# Patient Record
Sex: FEMALE | Race: WHITE | Employment: FULL TIME | URBAN - METROPOLITAN AREA
[De-identification: names, ages, dates, MRNs, and addresses within clinical notes are randomized per-mention and may not be internally consistent; named-entity substitution may affect disease eponyms.]

---

## 2017-01-05 ENCOUNTER — HOSPITAL ENCOUNTER (OUTPATIENT)
Dept: PAIN MANAGEMENT | Age: 62
Discharge: HOME | End: 2017-01-05
Attending: PAIN MEDICINE | Admitting: PAIN MEDICINE

## 2017-01-05 VITALS
OXYGEN SATURATION: 98 % | DIASTOLIC BLOOD PRESSURE: 86 MMHG | TEMPERATURE: 97.5 F | RESPIRATION RATE: 16 BRPM | WEIGHT: 130 LBS | SYSTOLIC BLOOD PRESSURE: 130 MMHG | BODY MASS INDEX: 20.89 KG/M2 | HEIGHT: 66 IN | HEART RATE: 70 BPM

## 2017-01-05 ASSESSMENT — PAIN - FUNCTIONAL ASSESSMENT: PAIN_FUNCTIONAL_ASSESSMENT: 0-10

## 2017-01-05 ASSESSMENT — ACTIVITIES OF DAILY LIVING (ADL): EFFECT OF PAIN ON DAILY ACTIVITIES: WALKING AND STANDING

## 2017-01-05 ASSESSMENT — PAIN DESCRIPTION - DESCRIPTORS: DESCRIPTORS: ACHING

## 2017-02-16 ENCOUNTER — HOSPITAL ENCOUNTER (OUTPATIENT)
Dept: PAIN MANAGEMENT | Age: 62
Discharge: HOME | End: 2017-02-16
Attending: PAIN MEDICINE | Admitting: PAIN MEDICINE

## 2017-02-16 VITALS
HEART RATE: 71 BPM | SYSTOLIC BLOOD PRESSURE: 139 MMHG | RESPIRATION RATE: 16 BRPM | DIASTOLIC BLOOD PRESSURE: 93 MMHG | OXYGEN SATURATION: 100 % | HEIGHT: 66 IN | TEMPERATURE: 98.4 F | WEIGHT: 130 LBS | BODY MASS INDEX: 20.89 KG/M2

## 2017-02-16 ASSESSMENT — PAIN DESCRIPTION - DESCRIPTORS: DESCRIPTORS: BURNING;SHARP;STABBING

## 2017-02-16 ASSESSMENT — PAIN - FUNCTIONAL ASSESSMENT: PAIN_FUNCTIONAL_ASSESSMENT: 0-10

## 2017-02-16 ASSESSMENT — ACTIVITIES OF DAILY LIVING (ADL): EFFECT OF PAIN ON DAILY ACTIVITIES: EVERYTHING

## 2017-03-23 ENCOUNTER — HOSPITAL ENCOUNTER (OUTPATIENT)
Dept: PAIN MANAGEMENT | Age: 62
Discharge: HOME | End: 2017-03-23
Attending: PAIN MEDICINE | Admitting: PAIN MEDICINE

## 2017-03-23 VITALS
TEMPERATURE: 98 F | BODY MASS INDEX: 20.89 KG/M2 | RESPIRATION RATE: 16 BRPM | WEIGHT: 130 LBS | HEART RATE: 77 BPM | DIASTOLIC BLOOD PRESSURE: 70 MMHG | HEIGHT: 66 IN | OXYGEN SATURATION: 98 % | SYSTOLIC BLOOD PRESSURE: 120 MMHG

## 2017-03-23 PROCEDURE — 64636 DESTROY L/S FACET JNT ADDL: CPT | Performed by: PAIN MEDICINE

## 2017-03-23 PROCEDURE — 77003 FLUOROGUIDE FOR SPINE INJECT: CPT | Performed by: PAIN MEDICINE

## 2017-03-23 PROCEDURE — 64635 DESTROY LUMB/SAC FACET JNT: CPT | Performed by: PAIN MEDICINE

## 2017-03-23 RX ORDER — SODIUM CHLORIDE, SODIUM LACTATE, POTASSIUM CHLORIDE, CALCIUM CHLORIDE 600; 310; 30; 20 MG/100ML; MG/100ML; MG/100ML; MG/100ML
INJECTION, SOLUTION INTRAVENOUS CONTINUOUS
Status: DISCONTINUED | OUTPATIENT
Start: 2017-03-23 | End: 2017-03-24 | Stop reason: HOSPADM

## 2017-03-23 RX ADMIN — SODIUM CHLORIDE, SODIUM LACTATE, POTASSIUM CHLORIDE, CALCIUM CHLORIDE: 600; 310; 30; 20 INJECTION, SOLUTION INTRAVENOUS at 11:15

## 2017-03-23 ASSESSMENT — PAIN - FUNCTIONAL ASSESSMENT: PAIN_FUNCTIONAL_ASSESSMENT: 0-10

## 2017-03-23 ASSESSMENT — ACTIVITIES OF DAILY LIVING (ADL): EFFECT OF PAIN ON DAILY ACTIVITIES: WALKING

## 2017-03-23 ASSESSMENT — PAIN DESCRIPTION - DESCRIPTORS: DESCRIPTORS: SHARP

## 2017-05-25 ENCOUNTER — HOSPITAL ENCOUNTER (OUTPATIENT)
Dept: PAIN MANAGEMENT | Age: 62
Discharge: HOME | End: 2017-05-25
Attending: PAIN MEDICINE | Admitting: PAIN MEDICINE

## 2017-05-25 VITALS
BODY MASS INDEX: 16.07 KG/M2 | RESPIRATION RATE: 16 BRPM | HEART RATE: 84 BPM | HEIGHT: 66 IN | OXYGEN SATURATION: 98 % | DIASTOLIC BLOOD PRESSURE: 82 MMHG | WEIGHT: 100 LBS | SYSTOLIC BLOOD PRESSURE: 138 MMHG | TEMPERATURE: 98 F

## 2017-05-25 PROCEDURE — 64483 NJX AA&/STRD TFRM EPI L/S 1: CPT | Performed by: PAIN MEDICINE

## 2017-05-25 PROCEDURE — 64484 NJX AA&/STRD TFRM EPI L/S EA: CPT | Performed by: PAIN MEDICINE

## 2017-05-25 ASSESSMENT — PAIN - FUNCTIONAL ASSESSMENT
PAIN_FUNCTIONAL_ASSESSMENT: 0-10
PAIN_FUNCTIONAL_ASSESSMENT: 0-10

## 2017-05-25 ASSESSMENT — ACTIVITIES OF DAILY LIVING (ADL): EFFECT OF PAIN ON DAILY ACTIVITIES: WALKING

## 2017-05-25 ASSESSMENT — PAIN DESCRIPTION - DESCRIPTORS: DESCRIPTORS: ACHING;BURNING

## 2017-07-12 ENCOUNTER — HOSPITAL ENCOUNTER (OUTPATIENT)
Dept: ENDOSCOPY | Age: 62
Discharge: HOME | End: 2017-07-12
Attending: INTERNAL MEDICINE | Admitting: INTERNAL MEDICINE

## 2017-07-12 VITALS
TEMPERATURE: 98.5 F | DIASTOLIC BLOOD PRESSURE: 74 MMHG | RESPIRATION RATE: 20 BRPM | BODY MASS INDEX: 20.89 KG/M2 | HEIGHT: 66 IN | HEART RATE: 80 BPM | SYSTOLIC BLOOD PRESSURE: 116 MMHG | OXYGEN SATURATION: 100 % | WEIGHT: 130 LBS

## 2019-05-25 ENCOUNTER — APPOINTMENT (OUTPATIENT)
Dept: GENERAL RADIOLOGY | Age: 64
End: 2019-05-25
Payer: COMMERCIAL

## 2019-05-25 ENCOUNTER — APPOINTMENT (OUTPATIENT)
Dept: CT IMAGING | Age: 64
End: 2019-05-25
Payer: COMMERCIAL

## 2019-05-25 ENCOUNTER — HOSPITAL ENCOUNTER (EMERGENCY)
Age: 64
Discharge: HOME | End: 2019-05-25
Attending: EMERGENCY MEDICINE
Payer: COMMERCIAL

## 2019-05-25 VITALS
TEMPERATURE: 99.9 F | OXYGEN SATURATION: 97 % | SYSTOLIC BLOOD PRESSURE: 91 MMHG | HEART RATE: 95 BPM | BODY MASS INDEX: 20.98 KG/M2 | DIASTOLIC BLOOD PRESSURE: 67 MMHG | WEIGHT: 130 LBS | RESPIRATION RATE: 18 BRPM

## 2019-05-25 DIAGNOSIS — S40.011A CONTUSION OF RIGHT SHOULDER, INITIAL ENCOUNTER: ICD-10-CM

## 2019-05-25 DIAGNOSIS — S30.0XXA CONTUSION OF COCCYX, INITIAL ENCOUNTER: ICD-10-CM

## 2019-05-25 DIAGNOSIS — S09.90XA INJURY OF HEAD, INITIAL ENCOUNTER: Primary | ICD-10-CM

## 2019-05-25 PROCEDURE — 70450 CT HEAD/BRAIN W/O DYE: CPT

## 2019-05-25 PROCEDURE — 73030 X-RAY EXAM OF SHOULDER: CPT

## 2019-05-25 PROCEDURE — 6370000000 HC RX 637 (ALT 250 FOR IP): Performed by: EMERGENCY MEDICINE

## 2019-05-25 PROCEDURE — 72100 X-RAY EXAM L-S SPINE 2/3 VWS: CPT

## 2019-05-25 PROCEDURE — 99284 EMERGENCY DEPT VISIT MOD MDM: CPT

## 2019-05-25 RX ORDER — PROCHLORPERAZINE MALEATE 10 MG
10 TABLET ORAL EVERY 6 HOURS PRN
Qty: 21 TABLET | Refills: 0 | Status: SHIPPED | OUTPATIENT
Start: 2019-05-25 | End: 2019-12-09 | Stop reason: ALTCHOICE

## 2019-05-25 RX ORDER — HYDROCODONE BITARTRATE AND ACETAMINOPHEN 5; 325 MG/1; MG/1
1 TABLET ORAL ONCE
Status: COMPLETED | OUTPATIENT
Start: 2019-05-25 | End: 2019-05-25

## 2019-05-25 RX ADMIN — HYDROCODONE BITARTRATE AND ACETAMINOPHEN 1 TABLET: 5; 325 TABLET ORAL at 02:42

## 2019-05-25 ASSESSMENT — ENCOUNTER SYMPTOMS
ABDOMINAL PAIN: 0
BACK PAIN: 1
SHORTNESS OF BREATH: 0

## 2019-05-25 ASSESSMENT — PAIN SCALES - GENERAL: PAINLEVEL_OUTOF10: 9

## 2019-05-25 ASSESSMENT — PAIN DESCRIPTION - PAIN TYPE: TYPE: ACUTE PAIN

## 2019-05-25 ASSESSMENT — PAIN DESCRIPTION - LOCATION: LOCATION: HEAD

## 2019-05-25 NOTE — ED NOTES
Pt discharged to home, alert and oriented. Denies any questions about discharge instructions. Will follow up as directed. encouraged to return for any worsening symptoms.         Zaira Jewell RN  05/25/19 [id number]

## 2019-05-25 NOTE — ED PROVIDER NOTES
[facility]          ATTENDING PHYSICIAN NOTE       Date of evaluation: 5/25/2019    Chief Complaint     Fall (fell two days ago, head pain, tailbone pain; evaluated and imaged at [facility])      History of Present Illness     Nisha Kurtz is a 59 y.o. female who presents with right shoulder pain and headache in buttock pain after a mechanical fall a few days ago. She states that she was briefly knocked out for a period of a few seconds but otherwise has good recall of events. The headache is dull achy no other aggravating or relieving factors not associate with any focal neurologic complaints nausea or photophobia. She denies any neck pain. The right shoulder pain is dull achy minimally worse with movement certainly by rest with no other aggravating leading factors. Denies any focal neurologic complaints. Review of Systems     Review of Systems   Constitutional: Negative for chills and fever. Respiratory: Negative for shortness of breath. Cardiovascular: Negative for chest pain and palpitations. Gastrointestinal: Negative for abdominal pain. Musculoskeletal: Positive for back pain. Neurological: Positive for headaches. Negative for weakness and numbness. All other systems reviewed and are negative. Past Medical, Surgical, Family, and Social History     She has a past medical history of Chronic back pain, Depression, and Fracture. She has a past surgical history that includes Neck surgery; Tubal ligation; and Cholecystectomy. Her family history is not on file. She reports that she has quit smoking. She does not have any smokeless tobacco history on file. She reports that she drinks alcohol. She reports that she does not use drugs. Medications     Previous Medications    ACYCLOVIR (ZOVIRAX) 200 MG CAPSULE    Take 200 mg by mouth every 4 hours (while awake).       DULOXETINE HCL (CYMBALTA PO)    Take by mouth    GABAPENTIN PO    Take by mouth IBUPROFEN (IBU) 600 MG TABLET    Take 1 tablet by mouth every 8 hours as needed for Pain    MULTIVITAMIN (THERAGRAN) PER TABLET    Take 1 tablet by mouth daily. OXYCODONE-ACETAMINOPHEN (PERCOCET) 5-325 MG PER TABLET    Take 1 tablet by mouth 2 times daily  Fill on or After 03/22/17. OXYCODONE-ACETAMINOPHEN (PERCOCET) 5-325 MG PER TABLET    Take 1 tablet by mouth 2 times daily  [facility] RX [id number]. Allergies     She is allergic to latex; vicodin [hydrocodone-acetaminophen]; and zocor [simvastatin]. Physical Exam     INITIAL VITALS: BP: 121/67, Temp: 99.9 °F (37.7 °C), Pulse: 91, Resp: 20, SpO2: 100 %    Physical Exam   Constitutional: She is oriented to person, place, and time. She appears well-developed and well-nourished. HENT:   Head: Normocephalic and atraumatic. Right Ear: External ear normal.   Left Ear: External ear normal.   Eyes: Pupils are equal, round, and reactive to light. EOM are normal.   Neck: Normal range of motion and full passive range of motion without pain. Neck supple. No spinous process tenderness and no muscular tenderness present. No neck rigidity. Normal range of motion present. Cardiovascular: Normal rate, regular rhythm, normal heart sounds and intact distal pulses. Exam reveals no gallop and no friction rub. No murmur heard. Pulmonary/Chest: Effort normal and breath sounds normal.   Abdominal: Soft. Musculoskeletal: Normal range of motion. Back:    Neurological: She is alert and oriented to person, place, and time. Skin: Skin is warm and dry. Capillary refill takes less than 2 seconds. Psychiatric: She has a normal mood and affect. Her behavior is normal.       Diagnostic Results     EKG       RADIOLOGY:  XR SHOULDER RIGHT (MIN 2 VIEWS)   Final Result     No acute findings. XR LUMBAR SPINE (2-3 VIEWS)   Final Result       Mild levoscoliosis. No acute fracture.   Multilevel moderate degenerative    disc disease with disc height loss and facet arthrosis. Grade 1    anterolisthesis of L3 on L4, L4 on L5, and L5 on S1.      CT HEAD WO CONTRAST   Final Result     No acute hemorrhage, hydrocephalus, or mass effect. LABS:   No results found for this visit on 05/25/19. ED BEDSIDE ULTRASOUND:      RECENT VITALS:  BP: 121/67,Temp: 99.9 °F (37.7 °C), Pulse: 91, Resp: 20, SpO2: 100 %     Procedures     The above history and physical exam are performed. I reviewed her past medical past social and family history. She was given appropriate analgesia in the emergency department. ED Course     Nursing Notes, Past Medical Hx, Past Surgical Hx, Social Hx,Allergies, and Family Hx were reviewed. The patient was given the following medications:  Orders Placed This Encounter   Medications    HYDROcodone-acetaminophen (NORCO) 5-325 MG per tablet 1 tablet       CONSULTS:  None    MEDICAL DECISIONMAKING / ASSESSMENT / Sheila Ave is a 59 y.o. female with history of mechanical fall with minimal head trauma probably a grade 1 concussion. Her films were essentially unremarkable slightly she is stable for discharge and to be safely follow-up on outpatient basis. She is given appropriate analgesia in the emergency department. .      Clinical Impression     1. Injury of head, initial encounter    2. Contusion of right shoulder, initial encounter    3. Contusion of coccyx, initial encounter        Disposition     PATIENT REFERRED TO:  No follow-up provider specified.     DISCHARGE MEDICATIONS:  New Prescriptions    No medications on file       DISPOSITION Decision To Discharge 05/25/2019 03:49:26 AM        Isis Barcenas MD  05/25/19 1170

## 2019-05-25 NOTE — LETTER
[facility] Emergency Department  [address] [place] [address]  Phone: [phone]  Fax: [phone]         May 25, 2019     Patient: Ally Guerrero   YOB: 1955   Date of Visit: 5/25/2019       To Whom It May Concern:    Fan Nieto was seen and treated in our emergency department on 5/25/2019. The following work duties are recommended.     She may return to work on 5/27/19          Sincerely,        Esa Lepe RN        Signature:__________________________________

## 2019-05-26 ENCOUNTER — ANESTHESIA EVENT (OUTPATIENT)
Dept: OPERATING ROOM | Age: 64
DRG: 463 | End: 2019-05-26
Payer: COMMERCIAL

## 2019-05-26 ENCOUNTER — APPOINTMENT (OUTPATIENT)
Dept: CT IMAGING | Age: 64
DRG: 463 | End: 2019-05-26
Payer: COMMERCIAL

## 2019-05-26 ENCOUNTER — APPOINTMENT (OUTPATIENT)
Dept: GENERAL RADIOLOGY | Age: 64
DRG: 463 | End: 2019-05-26
Payer: COMMERCIAL

## 2019-05-26 ENCOUNTER — ANESTHESIA (OUTPATIENT)
Dept: OPERATING ROOM | Age: 64
DRG: 463 | End: 2019-05-26
Payer: COMMERCIAL

## 2019-05-26 ENCOUNTER — HOSPITAL ENCOUNTER (INPATIENT)
Age: 64
LOS: 16 days | Discharge: NURSING, REHAB OR LONG-TERM CARE | DRG: 463 | End: 2019-06-11
Attending: EMERGENCY MEDICINE | Admitting: SURGERY
Payer: COMMERCIAL

## 2019-05-26 VITALS — DIASTOLIC BLOOD PRESSURE: 38 MMHG | SYSTOLIC BLOOD PRESSURE: 68 MMHG | TEMPERATURE: 101.3 F | OXYGEN SATURATION: 96 %

## 2019-05-26 DIAGNOSIS — L03.315 CELLULITIS OF PERINEUM: ICD-10-CM

## 2019-05-26 DIAGNOSIS — L03.317 CELLULITIS OF GLUTEAL REGION: Primary | ICD-10-CM

## 2019-05-26 LAB
ANION GAP SERPL CALCULATED.3IONS-SCNC: 12 MMOL/L (ref 3–16)
ANION GAP SERPL CALCULATED.3IONS-SCNC: 16 MMOL/L (ref 3–16)
ATYPICAL LYMPHOCYTE RELATIVE PERCENT: 1 % (ref 0–6)
BANDED NEUTROPHILS RELATIVE PERCENT: 20 % (ref 0–7)
BANDED NEUTROPHILS RELATIVE PERCENT: 6 % (ref 0–7)
BASE EXCESS ARTERIAL: -6.7 MMOL/L (ref -3–3)
BASE EXCESS VENOUS: -2 (ref -3–3)
BASE EXCESS VENOUS: -3 (ref -3–3)
BASOPHILS ABSOLUTE: 0 K/UL (ref 0–0.2)
BASOPHILS ABSOLUTE: 0 K/UL (ref 0–0.2)
BASOPHILS RELATIVE PERCENT: 0 %
BASOPHILS RELATIVE PERCENT: 0 %
BUN BLDV-MCNC: 8 MG/DL (ref 7–20)
BUN BLDV-MCNC: 9 MG/DL (ref 7–20)
C-REACTIVE PROTEIN: 505.4 MG/L (ref 0–5.1)
CALCIUM SERPL-MCNC: 7.9 MG/DL (ref 8.3–10.6)
CALCIUM SERPL-MCNC: 9.4 MG/DL (ref 8.3–10.6)
CARBOXYHEMOGLOBIN ARTERIAL: 1.1 % (ref 0–1.5)
CHLORIDE BLD-SCNC: 101 MMOL/L (ref 99–110)
CHLORIDE BLD-SCNC: 106 MMOL/L (ref 99–110)
CO2: 19 MMOL/L (ref 21–32)
CO2: 21 MMOL/L (ref 21–32)
CREAT SERPL-MCNC: 0.7 MG/DL (ref 0.6–1.2)
CREAT SERPL-MCNC: 0.7 MG/DL (ref 0.6–1.2)
EOSINOPHILS ABSOLUTE: 0 K/UL (ref 0–0.6)
EOSINOPHILS ABSOLUTE: 0.1 K/UL (ref 0–0.6)
EOSINOPHILS RELATIVE PERCENT: 0 %
EOSINOPHILS RELATIVE PERCENT: 1 %
GFR AFRICAN AMERICAN: >60
GFR AFRICAN AMERICAN: >60
GFR NON-AFRICAN AMERICAN: >60
GFR NON-AFRICAN AMERICAN: >60
GLUCOSE BLD-MCNC: 117 MG/DL (ref 70–99)
GLUCOSE BLD-MCNC: 126 MG/DL (ref 70–99)
HCO3 ARTERIAL: 19 MMOL/L (ref 21–29)
HCO3 VENOUS: 22.1 MMOL/L (ref 23–29)
HCO3 VENOUS: 22.2 MMOL/L (ref 23–29)
HCT VFR BLD CALC: 28.7 % (ref 36–48)
HCT VFR BLD CALC: 34.3 % (ref 36–48)
HEMOGLOBIN, ART, EXTENDED: 8.8 G/DL
HEMOGLOBIN: 11.2 G/DL (ref 12–16)
HEMOGLOBIN: 9.4 G/DL (ref 12–16)
LACTATE: 1.32 MMOL/L (ref 0.4–2)
LACTATE: 1.99 MMOL/L (ref 0.4–2)
LACTIC ACID: 1.8 MMOL/L (ref 0.4–2)
LACTIC ACID: 2.7 MMOL/L (ref 0.4–2)
LYMPHOCYTES ABSOLUTE: 0.8 K/UL (ref 1–5.1)
LYMPHOCYTES ABSOLUTE: 1 K/UL (ref 1–5.1)
LYMPHOCYTES RELATIVE PERCENT: 5 %
LYMPHOCYTES RELATIVE PERCENT: 7 %
MAGNESIUM: 1.2 MG/DL (ref 1.8–2.4)
MCH RBC QN AUTO: 30.1 PG (ref 26–34)
MCH RBC QN AUTO: 30.3 PG (ref 26–34)
MCHC RBC AUTO-ENTMCNC: 32.6 G/DL (ref 31–36)
MCHC RBC AUTO-ENTMCNC: 32.7 G/DL (ref 31–36)
MCV RBC AUTO: 92.3 FL (ref 80–100)
MCV RBC AUTO: 92.7 FL (ref 80–100)
METAMYELOCYTES RELATIVE PERCENT: 3 %
METHEMOGLOBIN ARTERIAL: 0.9 % (ref 0–1.4)
MONOCYTES ABSOLUTE: 0.8 K/UL (ref 0–1.3)
MONOCYTES ABSOLUTE: 1.1 K/UL (ref 0–1.3)
MONOCYTES RELATIVE PERCENT: 6 %
MONOCYTES RELATIVE PERCENT: 8 %
MYELOCYTE PERCENT: 1 %
MYELOCYTE PERCENT: 1 %
NEUTROPHILS ABSOLUTE: 11 K/UL (ref 1.7–7.7)
NEUTROPHILS ABSOLUTE: 11.6 K/UL (ref 1.7–7.7)
NEUTROPHILS RELATIVE PERCENT: 61 %
NEUTROPHILS RELATIVE PERCENT: 80 %
O2 SAT, ARTERIAL: 99 % (ref 93–100)
O2 SAT, VEN: 49 %
O2 SAT, VEN: 50 %
PCO2 ARTERIAL: 43.5 MMHG (ref 35–45)
PCO2, VEN: 31.3 MM HG (ref 40–50)
PCO2, VEN: 36.9 MM HG (ref 40–50)
PDW BLD-RTO: 13.1 % (ref 12.4–15.4)
PDW BLD-RTO: 13.3 % (ref 12.4–15.4)
PERFORMED ON: ABNORMAL
PERFORMED ON: ABNORMAL
PH ARTERIAL: 7.27 (ref 7.35–7.45)
PH VENOUS: 7.38 (ref 7.35–7.45)
PH VENOUS: 7.46 (ref 7.35–7.45)
PLATELET # BLD: 243 K/UL (ref 135–450)
PLATELET # BLD: 291 K/UL (ref 135–450)
PMV BLD AUTO: 8.1 FL (ref 5–10.5)
PMV BLD AUTO: 8.3 FL (ref 5–10.5)
PO2 ARTERIAL: 190 MMHG (ref 75–108)
PO2, VEN: 25 MM HG
PO2, VEN: 27 MM HG
POC SAMPLE TYPE: ABNORMAL
POC SAMPLE TYPE: ABNORMAL
POTASSIUM REFLEX MAGNESIUM: 4 MMOL/L (ref 3.5–5.1)
POTASSIUM REFLEX MAGNESIUM: 4.1 MMOL/L (ref 3.5–5.1)
RBC # BLD: 3.1 M/UL (ref 4–5.2)
RBC # BLD: 3.71 M/UL (ref 4–5.2)
RBC # BLD: NORMAL 10*6/UL
RBC # BLD: NORMAL 10*6/UL
REASON FOR REJECTION: NORMAL
REJECTED TEST: NORMAL
SODIUM BLD-SCNC: 137 MMOL/L (ref 136–145)
SODIUM BLD-SCNC: 138 MMOL/L (ref 136–145)
TCO2 ARTERIAL: 21 MMOL/L
TCO2 CALC VENOUS: 23 MMOL/L
TCO2 CALC VENOUS: 23 MMOL/L
WBC # BLD: 12.7 K/UL (ref 4–11)
WBC # BLD: 13.7 K/UL (ref 4–11)

## 2019-05-26 PROCEDURE — 6360000002 HC RX W HCPCS: Performed by: SURGERY

## 2019-05-26 PROCEDURE — 3700000000 HC ANESTHESIA ATTENDED CARE: Performed by: SURGERY

## 2019-05-26 PROCEDURE — 37799 UNLISTED PX VASCULAR SURGERY: CPT

## 2019-05-26 PROCEDURE — 87116 MYCOBACTERIA CULTURE: CPT

## 2019-05-26 PROCEDURE — 87040 BLOOD CULTURE FOR BACTERIA: CPT

## 2019-05-26 PROCEDURE — 3700000001 HC ADD 15 MINUTES (ANESTHESIA): Performed by: SURGERY

## 2019-05-26 PROCEDURE — 7100000001 HC PACU RECOVERY - ADDTL 15 MIN: Performed by: SURGERY

## 2019-05-26 PROCEDURE — 96375 TX/PRO/DX INJ NEW DRUG ADDON: CPT

## 2019-05-26 PROCEDURE — 96376 TX/PRO/DX INJ SAME DRUG ADON: CPT

## 2019-05-26 PROCEDURE — 86850 RBC ANTIBODY SCREEN: CPT

## 2019-05-26 PROCEDURE — 86140 C-REACTIVE PROTEIN: CPT

## 2019-05-26 PROCEDURE — 2500000003 HC RX 250 WO HCPCS: Performed by: ANESTHESIOLOGY

## 2019-05-26 PROCEDURE — 0JBB0ZZ EXCISION OF PERINEUM SUBCUTANEOUS TISSUE AND FASCIA, OPEN APPROACH: ICD-10-PCS | Performed by: STUDENT IN AN ORGANIZED HEALTH CARE EDUCATION/TRAINING PROGRAM

## 2019-05-26 PROCEDURE — 86900 BLOOD TYPING SEROLOGIC ABO: CPT

## 2019-05-26 PROCEDURE — 2709999900 HC NON-CHARGEABLE SUPPLY: Performed by: SURGERY

## 2019-05-26 PROCEDURE — 71045 X-RAY EXAM CHEST 1 VIEW: CPT

## 2019-05-26 PROCEDURE — 2580000003 HC RX 258: Performed by: STUDENT IN AN ORGANIZED HEALTH CARE EDUCATION/TRAINING PROGRAM

## 2019-05-26 PROCEDURE — 86901 BLOOD TYPING SEROLOGIC RH(D): CPT

## 2019-05-26 PROCEDURE — 36415 COLL VENOUS BLD VENIPUNCTURE: CPT

## 2019-05-26 PROCEDURE — 2500000003 HC RX 250 WO HCPCS: Performed by: INTERNAL MEDICINE

## 2019-05-26 PROCEDURE — 2580000003 HC RX 258: Performed by: INTERNAL MEDICINE

## 2019-05-26 PROCEDURE — 6360000002 HC RX W HCPCS: Performed by: STUDENT IN AN ORGANIZED HEALTH CARE EDUCATION/TRAINING PROGRAM

## 2019-05-26 PROCEDURE — 87206 SMEAR FLUORESCENT/ACID STAI: CPT

## 2019-05-26 PROCEDURE — 96361 HYDRATE IV INFUSION ADD-ON: CPT

## 2019-05-26 PROCEDURE — 80048 BASIC METABOLIC PNL TOTAL CA: CPT

## 2019-05-26 PROCEDURE — 87077 CULTURE AEROBIC IDENTIFY: CPT

## 2019-05-26 PROCEDURE — 87102 FUNGUS ISOLATION CULTURE: CPT

## 2019-05-26 PROCEDURE — 86403 PARTICLE AGGLUT ANTBDY SCRN: CPT

## 2019-05-26 PROCEDURE — 6360000002 HC RX W HCPCS: Performed by: INTERNAL MEDICINE

## 2019-05-26 PROCEDURE — 3600000013 HC SURGERY LEVEL 3 ADDTL 15MIN: Performed by: SURGERY

## 2019-05-26 PROCEDURE — 6370000000 HC RX 637 (ALT 250 FOR IP): Performed by: ANESTHESIOLOGY

## 2019-05-26 PROCEDURE — 83735 ASSAY OF MAGNESIUM: CPT

## 2019-05-26 PROCEDURE — 11004 DBRDMT SKIN XTRNL GENT&PER: CPT | Performed by: SURGERY

## 2019-05-26 PROCEDURE — 7100000000 HC PACU RECOVERY - FIRST 15 MIN: Performed by: SURGERY

## 2019-05-26 PROCEDURE — 70450 CT HEAD/BRAIN W/O DYE: CPT

## 2019-05-26 PROCEDURE — 87070 CULTURE OTHR SPECIMN AEROBIC: CPT

## 2019-05-26 PROCEDURE — 87015 SPECIMEN INFECT AGNT CONCNTJ: CPT

## 2019-05-26 PROCEDURE — 99253 IP/OBS CNSLTJ NEW/EST LOW 45: CPT | Performed by: SURGERY

## 2019-05-26 PROCEDURE — 72170 X-RAY EXAM OF PELVIS: CPT

## 2019-05-26 PROCEDURE — 83036 HEMOGLOBIN GLYCOSYLATED A1C: CPT

## 2019-05-26 PROCEDURE — 99285 EMERGENCY DEPT VISIT HI MDM: CPT

## 2019-05-26 PROCEDURE — 82803 BLOOD GASES ANY COMBINATION: CPT

## 2019-05-26 PROCEDURE — 83605 ASSAY OF LACTIC ACID: CPT

## 2019-05-26 PROCEDURE — 2000000000 HC ICU R&B

## 2019-05-26 PROCEDURE — 96365 THER/PROPH/DIAG IV INF INIT: CPT

## 2019-05-26 PROCEDURE — 72193 CT PELVIS W/DYE: CPT

## 2019-05-26 PROCEDURE — 85025 COMPLETE CBC W/AUTO DIFF WBC: CPT

## 2019-05-26 PROCEDURE — 6360000004 HC RX CONTRAST MEDICATION: Performed by: EMERGENCY MEDICINE

## 2019-05-26 PROCEDURE — 3600000003 HC SURGERY LEVEL 3 BASE: Performed by: SURGERY

## 2019-05-26 PROCEDURE — 87205 SMEAR GRAM STAIN: CPT

## 2019-05-26 PROCEDURE — 2580000003 HC RX 258: Performed by: SURGERY

## 2019-05-26 PROCEDURE — 6360000002 HC RX W HCPCS: Performed by: ANESTHESIOLOGY

## 2019-05-26 PROCEDURE — 87186 SC STD MICRODIL/AGAR DIL: CPT

## 2019-05-26 RX ORDER — DICLOFENAC SODIUM 75 MG/1
75 TABLET, DELAYED RELEASE ORAL 2 TIMES DAILY
Status: ON HOLD | COMMUNITY
End: 2019-12-10 | Stop reason: HOSPADM

## 2019-05-26 RX ORDER — LACTOBACILLUS RHAMNOSUS GG 10B CELL
1 CAPSULE ORAL 2 TIMES DAILY
Status: DISCONTINUED | OUTPATIENT
Start: 2019-05-26 | End: 2019-05-26

## 2019-05-26 RX ORDER — DEXAMETHASONE SODIUM PHOSPHATE 4 MG/ML
INJECTION, SOLUTION INTRA-ARTICULAR; INTRALESIONAL; INTRAMUSCULAR; INTRAVENOUS; SOFT TISSUE PRN
Status: DISCONTINUED | OUTPATIENT
Start: 2019-05-26 | End: 2019-05-26 | Stop reason: SDUPTHER

## 2019-05-26 RX ORDER — OXYCODONE HYDROCHLORIDE 5 MG/1
5 TABLET ORAL EVERY 8 HOURS PRN
Status: DISCONTINUED | OUTPATIENT
Start: 2019-05-26 | End: 2019-05-26

## 2019-05-26 RX ORDER — MINERAL OIL AND WHITE PETROLATUM 150; 830 MG/G; MG/G
OINTMENT OPHTHALMIC PRN
Status: DISCONTINUED | OUTPATIENT
Start: 2019-05-26 | End: 2019-05-26 | Stop reason: SDUPTHER

## 2019-05-26 RX ORDER — OXYCODONE HYDROCHLORIDE 5 MG/1
5 TABLET ORAL EVERY 8 HOURS PRN
Status: ON HOLD | COMMUNITY
End: 2019-06-11 | Stop reason: SDUPTHER

## 2019-05-26 RX ORDER — HYDRALAZINE HYDROCHLORIDE 20 MG/ML
5 INJECTION INTRAMUSCULAR; INTRAVENOUS EVERY 10 MIN PRN
Status: DISCONTINUED | OUTPATIENT
Start: 2019-05-26 | End: 2019-05-26 | Stop reason: HOSPADM

## 2019-05-26 RX ORDER — PROCHLORPERAZINE MALEATE 10 MG
10 TABLET ORAL EVERY 6 HOURS PRN
Status: DISCONTINUED | OUTPATIENT
Start: 2019-05-26 | End: 2019-05-26

## 2019-05-26 RX ORDER — ACYCLOVIR 200 MG/1
200 CAPSULE ORAL
Status: DISCONTINUED | OUTPATIENT
Start: 2019-05-26 | End: 2019-05-26

## 2019-05-26 RX ORDER — PROPOFOL 10 MG/ML
INJECTION, EMULSION INTRAVENOUS PRN
Status: DISCONTINUED | OUTPATIENT
Start: 2019-05-26 | End: 2019-05-26 | Stop reason: SDUPTHER

## 2019-05-26 RX ORDER — 0.9 % SODIUM CHLORIDE 0.9 %
1000 INTRAVENOUS SOLUTION INTRAVENOUS ONCE
Status: COMPLETED | OUTPATIENT
Start: 2019-05-26 | End: 2019-05-26

## 2019-05-26 RX ORDER — MIDAZOLAM HYDROCHLORIDE 1 MG/ML
INJECTION INTRAMUSCULAR; INTRAVENOUS PRN
Status: DISCONTINUED | OUTPATIENT
Start: 2019-05-26 | End: 2019-05-26 | Stop reason: SDUPTHER

## 2019-05-26 RX ORDER — GABAPENTIN 600 MG/1
600 TABLET ORAL 3 TIMES DAILY
COMMUNITY
End: 2021-07-02 | Stop reason: ALTCHOICE

## 2019-05-26 RX ORDER — HYDROMORPHONE HCL 110MG/55ML
PATIENT CONTROLLED ANALGESIA SYRINGE INTRAVENOUS PRN
Status: DISCONTINUED | OUTPATIENT
Start: 2019-05-26 | End: 2019-05-26 | Stop reason: SDUPTHER

## 2019-05-26 RX ORDER — ROCURONIUM BROMIDE 10 MG/ML
INJECTION, SOLUTION INTRAVENOUS PRN
Status: DISCONTINUED | OUTPATIENT
Start: 2019-05-26 | End: 2019-05-26 | Stop reason: SDUPTHER

## 2019-05-26 RX ORDER — MEPERIDINE HYDROCHLORIDE 25 MG/ML
12.5 INJECTION INTRAMUSCULAR; INTRAVENOUS; SUBCUTANEOUS EVERY 5 MIN PRN
Status: DISCONTINUED | OUTPATIENT
Start: 2019-05-26 | End: 2019-05-26 | Stop reason: HOSPADM

## 2019-05-26 RX ORDER — SODIUM CHLORIDE 0.9 % (FLUSH) 0.9 %
10 SYRINGE (ML) INJECTION PRN
Status: DISCONTINUED | OUTPATIENT
Start: 2019-05-26 | End: 2019-06-11 | Stop reason: HOSPADM

## 2019-05-26 RX ORDER — FENTANYL CITRATE 50 UG/ML
25 INJECTION, SOLUTION INTRAMUSCULAR; INTRAVENOUS EVERY 5 MIN PRN
Status: DISCONTINUED | OUTPATIENT
Start: 2019-05-26 | End: 2019-05-26 | Stop reason: HOSPADM

## 2019-05-26 RX ORDER — MAGNESIUM HYDROXIDE 1200 MG/15ML
LIQUID ORAL CONTINUOUS PRN
Status: COMPLETED | OUTPATIENT
Start: 2019-05-26 | End: 2019-05-26

## 2019-05-26 RX ORDER — 0.9 % SODIUM CHLORIDE 0.9 %
500 INTRAVENOUS SOLUTION INTRAVENOUS
Status: DISCONTINUED | OUTPATIENT
Start: 2019-05-26 | End: 2019-05-26 | Stop reason: HOSPADM

## 2019-05-26 RX ORDER — LIDOCAINE HYDROCHLORIDE 20 MG/ML
INJECTION, SOLUTION INTRAVENOUS PRN
Status: DISCONTINUED | OUTPATIENT
Start: 2019-05-26 | End: 2019-05-26 | Stop reason: SDUPTHER

## 2019-05-26 RX ORDER — FENTANYL CITRATE 50 UG/ML
INJECTION, SOLUTION INTRAMUSCULAR; INTRAVENOUS PRN
Status: DISCONTINUED | OUTPATIENT
Start: 2019-05-26 | End: 2019-05-26 | Stop reason: SDUPTHER

## 2019-05-26 RX ORDER — MORPHINE SULFATE 2 MG/ML
4 INJECTION, SOLUTION INTRAMUSCULAR; INTRAVENOUS EVERY 4 HOURS PRN
Status: DISCONTINUED | OUTPATIENT
Start: 2019-05-26 | End: 2019-05-26

## 2019-05-26 RX ORDER — CLINDAMYCIN PHOSPHATE 600 MG/50ML
600 INJECTION INTRAVENOUS EVERY 8 HOURS
Status: DISCONTINUED | OUTPATIENT
Start: 2019-05-26 | End: 2019-05-27

## 2019-05-26 RX ORDER — ONDANSETRON 2 MG/ML
4 INJECTION INTRAMUSCULAR; INTRAVENOUS EVERY 6 HOURS PRN
Status: DISCONTINUED | OUTPATIENT
Start: 2019-05-26 | End: 2019-06-11 | Stop reason: HOSPADM

## 2019-05-26 RX ORDER — LABETALOL 20 MG/4 ML (5 MG/ML) INTRAVENOUS SYRINGE
5 EVERY 10 MIN PRN
Status: DISCONTINUED | OUTPATIENT
Start: 2019-05-26 | End: 2019-05-26 | Stop reason: HOSPADM

## 2019-05-26 RX ORDER — IBUPROFEN 200 MG
400 TABLET ORAL EVERY 8 HOURS PRN
Status: DISCONTINUED | OUTPATIENT
Start: 2019-05-26 | End: 2019-05-26

## 2019-05-26 RX ORDER — VENLAFAXINE 100 MG/1
100 TABLET ORAL 2 TIMES DAILY
COMMUNITY

## 2019-05-26 RX ORDER — OXYCODONE HYDROCHLORIDE AND ACETAMINOPHEN 5; 325 MG/1; MG/1
1 TABLET ORAL ONCE
Status: DISCONTINUED | OUTPATIENT
Start: 2019-05-26 | End: 2019-05-26

## 2019-05-26 RX ORDER — SUCCINYLCHOLINE/SOD CL,ISO/PF 200MG/10ML
SYRINGE (ML) INTRAVENOUS PRN
Status: DISCONTINUED | OUTPATIENT
Start: 2019-05-26 | End: 2019-05-26 | Stop reason: SDUPTHER

## 2019-05-26 RX ORDER — ACETAMINOPHEN 325 MG/1
650 TABLET ORAL EVERY 4 HOURS PRN
Status: DISCONTINUED | OUTPATIENT
Start: 2019-05-26 | End: 2019-06-07

## 2019-05-26 RX ORDER — METOCLOPRAMIDE HYDROCHLORIDE 5 MG/ML
INJECTION INTRAMUSCULAR; INTRAVENOUS PRN
Status: DISCONTINUED | OUTPATIENT
Start: 2019-05-26 | End: 2019-05-26 | Stop reason: SDUPTHER

## 2019-05-26 RX ORDER — DIPHENHYDRAMINE HYDROCHLORIDE 50 MG/ML
12.5 INJECTION INTRAMUSCULAR; INTRAVENOUS
Status: DISCONTINUED | OUTPATIENT
Start: 2019-05-26 | End: 2019-05-26 | Stop reason: HOSPADM

## 2019-05-26 RX ORDER — GABAPENTIN 600 MG/1
600 TABLET ORAL 3 TIMES DAILY
Status: DISCONTINUED | OUTPATIENT
Start: 2019-05-26 | End: 2019-05-26

## 2019-05-26 RX ORDER — MORPHINE SULFATE 4 MG/ML
4 INJECTION, SOLUTION INTRAMUSCULAR; INTRAVENOUS ONCE
Status: COMPLETED | OUTPATIENT
Start: 2019-05-26 | End: 2019-05-26

## 2019-05-26 RX ORDER — ONDANSETRON 2 MG/ML
4 INJECTION INTRAMUSCULAR; INTRAVENOUS EVERY 30 MIN PRN
Status: DISCONTINUED | OUTPATIENT
Start: 2019-05-26 | End: 2019-05-26 | Stop reason: HOSPADM

## 2019-05-26 RX ORDER — SODIUM CHLORIDE, SODIUM LACTATE, POTASSIUM CHLORIDE, CALCIUM CHLORIDE 600; 310; 30; 20 MG/100ML; MG/100ML; MG/100ML; MG/100ML
INJECTION, SOLUTION INTRAVENOUS CONTINUOUS
Status: DISCONTINUED | OUTPATIENT
Start: 2019-05-26 | End: 2019-05-29

## 2019-05-26 RX ORDER — SODIUM CHLORIDE 0.9 % (FLUSH) 0.9 %
10 SYRINGE (ML) INJECTION EVERY 12 HOURS SCHEDULED
Status: DISCONTINUED | OUTPATIENT
Start: 2019-05-26 | End: 2019-06-11 | Stop reason: HOSPADM

## 2019-05-26 RX ORDER — SODIUM CHLORIDE 9 MG/ML
INJECTION, SOLUTION INTRAVENOUS CONTINUOUS
Status: DISCONTINUED | OUTPATIENT
Start: 2019-05-26 | End: 2019-05-26

## 2019-05-26 RX ORDER — PROMETHAZINE HYDROCHLORIDE 25 MG/ML
6.25 INJECTION, SOLUTION INTRAMUSCULAR; INTRAVENOUS
Status: DISCONTINUED | OUTPATIENT
Start: 2019-05-26 | End: 2019-05-26 | Stop reason: HOSPADM

## 2019-05-26 RX ORDER — M-VIT,TX,IRON,MINS/CALC/FOLIC 27MG-0.4MG
1 TABLET ORAL DAILY
Status: DISCONTINUED | OUTPATIENT
Start: 2019-05-26 | End: 2019-05-26

## 2019-05-26 RX ADMIN — PHENYLEPHRINE HYDROCHLORIDE 160 MCG: 10 INJECTION, SOLUTION INTRAMUSCULAR; INTRAVENOUS; SUBCUTANEOUS at 21:26

## 2019-05-26 RX ADMIN — PHENYLEPHRINE HYDROCHLORIDE 160 MCG: 10 INJECTION, SOLUTION INTRAMUSCULAR; INTRAVENOUS; SUBCUTANEOUS at 21:35

## 2019-05-26 RX ADMIN — PHENYLEPHRINE HYDROCHLORIDE 160 MCG: 10 INJECTION, SOLUTION INTRAMUSCULAR; INTRAVENOUS; SUBCUTANEOUS at 21:20

## 2019-05-26 RX ADMIN — CLINDAMYCIN PHOSPHATE 600 MG: 600 INJECTION, SOLUTION INTRAVENOUS at 19:03

## 2019-05-26 RX ADMIN — PHENYLEPHRINE HYDROCHLORIDE 160 MCG: 10 INJECTION, SOLUTION INTRAMUSCULAR; INTRAVENOUS; SUBCUTANEOUS at 20:54

## 2019-05-26 RX ADMIN — PHENYLEPHRINE HYDROCHLORIDE 320 MCG: 10 INJECTION, SOLUTION INTRAMUSCULAR; INTRAVENOUS; SUBCUTANEOUS at 21:42

## 2019-05-26 RX ADMIN — MORPHINE SULFATE 4 MG: 4 INJECTION INTRAVENOUS at 15:34

## 2019-05-26 RX ADMIN — FENTANYL CITRATE 100 MCG: 50 INJECTION INTRAMUSCULAR; INTRAVENOUS at 20:26

## 2019-05-26 RX ADMIN — SODIUM CHLORIDE 1000 ML: 9 INJECTION, SOLUTION INTRAVENOUS at 18:12

## 2019-05-26 RX ADMIN — Medication 120 MG: at 20:26

## 2019-05-26 RX ADMIN — LIDOCAINE HYDROCHLORIDE 100 MG: 20 INJECTION, SOLUTION INTRAVENOUS at 20:26

## 2019-05-26 RX ADMIN — Medication 10 ML: at 23:41

## 2019-05-26 RX ADMIN — MORPHINE SULFATE 4 MG: 4 INJECTION INTRAVENOUS at 13:18

## 2019-05-26 RX ADMIN — PHENYLEPHRINE HYDROCHLORIDE 320 MCG: 10 INJECTION, SOLUTION INTRAMUSCULAR; INTRAVENOUS; SUBCUTANEOUS at 21:48

## 2019-05-26 RX ADMIN — PROPOFOL 150 MG: 10 INJECTION, EMULSION INTRAVENOUS at 20:26

## 2019-05-26 RX ADMIN — MIDAZOLAM HYDROCHLORIDE 2 MG: 2 INJECTION, SOLUTION INTRAMUSCULAR; INTRAVENOUS at 20:25

## 2019-05-26 RX ADMIN — PHENYLEPHRINE HYDROCHLORIDE 80 MCG: 10 INJECTION, SOLUTION INTRAMUSCULAR; INTRAVENOUS; SUBCUTANEOUS at 22:00

## 2019-05-26 RX ADMIN — VANCOMYCIN HYDROCHLORIDE 1250 MG: 10 INJECTION, POWDER, LYOPHILIZED, FOR SOLUTION INTRAVENOUS at 14:54

## 2019-05-26 RX ADMIN — DEXAMETHASONE SODIUM PHOSPHATE 8 MG: 4 INJECTION, SOLUTION INTRAMUSCULAR; INTRAVENOUS at 21:03

## 2019-05-26 RX ADMIN — SUGAMMADEX 2 MG: 100 INJECTION, SOLUTION INTRAVENOUS at 21:39

## 2019-05-26 RX ADMIN — PHENYLEPHRINE HYDROCHLORIDE 160 MCG: 10 INJECTION, SOLUTION INTRAMUSCULAR; INTRAVENOUS; SUBCUTANEOUS at 21:07

## 2019-05-26 RX ADMIN — SODIUM CHLORIDE 1000 ML: 9 INJECTION, SOLUTION INTRAVENOUS at 13:18

## 2019-05-26 RX ADMIN — PIPERACILLIN SODIUM,TAZOBACTAM SODIUM 3.38 G: 3; .375 INJECTION, POWDER, FOR SOLUTION INTRAVENOUS at 20:37

## 2019-05-26 RX ADMIN — SODIUM CHLORIDE, POTASSIUM CHLORIDE, SODIUM LACTATE AND CALCIUM CHLORIDE: 600; 310; 30; 20 INJECTION, SOLUTION INTRAVENOUS at 23:25

## 2019-05-26 RX ADMIN — PHENYLEPHRINE HYDROCHLORIDE 160 MCG: 10 INJECTION, SOLUTION INTRAMUSCULAR; INTRAVENOUS; SUBCUTANEOUS at 20:40

## 2019-05-26 RX ADMIN — HYDROMORPHONE HYDROCHLORIDE 0.4 MG: 2 INJECTION, SOLUTION INTRAMUSCULAR; INTRAVENOUS; SUBCUTANEOUS at 20:45

## 2019-05-26 RX ADMIN — SODIUM CHLORIDE: 9 INJECTION, SOLUTION INTRAVENOUS at 19:42

## 2019-05-26 RX ADMIN — ROCURONIUM BROMIDE 50 MG: 10 INJECTION, SOLUTION INTRAVENOUS at 20:31

## 2019-05-26 RX ADMIN — WHITE PETROLATUM MINERAL OIL 1 APPLICATOR: 150; 830 OINTMENT OPHTHALMIC at 20:30

## 2019-05-26 RX ADMIN — PHENYLEPHRINE HYDROCHLORIDE 160 MCG: 10 INJECTION, SOLUTION INTRAMUSCULAR; INTRAVENOUS; SUBCUTANEOUS at 21:30

## 2019-05-26 RX ADMIN — METOCLOPRAMIDE 10 MG: 5 INJECTION, SOLUTION INTRAMUSCULAR; INTRAVENOUS at 21:03

## 2019-05-26 RX ADMIN — IOPAMIDOL 80 ML: 755 INJECTION, SOLUTION INTRAVENOUS at 14:24

## 2019-05-26 ASSESSMENT — PULMONARY FUNCTION TESTS
PIF_VALUE: 5
PIF_VALUE: 25
PIF_VALUE: 22
PIF_VALUE: 5
PIF_VALUE: 22
PIF_VALUE: 22
PIF_VALUE: 5
PIF_VALUE: 4
PIF_VALUE: 22
PIF_VALUE: 21
PIF_VALUE: 21
PIF_VALUE: 22
PIF_VALUE: 21
PIF_VALUE: 4
PIF_VALUE: 1
PIF_VALUE: 22
PIF_VALUE: 5
PIF_VALUE: 21
PIF_VALUE: 5
PIF_VALUE: 22
PIF_VALUE: 18
PIF_VALUE: 4
PIF_VALUE: 21
PIF_VALUE: 4
PIF_VALUE: 22
PIF_VALUE: 1
PIF_VALUE: 1
PIF_VALUE: 22
PIF_VALUE: 22
PIF_VALUE: 23
PIF_VALUE: 21
PIF_VALUE: 21
PIF_VALUE: 23
PIF_VALUE: 22
PIF_VALUE: 6
PIF_VALUE: 1
PIF_VALUE: 22
PIF_VALUE: 21
PIF_VALUE: 17
PIF_VALUE: 23
PIF_VALUE: 17
PIF_VALUE: 21
PIF_VALUE: 5
PIF_VALUE: 22
PIF_VALUE: 22
PIF_VALUE: 0
PIF_VALUE: 29
PIF_VALUE: 23
PIF_VALUE: 22
PIF_VALUE: 16
PIF_VALUE: 22
PIF_VALUE: 22
PIF_VALUE: 4
PIF_VALUE: 2
PIF_VALUE: 21
PIF_VALUE: 1
PIF_VALUE: 22
PIF_VALUE: 4
PIF_VALUE: 22
PIF_VALUE: 5
PIF_VALUE: 4
PIF_VALUE: 22
PIF_VALUE: 18
PIF_VALUE: 23
PIF_VALUE: 22
PIF_VALUE: 21
PIF_VALUE: 22
PIF_VALUE: 19
PIF_VALUE: 22
PIF_VALUE: 22
PIF_VALUE: 4
PIF_VALUE: 6
PIF_VALUE: 22
PIF_VALUE: 22
PIF_VALUE: 0
PIF_VALUE: 22
PIF_VALUE: 2
PIF_VALUE: 22
PIF_VALUE: 22
PIF_VALUE: 21
PIF_VALUE: 29
PIF_VALUE: 21
PIF_VALUE: 22
PIF_VALUE: 21
PIF_VALUE: 22
PIF_VALUE: 22
PIF_VALUE: 4
PIF_VALUE: 5
PIF_VALUE: 21
PIF_VALUE: 5
PIF_VALUE: 22
PIF_VALUE: 21
PIF_VALUE: 5
PIF_VALUE: 22
PIF_VALUE: 5

## 2019-05-26 ASSESSMENT — ENCOUNTER SYMPTOMS
VOICE CHANGE: 0
NAUSEA: 0
CONSTIPATION: 0
TROUBLE SWALLOWING: 0
PHOTOPHOBIA: 0
SHORTNESS OF BREATH: 0
ABDOMINAL PAIN: 0
BLOOD IN STOOL: 0
VOMITING: 0
DIARRHEA: 0
COUGH: 0
STRIDOR: 0

## 2019-05-26 ASSESSMENT — PAIN DESCRIPTION - LOCATION: LOCATION: HEAD;BUTTOCKS

## 2019-05-26 ASSESSMENT — PAIN SCALES - GENERAL
PAINLEVEL_OUTOF10: 8
PAINLEVEL_OUTOF10: 0
PAINLEVEL_OUTOF10: 0
PAINLEVEL_OUTOF10: 8

## 2019-05-26 ASSESSMENT — PAIN DESCRIPTION - PAIN TYPE
TYPE: ACUTE PAIN
TYPE: ACUTE PAIN

## 2019-05-26 ASSESSMENT — PAIN DESCRIPTION - FREQUENCY: FREQUENCY: CONTINUOUS

## 2019-05-26 ASSESSMENT — PAIN DESCRIPTION - DESCRIPTORS
DESCRIPTORS: CONSTANT;CRAMPING
DESCRIPTORS: ACHING
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
## 2019-05-26 NOTE — ED NOTES
**med rec was completed after orders placed**  Home Med List is complete. Source of medications in list is patient interview at bedside.     Patient states she last took her meds yesterday.     Please note:  1. Removed from pt's med list:    Duloxetine   Ibuprofen 600 mg   2. Added to pt's med list:    Diclofenac 75 mg     Venlafaxine 100 mg    Oxycodone 5 mg SILVA SarahD.  Candidate 2020  5/26/2019 5:01 PM

## 2019-05-26 NOTE — ED PROVIDER NOTES
ED Attending Attestation Note     Date of evaluation: 5/26/2019    Chief Complaint     Tailbone Pain (Pt fell four days ago and still having pain in her tailbone and head.) and Headache        Medical Decision Making      This patient was seen by the resident. I have seen and examined the patient, agree with the workup, evaluation, management and diagnosis. The care plan has been discussed. My assessment reveals  64-year-old female presents for evaluation of tailbone pain after falling. Patient with multiple ER visits over the past few days. Examination concerning for cellulitis to gluteus and mons pubis area with no crepitus. Patient afebrile, tachycardic, though hemodynamically stable with normal mentation. Non-ill-appearing acutely. Low suspicion for necrotizing fasciitis however will obtain CT imaging to further classify. Will initiate fluids and antibiotics. Reevaluation 1526  No evidence of Girish's gangrene on imaging consistent with exam likely early cellulitis with no drainable fluid collection  As such we'll defer surgical consult at this point  Will treat medically with antibiotics and plan admission for further management  Patient has remained hemodynamically stable    Diagnostic Results       RADIOLOGY:  CT PELVIS W CONTRAST Additional Contrast? None   Final Result   1. Stranding in the fat involving the right perineum and right gluteal region. Given the patient's history this likely reflects a cellulitis. This does extend superiorly into the pelvis involving the right pelvic sidewall and  region, presumably    reflecting cellulitis/phlegmon. There is no evidence of a drainable fluid collection at this time. XR PELVIS (1-2 VIEWS)   Final Result      No acute findings. Degenerative changes in the lower lumbar spine.           LABS:   Labs Reviewed   CBC WITH AUTO DIFFERENTIAL - Abnormal; Notable for the following components:       Result Value    WBC 12.7 (*) RBC 3.71 (*)     Hemoglobin 11.2 (*)     Hematocrit 34.3 (*)     Neutrophils # 11.0 (*)     Lymphocytes # 0.8 (*)     Myelocytes Relative 1 (*)     All other components within normal limits    Narrative:     Performed at: [facility]  [address],  [place], [facility] [address]   Phone [phone]   BASIC METABOLIC PANEL W/ REFLEX TO MG FOR LOW K - Abnormal; Notable for the following components:    Glucose 117 (*)     All other components within normal limits    Narrative:     Performed at: [facility]  [address],  [place], [facility] [address]   Phone [phone]   POCT VENOUS - Abnormal; Notable for the following components:    pCO2, Alcon 36.9 (*)     HCO3, Venous 22.1 (*)     All other components within normal limits    Narrative:     Performed at: [facility]  [address],  [place], [facility] [address]   Phone [phone]   POCT VENOUS - Abnormal; Notable for the following components:    pH, Alcon 7.458 (*)     pCO2, Alcon 31.3 (*)     HCO3, Venous 22.2 (*)     All other components within normal limits    Narrative:     Performed at: [facility]  [address],  [place], [facility] [address]   Phone [phone]   LACTIC ACID, PLASMA    Narrative:     Performed at: [facility]  [address], [facility] [address]   Phone [phone]   BLOOD GAS, VENOUS       RECENT VITALS:    BP: 124/78, Temp: 98 °F (36.7 °C), Pulse: 117, Resp: 22       Disposition     CLINICAL IMPRESSION:  1. Cellulitis of gluteal region        DISPOSITION:     DISPOSITION Decision To Admit 05/26/2019 03:22:20 PM        (Please note that portions of this note were completed with a voice recognition program.  Efforts were made to edit the dictations but occasionally words are mis-transcribed. )             Enedina Felty, MD  05/26/19 [address] [address] [facility]

## 2019-05-26 NOTE — CONSULTS
Clinical Pharmacy Consult Note    Subjective/Objective:  59 YOF presenting with headache and pelvic pain. Pharmacy is consulted to dose Vancomycin for one-time ED dose per Dr. Hilary Carvalho. No results for input(s): BUN, CREATININE in the last 72 hours. CrCl cannot be calculated (Patient's most recent lab result is older than the maximum 10 days allowed. ). VITALS:  /78   Pulse 117   Temp 98 °F (36.7 °C) (Oral)   Resp 22   LMP 07/12/2007   SpO2 97%     Assessment/Plan:  · Will order Vancomycin 1.25g x1 for ED dose. · Please note, consult order is for ED dose only -- if patient admitted and continued on vancomycin, please consult pharmacy for continued dosing. Thank you for the consult!    Tierra Castellanos, PharmD, MPH  PGY-1 Pharmacy Resident  Office: [phone]  Wireless: [phone]  5/26/2019 1:03 PM

## 2019-05-26 NOTE — ED PROVIDER NOTES
[address] [facility] RESIDENT NOTE       Date of evaluation: 5/26/2019    Chief Complaint     Tailbone Pain (Pt fell four days ago and still having pain in her tailbone and head.) and Headache      History of Present Illness     Nisha Kurtz is a 59 y.o. female with history of depression, cervical and lumbar degenerative disc disease who presents headache and pelvic pain. Patient reports a mechanical fall 4 days ago when she was out in the garden reaching for stones and fell on her back towards her right side. She is subsequently been having a throbbing headache as well as as tailbone pain. She reports she was seen in the ED yesterday, and was unsure of what the results of her tests were. She reports she takes oxycodone and gabapentin at baseline for chronic cervical and lumbar back pain, however she has not taken this morning because she was looking to fill her Compazine and did not know if it in. With the other medications. She also reports difficulty with ambulation due to pain in her \"private region. \"  Reports that she has significant tenderness along her right buttock that extends to her private region. She denies any fevers or chills. Denies IV drug use. Review of Systems     Review of Systems   Constitutional: Negative for chills and fever. HENT: Negative for drooling, trouble swallowing and voice change. Eyes: Negative for photophobia and visual disturbance. Respiratory: Negative for cough, shortness of breath and stridor. Cardiovascular: Negative for chest pain and leg swelling. Gastrointestinal: Negative for abdominal pain, blood in stool, constipation, diarrhea, nausea and vomiting. Genitourinary: Positive for pelvic pain. Negative for dysuria and hematuria. Musculoskeletal: Negative for neck pain. Skin: Negative for rash. Neurological: Negative for weakness and headaches. Psychiatric/Behavioral: Negative for confusion. exhibits no discharge. Left eye exhibits no discharge. Neck: Normal range of motion. Neck supple. Cardiovascular: Normal rate and regular rhythm. Exam reveals no gallop and no friction rub. No murmur heard. Pulmonary/Chest: Effort normal. No stridor. No respiratory distress. She has no wheezes. She has no rales. Abdominal: Soft. She exhibits no distension. There is no tenderness. There is no rebound and no guarding. Genitourinary:   Genitourinary Comments: Rectal exam did not demonstrate any induration or fluctuance in the rectal canal.   Musculoskeletal: Normal range of motion. She exhibits no edema or deformity. Tenderness on palpation of the pubic symphysis. No appreciable pelvic laxity. Neurological: She is alert and oriented to person, place, and time. No cranial nerve deficit. Skin: Skin is warm. Rash noted. There is erythema. Large area of erythema and induration extending from right buttock to anterior labia majora. There is a small skin tear around her perineum without any purulent discharge or extension into deeper tissues. Psychiatric: She has a normal mood and affect. DiagnosticResults       RADIOLOGY:  CT PELVIS W CONTRAST Additional Contrast? None   Final Result   1. Stranding in the fat involving the right perineum and right gluteal region. Given the patient's history this likely reflects a cellulitis. This does extend superiorly into the pelvis involving the right pelvic sidewall and  region, presumably    reflecting cellulitis/phlegmon. There is no evidence of a drainable fluid collection at this time. XR PELVIS (1-2 VIEWS)   Final Result      No acute findings. Degenerative changes in the lower lumbar spine.       CT HEAD WO CONTRAST    (Results Pending)       LABS:   Results for orders placed or performed during the hospital encounter of 05/26/19   Lactic Acid, Plasma   Result Value Ref Range    Lactic Acid 1.8 0.4 - 2.0 mmol/L   CBC Auto Differential Result Value Ref Range    WBC 12.7 (H) 4.0 - 11.0 K/uL    RBC 3.71 (L) 4.00 - 5.20 M/uL    Hemoglobin 11.2 (L) 12.0 - 16.0 g/dL    Hematocrit 34.3 (L) 36.0 - 48.0 %    MCV 92.3 80.0 - 100.0 fL    MCH 30.1 26.0 - 34.0 pg    MCHC 32.6 31.0 - 36.0 g/dL    RDW 13.1 12.4 - 15.4 %    Platelets 510 206 - 379 K/uL    MPV 8.3 5.0 - 10.5 fL    Neutrophils % 80.0 %    Lymphocytes % 5.0 %    Monocytes % 6.0 %    Eosinophils % 1.0 %    Basophils % 0.0 %    Neutrophils # 11.0 (H) 1.7 - 7.7 K/uL    Lymphocytes # 0.8 (L) 1.0 - 5.1 K/uL    Monocytes # 0.8 0.0 - 1.3 K/uL    Eosinophils # 0.1 0.0 - 0.6 K/uL    Basophils # 0.0 0.0 - 0.2 K/uL    Bands Relative 6 0 - 7 %    Atypical Lymphocytes Relative 1 0 - 6 %    Myelocytes Relative 1 (A) %    RBC Morphology Normal    Basic Metabolic Panel w/ Reflex to MG   Result Value Ref Range    Sodium 138 136 - 145 mmol/L    Potassium reflex Magnesium 4.0 3.5 - 5.1 mmol/L    Chloride 101 99 - 110 mmol/L    CO2 21 21 - 32 mmol/L    Anion Gap 16 3 - 16    Glucose 117 (H) 70 - 99 mg/dL    BUN 9 7 - 20 mg/dL    CREATININE 0.7 0.6 - 1.2 mg/dL    GFR Non-African American >60 >60    GFR African American >60 >60    Calcium 9.4 8.3 - 10.6 mg/dL   POCT Venous   Result Value Ref Range    pH, Anahi 7.385 7.350 - 7.450    pCO2, Anahi 36.9 (L) 40.0 - 50.0 mm Hg    pO2, Anahi 27 Not Established mm Hg    HCO3, Venous 22.1 (L) 23.0 - 29.0 mmol/L    Base Excess, Anahi -3 -3 - 3    O2 Sat, Anahi 49 Not Established %    TC02 (Calc), Anahi 23 Not Established mmol/L    Lactate 1.99 0.40 - 2.00 mmol/L    Sample Type ANAHI     Performed on SEE BELOW    POCT Venous   Result Value Ref Range    pH, Anahi 7.458 (H) 7.350 - 7.450    pCO2, Anahi 31.3 (L) 40.0 - 50.0 mm Hg    pO2, Anahi 25 Not Established mm Hg    HCO3, Venous 22.2 (L) 23.0 - 29.0 mmol/L    Base Excess, Anahi -2 -3 - 3    O2 Sat, Anahi 50 Not Established %    TC02 (Calc), Anahi 23 Not Established mmol/L    Lactate 1.32 0.40 - 2.00 mmol/L    Sample Type ANAHI     Performed on SEE BELOW ED BEDSIDE ULTRASOUND:  Diffuse cobblestoning of infected area without fluid collection    RECENT VITALS:  BP: 123/76, Temp: 101.4 °F (38.6 °C), Pulse: 108,Resp: 22, SpO2: 95 %     Procedures     n/a    ED Course     Nursing Notes, Past Medical Hx, Past Surgical Hx, Social Hx, Allergies, and Family Hx were reviewed.     The patient was given the followingmedications:  Orders Placed This Encounter   Medications    0.9 % sodium chloride IV bolus 1,000 mL    morphine injection 4 mg    vancomycin (VANCOCIN) 1,250 mg in dextrose 5 % 250 mL IVPB    iopamidol (ISOVUE-370) 76 % injection 80 mL    morphine injection 4 mg    piperacillin-tazobactam (ZOSYN) 3.375 g in dextrose 5 % 100 mL IVPB extended infusion (mini-bag)    clindamycin (CLEOCIN) 600 mg in dextrose 5 % 50 mL IVPB    lactobacillus (CULTURELLE) capsule 1 capsule    0.9 % sodium chloride bolus    0.9 % sodium chloride infusion    insulin lispro (HUMALOG) injection pen 0-6 Units    morphine (PF) injection 4 mg    sodium chloride flush 0.9 % injection 10 mL    sodium chloride flush 0.9 % injection 10 mL    magnesium hydroxide (MILK OF MAGNESIA) 400 MG/5ML suspension 30 mL    ondansetron (ZOFRAN) injection 4 mg    enoxaparin (LOVENOX) injection 40 mg    acetaminophen (TYLENOL) tablet 650 mg    acyclovir (ZOVIRAX) capsule 200 mg    ibuprofen (ADVIL;MOTRIN) tablet 400 mg    gabapentin (NEURONTIN) tablet 600 mg    multivitamin (THERAGRAN) per tablet 1 tablet    oxyCODONE (ROXICODONE) immediate release tablet 5 mg    prochlorperazine (COMPAZINE) tablet 10 mg    venlafaxine (EFFEXOR) tablet 100 mg       CONSULTS:  PHARMACY TO DOSE VANCOMYCIN  IP CONSULT TO HOSPITALIST  IP CONSULT TO PHARMACY  IP CONSULT TO INFECTIOUS DISEASES  IP CONSULT TO GENERAL SURGERY  IP CONSULT TO [address] [place] [address] / [facility] / Gabriel Silvino is a 59 y.o. female with history of depression, cervical and lumbar degenerative disc disease who presents headache and pelvic pain. Labs notable for tachycardia. Physical exam as above. Suspect tachycardia is in part due to pain as well as anxiety, patient otherwise looks well and lower suspicion for sepsis. She is provided with 1 L fluid bolus and analgesia, along with empirically provided with vancomycin given the extent of cellulitis. A bedside ultrasound was performed that demonstrated diffuse cobblestoning without any focal fluid consolidation to indicate abscess. CBC with leukocytosis of 12.7, BMP within normal limits. VBG without acidosis, lactic acid 1.8. CT pelvis:  Stranding in the fat involving the right perineum and right gluteal region. Given the patient's history this likely reflects a cellulitis. This does extend superiorly into the pelvis involving the right pelvic sidewall and  region, presumably reflecting cellulitis/phlegmon. There is no evidence of a drainable fluid collection at this time. Patient will be admitted to hospitalist team for further management of cellulitis and pain. Low suspicion for Girish's gangrene as no risk factors and no evidence of deeper soft tissue infection on CT scan. In terms of patient's injuries, they have been previously imaged without acute findings, and given no repeat injury with intact neurologic exam, no further imaging warranted at this time. This patient was also evaluated by the attending physician. All care plans werediscussed and agreed upon. Clinical Impression     1.  Cellulitis of gluteal region        Disposition     PATIENT REFERRED TO:  [id number]  [address] [facility] [address]  [phone]            DISCHARGE MEDICATIONS:  Current Discharge Medication List          DISPOSITION Admitted 05/26/2019 04:18:24 Pipe Osborne MD  Resident  05/26/19 [id number]       Nicole Linton MD  Resident  05/26/19 6205

## 2019-05-26 NOTE — ED TRIAGE NOTES
Pt reports falling four days ago. Pt report still having pain in tailbone and head. Pt never got her compazine filled because she could not find a ride. Pt sees a pain management doctor but she wasn't sure if she could take her percocet and gabapentin with her compazine. Pt has not taken her percocet or gabapentin today.

## 2019-05-26 NOTE — CONSULTS
every 4 hours (while awake).  prochlorperazine (COMPAZINE) 10 MG tablet Take 1 tablet by mouth every 6 hours as needed (HA) 21 tablet 0    oxyCODONE-acetaminophen (PERCOCET) 5-325 MG per tablet Take 1 tablet by mouth 2 times daily  Oh CTP RX 08935. 60 tablet 0    oxyCODONE-acetaminophen (PERCOCET) 5-325 MG per tablet Take 1 tablet by mouth 2 times daily  Fill on or After 03/22/17. 60 tablet 0       Current Meds    piperacillin-tazobactam (ZOSYN) 3.375 g in dextrose 5 % 100 mL IVPB extended infusion (mini-bag) Q8H   clindamycin (CLEOCIN) 600 mg in dextrose 5 % 50 mL IVPB Q8H   lactobacillus (CULTURELLE) capsule 1 capsule BID   0.9 % sodium chloride bolus Once   0.9 % sodium chloride infusion Continuous   insulin lispro (HUMALOG) injection pen 0-6 Units TID WC   morphine (PF) injection 4 mg Q4H PRN   sodium chloride flush 0.9 % injection 10 mL 2 times per day   sodium chloride flush 0.9 % injection 10 mL PRN   magnesium hydroxide (MILK OF MAGNESIA) 400 MG/5ML suspension 30 mL Daily PRN   ondansetron (ZOFRAN) injection 4 mg Q6H PRN   enoxaparin (LOVENOX) injection 40 mg Daily   acetaminophen (TYLENOL) tablet 650 mg Q4H PRN   acyclovir (ZOVIRAX) capsule 200 mg Q4H While awake   ibuprofen (ADVIL;MOTRIN) tablet 400 mg Q8H PRN   gabapentin (NEURONTIN) tablet 600 mg TID   multivitamin (THERAGRAN) per tablet 1 tablet Daily   oxyCODONE (ROXICODONE) immediate release tablet 5 mg Q8H PRN   prochlorperazine (COMPAZINE) tablet 10 mg Q6H PRN   venlafaxine (EFFEXOR) tablet 100 mg TID       Family History:   History reviewed. No pertinent family history. Social History:   TOBACCO:   reports that she has quit smoking. She has never used smokeless tobacco. quit 25 years ago  ETOH:   reports that she drinks alcohol. DRUGS:   reports that she does not use drugs. ROS: A 14 point review of systems was conducted, significant findings as noted in HPI. All other systems negative.     Physical exam:    Vitals:    05/26/19 1600 05/26/19 1630 05/26/19 1717 05/26/19 1726   BP: 103/62  123/76    Pulse: 109 100 108    Resp: 26 24 22    Temp:   101.4 °F (38.6 °C)    TempSrc:   Oral    SpO2: 93% (!) 65% 95%    Weight:    141 lb 12.1 oz (64.3 kg)   Height:    5' 6\" (1.676 m)       General appearance: alert, in acute distress, grooming appropriate  Eyes: PERRLA, no scleral icterus  Neck: trachea midline, no JVD, no lymphadenopathy  Chest/Lungs: CTAB, no crackles/rales, wheezes/rhonchi, normal effort  Cardiovascular: Tachycardic, RR, no murmurs/gallops/rubs  Abdomen: soft, tender to palpate in lower abdomen, non-distended, +BS, no guarding/rigidity. Surgical scar noted. : Warm, erythematous R labia extending to right gluteal (marked) with induration. Woody edema, no fluctuance noted. Tender to palpation. Bullae and skin tear noted. Metal ring in clitoris in place. Skin: warm and dry  Extremities: no edema, no cyanosis  Neuro: A&Ox3, no focal deficits, sensation intact    Labs:    CBC: Recent Labs     05/26/19  1249   WBC 12.7*   HGB 11.2*   HCT 34.3*   MCV 92.3        BMP: Recent Labs     05/26/19  1249      K 4.0      CO2 21   BUN 9   CREATININE 0.7     PT/INR: No results for input(s): PROTIME, INR in the last 72 hours. APTT: No results for input(s): APTT in the last 72 hours. Liver Profile:  Lab Results   Component Value Date    AST 40 06/04/2013    ALT 54 06/04/2013    BILITOT 0.4 06/04/2013    ALKPHOS 108 06/04/2013     Lab Results   Component Value Date    CHOL 206 06/04/2013    HDL 61 06/04/2013    TRIG 101 06/04/2013     UA: No results found for: NITRITE, COLORU, PHUR, LABCAST, WBCUA, RBCUA, MUCUS, TRICHOMONAS, YEAST, BACTERIA, CLARITYU, SPECGRAV, LEUKOCYTESUR, UROBILINOGEN, BILIRUBINUR, BLOODU, GLUCOSEU, AMORPHOUS    Imaging:   CT PELVIS W CONTRAST Additional Contrast? None   Final Result   1. Stranding in the fat involving the right perineum and right gluteal region.  Given the patient's history this likely reflects a cellulitis. This does extend superiorly into the pelvis involving the right pelvic sidewall and  region, presumably    reflecting cellulitis/phlegmon. There is no evidence of a drainable fluid collection at this time. XR PELVIS (1-2 VIEWS)   Final Result      No acute findings. Degenerative changes in the lower lumbar spine. CT HEAD WO CONTRAST    (Results Pending)          Assessment/Plan: This is a 59 y.o. female with acute R labial pain erythema and edema extending to R glueal. Pt is febrile and tachycardic, otherwise hemodynamically stable. Labs shows leukocytosis, elevated CRP, elevated lactic acid. CT of pelvis shows extensive fat stranding, however no gas or fluid collection. CT scan is consistent with cellulitis, however, her physical exam finding is concerning for Girish's gangrene. Patient will be taken to the OR emergently for incision and debridement of necrotizing tissue.  - Diet/IVF: NPO,  ml/hr  - EKG  - PT/INR  - Type and screen  - Antibiotic: clinda and zosyn  - Pregnancy test not indicated  - Consent obtained and will be placed on chart  - Anesthesia to see patient    Discussed with staff    Binta Valladares MD  PGY-1 General Surgery  05/26/19 7:31 PM  [phone]    I have seen, examined, and reviewed the patients chart. I agree with the residents assessment and have made appropriate changes. Patient with findings suggestive of a necrotizing process. We will proceed with surgery. The risks, benefits, and extensive debridement that may be required were discussed with the patient and she wishes to proceed.      Ritchie Sever

## 2019-05-27 PROBLEM — M79.89 NECROTIZING SOFT TISSUE INFECTION: Status: ACTIVE | Noted: 2019-05-27

## 2019-05-27 LAB
ABO/RH: NORMAL
ALBUMIN SERPL-MCNC: 2.8 G/DL (ref 3.4–5)
ANION GAP SERPL CALCULATED.3IONS-SCNC: 12 MMOL/L (ref 3–16)
ANTIBODY SCREEN: NORMAL
BASE EXCESS ARTERIAL: -4.6 MMOL/L (ref -3–3)
BASOPHILS ABSOLUTE: 0 K/UL (ref 0–0.2)
BASOPHILS RELATIVE PERCENT: 0.3 %
BUN BLDV-MCNC: 9 MG/DL (ref 7–20)
CALCIUM SERPL-MCNC: 8.2 MG/DL (ref 8.3–10.6)
CARBOXYHEMOGLOBIN ARTERIAL: 1.3 % (ref 0–1.5)
CHLORIDE BLD-SCNC: 107 MMOL/L (ref 99–110)
CO2: 19 MMOL/L (ref 21–32)
CREAT SERPL-MCNC: 0.7 MG/DL (ref 0.6–1.2)
EOSINOPHILS ABSOLUTE: 0 K/UL (ref 0–0.6)
EOSINOPHILS RELATIVE PERCENT: 0 %
ESTIMATED AVERAGE GLUCOSE: 125.5 MG/DL
GFR AFRICAN AMERICAN: >60
GFR NON-AFRICAN AMERICAN: >60
GLUCOSE BLD-MCNC: 171 MG/DL (ref 70–99)
GLUCOSE BLD-MCNC: 200 MG/DL (ref 70–99)
GLUCOSE BLD-MCNC: 205 MG/DL (ref 70–99)
GLUCOSE BLD-MCNC: 251 MG/DL (ref 70–99)
HBA1C MFR BLD: 6 %
HCO3 ARTERIAL: 21 MMOL/L (ref 21–29)
HCT VFR BLD CALC: 25.7 % (ref 36–48)
HEMOGLOBIN, ART, EXTENDED: 8.5 G/DL
HEMOGLOBIN: 8.4 G/DL (ref 12–16)
INR BLD: 1.3 (ref 0.86–1.14)
LACTIC ACID: 1.1 MMOL/L (ref 0.4–2)
LYMPHOCYTES ABSOLUTE: 0.7 K/UL (ref 1–5.1)
LYMPHOCYTES RELATIVE PERCENT: 4.4 %
MAGNESIUM: 3 MG/DL (ref 1.8–2.4)
MCH RBC QN AUTO: 30.4 PG (ref 26–34)
MCHC RBC AUTO-ENTMCNC: 32.8 G/DL (ref 31–36)
MCV RBC AUTO: 92.5 FL (ref 80–100)
METHEMOGLOBIN ARTERIAL: 1.1 % (ref 0–1.4)
MONOCYTES ABSOLUTE: 0.9 K/UL (ref 0–1.3)
MONOCYTES RELATIVE PERCENT: 5.7 %
NEUTROPHILS ABSOLUTE: 14 K/UL (ref 1.7–7.7)
NEUTROPHILS RELATIVE PERCENT: 89.6 %
O2 SAT, ARTERIAL: 99 % (ref 93–100)
PCO2 ARTERIAL: 41 MMHG (ref 35–45)
PDW BLD-RTO: 13.4 % (ref 12.4–15.4)
PERFORMED ON: ABNORMAL
PH ARTERIAL: 7.32 (ref 7.35–7.45)
PHOSPHORUS: 4.1 MG/DL (ref 2.5–4.9)
PLATELET # BLD: 247 K/UL (ref 135–450)
PMV BLD AUTO: 7.8 FL (ref 5–10.5)
PO2 ARTERIAL: 125 MMHG (ref 75–108)
POTASSIUM SERPL-SCNC: 4.3 MMOL/L (ref 3.5–5.1)
PROTHROMBIN TIME: 14.8 SEC (ref 9.8–13)
RBC # BLD: 2.77 M/UL (ref 4–5.2)
SODIUM BLD-SCNC: 138 MMOL/L (ref 136–145)
TCO2 ARTERIAL: 22 MMOL/L
WBC # BLD: 15.6 K/UL (ref 4–11)

## 2019-05-27 PROCEDURE — 94761 N-INVAS EAR/PLS OXIMETRY MLT: CPT

## 2019-05-27 PROCEDURE — 82803 BLOOD GASES ANY COMBINATION: CPT

## 2019-05-27 PROCEDURE — 6360000002 HC RX W HCPCS: Performed by: STUDENT IN AN ORGANIZED HEALTH CARE EDUCATION/TRAINING PROGRAM

## 2019-05-27 PROCEDURE — 2500000003 HC RX 250 WO HCPCS: Performed by: SURGERY

## 2019-05-27 PROCEDURE — 2580000003 HC RX 258: Performed by: STUDENT IN AN ORGANIZED HEALTH CARE EDUCATION/TRAINING PROGRAM

## 2019-05-27 PROCEDURE — 85025 COMPLETE CBC W/AUTO DIFF WBC: CPT

## 2019-05-27 PROCEDURE — 2500000003 HC RX 250 WO HCPCS: Performed by: INTERNAL MEDICINE

## 2019-05-27 PROCEDURE — 6360000002 HC RX W HCPCS: Performed by: INTERNAL MEDICINE

## 2019-05-27 PROCEDURE — 83735 ASSAY OF MAGNESIUM: CPT

## 2019-05-27 PROCEDURE — 85610 PROTHROMBIN TIME: CPT

## 2019-05-27 PROCEDURE — 99024 POSTOP FOLLOW-UP VISIT: CPT | Performed by: SURGERY

## 2019-05-27 PROCEDURE — 2580000003 HC RX 258: Performed by: INTERNAL MEDICINE

## 2019-05-27 PROCEDURE — 80069 RENAL FUNCTION PANEL: CPT

## 2019-05-27 PROCEDURE — 99255 IP/OBS CONSLTJ NEW/EST HI 80: CPT | Performed by: INTERNAL MEDICINE

## 2019-05-27 PROCEDURE — 6370000000 HC RX 637 (ALT 250 FOR IP): Performed by: STUDENT IN AN ORGANIZED HEALTH CARE EDUCATION/TRAINING PROGRAM

## 2019-05-27 PROCEDURE — 2000000000 HC ICU R&B

## 2019-05-27 PROCEDURE — 36415 COLL VENOUS BLD VENIPUNCTURE: CPT

## 2019-05-27 PROCEDURE — 2500000003 HC RX 250 WO HCPCS: Performed by: STUDENT IN AN ORGANIZED HEALTH CARE EDUCATION/TRAINING PROGRAM

## 2019-05-27 PROCEDURE — 37799 UNLISTED PX VASCULAR SURGERY: CPT

## 2019-05-27 RX ORDER — CLINDAMYCIN PHOSPHATE 900 MG/50ML
900 INJECTION INTRAVENOUS EVERY 8 HOURS
Status: DISCONTINUED | OUTPATIENT
Start: 2019-05-27 | End: 2019-05-27

## 2019-05-27 RX ORDER — CLINDAMYCIN PHOSPHATE 600 MG/50ML
600 INJECTION INTRAVENOUS EVERY 8 HOURS
Status: DISCONTINUED | OUTPATIENT
Start: 2019-05-27 | End: 2019-05-28

## 2019-05-27 RX ORDER — SODIUM CHLORIDE 9 MG/ML
INJECTION, SOLUTION INTRAVENOUS
Status: DISPENSED
Start: 2019-05-27 | End: 2019-05-27

## 2019-05-27 RX ORDER — MAGNESIUM SULFATE IN WATER 40 MG/ML
4 INJECTION, SOLUTION INTRAVENOUS ONCE
Status: COMPLETED | OUTPATIENT
Start: 2019-05-27 | End: 2019-05-27

## 2019-05-27 RX ADMIN — VENLAFAXINE 100 MG: 25 TABLET ORAL at 20:15

## 2019-05-27 RX ADMIN — CLINDAMYCIN PHOSPHATE 600 MG: 600 INJECTION, SOLUTION INTRAVENOUS at 02:47

## 2019-05-27 RX ADMIN — PIPERACILLIN SODIUM,TAZOBACTAM SODIUM 3.38 G: 3; .375 INJECTION, POWDER, FOR SOLUTION INTRAVENOUS at 18:10

## 2019-05-27 RX ADMIN — CLINDAMYCIN PHOSPHATE 600 MG: 600 INJECTION, SOLUTION INTRAVENOUS at 10:01

## 2019-05-27 RX ADMIN — HYDROMORPHONE HYDROCHLORIDE 0.5 MG: 1 INJECTION, SOLUTION INTRAMUSCULAR; INTRAVENOUS; SUBCUTANEOUS at 15:24

## 2019-05-27 RX ADMIN — FOLIC ACID: 5 INJECTION, SOLUTION INTRAMUSCULAR; INTRAVENOUS; SUBCUTANEOUS at 10:56

## 2019-05-27 RX ADMIN — CLINDAMYCIN PHOSPHATE 600 MG: 600 INJECTION, SOLUTION INTRAVENOUS at 18:10

## 2019-05-27 RX ADMIN — HYDROMORPHONE HYDROCHLORIDE 0.5 MG: 1 INJECTION, SOLUTION INTRAMUSCULAR; INTRAVENOUS; SUBCUTANEOUS at 20:15

## 2019-05-27 RX ADMIN — HYDROMORPHONE HYDROCHLORIDE 0.5 MG: 1 INJECTION, SOLUTION INTRAMUSCULAR; INTRAVENOUS; SUBCUTANEOUS at 09:45

## 2019-05-27 RX ADMIN — Medication 10 ML: at 20:15

## 2019-05-27 RX ADMIN — HYDROMORPHONE HYDROCHLORIDE 0.5 MG: 1 INJECTION, SOLUTION INTRAMUSCULAR; INTRAVENOUS; SUBCUTANEOUS at 12:17

## 2019-05-27 RX ADMIN — MAGNESIUM SULFATE HEPTAHYDRATE 4 G: 40 INJECTION, SOLUTION INTRAVENOUS at 00:55

## 2019-05-27 RX ADMIN — VANCOMYCIN HYDROCHLORIDE 1000 MG: 10 INJECTION, POWDER, LYOPHILIZED, FOR SOLUTION INTRAVENOUS at 14:26

## 2019-05-27 RX ADMIN — ENOXAPARIN SODIUM 40 MG: 40 INJECTION SUBCUTANEOUS at 08:24

## 2019-05-27 RX ADMIN — VENLAFAXINE 100 MG: 25 TABLET ORAL at 10:01

## 2019-05-27 RX ADMIN — PIPERACILLIN SODIUM,TAZOBACTAM SODIUM 3.38 G: 3; .375 INJECTION, POWDER, FOR SOLUTION INTRAVENOUS at 03:00

## 2019-05-27 RX ADMIN — HYDROMORPHONE HYDROCHLORIDE 1 MG: 1 INJECTION, SOLUTION INTRAMUSCULAR; INTRAVENOUS; SUBCUTANEOUS at 05:57

## 2019-05-27 RX ADMIN — SODIUM CHLORIDE, POTASSIUM CHLORIDE, SODIUM LACTATE AND CALCIUM CHLORIDE: 600; 310; 30; 20 INJECTION, SOLUTION INTRAVENOUS at 17:07

## 2019-05-27 RX ADMIN — SODIUM CHLORIDE, POTASSIUM CHLORIDE, SODIUM LACTATE AND CALCIUM CHLORIDE: 600; 310; 30; 20 INJECTION, SOLUTION INTRAVENOUS at 08:31

## 2019-05-27 RX ADMIN — Medication 10 ML: at 08:24

## 2019-05-27 RX ADMIN — HYDROMORPHONE HYDROCHLORIDE 1 MG: 1 INJECTION, SOLUTION INTRAMUSCULAR; INTRAVENOUS; SUBCUTANEOUS at 17:04

## 2019-05-27 RX ADMIN — VANCOMYCIN HYDROCHLORIDE 1000 MG: 10 INJECTION, POWDER, LYOPHILIZED, FOR SOLUTION INTRAVENOUS at 02:53

## 2019-05-27 RX ADMIN — PIPERACILLIN SODIUM,TAZOBACTAM SODIUM 3.38 G: 3; .375 INJECTION, POWDER, FOR SOLUTION INTRAVENOUS at 10:01

## 2019-05-27 ASSESSMENT — PAIN DESCRIPTION - PROGRESSION
CLINICAL_PROGRESSION: NOT CHANGED
CLINICAL_PROGRESSION: NOT CHANGED

## 2019-05-27 ASSESSMENT — PAIN SCALES - GENERAL
PAINLEVEL_OUTOF10: 5
PAINLEVEL_OUTOF10: 0
PAINLEVEL_OUTOF10: 7
PAINLEVEL_OUTOF10: 8
PAINLEVEL_OUTOF10: 0
PAINLEVEL_OUTOF10: 7
PAINLEVEL_OUTOF10: 8
PAINLEVEL_OUTOF10: 6
PAINLEVEL_OUTOF10: 5
PAINLEVEL_OUTOF10: 0
PAINLEVEL_OUTOF10: 0
PAINLEVEL_OUTOF10: 8
PAINLEVEL_OUTOF10: 4

## 2019-05-27 ASSESSMENT — PAIN DESCRIPTION - PAIN TYPE
TYPE: SURGICAL PAIN
TYPE: SURGICAL PAIN

## 2019-05-27 ASSESSMENT — PAIN DESCRIPTION - ONSET: ONSET: ON-GOING

## 2019-05-27 ASSESSMENT — PAIN DESCRIPTION - LOCATION
LOCATION: PERINEUM
LOCATION: PERINEUM

## 2019-05-27 ASSESSMENT — PAIN DESCRIPTION - FREQUENCY: FREQUENCY: INTERMITTENT

## 2019-05-27 ASSESSMENT — PAIN DESCRIPTION - DESCRIPTORS: DESCRIPTORS: ACHING

## 2019-05-27 NOTE — PROGRESS NOTES
Pharmacy Consult Note    Admit date:  5/26/19    Interval update: I&D yesterday, plan to go back to OR tomorrow for EUA    Subjective/Objective:  60 yo female admitted with 3 day history right labial / gluteal pain and edema. 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 Cellulitis confirmed by CT, but necrotizing tissue noted by physician. Nausea/ vomiting upon admission with fever 103 and chills. Pt started on vancomycin in ED, then clindamycin/ Zosyn on admission. Taken to OR for emergency I&D. Pharmacy is consulted to dose Vancomycin per Dr. Yoseph Milton    Pertinent Patient Data:  Height= 66 inches      Weight = 64.3 kg    Pertinent Medications:  Vancomycin -- day  #2   1250 mg x 1 (5/26)   1000 mg IV q12h (5/27-current)  Zosyn 3.375 g IV q8h -- day #2  Clindamycin 600 mg IV q8h  - day #2    PERTINENT LABS:  Recent Labs     05/26/19  2300 05/27/19  0627    138   K 4.1 4.3    107   CO2 19* 19*   PHOS  --  4.1   BUN 8 9   CREATININE 0.7 0.7       Estimated Creatinine Clearance: 78 mL/min (based on SCr of 0.7 mg/dL). Recent Labs     05/26/19  2300 05/27/19  0627   WBC 13.7* 15.6*   HGB 9.4* 8.4*   HCT 28.7* 25.7*   MCV 92.7 92.5    247       Micro:  Date Site Micro Susceptibility   5/26 Tissue 1+ GPC  3+ GPC    5/26 Blood #1 pending    5/26 Blood #2 pending    5/26 Tissue (Fungus) Sent      Assessment/Plan:  1. Necrotizing infection of perineal area :  vancomycin day #2  Vancomycin  · Renal function stable, will continue 1000 mg IV q12h. · Ordered trough tomorrow prior to 0300 dose. Goal trough = 15-20 mcg/mL. · Renal function will be monitored closely and dosing will be adjusted as appropriate.     Thank you for the consult  Harjeet Clarke, PharmD  [phone]  5/27/2019 12:02 PM

## 2019-05-27 NOTE — PROGRESS NOTES
Surgery at bedside for wound dressing change. One time dose 1mg Dilaudid given as ordered. Dressing change completed, pt tolerated well.

## 2019-05-27 NOTE — PROGRESS NOTES
Prophylaxis:sc lovenox   Diet: DIET CLEAR LIQUID;  Diet NPO, After Midnight  Dietary Nutrition Supplements: Protein Modular, Clear Liquid Oral Supplement  Code Status: Full Code      Dispo - once acute medical processes have resolved    Vish Collazo MD

## 2019-05-27 NOTE — PROGRESS NOTES
Surgery Daily Progress Note    CC: necrotizing fasciitis of the perineum    Subjective :  No acute events overnight, does have pain in her perineum. Performing IS at bedside    denies nausea, vomiting, urinary complaints such as burning, chills, chest pain, SOB    ROS: A 10 point review of systems was obtained and pertinent positives and negatives were mentioned. Otherwise, no significant history other than as noted in the subjective portion of the note. Objective    Infusions:   sodium chloride      norepinephrine Stopped (05/27/19 0024)    lactated ringers 125 mL/hr at 05/26/19 2325        I/O:I/O last 3 completed shifts:  In: -   Out: 300 [Urine:100; Blood:200]           Wt Readings from Last 1 Encounters:   05/27/19 146 lb 2.6 oz (66.3 kg)                 LABS:    Recent Labs     05/26/19  2300 05/27/19  0627   WBC 13.7* 15.6*   HGB 9.4* 8.4*   HCT 28.7* 25.7*   MCV 92.7 92.5    247        Recent Labs     05/26/19  1249 05/26/19  2300    137   K 4.0 4.1    106   CO2 21 19*   BUN 9 8   CREATININE 0.7 0.7      No results for input(s): AST, ALT, ALB, BILIDIR, BILITOT, ALKPHOS in the last 72 hours. No results for input(s): LIPASE, AMYLASE in the last 72 hours. No results for input(s): PROT, INR, APTT in the last 72 hours. No results for input(s): CKTOTAL, CKMB, CKMBINDEX, TROPONINI in the last 72 hours.       Exam:  Vitals:    05/27/19 0130 05/27/19 0200 05/27/19 0230 05/27/19 0400   BP: 103/76 (!) 91/56 (!) 91/56    Pulse: 80 83 81    Resp: 16 16 17    Temp:    97.6 °F (36.4 °C)   TempSrc:    Oral   SpO2: 100% 100% 99%    Weight:       Height:           General appearance: alert, lying comfortably in bed  Neuro: Alert & Orientated x3,   Lungs: no labored breathing, no respiratory distress, no accessory muscle use, on 2L NC  Cardiovascular: RRR, S1, S2 normal, no m/g/r  Abdomen: soft, NT, ND, +BS  Ext: no edema  Perineum: wound in the right labia roughly 18 x 5 x 15 deep - no other

## 2019-05-27 NOTE — PROGRESS NOTES
ABG, renal, mag, CBC, lactic acid, coags drawn at this time via arterial line. Patient remains on a 100% non re-breather.

## 2019-05-27 NOTE — CONSULTS
Pharmacy Consult Note    Admit date:  5/26/19    Subjective/Objective:  58 yo female admitted with 3 day history right labial / gluteal pain and edema. IPt has 12-year clitoral piercing, and fell while dog-walking 3 days ago. Cellulitis confirmed by CT, but necrotizing tissue noted by physician. Nausea/ vomiting today, fever 103, and chills. Pt started on vancomycin in ED, then clindamycin/ Zosyn on admission. Taken to OR for emergency I&D. Pharmacy is consulted to dose Vancomycin per Dr. Cody Mooney    Pertinent Patient Data:  Height= 66 inches      Weight = 64.3 kg    Pertinent Medications:  Vancomycin 1250 mg x 1 then 1000 mg IV every 12 hours-- day  1  Zosyn 3.375 g IV extended infusion, every 8 hours  - day 1  Clindamycin 600 mg IV every 8 hours  - day 1        PERTINENT LABS:  Recent Labs     05/26/19  1249      K 4.0      CO2 21   BUN 9   CREATININE 0.7       Estimated Creatinine Clearance: 78 mL/min (based on SCr of 0.7 mg/dL). Recent Labs     05/26/19  1249   WBC 12.7*   HGB 11.2*   HCT 34.3*   MCV 92.3          Micro:  Date Site Micro Susceptibility   5/26 tissue pending    5/26 Blood #1 pending    5/26 Blood #2 pending        Assessment/Plan:  1. Necrotizing infection of  :  vancomycin day #1  Vancomycin  Dose of 1250 mg received in ED @ 1500  Estimated vancomycin elimination half-life=   Will order 1000 mg IV every 12 hours, next dose at 0300  Renal function will be monitored closely and dosing will be adjusted as appropriate. Please call pharmacy [phone] with any questions     Thank you for the consult  Buck Blank. [place] ARENAS   5/26/2019 9:28 PM

## 2019-05-27 NOTE — PROGRESS NOTES
Nutrition Assessment    Type and Reason for Visit: Initial, Positive Nutrition Screen    Nutrition Recommendations:   · Monitor ability to advance diet as tolerate to encourage intake   · Trial ONS: Ensure Clear and Protein modular     Nutrition Assessment: Pt is at nutritional compromise r/t inadequate oral intake since admission 2/2 NPO and clear liquid diet order. Pt has increased nutritional needs 2/2 wound. Pt w/ admitted w/ necrotizing fasciitis of the perineum s/p I&D 5/26 w/ plans to return to OR tomorrow (5/27). Diet advanced to clear liquids this morning. Will add Ensure Clear ONS and Protein Modular as trial. Monitor ability to advance diet as tolerated and adequacy of PO intake. Malnutrition Assessment:  · Malnutrition Status: At risk for malnutrition  · Context: Acute illness or injury  · Findings of the 6 clinical characteristics of malnutrition (Minimum of 2 out of 6 clinical characteristics is required to make the diagnosis of moderate or severe Protein Calorie Malnutrition based on AND/ASPEN Guidelines):  1. Energy Intake-Less than or equal to 50% of estimated energy requirement, (x2 days)    2. Weight Loss-No significant weight loss,    3. Fat Loss-Unable to assess(not appropriate),    4. Muscle Loss-Unable to assess,    5. Fluid Accumulation-No significant fluid accumulation,    6.   Strength-Not measured    Nutrition Risk Level: High    Nutrient Needs:  · Estimated Daily Total Kcal: 6722-3961 kcal (25-30)  · Estimated Daily Protein (g): 85-99 grams(1.3-1.5)  · Estimated Daily Total Fluid (ml/day): 9842-7901 mL    Nutrition Diagnosis:   · Problem: Inadequate oral intake  · Etiology: related to Acute injury/trauma     Signs and symptoms:  as evidenced by NPO status due to medical condition    Objective Information:  · Nutrition-Focused Physical Findings: No BM documented since admission; no edema  · Wound Type: Surgical Wound  · Current Nutrition Therapies:  · Oral Diet Orders: Clear Liquid   · Oral Nutrition Supplement (ONS) Orders: None  · Anthropometric Measures:  · Ht: 5' 6\" (167.6 cm)   · Current Body Wt: 146 lb 2.6 oz (66.3 kg)  · Admission Body Wt: 141 lb 12.1 oz (64.3 kg)  · Usual Body Wt: 135 lb (61.2 kg)(-140 lb)  · Ideal Body Wt: 130 lb (59 kg)   · BMI Classification: BMI 18.5 - 24.9 Normal Weight    Nutrition Interventions:   Start oral diet, Start ONS  Continued Inpatient Monitoring    Nutrition Evaluation:   · Evaluation: Goals set   · Goals: pt will tolerate diet and consume >75% of meals and ONS offered    · Monitoring: Nutrition Progression, Diet Tolerance, Wound Healing, Monitor Bowel Function      Electronically signed by Kaci Leggett RD, LD on 5/27/19 at 11:26 AM    Contact Number:   Pager: [phone]  Office: [phone]

## 2019-05-27 NOTE — PROGRESS NOTES
Patient admitted to [id number] [phone] [id number] and successfully recovered by PACU nurses without difficulty. Admission assessment performed. Patient lethargic after procedure, but answering questions and following commands. Oriented to self and is aware that she is at the hospital. Unable to tell this RN what brought her to the hospital, and says that it is \"2013\". Stridorous while on %. Left lower lobe with crackles, otherwise clear. Perineal area packed with gauze that is saturated. Informed surgical residents who note that it's chlorhexidine. Patient denies pain at this time. BP soft, but MAP > 65 per a-line (correlates well with cuff pressure). Will use ordered levo if needed. ST on monitor. CHG bath given. Patient updated on POC. Questions answered at this time. Will continue to monitor.
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
## 2019-05-27 NOTE — PROGRESS NOTES
Patient admitted to PACU (while recovering in the ICU 4522) post I and D of perineal area per Dr Cony Roblero. Attached to ICU monitoring system and report received from Dr. Caryle Neighbours. Patient was unstable at times during surgical procedure with low blood pressure requiring myrna synephrine. Patient arrived in PACU not wakeful from her anesthesia and with no initial evidence of pain.

## 2019-05-27 NOTE — PROGRESS NOTES
Department of Surgery:  Post-op Note    CC: R perineum nec fasc    Procedure(s) Performed: Incision and drainage perineal area with debridement of nec fasc    Subjective:   Pain is controlled, denies nausea or vomiting. Block catheter in place. Did not require additional pressors    Objective:  Anesthesia type: General      I/O    Intra op    Post op     Fluids  1000 mL 500 mL      mL 0 mL     Urine 100 mL 300 mL     Exam:  Vitals:    05/27/19 0600 05/27/19 0630 05/27/19 0700 05/27/19 0715   BP: 99/72 91/67 97/69 103/69   Pulse: 84 80 87 87   Resp: 12 13 21 18   Temp:       TempSrc:       SpO2: 92% 100% 98% 94%   Weight:       Height:           General appearance: alert, no acute distress, grooming appropriate  Chest/Lungs: CTAB, no crackles/rales, wheezes/rhonchi, normal effort  Cardiovascular: RRR, no murmurs/gallops/rubs  Abdomen: soft, appropriately tender, non-distended  : wound cavity packed with betadaine soaked gauzed. No bleeding noted. Erythema marked, has not spread pass the outlined marked  Extremities: no edema, no cyanosis  Neuro: A&Ox3, no focal deficits, sensation intact    Assessment and Plan  This is a 59y.o. year old female s/p Incision and drainage perineal area with debridement of nec fasc POD #0    Pain management: dilaudid for dressing change  Cardiovascular: stable, continue to monitor.  Levo availabe if patient become hypotensive  Respiratory: extubated, encourage hourly incentive spirometry and deep breathing  FEN:  Fluids: , Diet: NPO  : Urine output is adequate  Wound: local care, dressing change in the AM  Ambulation: OOB to chair, encourage ambulation  Prophylaxis: luis Miller MD  PGY-1 General Surgery  05/27/19  7:34 AM  [phone]

## 2019-05-27 NOTE — PLAN OF CARE
Problem: Nutrition  Goal: Optimal nutrition therapy  Outcome: Ongoing   Nutrition Problem: Inadequate oral intake  Intervention: Food and/or Nutrient Delivery: Start oral diet, Start ONS  Nutritional Goals: pt will tolerate diet and consume >75% of meals and ONS offered

## 2019-05-27 NOTE — ANESTHESIA PROCEDURE NOTES
Central Venous Line:    A central venous line was placed using surface landmarks, in the OR for the following indication(s): central venous access and CVP monitoring. Sterility preparation included the following: hand hygiene performed prior to procedure, maximum sterile barriers used and sterile technique used to drape from head to toe. The patient was placed in supine position. The right internal jugular vein was prepped. The site was prepped with Chloraprep. A 7 Fr (size), 16 (length), triple lumen was placed. During the procedure, the following specific steps were taken: target vein identified, needle advanced into vein and blood aspirated and guidewire advanced into vein. Intravenous verification was obtained by ultrasound and venous blood return. Post insertion care included: all ports aspirated, all ports flushed easily, guidewire removed intact, Biopatch applied, line sutured in place and dressing applied. During the procedure the patient experienced: patient tolerated procedure well with no complications.       Anesthesia type: general..No  Staffing  Anesthesiologist: Tony Marc MD  Performed: Anesthesiologist   Preanesthetic Checklist  Completed: patient identified, site marked, surgical consent, pre-op evaluation, timeout performed, IV checked, risks and benefits discussed, monitors and equipment checked, anesthesia consent given, oxygen available and patient being monitored

## 2019-05-27 NOTE — ANESTHESIA POSTPROCEDURE EVALUATION
Department of Anesthesiology  Postprocedure Note    Patient: Shad Malave  MRN: [id number]  YOB: 1955  Date of evaluation: 5/26/2019  Time:  10:21 PM     Procedure Summary     Date:  05/26/19 Room / Location:  [facility] OR    Anesthesia Start:  2016 Anesthesia Stop:      Procedure:  Monna Navarro AND DRAINAGE PERINEAL AREA WITH DEBRIDEMENT OF NECROTIZING FASCIITIS (N/A ) Diagnosis:  (NECROTIZING FASCIITIS)    Surgeon:  Ashlie Montalvo MD Responsible Provider:  Lara Del Rosario MD    Anesthesia Type:  general ASA Status:  3 - Emergent          Anesthesia Type: No value filed. Anum Phase I:      Anum Phase II:      Last vitals: Reviewed and per EMR flowsheets. Anesthesia Post Evaluation    Patient location during evaluation: ICU  Patient participation: complete - patient participated  Level of consciousness: lethargic (resting comfortably)  Pain scale: please refer to nursing notes. Airway patency: patent  Nausea & Vomiting: no nausea and no vomiting  Complications: no  Cardiovascular status: hypotensive and vasoactive/inotropes  Respiratory status: spontaneous ventilation and face mask  Hydration status: hypovolemic  Comments: No phone calls received from PACU RN regarding patient.

## 2019-05-27 NOTE — ANESTHESIA PRE PROCEDURE
Department of Anesthesiology  Preprocedure Note       Name:  Shad Rubin   Age:  59 y.o.  :  1955                                          MRN:  [id number]         Date:  2019      Surgeon: Chao Shi):  Fabian North MD    Procedure: Rj Hammer AND DRAINAGE PERINEAL AREA WITH DEBRIDEMENT (N/A )    Medications prior to admission:   Prior to Admission medications    Medication Sig Start Date End Date Taking? Authorizing Provider   diclofenac (VOLTAREN) 75 MG EC tablet Take 75 mg by mouth 2 times daily   Yes Historical Provider, MD   gabapentin (NEURONTIN) 600 MG tablet Take 600 mg by mouth 3 times daily. Yes Historical Provider, MD   venlafaxine (EFFEXOR) 100 MG tablet Take 100 mg by mouth 2 times daily    Yes Historical Provider, MD   oxyCODONE (ROXICODONE) 5 MG immediate release tablet Take 5 mg by mouth every 8 hours as needed for Pain. Yes Historical Provider, MD   multivitamin [facility]) per tablet Take 1 tablet by mouth daily. Yes Historical Provider, MD   acyclovir (ZOVIRAX) 200 MG capsule Take 200 mg by mouth every 4 hours (while awake).      Yes Historical Provider, MD   prochlorperazine (COMPAZINE) 10 MG tablet Take 1 tablet by mouth every 6 hours as needed (HA) 19   Ángel Peña MD   oxyCODONE-acetaminophen (PERCOCET) 5-325 MG per tablet Take 1 tablet by mouth 2 times daily  [facility] RX 25519. 17  Shawna Ganser, APRN - [place]   oxyCODONE-acetaminophen (PERCOCET) 5-325 MG per tablet Take 1 tablet by mouth 2 times daily  Fill on or After 17. 3/20/17 4/19/17  Amanda Mohan MD       Current medications:    Current Facility-Administered Medications   Medication Dose Route Frequency Provider Last Rate Last Dose    piperacillin-tazobactam (ZOSYN) 3.375 g in dextrose 5 % 100 mL IVPB extended infusion (mini-bag)  3.375 g Intravenous Q8H Rafa Trujillo MD   3.375 g at 19    clindamycin (CLEOCIN) 600 mg in dextrose 5 % 50 mL IVPB  600 mg Intravenous Q8H Bob Dash MD   Stopped at 05/1955    lactobacillus (CULTURELLE) capsule 1 capsule  1 capsule Oral BID Bob Dash MD        0.9 % sodium chloride infusion   Intravenous Continuous Bob MD Hardy 100 mL/hr at 05/26/19 1942      insulin lispro (HUMALOG) injection pen 0-6 Units  0-6 Units Subcutaneous TID  Bob Dash MD        morphine (PF) injection 4 mg  4 mg Intravenous Q4H PRN Bob Dash MD        sodium chloride flush 0.9 % injection 10 mL  10 mL Intravenous 2 times per day Bob Dash MD        sodium chloride flush 0.9 % injection 10 mL  10 mL Intravenous PRN Bob Dash MD        magnesium hydroxide (MILK OF MAGNESIA) 400 MG/5ML suspension 30 mL  30 mL Oral Daily PRN Bob Dash MD        ondansetron [facility]) injection 4 mg  4 mg Intravenous Q6H PRN Bob Dash MD        enoxaparin (LOVENOX) injection 40 mg  40 mg Subcutaneous Daily Bob Dash MD        acetaminophen (TYLENOL) tablet 650 mg  650 mg Oral Q4H PRN Bob Dash MD        acyclovir (ZOVIRAX) capsule 200 mg  200 mg Oral Q4H While awake Bob Dash MD        gabapentin (NEURONTIN) tablet 600 mg  600 mg Oral TID Bob Dash MD        therapeutic multivitamin-minerals 1 tablet  1 tablet Oral Daily Bob Dash MD        oxyCODONE (ROXICODONE) immediate release tablet 5 mg  5 mg Oral Q8H PRN Bob Dash MD        prochlorperazine (COMPAZINE) tablet 10 mg  10 mg Oral Q6H PRN Bob Dash MD        venlafaxine [facility]) tablet 100 mg  100 mg Oral BID  Bob Dash MD        ibuprofen (ADVIL;MOTRIN) tablet 400 mg  400 mg Oral Q8H PRN Sander Avila MD        [START ON 5/27/2019] vancomycin (VANCOCIN) 1,000 mg in dextrose 5 % 250 mL IVPB  1,000 mg Intravenous Q12H Bob Dash MD        fentaNYL (SUBLIMAZE) injection 25 mcg  25 mcg Intravenous Q5 Min PRN Roslyn Murillo MD        HYDROmorphone (DILAUDID) injection 0.5 mg  0.5 mg Intravenous Q5 Min PRN Charles Johnson MD  HYDROmorphone (DILAUDID) injection 0.25 mg  0.25 mg Intravenous Q5 Min PRN Roslyn Murillo MD        HYDROmorphone (DILAUDID) injection 0.5 mg  0.5 mg Intravenous Q5 Min PRN Teodoro Vivar MD        oxyCODONE-acetaminophen (PERCOCET) 5-325 MG per tablet 1 tablet  1 tablet Oral Once Teodoro Vivar MD        diphenhydrAMINE (BENADRYL) injection 12.5 mg  12.5 mg Intravenous Once PRN Teodoro Vivar MD        0.9 % sodium chloride bolus  500 mL Intravenous Once PRN Teodoro Vivar MD        ondansetron (ZOFRAN) injection 4 mg  4 mg Intravenous Q30 Min PRN Roslyn Murillo MD        promethazine (PHENERGAN) injection 6.25 mg  6.25 mg Intravenous Once PRN Roslyn Murillo MD        labetalol (NORMODYNE;TRANDATE) injection 5 mg  5 mg Intravenous Q10 Min PRN Roslyn Murillo MD        hydrALAZINE (APRESOLINE) injection 5 mg  5 mg Intravenous Q10 Min PRN Roslyn Murillo MD        meperidine (DEMEROL) injection 12.5 mg  12.5 mg Intravenous Q5 Min PRN Teodoro Vivar MD         Facility-Administered Medications Ordered in Other Encounters   Medication Dose Route Frequency Provider Last Rate Last Dose    propofol injection    PRN Teodoro Vivar MD   150 mg at 05/26/19 2026    midazolam (VERSED) injection    PRN Teodoro Vivar MD   2 mg at 05/26/19 2025    succinylcholine (ANECTINE) injection    PRN Teodoro Vivar MD   120 mg at 05/26/19 2026    fentaNYL (SUBLIMAZE) injection    PRN Roslyn Murillo MD   100 mcg at 05/26/19 2026    lidocaine (cardiac) (XYLOCAINE) injection    PRN Roslyn Murillo MD   100 mg at 05/26/19 2026    rocuronium (ZEMURON) injection    PRBARB Vivar MD   50 mg at 05/26/19 2031    phenylephrine (TITUS-SYNEPHRINE) injection    HODAN Vivar MD   160 mcg at 05/26/19 2040    lubrifresh P.M. (artificial tears) ophthalmic ointment    PRN Teodoro Vivar MD   1 applicator at 90/71/47 1893    HYDROmorphone (DILAUDID) injection    PRN Teodoro Vivar MD   0.4 mg at 05/26/19 8355 Encounters:   05/26/19 141 lb 12.1 oz (64.3 kg)   05/25/19 130 lb (59 kg)   04/01/18 130 lb (59 kg)     Body mass index is 22.88 kg/m². CBC:   Lab Results   Component Value Date    WBC 12.7 05/26/2019    RBC 3.71 05/26/2019    HGB 11.2 05/26/2019    HCT 34.3 05/26/2019    MCV 92.3 05/26/2019    RDW 13.1 05/26/2019     05/26/2019       CMP:   Lab Results   Component Value Date     05/26/2019    K 4.0 05/26/2019     05/26/2019    CO2 21 05/26/2019    BUN 9 05/26/2019    CREATININE 0.7 05/26/2019    GFRAA >60 05/26/2019    AGRATIO 1.5 06/04/2013    LABGLOM >60 05/26/2019    GLUCOSE 117 05/26/2019    PROT 6.3 06/04/2013    CALCIUM 9.4 05/26/2019    BILITOT 0.4 06/04/2013    ALKPHOS 108 06/04/2013    AST 40 06/04/2013    ALT 54 06/04/2013       POC Tests: No results for input(s): POCGLU, POCNA, POCK, POCCL, POCBUN, POCHEMO, POCHCT in the last 72 hours. Coags:   Lab Results   Component Value Date    PROTIME 12.7 06/04/2013    INR 1.0 06/04/2013       HCG (If Applicable): No results found for: PREGTESTUR, PREGSERUM, HCG, HCGQUANT     ABGs: No results found for: PHART, PO2ART, AZI2SBP, OCW7DJR, BEART, X2OVEANA     Type & Screen (If Applicable):  No results found for: [facility]    Anesthesia Evaluation  Patient summary reviewed no history of anesthetic complications:   Airway: Mallampati: I  TM distance: >3 FB   Neck ROM: full  Mouth opening: < 3 FB Dental:          Pulmonary:                              Cardiovascular:  Exercise tolerance: good (>4 METS),   (+) hyperlipidemia                 PE comment: tachycardic   Neuro/Psych:   (+) depression/anxiety              ROS comment: Chronic Back Pain GI/Hepatic/Renal:            ROS comment: Nec Fasc of perineal area.    Endo/Other:    (+) blood dyscrasia: anemia:., .                  ROS comment: EtOH use- 3-4 beers each weekend day Abdominal:           Vascular:                                        Anesthesia Plan      general     ASA 3 - emergent       Induction: intravenous and rapid sequence. MIPS: Postoperative opioids intended and Prophylactic antiemetics administered. Anesthetic plan and risks discussed with patient.                       Tony Marc MD   5/26/2019

## 2019-05-27 NOTE — PROGRESS NOTES
4 Eyes Admission Assessment     I agree as the admission nurse that 2 RN's have performed a thorough Head to Toe Skin Assessment on the patient. ALL assessment sites listed below have been assessed on admission. Areas assessed by both nurses:   [x]   Head, Face, and Ears   [x]   Shoulders, Back, and Chest  [x]   Arms, Elbows, and Hands   [x]   Coccyx, Sacrum, and Ischum  [x]   Legs, Feet, and Heels        Does the Patient have Skin Breakdown?   Yes a wound was noted on the Admission Assessment and an LDA was Initiated documentation include the Tran-wound, Wound Assessment, Measurements, Dressing Treatment, Drainage, and Color\",         Pavel Prevention initiated:  Yes   Wound Care Orders initiated:  Yes      WOC nurse consulted for Pressure Injury (Stage 3,4, Unstageable, DTI, NWPT, and Complex wounds):  No      Nurse 1 eSignature: Electronically signed by Hudson Ricardo RN on 5/27/19 at 12:31 AM    **SHARE this note so that the co-signing nurse is able to place an eSignature**    Nurse 2 eSignature: Electronically signed by hTong Diaz RN on 5/27/19 at 12:40 AM

## 2019-05-27 NOTE — BRIEF OP NOTE
Brief Postoperative Note  ______________________________________________________________    Patient: Simone Krabbe  YOB: 1955  MRN: [id number]  Date of Procedure: 5/26/2019    Pre-Op Diagnosis: NECROTIZING FASCIITIS    Post-Op Diagnosis: Same       Procedure(s):  IINCISION AND DRAINAGE PERINEAL AREA WITH DEBRIDEMENT OF NECROTIZING FASCIITIS    Anesthesia: Anesthesia type not filed in the log.     Surgeon(s):  Monica Ssoa MD    Assistant: Francesco Rai    Estimated Blood Loss (mL): less than 50     Complications: None    Specimens:   ID Type Source Tests Collected by Time Destination   1 : 1. FLUID CULTURE SWAB PERINEUM Tissue Perineum FUNGUS CULTURE, TISSUE CULTURE, ACID FAST CULTURE WITH SMEAR Monica Sosa MD 5/26/2019 2049    2 : 2. TISSUE CULTURE PERINEUM Tissue Perineum FUNGUS CULTURE, TISSUE CULTURE, ACID FAST CULTURE WITH SMEAR Monica Sosa MD 5/26/2019 2051            Drains:   Urethral Catheter Non-latex 16 fr (Active)       Findings: Necrotic tissue from right labia to the ischiorectal fossa     Francesco Rai MD  Date: 5/26/2019  Time: 10:20 PM

## 2019-05-28 ENCOUNTER — APPOINTMENT (OUTPATIENT)
Dept: GENERAL RADIOLOGY | Age: 64
DRG: 463 | End: 2019-05-28
Payer: COMMERCIAL

## 2019-05-28 ENCOUNTER — ANESTHESIA EVENT (OUTPATIENT)
Dept: OPERATING ROOM | Age: 64
DRG: 463 | End: 2019-05-28
Payer: COMMERCIAL

## 2019-05-28 ENCOUNTER — ANESTHESIA (OUTPATIENT)
Dept: OPERATING ROOM | Age: 64
DRG: 463 | End: 2019-05-28
Payer: COMMERCIAL

## 2019-05-28 VITALS — DIASTOLIC BLOOD PRESSURE: 66 MMHG | OXYGEN SATURATION: 92 % | TEMPERATURE: 98 F | SYSTOLIC BLOOD PRESSURE: 107 MMHG

## 2019-05-28 LAB
ALBUMIN SERPL-MCNC: 2.6 G/DL (ref 3.4–5)
ANION GAP SERPL CALCULATED.3IONS-SCNC: 11 MMOL/L (ref 3–16)
BASE EXCESS ARTERIAL: -1.1 MMOL/L (ref -3–3)
BASOPHILS ABSOLUTE: 0 K/UL (ref 0–0.2)
BASOPHILS RELATIVE PERCENT: 0.2 %
BUN BLDV-MCNC: 15 MG/DL (ref 7–20)
CALCIUM SERPL-MCNC: 8.6 MG/DL (ref 8.3–10.6)
CARBOXYHEMOGLOBIN ARTERIAL: 0.9 % (ref 0–1.5)
CHLORIDE BLD-SCNC: 107 MMOL/L (ref 99–110)
CO2: 23 MMOL/L (ref 21–32)
CREAT SERPL-MCNC: 0.6 MG/DL (ref 0.6–1.2)
EOSINOPHILS ABSOLUTE: 0 K/UL (ref 0–0.6)
EOSINOPHILS RELATIVE PERCENT: 0.1 %
GFR AFRICAN AMERICAN: >60
GFR NON-AFRICAN AMERICAN: >60
GLUCOSE BLD-MCNC: 113 MG/DL (ref 70–99)
GLUCOSE BLD-MCNC: 124 MG/DL (ref 70–99)
GLUCOSE BLD-MCNC: 129 MG/DL (ref 70–99)
GLUCOSE BLD-MCNC: 136 MG/DL (ref 70–99)
HCO3 ARTERIAL: 23 MMOL/L (ref 21–29)
HCT VFR BLD CALC: 21.8 % (ref 36–48)
HEMOGLOBIN, ART, EXTENDED: 14.1 G/DL
HEMOGLOBIN: 7.2 G/DL (ref 12–16)
LYMPHOCYTES ABSOLUTE: 1.4 K/UL (ref 1–5.1)
LYMPHOCYTES RELATIVE PERCENT: 8.3 %
MAGNESIUM: 2.3 MG/DL (ref 1.8–2.4)
MCH RBC QN AUTO: 30.4 PG (ref 26–34)
MCHC RBC AUTO-ENTMCNC: 33.1 G/DL (ref 31–36)
MCV RBC AUTO: 91.8 FL (ref 80–100)
METHEMOGLOBIN ARTERIAL: 0.5 % (ref 0–1.4)
MONOCYTES ABSOLUTE: 1.2 K/UL (ref 0–1.3)
MONOCYTES RELATIVE PERCENT: 7.2 %
NEUTROPHILS ABSOLUTE: 13.8 K/UL (ref 1.7–7.7)
NEUTROPHILS RELATIVE PERCENT: 84.2 %
O2 SAT, ARTERIAL: 93 % (ref 93–100)
PCO2 ARTERIAL: 37.3 MMHG (ref 35–45)
PDW BLD-RTO: 13.6 % (ref 12.4–15.4)
PERFORMED ON: ABNORMAL
PH ARTERIAL: 7.4 (ref 7.35–7.45)
PHOSPHORUS: 3.8 MG/DL (ref 2.5–4.9)
PLATELET # BLD: 255 K/UL (ref 135–450)
PMV BLD AUTO: 7.8 FL (ref 5–10.5)
PO2 ARTERIAL: 66.7 MMHG (ref 75–108)
POTASSIUM SERPL-SCNC: 3.5 MMOL/L (ref 3.5–5.1)
RBC # BLD: 2.38 M/UL (ref 4–5.2)
SLIDE REVIEW: ABNORMAL
SODIUM BLD-SCNC: 141 MMOL/L (ref 136–145)
TCO2 ARTERIAL: 24 MMOL/L
VANCOMYCIN TROUGH: 10.9 UG/ML (ref 10–20)
WBC # BLD: 16.4 K/UL (ref 4–11)

## 2019-05-28 PROCEDURE — 3600000013 HC SURGERY LEVEL 3 ADDTL 15MIN: Performed by: SURGERY

## 2019-05-28 PROCEDURE — 2580000003 HC RX 258: Performed by: STUDENT IN AN ORGANIZED HEALTH CARE EDUCATION/TRAINING PROGRAM

## 2019-05-28 PROCEDURE — 6360000002 HC RX W HCPCS: Performed by: SURGERY

## 2019-05-28 PROCEDURE — 6360000002 HC RX W HCPCS: Performed by: STUDENT IN AN ORGANIZED HEALTH CARE EDUCATION/TRAINING PROGRAM

## 2019-05-28 PROCEDURE — 2500000003 HC RX 250 WO HCPCS: Performed by: STUDENT IN AN ORGANIZED HEALTH CARE EDUCATION/TRAINING PROGRAM

## 2019-05-28 PROCEDURE — 80069 RENAL FUNCTION PANEL: CPT

## 2019-05-28 PROCEDURE — 3700000001 HC ADD 15 MINUTES (ANESTHESIA): Performed by: SURGERY

## 2019-05-28 PROCEDURE — 2709999900 HC NON-CHARGEABLE SUPPLY: Performed by: SURGERY

## 2019-05-28 PROCEDURE — 36415 COLL VENOUS BLD VENIPUNCTURE: CPT

## 2019-05-28 PROCEDURE — 6360000002 HC RX W HCPCS: Performed by: INTERNAL MEDICINE

## 2019-05-28 PROCEDURE — 3600000003 HC SURGERY LEVEL 3 BASE: Performed by: SURGERY

## 2019-05-28 PROCEDURE — 3700000000 HC ANESTHESIA ATTENDED CARE: Performed by: SURGERY

## 2019-05-28 PROCEDURE — 6360000002 HC RX W HCPCS: Performed by: NURSE ANESTHETIST, CERTIFIED REGISTERED

## 2019-05-28 PROCEDURE — 2000000000 HC ICU R&B

## 2019-05-28 PROCEDURE — 82803 BLOOD GASES ANY COMBINATION: CPT

## 2019-05-28 PROCEDURE — 83735 ASSAY OF MAGNESIUM: CPT

## 2019-05-28 PROCEDURE — 6370000000 HC RX 637 (ALT 250 FOR IP): Performed by: STUDENT IN AN ORGANIZED HEALTH CARE EDUCATION/TRAINING PROGRAM

## 2019-05-28 PROCEDURE — 36592 COLLECT BLOOD FROM PICC: CPT

## 2019-05-28 PROCEDURE — 99232 SBSQ HOSP IP/OBS MODERATE 35: CPT | Performed by: INTERNAL MEDICINE

## 2019-05-28 PROCEDURE — 71045 X-RAY EXAM CHEST 1 VIEW: CPT

## 2019-05-28 PROCEDURE — 80202 ASSAY OF VANCOMYCIN: CPT

## 2019-05-28 PROCEDURE — 2580000003 HC RX 258: Performed by: SURGERY

## 2019-05-28 PROCEDURE — 2580000003 HC RX 258: Performed by: INTERNAL MEDICINE

## 2019-05-28 PROCEDURE — 85025 COMPLETE CBC W/AUTO DIFF WBC: CPT

## 2019-05-28 PROCEDURE — 11004 DBRDMT SKIN XTRNL GENT&PER: CPT | Performed by: SURGERY

## 2019-05-28 PROCEDURE — 2500000003 HC RX 250 WO HCPCS: Performed by: SURGERY

## 2019-05-28 RX ORDER — LABETALOL 20 MG/4 ML (5 MG/ML) INTRAVENOUS SYRINGE
5 EVERY 10 MIN PRN
Status: DISCONTINUED | OUTPATIENT
Start: 2019-05-28 | End: 2019-05-28

## 2019-05-28 RX ORDER — FENTANYL CITRATE 50 UG/ML
25 INJECTION, SOLUTION INTRAMUSCULAR; INTRAVENOUS EVERY 5 MIN PRN
Status: DISCONTINUED | OUTPATIENT
Start: 2019-05-28 | End: 2019-05-28

## 2019-05-28 RX ORDER — PROMETHAZINE HYDROCHLORIDE 25 MG/ML
6.25 INJECTION, SOLUTION INTRAMUSCULAR; INTRAVENOUS
Status: DISCONTINUED | OUTPATIENT
Start: 2019-05-28 | End: 2019-05-28

## 2019-05-28 RX ORDER — OXYCODONE HYDROCHLORIDE AND ACETAMINOPHEN 5; 325 MG/1; MG/1
1 TABLET ORAL
Status: DISCONTINUED | OUTPATIENT
Start: 2019-05-28 | End: 2019-05-28

## 2019-05-28 RX ORDER — PROPOFOL 10 MG/ML
INJECTION, EMULSION INTRAVENOUS PRN
Status: DISCONTINUED | OUTPATIENT
Start: 2019-05-28 | End: 2019-05-28 | Stop reason: SDUPTHER

## 2019-05-28 RX ORDER — ONDANSETRON 2 MG/ML
4 INJECTION INTRAMUSCULAR; INTRAVENOUS
Status: DISCONTINUED | OUTPATIENT
Start: 2019-05-28 | End: 2019-05-28

## 2019-05-28 RX ORDER — MIDAZOLAM HYDROCHLORIDE 1 MG/ML
INJECTION INTRAMUSCULAR; INTRAVENOUS PRN
Status: DISCONTINUED | OUTPATIENT
Start: 2019-05-28 | End: 2019-05-28 | Stop reason: SDUPTHER

## 2019-05-28 RX ORDER — ONDANSETRON 2 MG/ML
INJECTION INTRAMUSCULAR; INTRAVENOUS PRN
Status: DISCONTINUED | OUTPATIENT
Start: 2019-05-28 | End: 2019-05-28 | Stop reason: SDUPTHER

## 2019-05-28 RX ORDER — DEXAMETHASONE SODIUM PHOSPHATE 4 MG/ML
INJECTION, SOLUTION INTRA-ARTICULAR; INTRALESIONAL; INTRAMUSCULAR; INTRAVENOUS; SOFT TISSUE PRN
Status: DISCONTINUED | OUTPATIENT
Start: 2019-05-28 | End: 2019-05-28 | Stop reason: SDUPTHER

## 2019-05-28 RX ORDER — HYDROMORPHONE HCL 110MG/55ML
PATIENT CONTROLLED ANALGESIA SYRINGE INTRAVENOUS PRN
Status: DISCONTINUED | OUTPATIENT
Start: 2019-05-28 | End: 2019-05-28 | Stop reason: SDUPTHER

## 2019-05-28 RX ORDER — HYDRALAZINE HYDROCHLORIDE 20 MG/ML
5 INJECTION INTRAMUSCULAR; INTRAVENOUS EVERY 10 MIN PRN
Status: DISCONTINUED | OUTPATIENT
Start: 2019-05-28 | End: 2019-05-28

## 2019-05-28 RX ORDER — FENTANYL CITRATE 50 UG/ML
50 INJECTION, SOLUTION INTRAMUSCULAR; INTRAVENOUS EVERY 5 MIN PRN
Status: DISCONTINUED | OUTPATIENT
Start: 2019-05-28 | End: 2019-05-28

## 2019-05-28 RX ADMIN — Medication 10 ML: at 08:21

## 2019-05-28 RX ADMIN — FOLIC ACID: 5 INJECTION, SOLUTION INTRAMUSCULAR; INTRAVENOUS; SUBCUTANEOUS at 08:08

## 2019-05-28 RX ADMIN — ONDANSETRON 4 MG: 2 INJECTION INTRAMUSCULAR; INTRAVENOUS at 15:23

## 2019-05-28 RX ADMIN — PROPOFOL 30 MG: 10 INJECTION, EMULSION INTRAVENOUS at 15:25

## 2019-05-28 RX ADMIN — PROPOFOL 200 MG: 10 INJECTION, EMULSION INTRAVENOUS at 15:03

## 2019-05-28 RX ADMIN — HYDROMORPHONE HYDROCHLORIDE 0.5 MG: 1 INJECTION, SOLUTION INTRAMUSCULAR; INTRAVENOUS; SUBCUTANEOUS at 21:06

## 2019-05-28 RX ADMIN — Medication 10 ML: at 21:00

## 2019-05-28 RX ADMIN — PIPERACILLIN SODIUM,TAZOBACTAM SODIUM 3.38 G: 3; .375 INJECTION, POWDER, FOR SOLUTION INTRAVENOUS at 03:19

## 2019-05-28 RX ADMIN — VENLAFAXINE 100 MG: 25 TABLET ORAL at 21:06

## 2019-05-28 RX ADMIN — PIPERACILLIN SODIUM,TAZOBACTAM SODIUM 3.38 G: 3; .375 INJECTION, POWDER, FOR SOLUTION INTRAVENOUS at 10:02

## 2019-05-28 RX ADMIN — ENOXAPARIN SODIUM 40 MG: 40 INJECTION SUBCUTANEOUS at 08:08

## 2019-05-28 RX ADMIN — SODIUM CHLORIDE, POTASSIUM CHLORIDE, SODIUM LACTATE AND CALCIUM CHLORIDE: 600; 310; 30; 20 INJECTION, SOLUTION INTRAVENOUS at 16:54

## 2019-05-28 RX ADMIN — HYDROMORPHONE HYDROCHLORIDE 1 MG: 2 INJECTION, SOLUTION INTRAMUSCULAR; INTRAVENOUS; SUBCUTANEOUS at 15:26

## 2019-05-28 RX ADMIN — SODIUM CHLORIDE, POTASSIUM CHLORIDE, SODIUM LACTATE AND CALCIUM CHLORIDE: 600; 310; 30; 20 INJECTION, SOLUTION INTRAVENOUS at 11:47

## 2019-05-28 RX ADMIN — PIPERACILLIN SODIUM,TAZOBACTAM SODIUM 3.38 G: 3; .375 INJECTION, POWDER, FOR SOLUTION INTRAVENOUS at 19:19

## 2019-05-28 RX ADMIN — HYDROMORPHONE HYDROCHLORIDE 0.5 MG: 1 INJECTION, SOLUTION INTRAMUSCULAR; INTRAVENOUS; SUBCUTANEOUS at 12:08

## 2019-05-28 RX ADMIN — MIDAZOLAM HYDROCHLORIDE 2 MG: 2 INJECTION, SOLUTION INTRAMUSCULAR; INTRAVENOUS at 15:03

## 2019-05-28 RX ADMIN — HYDROMORPHONE HYDROCHLORIDE 1 MG: 1 INJECTION, SOLUTION INTRAMUSCULAR; INTRAVENOUS; SUBCUTANEOUS at 06:14

## 2019-05-28 RX ADMIN — CLINDAMYCIN PHOSPHATE 600 MG: 600 INJECTION, SOLUTION INTRAVENOUS at 10:02

## 2019-05-28 RX ADMIN — VENLAFAXINE 100 MG: 25 TABLET ORAL at 08:08

## 2019-05-28 RX ADMIN — CLINDAMYCIN PHOSPHATE 600 MG: 600 INJECTION, SOLUTION INTRAVENOUS at 03:20

## 2019-05-28 RX ADMIN — VANCOMYCIN HYDROCHLORIDE 1250 MG: 10 INJECTION, POWDER, LYOPHILIZED, FOR SOLUTION INTRAVENOUS at 11:47

## 2019-05-28 RX ADMIN — VANCOMYCIN HYDROCHLORIDE 1000 MG: 10 INJECTION, POWDER, LYOPHILIZED, FOR SOLUTION INTRAVENOUS at 03:19

## 2019-05-28 RX ADMIN — DEXAMETHASONE SODIUM PHOSPHATE 4 MG: 4 INJECTION, SOLUTION INTRAMUSCULAR; INTRAVENOUS at 15:23

## 2019-05-28 ASSESSMENT — PULMONARY FUNCTION TESTS
PIF_VALUE: 4
PIF_VALUE: 13
PIF_VALUE: 1
PIF_VALUE: 4
PIF_VALUE: 11
PIF_VALUE: 15
PIF_VALUE: 5
PIF_VALUE: 12
PIF_VALUE: 14
PIF_VALUE: 12
PIF_VALUE: 4
PIF_VALUE: 4
PIF_VALUE: 12
PIF_VALUE: 12
PIF_VALUE: 1
PIF_VALUE: 1
PIF_VALUE: 12
PIF_VALUE: 18
PIF_VALUE: 4
PIF_VALUE: 5
PIF_VALUE: 3
PIF_VALUE: 1
PIF_VALUE: 6
PIF_VALUE: 13
PIF_VALUE: 4
PIF_VALUE: 3
PIF_VALUE: 4
PIF_VALUE: 5
PIF_VALUE: 27
PIF_VALUE: 8
PIF_VALUE: 13
PIF_VALUE: 15
PIF_VALUE: 5
PIF_VALUE: 13
PIF_VALUE: 4
PIF_VALUE: 5
PIF_VALUE: 0
PIF_VALUE: 0
PIF_VALUE: 3
PIF_VALUE: 5
PIF_VALUE: 12
PIF_VALUE: 4
PIF_VALUE: 1
PIF_VALUE: 4
PIF_VALUE: 7
PIF_VALUE: 4
PIF_VALUE: 5
PIF_VALUE: 26
PIF_VALUE: 1
PIF_VALUE: 10
PIF_VALUE: 4
PIF_VALUE: 3
PIF_VALUE: 6
PIF_VALUE: 5
PIF_VALUE: 3

## 2019-05-28 ASSESSMENT — PAIN SCALES - GENERAL
PAINLEVEL_OUTOF10: 4
PAINLEVEL_OUTOF10: 0
PAINLEVEL_OUTOF10: 8
PAINLEVEL_OUTOF10: 9
PAINLEVEL_OUTOF10: 7
PAINLEVEL_OUTOF10: 0

## 2019-05-28 ASSESSMENT — PAIN DESCRIPTION - PAIN TYPE
TYPE: SURGICAL PAIN
TYPE: SURGICAL PAIN

## 2019-05-28 ASSESSMENT — PAIN DESCRIPTION - ONSET
ONSET: ON-GOING
ONSET: ON-GOING

## 2019-05-28 ASSESSMENT — PAIN DESCRIPTION - FREQUENCY
FREQUENCY: CONTINUOUS
FREQUENCY: CONTINUOUS

## 2019-05-28 ASSESSMENT — PAIN DESCRIPTION - DESCRIPTORS
DESCRIPTORS: ACHING
DESCRIPTORS: ACHING

## 2019-05-28 ASSESSMENT — PAIN DESCRIPTION - PROGRESSION
CLINICAL_PROGRESSION: GRADUALLY WORSENING
CLINICAL_PROGRESSION: GRADUALLY WORSENING

## 2019-05-28 ASSESSMENT — PAIN DESCRIPTION - LOCATION
LOCATION: PERINEUM
LOCATION: PERINEUM

## 2019-05-28 ASSESSMENT — PAIN - FUNCTIONAL ASSESSMENT: PAIN_FUNCTIONAL_ASSESSMENT: PREVENTS OR INTERFERES SOME ACTIVE ACTIVITIES AND ADLS

## 2019-05-28 ASSESSMENT — PAIN DESCRIPTION - ORIENTATION: ORIENTATION: OTHER (COMMENT)

## 2019-05-28 NOTE — PROGRESS NOTES
Pt in bed, alert and oriented x 4. Incont of stool, area cleansed, portion of dressing with stool was trimmed off, and abd pad replaced. Block care completed. Pt encouraged to remove jewelry (rings and toe rings), she insists on keeping it in place. Pt explained that if extremities swell, it might require rings to be cut off, she verbalizes understanding, and continues to refuse to take it off at this time. Instructed to call for assistance with ambulation, call light in reach. Bed in lowest locked position, side rails up x 3, bed alarm on.

## 2019-05-28 NOTE — BRIEF OP NOTE
Brief Postoperative Note  ______________________________________________________________    Patient: Isaías Lemos  YOB: 1955  MRN: [id number]  Date of Procedure: 5/28/2019    Pre-Op Diagnosis: NECROTIZING FASCIITIS OF GLUTEAL REGION    Post-Op Diagnosis: Same       Procedure(s):  EXAMINATION UNDER ANESTHESIA WITH DEBRIDEMENT    Anesthesia: General    Surgeon(s):  Jeff Brand MD    Assistant: Dr. Crystal Cuenca  PGY-III    Estimated Blood Loss (mL): less than 50     Complications: None    Specimens:   * No specimens in log *    Implants:  * No implants in log *      Drains:   Urethral Catheter Non-latex 16 fr (Active)   Catheter Indications Perioperative use in selected surgeries including but not limited to urologic, pelvic or need for intraoperative monitoring of urinary output due to prolonged surgery, large volume infusion or need for diuretic therapy in surgery 5/28/2019 11:51 AM   Site Assessment No urethral drainage 5/28/2019 11:51 AM   Urine Color Yellow 5/28/2019 11:51 AM   Urine Appearance Clear 5/28/2019 11:51 AM   Output (mL) 75 mL 5/28/2019  2:45 PM       Findings: see op note    Gorge Cm MD  Date: 5/28/2019  Time: 3:56 PM

## 2019-05-28 NOTE — PLAN OF CARE
Problem: Pain:  Goal: Pain level will decrease  Description  Pain level will decrease  5/27/2019 1448 by Emelina Day RN  Outcome: Ongoing  Pt resting comfortably at this time, will cont to monitor for pain.     Problem: Falls - Risk of:  Goal: Will remain free from falls  Description  Will remain free from falls  5/27/2019 1448 by Emelina Day RN  Outcome: Ongoing  Pt is fall risk. Instructed to call for assistance with ambulation, call light in reach. Bed in lowest locked position, side rails up x 3, bed alarm on. Will cont to monitor for safety.     Problem: Risk for Impaired Skin Integrity  Goal: Tissue integrity - skin and mucous membranes  Description  Structural intactness and normal physiological function of skin and  mucous membranes. 5/27/2019 1448 by Emelina Day RN  Outcome: Ongoing  Pt encouraged to turn and reposition frequently in bed, assistance given as needed. Surgical site to perineum, dressing in place, covered by absorbant brief. Sacral heart to coccyx for prevention.      Problem: Nutrition  Goal: Optimal nutrition therapy  5/27/2019 1448 by Emelina Day RN  Outcome: Ongoing  Pt NPO in anticipation of surgery today.

## 2019-05-28 NOTE — ANESTHESIA POSTPROCEDURE EVALUATION
Department of Anesthesiology  Postprocedure Note    Patient: Jhonny Monroy  MRN: [id number]  YOB: 1955  Date of evaluation: 5/28/2019  Time:  5:29 PM     Procedure Summary     Date:  05/28/19 Room / Location:  [facility] OR [address] [place] [address] OR    Anesthesia Start:  6962 Anesthesia Stop:  3235    Procedure:  EXAMINATION UNDER ANESTHESIA WITH DEBRIDEMENT (N/A ) Diagnosis:  (NECROTIZING FASCIITIS OF GLUTEAL REGION)    Surgeon:  Prema Proctor MD Responsible Provider:  Jelena Kearns DO    Anesthesia Type:  general ASA Status:  3 - Emergent          Anesthesia Type: general    Anum Phase I: Anum Score: 8    Anum Phase II:      Last vitals: Reviewed and per EMR flowsheets.        Anesthesia Post Evaluation    Patient location during evaluation: PACU  Patient participation: complete - patient participated  Level of consciousness: awake and alert  Airway patency: patent  Nausea & Vomiting: no nausea and no vomiting  Cardiovascular status: blood pressure returned to baseline  Respiratory status: acceptable  Hydration status: euvolemic

## 2019-05-28 NOTE — PROGRESS NOTES
Hospitalist Progress Note      PCP: Se[facility]    Date of Admission: 5/26/2019    Chief Complaint: Necrotizing fasciitis of the perineum    Hospital Course:  Patient feels well  Semination under anesthesia today  No chest pain shortness of breath  No abdominal pain nausea vomiting  No headache        Medications:  Reviewed      Exam:    /78   Pulse 73   Temp 98.5 °F (36.9 °C) (Oral)   Resp 15   Ht 5' 6\" (1.676 m)   Wt 149 lb 0.5 oz (67.6 kg)   LMP 07/12/2007   SpO2 96%   BMI 24.05 kg/m²     General appearance: No apparent distress, appears stated age and cooperative. HEENT: Pupils equal, round, and reactive to light. Conjunctivae/corneas clear. Neck: Supple, with full range of motion. No jugular venous distention. Trachea midline. Respiratory:  Normal respiratory effort. Clear to auscultation, bilaterally without RALES/WHEEZES/Rhonchi. Cardiovascular: Regular rate and rhythm with normal S1/S2 without MURMURS, rubs or gallops. Abdomen: Soft, non-tender, non-distended with normal bowel sounds. Musculoskeletal: No clubbing, cyanosis or EDEMA bilaterally. Full range of motion without deformity. Skin: Skin color, texture, turgor normal.  No rashes or lesions. Perineal wound dressed by general surgery  Neurologic:  Neurovascularly intact without any focal sensory/motor deficits.  Cranial nerves: II-XII intact, grossly non-focal.  Psychiatric: Alert and oriented, thought content appropriate, normal insight  Overall no change in physical exam from 5/27        Labs:   Recent Labs     05/26/19  2300 05/27/19  0627 05/28/19  0354   WBC 13.7* 15.6* 16.4*   HGB 9.4* 8.4* 7.2*   HCT 28.7* 25.7* 21.8*    247 255     Recent Labs     05/26/19  2300 05/27/19  0627 05/28/19  0302    138 141   K 4.1 4.3 3.5    107 107   CO2 19* 19* 23   BUN 8 9 15   CREATININE 0.7 0.7 0.6   CALCIUM 7.9* 8.2* 8.6   PHOS  --  4.1 3.8     No results for input(s): AST, ALT, BILIDIR, BILITOT, ALKPHOS in the last 72 hours. Recent Labs     05/27/19  0627   INR 1.30*     No results for input(s): Gordy Daly in the last 72 hours. Urinalysis:    No results found for: Noretta Flash, BACTERIA, RBCUA, BLOODU, SPECGRAV, [place] 994    Radiology:  XR CHEST PORTABLE   Final Result      Patchy consolidation in the right lower lung-atelectasis versus pneumonia. Low lung volumes. Stable cardiomediastinal silhouette. Right jugular central venous catheter. XR CHEST PORTABLE   Final Result   Impression:       1. Newly placed right IJ central venous catheter with tip overlying the cavoatrial junction. No pneumothorax. 2. Low lung volumes with ill-defined bilateral lung opacities. This could represent vascular crowding related to low lung volumes, subsegmental atelectasis, pulmonary edema, or pneumonia. CT HEAD WO CONTRAST   Final Result   Impression: No acute intracranial abnormality. No significant change since yesterday's study. CT PELVIS W CONTRAST Additional Contrast? None   Final Result   1. Stranding in the fat involving the right perineum and right gluteal region. Given the patient's history this likely reflects a cellulitis. This does extend superiorly into the pelvis involving the right pelvic sidewall and  region, presumably    reflecting cellulitis/phlegmon. There is no evidence of a drainable fluid collection at this time. XR PELVIS (1-2 VIEWS)   Final Result      No acute findings. Degenerative changes in the lower lumbar spine.           Assessment/Plan:    Active Hospital Problems    Diagnosis Date Noted    Necrotizing soft tissue infection [M79.89] 05/27/2019    Cellulitis of perineum [G62.543] 05/26/2019    Cellulitis of gluteal region [L03.317]        Acute Medical Issues Being Addressed:    61-year-old woman admitted to the hospital with necrotizing fasciitis of the cranial region    Sepsis sec to Necrotizing fasciitis of the perineal region MRSA positive  Status post incision and drainage by general surgery  On vancomycin and Zosyn and clindamycin, and infectious disease clindamycin discontinued  Hemodynamically stable  Daily dressings per surgery  Continue IV fluids  Seen by infectious disease  Is growing MRSA  Plan for examination under anesthesia today       DVT Prophylaxis:sc lovenox   Diet: Diet NPO, After Midnight  Dietary Nutrition Supplements: Protein Modular, Clear Liquid Oral Supplement  Code Status: Full Code      Dispo - once acute medical processes have resolved    Caprice Yost MD

## 2019-05-28 NOTE — PROGRESS NOTES
Pt incontinent of small amount of formed stool. ABD pad changed and wound cleansed on the surface. Pt scheduled to go to OR at 2pm for wound care and dressing change. Attempted to removed rings with lotion, several were removed and placed in pt belonging bag in closet, but unable to remove all. Will tape prior to OR. Pt aware that if rings start to cut into skin or impede circulation that we will have to cut them off. Pt verbalized understanding.

## 2019-05-28 NOTE — PROGRESS NOTES
Surgery Daily Progress Note    CC: necrotizing fasciitis of the perineum    Subjective :  No acute events overnight, tolerated dressing change in the PM, tolerated her liquid diet and has been NPO after midnight    denies nausea, vomiting, urinary complaints such as burning, chills, chest pain, SOB    ROS: A 10 point review of systems was obtained and pertinent positives and negatives were mentioned. Otherwise, no significant history other than as noted in the subjective portion of the note. Objective    Infusions:   norepinephrine Stopped (05/27/19 0024)    lactated ringers 125 mL/hr at 05/27/19 1707        I/O:I/O last 3 completed shifts: In: 5077.6 [P.O.:1140; I.V.:3790.5; IV Piggyback:147.1]  Out: 2460 [Urine:2460]           Wt Readings from Last 1 Encounters:   05/27/19 149 lb 0.5 oz (67.6 kg)                 LABS:    Recent Labs     05/27/19 0627 05/28/19  0354   WBC 15.6* 16.4*   HGB 8.4* 7.2*   HCT 25.7* 21.8*   MCV 92.5 91.8    255        Recent Labs     05/27/19  0627 05/28/19  0302    141   K 4.3 3.5    107   CO2 19* 23   PHOS 4.1 3.8   BUN 9 15   CREATININE 0.7 0.6      No results for input(s): AST, ALT, ALB, BILIDIR, BILITOT, ALKPHOS in the last 72 hours. No results for input(s): LIPASE, AMYLASE in the last 72 hours. Recent Labs     05/27/19 0627   INR 1.30*      No results for input(s): CKTOTAL, CKMB, CKMBINDEX, TROPONINI in the last 72 hours.       Exam:  Vitals:    05/28/19 0200 05/28/19 0300 05/28/19 0400 05/28/19 0500   BP: 99/69 98/66 90/76 103/67   Pulse: 77 75 78 83   Resp: 17 12 16 17   Temp:  98.8 °F (37.1 °C)     TempSrc:  Oral     SpO2:  96%     Weight:       Height:           General appearance: alert, lying comfortably in bed  Neuro: Alert & Orientated x3,   Lungs: no labored breathing, no respiratory distress, no accessory muscle use, on 2L NC  Cardiovascular: RRR, S1, S2 normal, no m/g/r  Abdomen: soft, NT, ND, +BS  Ext: no edema  : valverde in place  Perineum: wound in the right labia roughly 18 x 5 x 15 deep - no other necrotic tissue present that is visible, penrose drain in place, no foul smelling odor, minimal erythema, healthy adipose visualized, new betadine soaked krelex changed      ASSESSMENT/PLAN:   This is a 59 y.o. female s/p incision and drainage of perineal area with debridement of necrotizing fasciitis that demonstrated necrotic tissue from right labia to the ischiorectal fossa POD #2 (5.26.2019)    - cont. Vanc/Zosyn/Clinda until cultures result  - awaiting blood cultures  - ID consulted  - NPO today for OR in the afternoon for EUA and possible debridement  - cont. BID Dressing changes with Krelex soaked betadine gauze  - encourage IS   - cont.  IVF at this time with strict I/O's with nicolle tobar  - Follow up morning labs      Vishnu Elizondo DO  05/28/19  6:38 AM  [phone]

## 2019-05-28 NOTE — PROGRESS NOTES
ID Follow-up NOTE    CC:   Necrotizing soft tissue infection  Antibiotics: Vancomycin, Clindamycin, Zosyn    Admit Date: 2019  Hospital Day: 3    Subjective:     Patient c/o pain at surg site, worse with dressing changes      Objective:     Afebrile last 24 hr    Patient Vitals for the past 8 hrs:   BP Temp Temp src Pulse Resp SpO2   19 1000 114/78 -- -- 73 15 96 %   19 0900 107/72 -- -- 75 15 93 %   19 0800 107/74 98.5 °F (36.9 °C) Oral 90 17 94 %   19 0700 104/72 -- -- 86 14 92 %   19 0600 105/65 -- -- 78 15 --   19 0500 103/67 -- -- 83 17 --   19 0400 90/76 -- -- 78 16 --   19 0300 98/66 98.8 °F (37.1 °C) Oral 75 12 96 %     I/O last 3 completed shifts: In: 4507 [P.O.:1080; I.V.:2823]  Out: 1920 [Urine:1920]  I/O this shift:  In: -   Out: 350 [Urine:350]    EXAM:  GENERAL: No apparent distress.     HEENT: Membranes moist, no oral lesion  NECK:  Supple  LUNGS: Clear b/l, no rales, no dullness  CARDIAC: RRR, no murmur appreciated  ABD:  + BS, soft / NT  EXT:  No rash, no edema, no lesions  NEURO: No focal neurologic findings  PSYCH: Orientation, sensorium, mood normal  LINES:  R IJ line in place, R radial a-line     Data Review:  Lab Results   Component Value Date    WBC 16.4 (H) 2019    HGB 7.2 (L) 2019    HCT 21.8 (L) 2019    MCV 91.8 2019     2019     Lab Results   Component Value Date    CREATININE 0.6 2019    BUN 15 2019     2019    K 3.5 2019     2019    CO2 23 2019       Hepatic Function Panel:   Lab Results   Component Value Date    ALKPHOS 108 2013    ALT 54 2013    AST 40 2013    PROT 6.3 2013    BILITOT 0.4 2013    LABALBU 2.6 2019         Micro:   Tissue #1: GS 1+WBC, 3+GPC; cult light MRSA   Tissue #2: GS no WBC, 1+GPC; cult - light MRSA, no anaerobes isolated to date   BC x 2 - no growth to date     Imagin/26 Pelvic CT:  Stranding in the fat involving the right perineum and right gluteal region. Given the patient's history this likely reflects a cellulitis. This does extend superiorly into the pelvis involving the right pelvic sidewall and  region, presumably    reflecting cellulitis/phlegmon. There is no evidence of a drainable fluid collection at this time. Scheduled Meds:   vancomycin  1,250 mg Intravenous Q12H    clindamycin (CLEOCIN) IV  600 mg Intravenous Q8H    venlafaxine  100 mg Oral BID    folic acid, thiamine, multi-vitamin with vitamin K infusion   Intravenous Daily    piperacillin-tazobactam  3.375 g Intravenous Q8H    sodium chloride flush  10 mL Intravenous 2 times per day    enoxaparin  40 mg Subcutaneous Daily       Continuous Infusions:   norepinephrine Stopped (05/27/19 0024)    lactated ringers 125 mL/hr at 05/27/19 1707       PRN Meds:  HYDROmorphone **OR** HYDROmorphone, sodium chloride flush, ondansetron, acetaminophen      Assessment:     58 yo woman with hx chronic back pain, depression, recurrent HSV, neck surg     Pt fell on 5/23 (gardening), seen at [facility] ED, discharged  Presents to [facility] ED 5/25 with HA, R shoulder pain. T 99.9. Discharged     Returned to [facility] ED 5/26 with HA and pain in 'tailbone'. T 101.4. WBC 12.7  Had erythema on R buttock and perineum (from labia extending posteriorly). Pelvic CT with 'standing', no gas. Started on vancomycin and clindamycin and zosyn added  Seen by Gen Surg. Taken to OR, had necrotic tissue debrided.     IMP/  Hx chronic back pain, depression, recurrent HSV, neck surg  Fall     R buttock / perineum infection -  + necrotizing ST infection, cult + MRSA  Fever - resolved  Leukocytosis     Plan:     Cont vanco + zosyn for now   Will d/c clindamycin  Will f/u on cult - await MRSA sensitivities, updated anaerobic cult  Wound care and return to OR today per Surg     Discussed with pt  Serina Valle
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
## 2019-05-28 NOTE — PROGRESS NOTES
Pt to OR for procedure. Escorted to OR by this RN. Rings taped in place prior to transfer. Friend at bedside to Surgery Family Waiting Area.

## 2019-05-28 NOTE — CARE COORDINATION
Case Management Admission Assessment     2019  [facility])  Clinical Case Management Department    Patient: Shad Malave  MRN: [id number] / : 1955  ACCT: [de-identified]        Admission Documentation  Attending Provider: Moni Winter MD  Admit date/time: 2019 12:16 PM  Status: Inpatient [101]  Diagnosis: Cellulitis of perineum     Readmission within last 30 days:  no     The patient is a 59 y.o. female with medical history of chronic lower back pain and depression, who presents to [facility] with perineal pain. She states she fell few days ago and started to have pain initially in her tail bone and later on pain spread to involve right labia and buttock and lower abdominal wall. Rates pain 8/10, sharp, morphine helped in ER. Patient also reports chills at home, but did not check her temperature. Trauma work up was negative in ER on . Patient reports her headache persisted till today. She does not feel in general.     ER course: CT pelvic was negative for fracture, positive for fat stranding in right perineum and gluteal region. Admitted to ICU with diagnosis of Cellulitis of perineum. Met with patient at bedside. She works full time at [employer]. She has 5 dogs at home that she has no one to care for. Her daughter travels a lot for work. She has never required hhc, IV antibiotics at home, or a SNF stay. Currently on IV Clindamycin, IV Zosyn, and IV Vancomycin. CM will follow case and assist with discharge planning as needed.     PTA Living Situation  Discharge Planning  Living Arrangements: Family Members - daughter  Support Systems: Friends/Neighbors, Family Members  Potential Assistance Needed: N/A  Type of Home Care Services: None  Patient expects to be discharged to[de-identified] Home with daughter and dogs    Service Assessment       Values / Beliefs  Do you have any ethnic, cultural, sacramental, or spiritual Yazidi needs you would like us to be aware of while you are in the hospital?: No    Advance Directives (For Healthcare)  Pre-existing DNR Comfort Care/DNR Arrest/DNI Order: No  Healthcare Directive: No, patient does not have an advance directive for healthcare treatment  Information on Healthcare Directives Requested: No  Patient Requests Assistance: No              Durable Medical Equipment    None    Home Health/Skilled Nursing   Home care at home? No       Therapy Consults  PT evaluation needed?: No  OT Evalulation Needed?: No  SLP evaluation needed?: No      Pharmacy: Em at [facility] Medications:  No  Does patient want to participate in local refill/meds to beds program?: Yes    Discharge Plan   Patient expects to be discharged to[de-identified] Home with daughter and dogs         Barriers to discharge: OR today for EUA    Role of Case Management discussed with patient/family/designee.      Perfecto De La O RN, BSN  [facility] [employer]  Case Management Department  Ph:  [phone]

## 2019-05-28 NOTE — PROGRESS NOTES
Department of Surgery:  Post-op Note      Procedure(s) Performed: EUA with debridement of gluteal region    Subjective:   Patient's pain is controlled, denies nausea or vomiting. Has not had any liquids since surgery, but is hungry. Has not been OOB. Valverde in place. Denies flatus or BM at this time. Objective:  Anesthesia type: General      I/O    Intra op    Post op     Fluids  400 IVF 125ml/hr     EBL 25 0     Urine 200 350     Exam:/74   Pulse 71   Temp 98.1 °F (36.7 °C) (Oral)   Resp 10   Ht 5' 6\" (1.676 m)   Wt 149 lb 0.5 oz (67.6 kg)   LMP 07/12/2007   SpO2 96%   BMI 24.05 kg/m²   Post-op vital signs:  Stable   Exam:General appearance: alert, appears stated age and cooperative  Lungs: normal effort, no accessory muscle use, on 4L NC  Heart: regular rate and rhythm, normotensive  Abdomen: soft, non tender, non distended. Right labial wound - area of debridement with wet to dry dressing in place, distal aspect with some serosang. staining, penrose drain in place.    : valverde in place - clear yellow urine  Extremities: no edema or cyanosis    Assessment and Plan  Pt is a 59y.o. year old female with necrotizing right labial infection s/p incision and drainage of perineal area with debridement POD #2 (5.26.2019) taken back today for EXAMINATION UNDER ANESTHESIA WITH DEBRIDEMENT POD #0    Pain management: Dilaudid pain panel PRN  Cardiovasc: hemodynamically stable, will continue to monitor  Respiratory:  IS ordered to bedside, encourage hourly IS and deep breathing, wean oxygen as tolerated  FeNa: Fluids  LR at 125 ml/hr, Diet: Clear liquid diet  :  Urine output is adequate, valverde in place  Ambulation: OOB to chair, encourage ambulation  Prophylaxis: SCDs, lovenox  Abx: ID on board, continue vancomycin and Zosyn, clinda discontinued today  Dressing to be changed in the am.      Kevin Scales MD  General Surgery PGY 1  5/28/2019 7:29 PM  Pager [phone]

## 2019-05-28 NOTE — OP NOTE
fat were noted to be grey, boggy and necrosed. The incision was extended superiorly, posteriorly, laterally, and deep  All of the visible necrosed tissues were excised. Tissue tunneling was noted just posterior to the distal rectum. A small counter incision was made in the left perineal area and a penrose was left in place. The wound was irrigated with copious amounts of sterile saline using Pulsavac irrigation system. Hemostasis was ensured with electrocautery. The final measurements of the wound were 18 cm long, 5 cm wide and 15 cm deep (goint into the ischiorectal fossa). The wound was packed with betadine soaked Kirlex. Gauze fluffs and mesh panties were used as dressing. Laps and instruments counts were correct x 2. Patient tolerated procedure well. There were no immediate complications. Dr. Digna Vargas was present, scrubbed, and directed the course of the entire operation from beginning to end.      Karen Avila MD PGY 4  General Surgery Resident

## 2019-05-28 NOTE — ANESTHESIA PRE PROCEDURE
Department of Anesthesiology  Preprocedure Note       Name:  Simone Krabbe   Age:  59 y.o.  :  1955                                          MRN:  [id number]         Date:  2019      Surgeon: Kacy Fritz):  Monica Sosa MD    Procedure: EXAMINATION UNDER ANESTHESIA, POSSIBLE DEBRIDEMENT (N/A )    Medications prior to admission:   Prior to Admission medications    Medication Sig Start Date End Date Taking? Authorizing Provider   diclofenac (VOLTAREN) 75 MG EC tablet Take 75 mg by mouth 2 times daily    Historical Provider, MD   gabapentin (NEURONTIN) 600 MG tablet Take 600 mg by mouth 3 times daily. Historical Provider, MD   venlafaxine (EFFEXOR) 100 MG tablet Take 100 mg by mouth 2 times daily     Historical Provider, MD   oxyCODONE (ROXICODONE) 5 MG immediate release tablet Take 5 mg by mouth every 8 hours as needed for Pain. Historical Provider, MD   prochlorperazine (COMPAZINE) 10 MG tablet Take 1 tablet by mouth every 6 hours as needed (HA) 19   Sunny Lim MD   oxyCODONE-acetaminophen (PERCOCET) 5-325 MG per tablet Take 1 tablet by mouth 2 times daily  Oh CTP RX 78088. 17  NATHEN Pack - ADAL[facility]ERTO   oxyCODONE-acetaminophen (PERCOCET) 5-325 MG per tablet Take 1 tablet by mouth 2 times daily  Fill on or After 17. 3/20/17 4/19/17  Bonita Gold MD   multivitamin [facility]) per tablet Take 1 tablet by mouth daily. Historical Provider, MD   acyclovir (ZOVIRAX) 200 MG capsule Take 200 mg by mouth every 4 hours (while awake). Historical Provider, MD       Current medications:    No current facility-administered medications for this visit. No current outpatient medications on file.      Facility-Administered Medications Ordered in Other Visits   Medication Dose Route Frequency Provider Last Rate Last Dose    vancomycin (VANCOCIN) 1,250 mg in dextrose 5 % 250 mL IVPB  1,250 mg Intravenous Q12H Jose Rios .7 mL/hr at 19 1147 1,250 mg at 05/28/19 1147    HYDROmorphone (DILAUDID) injection 0.25 mg  0.25 mg Intravenous Q3H PRN Tara Prather MD        Or    HYDROmorphone (DILAUDID) injection 0.5 mg  0.5 mg Intravenous Q3H PRN Tara Prather MD   0.5 mg at 05/27/19 2015    venlafaxine (EFFEXOR) tablet 100 mg  100 mg Oral BID Divya Alicia MD   100 mg at 05/28/19 0808    sodium chloride 0.9 % 50 mL with folic acid 1 mg, adult multi-vitamin with vitamin k 10 mL, thiamine 100 mg   Intravenous Daily Jewel Rojo  mL/hr at 05/28/19 0808      piperacillin-tazobactam (ZOSYN) 3.375 g in dextrose 5 % 100 mL IVPB extended infusion (mini-bag)  3.375 g Intravenous Q8H Tara Prather MD 25 mL/hr at 05/28/19 1002 3.375 g at 05/28/19 1002    sodium chloride flush 0.9 % injection 10 mL  10 mL Intravenous 2 times per day Tara Prather MD   10 mL at 05/28/19 0821    sodium chloride flush 0.9 % injection 10 mL  10 mL Intravenous PRN Tara Prather MD        [facility]dansetron [facility] injection 4 mg  4 mg Intravenous Q6H PRN Tara Prather MD        enoxaparin (LOVENOX) injection 40 mg  40 mg Subcutaneous Daily Tara Prather MD   40 mg at 05/28/19 5835    acetaminophen (TYLENOL) tablet 650 mg  650 mg Oral Q4H PRN Tara Prather MD        lactated ringers infusion   Intravenous Continuous Tara Prather  mL/hr at 05/28/19 1147         Allergies:     Allergies   Allergen Reactions    Latex Rash    Vicodin [Hydrocodone-Acetaminophen] Nausea Only    Zocor [Simvastatin] Other (See Comments)     STATINS - LEG CRAMPS         Problem List:    Patient Active Problem List   Diagnosis Code    Hyperlipidemia E78.5    Depression F32.9    Displacement of lumbar intervertebral disc without myelopathy M51.26    Degeneration of lumbar or lumbosacral intervertebral disc M51.37    Lumbar radiculopathy M54.16    Cervical disc displacement M50.20    Cervical stenosis of spinal canal M48.02    Lumbar degenerative disc disease

## 2019-05-29 ENCOUNTER — ANESTHESIA EVENT (OUTPATIENT)
Dept: OPERATING ROOM | Age: 64
DRG: 463 | End: 2019-05-29
Payer: COMMERCIAL

## 2019-05-29 LAB
ALBUMIN SERPL-MCNC: 2.5 G/DL (ref 3.4–5)
ANION GAP SERPL CALCULATED.3IONS-SCNC: 9 MMOL/L (ref 3–16)
BANDED NEUTROPHILS RELATIVE PERCENT: 2 % (ref 0–7)
BASOPHILS ABSOLUTE: 0 K/UL (ref 0–0.2)
BASOPHILS RELATIVE PERCENT: 0 %
BUN BLDV-MCNC: 16 MG/DL (ref 7–20)
C DIFFICILE TOXIN, EIA: NORMAL
CALCIUM SERPL-MCNC: 8.7 MG/DL (ref 8.3–10.6)
CHLORIDE BLD-SCNC: 105 MMOL/L (ref 99–110)
CO2: 26 MMOL/L (ref 21–32)
CREAT SERPL-MCNC: 0.6 MG/DL (ref 0.6–1.2)
EOSINOPHILS ABSOLUTE: 0 K/UL (ref 0–0.6)
EOSINOPHILS RELATIVE PERCENT: 0 %
GFR AFRICAN AMERICAN: >60
GFR NON-AFRICAN AMERICAN: >60
GLUCOSE BLD-MCNC: 102 MG/DL (ref 70–99)
GLUCOSE BLD-MCNC: 106 MG/DL (ref 70–99)
GLUCOSE BLD-MCNC: 129 MG/DL (ref 70–99)
GLUCOSE BLD-MCNC: 93 MG/DL (ref 70–99)
HCT VFR BLD CALC: 21 % (ref 36–48)
HEMOGLOBIN: 7.1 G/DL (ref 12–16)
LYMPHOCYTES ABSOLUTE: 1.6 K/UL (ref 1–5.1)
LYMPHOCYTES RELATIVE PERCENT: 11 %
MAGNESIUM: 1.8 MG/DL (ref 1.8–2.4)
MCH RBC QN AUTO: 30.4 PG (ref 26–34)
MCHC RBC AUTO-ENTMCNC: 33.6 G/DL (ref 31–36)
MCV RBC AUTO: 90.5 FL (ref 80–100)
MONOCYTES ABSOLUTE: 0.3 K/UL (ref 0–1.3)
MONOCYTES RELATIVE PERCENT: 2 %
NEUTROPHILS ABSOLUTE: 12.8 K/UL (ref 1.7–7.7)
NEUTROPHILS RELATIVE PERCENT: 85 %
PDW BLD-RTO: 13.5 % (ref 12.4–15.4)
PERFORMED ON: ABNORMAL
PERFORMED ON: ABNORMAL
PERFORMED ON: NORMAL
PHOSPHORUS: 4.8 MG/DL (ref 2.5–4.9)
PLATELET # BLD: 297 K/UL (ref 135–450)
PMV BLD AUTO: 7.4 FL (ref 5–10.5)
POTASSIUM SERPL-SCNC: 4 MMOL/L (ref 3.5–5.1)
RBC # BLD: 2.32 M/UL (ref 4–5.2)
RBC # BLD: NORMAL 10*6/UL
SODIUM BLD-SCNC: 140 MMOL/L (ref 136–145)
VANCOMYCIN TROUGH: 18.1 UG/ML (ref 10–20)
WBC # BLD: 14.7 K/UL (ref 4–11)

## 2019-05-29 PROCEDURE — 6360000002 HC RX W HCPCS: Performed by: STUDENT IN AN ORGANIZED HEALTH CARE EDUCATION/TRAINING PROGRAM

## 2019-05-29 PROCEDURE — 99232 SBSQ HOSP IP/OBS MODERATE 35: CPT | Performed by: INTERNAL MEDICINE

## 2019-05-29 PROCEDURE — 2500000003 HC RX 250 WO HCPCS: Performed by: STUDENT IN AN ORGANIZED HEALTH CARE EDUCATION/TRAINING PROGRAM

## 2019-05-29 PROCEDURE — 83735 ASSAY OF MAGNESIUM: CPT

## 2019-05-29 PROCEDURE — 2580000003 HC RX 258: Performed by: STUDENT IN AN ORGANIZED HEALTH CARE EDUCATION/TRAINING PROGRAM

## 2019-05-29 PROCEDURE — 85025 COMPLETE CBC W/AUTO DIFF WBC: CPT

## 2019-05-29 PROCEDURE — 87449 NOS EACH ORGANISM AG IA: CPT

## 2019-05-29 PROCEDURE — 2000000000 HC ICU R&B

## 2019-05-29 PROCEDURE — 87324 CLOSTRIDIUM AG IA: CPT

## 2019-05-29 PROCEDURE — 80069 RENAL FUNCTION PANEL: CPT

## 2019-05-29 PROCEDURE — 2500000003 HC RX 250 WO HCPCS: Performed by: INTERNAL MEDICINE

## 2019-05-29 PROCEDURE — 6370000000 HC RX 637 (ALT 250 FOR IP): Performed by: STUDENT IN AN ORGANIZED HEALTH CARE EDUCATION/TRAINING PROGRAM

## 2019-05-29 PROCEDURE — 80202 ASSAY OF VANCOMYCIN: CPT

## 2019-05-29 RX ORDER — CLINDAMYCIN PHOSPHATE 600 MG/50ML
600 INJECTION INTRAVENOUS EVERY 8 HOURS
Status: DISCONTINUED | OUTPATIENT
Start: 2019-05-29 | End: 2019-06-11 | Stop reason: HOSPADM

## 2019-05-29 RX ORDER — SODIUM CHLORIDE, SODIUM LACTATE, POTASSIUM CHLORIDE, CALCIUM CHLORIDE 600; 310; 30; 20 MG/100ML; MG/100ML; MG/100ML; MG/100ML
INJECTION, SOLUTION INTRAVENOUS CONTINUOUS
Status: DISCONTINUED | OUTPATIENT
Start: 2019-05-30 | End: 2019-05-30

## 2019-05-29 RX ADMIN — HYDROMORPHONE HYDROCHLORIDE 0.5 MG: 1 INJECTION, SOLUTION INTRAMUSCULAR; INTRAVENOUS; SUBCUTANEOUS at 10:32

## 2019-05-29 RX ADMIN — HYDROMORPHONE HYDROCHLORIDE 0.5 MG: 1 INJECTION, SOLUTION INTRAMUSCULAR; INTRAVENOUS; SUBCUTANEOUS at 04:36

## 2019-05-29 RX ADMIN — SODIUM CHLORIDE, POTASSIUM CHLORIDE, SODIUM LACTATE AND CALCIUM CHLORIDE: 600; 310; 30; 20 INJECTION, SOLUTION INTRAVENOUS at 00:49

## 2019-05-29 RX ADMIN — HYDROMORPHONE HYDROCHLORIDE 0.5 MG: 1 INJECTION, SOLUTION INTRAMUSCULAR; INTRAVENOUS; SUBCUTANEOUS at 14:28

## 2019-05-29 RX ADMIN — VANCOMYCIN HYDROCHLORIDE 1250 MG: 10 INJECTION, POWDER, LYOPHILIZED, FOR SOLUTION INTRAVENOUS at 00:05

## 2019-05-29 RX ADMIN — HYDROMORPHONE HYDROCHLORIDE 0.5 MG: 1 INJECTION, SOLUTION INTRAMUSCULAR; INTRAVENOUS; SUBCUTANEOUS at 20:06

## 2019-05-29 RX ADMIN — HYDROMORPHONE HYDROCHLORIDE 0.5 MG: 1 INJECTION, SOLUTION INTRAMUSCULAR; INTRAVENOUS; SUBCUTANEOUS at 06:08

## 2019-05-29 RX ADMIN — HYDROMORPHONE HYDROCHLORIDE 0.5 MG: 1 INJECTION, SOLUTION INTRAMUSCULAR; INTRAVENOUS; SUBCUTANEOUS at 23:18

## 2019-05-29 RX ADMIN — Medication 10 ML: at 20:07

## 2019-05-29 RX ADMIN — PIPERACILLIN SODIUM,TAZOBACTAM SODIUM 3.38 G: 3; .375 INJECTION, POWDER, FOR SOLUTION INTRAVENOUS at 11:05

## 2019-05-29 RX ADMIN — FOLIC ACID: 5 INJECTION, SOLUTION INTRAMUSCULAR; INTRAVENOUS; SUBCUTANEOUS at 08:18

## 2019-05-29 RX ADMIN — VANCOMYCIN HYDROCHLORIDE 1250 MG: 10 INJECTION, POWDER, LYOPHILIZED, FOR SOLUTION INTRAVENOUS at 12:14

## 2019-05-29 RX ADMIN — HYDROMORPHONE HYDROCHLORIDE 0.5 MG: 1 INJECTION, SOLUTION INTRAMUSCULAR; INTRAVENOUS; SUBCUTANEOUS at 00:03

## 2019-05-29 RX ADMIN — VENLAFAXINE 100 MG: 25 TABLET ORAL at 20:07

## 2019-05-29 RX ADMIN — PIPERACILLIN SODIUM,TAZOBACTAM SODIUM 3.38 G: 3; .375 INJECTION, POWDER, FOR SOLUTION INTRAVENOUS at 04:34

## 2019-05-29 RX ADMIN — VENLAFAXINE 100 MG: 25 TABLET ORAL at 08:10

## 2019-05-29 RX ADMIN — ONDANSETRON 4 MG: 2 INJECTION INTRAMUSCULAR; INTRAVENOUS at 20:06

## 2019-05-29 RX ADMIN — Medication 10 ML: at 08:09

## 2019-05-29 RX ADMIN — ENOXAPARIN SODIUM 40 MG: 40 INJECTION SUBCUTANEOUS at 08:09

## 2019-05-29 RX ADMIN — CLINDAMYCIN PHOSPHATE 600 MG: 600 INJECTION, SOLUTION INTRAVENOUS at 20:07

## 2019-05-29 RX ADMIN — SODIUM CHLORIDE, POTASSIUM CHLORIDE, SODIUM LACTATE AND CALCIUM CHLORIDE: 600; 310; 30; 20 INJECTION, SOLUTION INTRAVENOUS at 10:32

## 2019-05-29 ASSESSMENT — PAIN DESCRIPTION - LOCATION: LOCATION: PERINEUM

## 2019-05-29 ASSESSMENT — PAIN SCALES - GENERAL
PAINLEVEL_OUTOF10: 0
PAINLEVEL_OUTOF10: 10
PAINLEVEL_OUTOF10: 0
PAINLEVEL_OUTOF10: 8
PAINLEVEL_OUTOF10: 0
PAINLEVEL_OUTOF10: 3
PAINLEVEL_OUTOF10: 0
PAINLEVEL_OUTOF10: 0
PAINLEVEL_OUTOF10: 7
PAINLEVEL_OUTOF10: 10
PAINLEVEL_OUTOF10: 7
PAINLEVEL_OUTOF10: 0
PAINLEVEL_OUTOF10: 9
PAINLEVEL_OUTOF10: 10
PAINLEVEL_OUTOF10: 0

## 2019-05-29 ASSESSMENT — PAIN DESCRIPTION - ONSET: ONSET: ON-GOING

## 2019-05-29 ASSESSMENT — PAIN DESCRIPTION - FREQUENCY: FREQUENCY: CONTINUOUS

## 2019-05-29 ASSESSMENT — PAIN DESCRIPTION - DESCRIPTORS: DESCRIPTORS: ACHING;CONSTANT;DISCOMFORT

## 2019-05-29 ASSESSMENT — PAIN DESCRIPTION - PAIN TYPE: TYPE: SURGICAL PAIN

## 2019-05-29 NOTE — PROGRESS NOTES
PRE-OP NOTE  Department of Surgery  Resident Note     Procedure: laparoscopic diverting colostomy, wound exam under anesthesia, poss. debridement    Consent: Informed consent signed    Labs      Recent Labs     05/28/19  0354 05/29/19  0405   WBC 16.4* 14.7*   HGB 7.2* 7.1*   HCT 21.8* 21.0*   MCV 91.8 90.5    297        Recent Labs     05/28/19  0302 05/29/19  0405    140   K 3.5 4.0    105   CO2 23 26   PHOS 3.8 4.8   BUN 15 16   CREATININE 0.6 0.6      No results for input(s): AST, ALT, ALB, BILIDIR, BILITOT, ALKPHOS in the last 72 hours. No results for input(s): LIPASE, AMYLASE in the last 72 hours. Recent Labs     05/27/19  0627   INR 1.30*      No results for input(s): CKTOTAL, CKMB, CKMBINDEX, TROPONINI in the last 72 hours.     CXR:   EKG: Reviewed, no acute changes    Orders:   · Diet: NPO after midnight,  IVF @ 100  · On Vanc/Zosyn  · Labs to be drawn: Type and Screen, Renal panel, CBC, INR    Adam Connell D.O.  5/29/2019  3:58 PM  [phone]

## 2019-05-29 NOTE — CARE COORDINATION
Case Management Daily Note:    Current Plan of Care: Continues IVABX x3. Dressing and wound care,       PT AM-PAC: N/A    OT AM-PAC: N/A    DME needs: N/A      Discharge Plan: From home. Unknown at this time. Tentative Discharge Date: TBD    Current Barriers to Discharge: Medical treatment/clearance     Resources/Information given: N/A       Case Management Notes: SW met with patient at bedside. SW talked about discharge plan. Patient unsure of next steps pending medical plan. Patient provided SW with [facility] paperwork. SW reached out to [email or web address] [facility] for assistance to complete. SW to follow. Paperwork to be provided to MIGUEL Starks with [facility] for assistance.          George Oliver, MSW, LSW     [facility]   [phone]

## 2019-05-29 NOTE — PROGRESS NOTES
ID Follow-up NOTE    CC:   Necrotizing soft tissue infection  Antibiotics: Vancomycin, Zosyn    Admit Date: 5/26/2019  Hospital Day: 4    Subjective:     Patient c/o pain at surg site, worse with dressing changes      Objective:     Afebrile last 24 hr    Patient Vitals for the past 8 hrs:   BP Temp Temp src Pulse Resp SpO2   05/29/19 1900 139/88 -- -- 85 23 --   05/29/19 1800 121/72 -- -- 75 15 --   05/29/19 1700 -- -- -- 80 18 --   05/29/19 1600 116/77 98.2 °F (36.8 °C) Oral 82 21 96 %   05/29/19 1500 -- -- -- 74 16 --   05/29/19 1400 110/66 -- -- 84 20 94 %   05/29/19 1300 116/76 -- -- 78 16 92 %   05/29/19 1200 113/81 98.6 °F (37 °C) Oral 91 17 92 %     I/O last 3 completed shifts: In: 4478 [P.O.:760; I.V.:3718]  Out: 2640 [Urine:2615; Blood:25]  No intake/output data recorded. EXAM:  GENERAL: No apparent distress.     HEENT: Membranes moist, no oral lesion  NECK:  Supple  LUNGS: Clear b/l, no rales, no dullness  CARDIAC: RRR, no murmur appreciated  ABD:  + BS, soft / NT  EXT:  No rash, no edema, no lesions  NEURO: No focal neurologic findings  PSYCH: Orientation, sensorium, mood normal  LINES:  R IJ line in place    Data Review:  Lab Results   Component Value Date    WBC 14.7 (H) 05/29/2019    HGB 7.1 (L) 05/29/2019    HCT 21.0 (L) 05/29/2019    MCV 90.5 05/29/2019     05/29/2019     Lab Results   Component Value Date    CREATININE 0.6 05/29/2019    BUN 16 05/29/2019     05/29/2019    K 4.0 05/29/2019     05/29/2019    CO2 26 05/29/2019       Hepatic Function Panel:   Lab Results   Component Value Date    ALKPHOS 108 06/04/2013    ALT 54 06/04/2013    AST 40 06/04/2013    PROT 6.3 06/04/2013    BILITOT 0.4 06/04/2013    LABALBU 2.5 05/29/2019         Micro:  5/26 Tissue #1: GS 1+WBC, 3+GPC; cult light MRSA  5/26 Tissue #2: GS no WBC, 1+GPC; cult - light MRSA, no anaerobes isolated to date  Staph aureus mrsa   Antibiotic Interpretation MARGIE Status    ceFAZolin Resistant >16 mcg/mL clindamycin Sensitive <=0.5 mcg/mL     erythromycin Resistant >4 mcg/mL     oxacillin Resistant >2 mcg/mL     tetracycline Sensitive <=0.5 mcg/mL     trimethoprim-sulfamethoxazole Sensitive <=0.5/9.5 mcg/mL     vancomycin Sensitive 1 mcg/mL        BC x 2 - no growth to date     Imagin/26 Pelvic CT:  Stranding in the fat involving the right perineum and right gluteal region. Given the patient's history this likely reflects a cellulitis. This does extend superiorly into the pelvis involving the right pelvic sidewall and  region, presumably    reflecting cellulitis/phlegmon. There is no evidence of a drainable fluid collection at this time. Scheduled Meds:   vancomycin  1,250 mg Intravenous Q12H    venlafaxine  100 mg Oral BID    piperacillin-tazobactam  3.375 g Intravenous Q8H    sodium chloride flush  10 mL Intravenous 2 times per day    enoxaparin  40 mg Subcutaneous Daily       Continuous Infusions:   [START ON 2019] lactated ringers         PRN Meds:  HYDROmorphone **OR** HYDROmorphone, sodium chloride flush, ondansetron, acetaminophen      Assessment:     58 yo woman with hx chronic back pain, depression, recurrent HSV, neck surg     Pt fell on  (gardening), seen at [facility] ED, discharged  Presents to [facility] ED  with HA, R shoulder pain. T 99.9. Discharged     Returned to [facility] ED  with HA and pain in 'tailbone'. T 101.4. WBC 12.7. BC sent  Had erythema on R buttock and perineum (from labia extending posteriorly). Pelvic CT with 'standing', no gas. Started on vancomycin and clindamycin and zosyn added  Seen by Gen Surg. Taken to OR, had necrotic tissue debrided.     Return to OR  - wound 18 x 5 x 15 cm    IMP/  Hx chronic back pain, depression, recurrent HSV, neck surg  Fall     R buttock / perineum infection -  + necrotizing ST infection, cult + MRSA  Fever - resolved  Leukocytosis     Plan:     Change to clindamycin alone - MRSA, anaerobic coverage  Diverting colostomy 5/30 per surg  Wound care per Surg     Discussed with pt  Ron Ferrell

## 2019-05-29 NOTE — PROGRESS NOTES
Patient up to chair with x2 assist. Patient slightly dizzy and wobbly upon first standing up but regained balance quickly and stated she feels fine. Vital signs stable. Chair alarm on, will continue to monitor.

## 2019-05-29 NOTE — PROGRESS NOTES
AM assessment complete. Patient alert and oriented x4 but seems to have poor attention/concentration. Patient denies any pain at this time. Patient took medications with water without issue. Given washcloth to wash off face. Bed alarm on, call light within reach, brake set, bed in lowest position. Will continue to monitor.

## 2019-05-29 NOTE — PLAN OF CARE
Problem: Pain:  Goal: Pain level will decrease  Description  Pain level will decrease  Outcome: Ongoing   Pain/discomfort being managed with PRN analgesics per MD orders. Pt able to express presence and absence of pain and rate pain appropriately using numerical scale. Problem: Falls - Risk of:  Goal: Will remain free from falls  Description  Patient has been free from falls and injuries this shift. Woodard fall risk assessed each shift. Bed locked in lowest position, alarm engaged. Fall bracelet in place, fall sign present outside door, fall socks present on patient. Patients call light within reach. Patient educated on use of call light. Room free of clutter. Patient has made no attempts to get out of bed. Will continue to round and assess needs. Outcome: Ongoing     Problem: Risk for Impaired Skin Integrity  Goal: Tissue integrity - skin and mucous membranes  Description  Patient's skin assessed q4h hours and prn. Patient turns self prn. Skin is kept clean and dry. Preventative dressings have been applied. Sacral heart placed and assessed to be CDI. Pavel scale is assessed and documented each shift. Pt family is educated on importance of frequent repositioning and assessment. Patient has no skin integrity issues besides the surgical site being seen and cared for by the surgical team. Patient's skin integrity maintained throughout shift. Outcome: Ongoing     Problem: Nutrition  Goal: Optimal nutrition therapy  Outcome: Ongoing  Pt able to verbalize understanding of and adherence to nutritional guidelines/dietary allowances or restrictions.

## 2019-05-29 NOTE — PROGRESS NOTES
Spoke with MD Connell, okay to place flexiseal as patient continues to have frequent, large amounts of liquid stool that saturates her dressing and wounds. . Inserted without issue. Patient cleaned up and new dressing placed. Will continue to monitor.

## 2019-05-29 NOTE — PROGRESS NOTES
Surgery Daily Progress Note    CC: necrotizing fasciitis of the perineum    Subjective :  Having some BM's overnight, dressing changed, no acute issues, tolerated her procedure yesterday. Tolerating clears    denies nausea, vomiting, urinary complaints such as burning, chills, chest pain, SOB    ROS: A 10 point review of systems was obtained and pertinent positives and negatives were mentioned. Otherwise, no significant history other than as noted in the subjective portion of the note. Objective    Infusions:   lactated ringers 125 mL/hr at 05/29/19 0049        I/O:I/O last 3 completed shifts: In: 2303 [I.V.:2298]  Out: 2690 [Urine:2665; Blood:25]           Wt Readings from Last 1 Encounters:   05/27/19 149 lb 0.5 oz (67.6 kg)                 LABS:    Recent Labs     05/28/19  0354 05/29/19  0405   WBC 16.4* 14.7*   HGB 7.2* 7.1*   HCT 21.8* 21.0*   MCV 91.8 90.5    297        Recent Labs     05/28/19  0302 05/29/19  0405    140   K 3.5 4.0    105   CO2 23 26   PHOS 3.8 4.8   BUN 15 16   CREATININE 0.6 0.6      No results for input(s): AST, ALT, ALB, BILIDIR, BILITOT, ALKPHOS in the last 72 hours. No results for input(s): LIPASE, AMYLASE in the last 72 hours. Recent Labs     05/27/19  0627   INR 1.30*      No results for input(s): CKTOTAL, CKMB, CKMBINDEX, TROPONINI in the last 72 hours.       Exam:  Vitals:    05/29/19 0100 05/29/19 0200 05/29/19 0300 05/29/19 0400   BP: 119/82 115/84 108/75 116/78   Pulse: 77 73 72 71   Resp: 25 26 19 18   Temp:    99 °F (37.2 °C)   TempSrc:    Oral   SpO2: 95% 96% 96% 92%   Weight:       Height:           General appearance: alert, lying comfortably in bed  Neuro: Alert & Orientated x3,   Lungs: no labored breathing, no respiratory distress, no accessory muscle use, on 3L NC  Cardiovascular: RRR, S1, S2 normal, no m/g/r  Abdomen: soft, NT, ND, +BS  Ext: no edema  : valverde in place  Perineum: wound in the right labia roughly 18 x 5 x 15 deep - no other necrotic tissue present that is visible, penrose drain in place, no foul smelling odor, minimal erythema around the gluteal region, healthy adipose visualized, new saline soaked gauze packing changed      ASSESSMENT/PLAN:   This is a 59 y.o. female s/p incision and drainage of perineal area with debridement of necrotizing fasciitis that demonstrated necrotic tissue from right labia to the ischiorectal fossa POD #3 (5.26.2019) and take back for EUA and debridement on 5.28.19 for a 2nd look. - cont. Vanc/Zosyn for MRSA growing - follow up on sensititivies - appreciate ID recommendations  - clinda discontinued per ID  - awaiting blood cultures  - okay for CLD and encourage nutrition shakes  - cont. BID Dressing changes with Krelex soaked saline gauze  - encourage IS   - decrease fluids to 50 given excellent urine output and tolerating clears  - laparoscopic diverting colostomy on thursday    Adam Montanez 68, DO  05/29/19  6:03 AM  [phone]    I have seen, examined, and reviewed the patients chart. I agree with the residents assessment and have made appropriate changes. Patient looks good. WBC decreasing. Plan to OR for lap diverting colostomy tomorrow. Nees to be marked.      Dominga Hirsch

## 2019-05-29 NOTE — PROGRESS NOTES
ALKPHOS in the last 72 hours. Recent Labs     05/27/19  0627   INR 1.30*     No results for input(s): Chuckie Aver in the last 72 hours. Urinalysis:    No results found for: Bates Maribel, BACTERIA, RBCUA, BLOODU, SPECGRAV, [place] [place] [address]    Radiology:  XR CHEST PORTABLE   Final Result      Patchy consolidation in the right lower lung-atelectasis versus pneumonia. Low lung volumes. Stable cardiomediastinal silhouette. Right jugular central venous catheter. XR CHEST PORTABLE   Final Result   Impression:       1. Newly placed right IJ central venous catheter with tip overlying the cavoatrial junction. No pneumothorax. 2. Low lung volumes with ill-defined bilateral lung opacities. This could represent vascular crowding related to low lung volumes, subsegmental atelectasis, pulmonary edema, or pneumonia. CT HEAD WO CONTRAST   Final Result   Impression: No acute intracranial abnormality. No significant change since yesterday's study. CT PELVIS W CONTRAST Additional Contrast? None   Final Result   1. Stranding in the fat involving the right perineum and right gluteal region. Given the patient's history this likely reflects a cellulitis. This does extend superiorly into the pelvis involving the right pelvic sidewall and  region, presumably    reflecting cellulitis/phlegmon. There is no evidence of a drainable fluid collection at this time. XR PELVIS (1-2 VIEWS)   Final Result      No acute findings. Degenerative changes in the lower lumbar spine.           Assessment/Plan:    Active Hospital Problems    Diagnosis Date Noted    Necrotizing soft tissue infection [M79.89] 05/27/2019    Cellulitis of perineum [N17.856] 05/26/2019    Cellulitis of gluteal region [L03.317]        Acute Medical Issues Being Addressed:    59-year-old woman admitted to the hospital with necrotizing fasciitis of the cranial region    Sepsis sec to Necrotizing fasciitis of the perineal region

## 2019-05-29 NOTE — PROGRESS NOTES
diclofenac (VOLTAREN) 75 MG EC tablet Take 75 mg by mouth 2 times daily   Yes Historical Provider, MD   gabapentin (NEURONTIN) 600 MG tablet Take 600 mg by mouth 3 times daily. Yes Historical Provider, MD   venlafaxine (EFFEXOR) 100 MG tablet Take 100 mg by mouth 2 times daily    Yes Historical Provider, MD   oxyCODONE (ROXICODONE) 5 MG immediate release tablet Take 5 mg by mouth every 8 hours as needed for Pain. Yes Historical Provider, MD   multivitamin [facility]) per tablet Take 1 tablet by mouth daily. Yes Historical Provider, MD   acyclovir (ZOVIRAX) 200 MG capsule Take 200 mg by mouth every 4 hours (while awake).      Yes Historical Provider, MD   prochlorperazine (COMPAZINE) 10 MG tablet Take 1 tablet by mouth every 6 hours as needed (HA) 5/25/19   Marco Antonio Carlson MD   oxyCODONE-acetaminophen (PERCOCET) 5-325 MG per tablet Take 1 tablet by mouth 2 times daily  Oh CTP RX 12742. 4/24/17 5/24/17  NATHEN Smyth - CNP   oxyCODONE-acetaminophen (PERCOCET) 5-325 MG per tablet Take 1 tablet by mouth 2 times daily  Fill on or After 03/22/17. 3/20/17 4/19/17  Darcy Olivares MD       CURRENT INPATIENT MEDICATIONS:  Scheduled Meds:   vancomycin  1,250 mg Intravenous Q12H    venlafaxine  100 mg Oral BID    folic acid, thiamine, multi-vitamin with vitamin K infusion   Intravenous Daily    piperacillin-tazobactam  3.375 g Intravenous Q8H    sodium chloride flush  10 mL Intravenous 2 times per day    enoxaparin  40 mg Subcutaneous Daily     Continuous Infusions:   lactated ringers 50 mL/hr at 05/29/19 0617     PRN Meds:HYDROmorphone **OR** HYDROmorphone, sodium chloride flush, ondansetron, acetaminophen    PERTINENT LABS:  CBC   Recent Labs     05/27/19 0627 05/28/19  0354 05/29/19  0405   WBC 15.6* 16.4* 14.7*   HGB 8.4* 7.2* 7.1*   HCT 25.7* 21.8* 21.0*   MCV 92.5 91.8 90.5    255 297     Renal   Recent Labs     05/27/19 0627 05/28/19  0302 05/29/19  0405    141 140   K 4.3 3.5 4.0  107 105   CO2 19* 23 26   PHOS 4.1 3.8 4.8   BUN 9 15 16   CREATININE 0.7 0.6 0.6     Hepatic No results for input(s): AST, ALT, ALB, BILIDIR, BILITOT, ALKPHOS in the last 72 hours. INR/ANTI-Xa  Lab Results   Component Value Date    INR 1.30 (H) 05/27/2019     No results found for: ANTIXA    CALCULATED  Serum creatinine: 0.6 mg/dL 05/29/19 0405  Estimated creatinine clearance: 91 mL/min     DIET  DIET CLEAR LIQUID;  Dietary Nutrition Supplements: Protein Modular, Clear Liquid Oral Supplement, Standard High Calorie Oral Supplement     GLUCOSES/INSULIN REQUIREMENTS LAST 24 HOURS  124 - 129 - 113 - 129. None    CULTURES/ANTIGENS  Date Culture/Site Gram Stain Prelim/Final ID Sensitivities   5/26 Blood x2      5/26 Perineum fluid MRSA     5/26 Tissue  (perineum) MRSA     5/26 Fungus      5/26 Acid fast               ANTIBIOTICS  Antibiotic Date Started Date Stop Day(s) Therapy   Vancomycin pharmacy to dose 5/26  3   Zosyn 3.375g IV Q8H 5/26  3   Clindamycin 600 mg IV Q8H 5/26 5/27 2     VANCOMYCIN LEVELS  Date Time Type of Level Result   5/28 03:02 Trough 10.9 (drawn on 1g IV Q12H)   5/29 23:30 Trough pending       ASSESSMENT AND PLAN: 59 YOF admitted to the ICU with necrotizing fasciitis of the perineal area , who was started on vancomycin. Pharmacy is consulted to dose and manage vancomycin. Her other medication-related problems include ICU standard care (MRSA decolonization) and core measures (vaccinations and prophylaxis). (1) Necrotizing fasciitis of perineal area s/p I&D 5/26: all cultures currently growing MRSA   Vancomycin - day #4  · Continue 1.25g IV Q12H  · Trough ordered for 5/29 @ 23:30.  Target trough ~15 mcg/mL  · Renal function and clinical status will be monitored closely and dosing adjusted as appropriate    Zosyn - day #4  · 3.375g IV Q8H appropriate for empiric coverage pending cx    (2) Prophylaxis  · DVT:  Lovenox  · SUP:  Not indicated    (3) Vaccinations  · Pneumonia: Not

## 2019-05-30 ENCOUNTER — ANESTHESIA (OUTPATIENT)
Dept: OPERATING ROOM | Age: 64
DRG: 463 | End: 2019-05-30
Payer: COMMERCIAL

## 2019-05-30 VITALS — TEMPERATURE: 99.9 F | DIASTOLIC BLOOD PRESSURE: 50 MMHG | OXYGEN SATURATION: 90 % | SYSTOLIC BLOOD PRESSURE: 80 MMHG

## 2019-05-30 LAB
ALBUMIN SERPL-MCNC: 2.5 G/DL (ref 3.4–5)
ALBUMIN SERPL-MCNC: 2.5 G/DL (ref 3.4–5)
ALBUMIN SERPL-MCNC: 2.6 G/DL (ref 3.4–5)
ANION GAP SERPL CALCULATED.3IONS-SCNC: 8 MMOL/L (ref 3–16)
BANDED NEUTROPHILS RELATIVE PERCENT: 1 % (ref 0–7)
BASOPHILS ABSOLUTE: 0 K/UL (ref 0–0.2)
BASOPHILS RELATIVE PERCENT: 0 %
BUN BLDV-MCNC: 10 MG/DL (ref 7–20)
BUN BLDV-MCNC: 7 MG/DL (ref 7–20)
BUN BLDV-MCNC: 9 MG/DL (ref 7–20)
CALCIUM SERPL-MCNC: 7.6 MG/DL (ref 8.3–10.6)
CALCIUM SERPL-MCNC: 8.1 MG/DL (ref 8.3–10.6)
CALCIUM SERPL-MCNC: 8.1 MG/DL (ref 8.3–10.6)
CHLORIDE BLD-SCNC: 98 MMOL/L (ref 99–110)
CO2: 28 MMOL/L (ref 21–32)
CO2: 28 MMOL/L (ref 21–32)
CO2: 29 MMOL/L (ref 21–32)
CREAT SERPL-MCNC: <0.5 MG/DL (ref 0.6–1.2)
EOSINOPHILS ABSOLUTE: 0.3 K/UL (ref 0–0.6)
EOSINOPHILS RELATIVE PERCENT: 2 %
GFR AFRICAN AMERICAN: >60
GFR NON-AFRICAN AMERICAN: >60
GLUCOSE BLD-MCNC: 107 MG/DL (ref 70–99)
GLUCOSE BLD-MCNC: 112 MG/DL (ref 70–99)
GLUCOSE BLD-MCNC: 134 MG/DL (ref 70–99)
GLUCOSE BLD-MCNC: 143 MG/DL (ref 70–99)
GLUCOSE BLD-MCNC: 191 MG/DL (ref 70–99)
HCT VFR BLD CALC: 23 % (ref 36–48)
HEMOGLOBIN: 7.8 G/DL (ref 12–16)
LYMPHOCYTES ABSOLUTE: 1.6 K/UL (ref 1–5.1)
LYMPHOCYTES RELATIVE PERCENT: 12 %
MAGNESIUM: 1.3 MG/DL (ref 1.8–2.4)
MAGNESIUM: 1.3 MG/DL (ref 1.8–2.4)
MCH RBC QN AUTO: 30.7 PG (ref 26–34)
MCHC RBC AUTO-ENTMCNC: 34.1 G/DL (ref 31–36)
MCV RBC AUTO: 89.9 FL (ref 80–100)
MONOCYTES ABSOLUTE: 1.1 K/UL (ref 0–1.3)
MONOCYTES RELATIVE PERCENT: 8 %
NEUTROPHILS ABSOLUTE: 10.5 K/UL (ref 1.7–7.7)
NEUTROPHILS RELATIVE PERCENT: 77 %
PDW BLD-RTO: 13.6 % (ref 12.4–15.4)
PERFORMED ON: ABNORMAL
PERFORMED ON: ABNORMAL
PHOSPHORUS: 2.8 MG/DL (ref 2.5–4.9)
PHOSPHORUS: 3.2 MG/DL (ref 2.5–4.9)
PHOSPHORUS: 3.4 MG/DL (ref 2.5–4.9)
PLATELET # BLD: 345 K/UL (ref 135–450)
PMV BLD AUTO: 7.4 FL (ref 5–10.5)
POTASSIUM SERPL-SCNC: 2.7 MMOL/L (ref 3.5–5.1)
POTASSIUM SERPL-SCNC: 2.9 MMOL/L (ref 3.5–5.1)
POTASSIUM SERPL-SCNC: 3.9 MMOL/L (ref 3.5–5.1)
RBC # BLD: 2.55 M/UL (ref 4–5.2)
SODIUM BLD-SCNC: 134 MMOL/L (ref 136–145)
SODIUM BLD-SCNC: 134 MMOL/L (ref 136–145)
SODIUM BLD-SCNC: 135 MMOL/L (ref 136–145)
WBC # BLD: 13.5 K/UL (ref 4–11)

## 2019-05-30 PROCEDURE — 2500000003 HC RX 250 WO HCPCS: Performed by: STUDENT IN AN ORGANIZED HEALTH CARE EDUCATION/TRAINING PROGRAM

## 2019-05-30 PROCEDURE — 2500000003 HC RX 250 WO HCPCS: Performed by: INTERNAL MEDICINE

## 2019-05-30 PROCEDURE — 6360000002 HC RX W HCPCS: Performed by: STUDENT IN AN ORGANIZED HEALTH CARE EDUCATION/TRAINING PROGRAM

## 2019-05-30 PROCEDURE — 3700000001 HC ADD 15 MINUTES (ANESTHESIA): Performed by: SURGERY

## 2019-05-30 PROCEDURE — 6360000002 HC RX W HCPCS: Performed by: NURSE ANESTHETIST, CERTIFIED REGISTERED

## 2019-05-30 PROCEDURE — 99232 SBSQ HOSP IP/OBS MODERATE 35: CPT | Performed by: INTERNAL MEDICINE

## 2019-05-30 PROCEDURE — 99253 IP/OBS CNSLTJ NEW/EST LOW 45: CPT | Performed by: SURGERY

## 2019-05-30 PROCEDURE — 7100000011 HC PHASE II RECOVERY - ADDTL 15 MIN

## 2019-05-30 PROCEDURE — 3600000004 HC SURGERY LEVEL 4 BASE: Performed by: SURGERY

## 2019-05-30 PROCEDURE — 2580000003 HC RX 258: Performed by: NURSE ANESTHETIST, CERTIFIED REGISTERED

## 2019-05-30 PROCEDURE — 2580000003 HC RX 258: Performed by: STUDENT IN AN ORGANIZED HEALTH CARE EDUCATION/TRAINING PROGRAM

## 2019-05-30 PROCEDURE — 3700000000 HC ANESTHESIA ATTENDED CARE: Performed by: SURGERY

## 2019-05-30 PROCEDURE — 80069 RENAL FUNCTION PANEL: CPT

## 2019-05-30 PROCEDURE — 44188 LAP COLOSTOMY: CPT | Performed by: SURGERY

## 2019-05-30 PROCEDURE — 6370000000 HC RX 637 (ALT 250 FOR IP): Performed by: STUDENT IN AN ORGANIZED HEALTH CARE EDUCATION/TRAINING PROGRAM

## 2019-05-30 PROCEDURE — 36592 COLLECT BLOOD FROM PICC: CPT

## 2019-05-30 PROCEDURE — 7100000010 HC PHASE II RECOVERY - FIRST 15 MIN

## 2019-05-30 PROCEDURE — 2500000003 HC RX 250 WO HCPCS: Performed by: NURSE ANESTHETIST, CERTIFIED REGISTERED

## 2019-05-30 PROCEDURE — 3600000014 HC SURGERY LEVEL 4 ADDTL 15MIN: Performed by: SURGERY

## 2019-05-30 PROCEDURE — 83735 ASSAY OF MAGNESIUM: CPT

## 2019-05-30 PROCEDURE — 2000000000 HC ICU R&B

## 2019-05-30 PROCEDURE — 85025 COMPLETE CBC W/AUTO DIFF WBC: CPT

## 2019-05-30 PROCEDURE — 0D1N4Z4 BYPASS SIGMOID COLON TO CUTANEOUS, PERCUTANEOUS ENDOSCOPIC APPROACH: ICD-10-PCS | Performed by: SURGERY

## 2019-05-30 PROCEDURE — 2709999900 HC NON-CHARGEABLE SUPPLY: Performed by: SURGERY

## 2019-05-30 PROCEDURE — 2500000003 HC RX 250 WO HCPCS: Performed by: SURGERY

## 2019-05-30 PROCEDURE — 2580000003 HC RX 258: Performed by: SURGERY

## 2019-05-30 PROCEDURE — 2720000010 HC SURG SUPPLY STERILE: Performed by: SURGERY

## 2019-05-30 RX ORDER — SUCCINYLCHOLINE CHLORIDE 20 MG/ML
INJECTION INTRAMUSCULAR; INTRAVENOUS PRN
Status: DISCONTINUED | OUTPATIENT
Start: 2019-05-30 | End: 2019-05-30 | Stop reason: SDUPTHER

## 2019-05-30 RX ORDER — ROCURONIUM BROMIDE 10 MG/ML
INJECTION, SOLUTION INTRAVENOUS PRN
Status: DISCONTINUED | OUTPATIENT
Start: 2019-05-30 | End: 2019-05-30 | Stop reason: SDUPTHER

## 2019-05-30 RX ORDER — DEXAMETHASONE SODIUM PHOSPHATE 4 MG/ML
INJECTION, SOLUTION INTRA-ARTICULAR; INTRALESIONAL; INTRAMUSCULAR; INTRAVENOUS; SOFT TISSUE PRN
Status: DISCONTINUED | OUTPATIENT
Start: 2019-05-30 | End: 2019-05-30 | Stop reason: SDUPTHER

## 2019-05-30 RX ORDER — MAGNESIUM HYDROXIDE 1200 MG/15ML
LIQUID ORAL CONTINUOUS PRN
Status: COMPLETED | OUTPATIENT
Start: 2019-05-30 | End: 2019-05-30

## 2019-05-30 RX ORDER — LIDOCAINE HYDROCHLORIDE 20 MG/ML
INJECTION, SOLUTION INTRAVENOUS PRN
Status: DISCONTINUED | OUTPATIENT
Start: 2019-05-30 | End: 2019-05-30 | Stop reason: SDUPTHER

## 2019-05-30 RX ORDER — MIDAZOLAM HYDROCHLORIDE 1 MG/ML
INJECTION INTRAMUSCULAR; INTRAVENOUS PRN
Status: DISCONTINUED | OUTPATIENT
Start: 2019-05-30 | End: 2019-05-30 | Stop reason: SDUPTHER

## 2019-05-30 RX ORDER — MAGNESIUM SULFATE IN WATER 40 MG/ML
4 INJECTION, SOLUTION INTRAVENOUS ONCE
Status: COMPLETED | OUTPATIENT
Start: 2019-05-30 | End: 2019-05-30

## 2019-05-30 RX ORDER — DEXTROSE, SODIUM CHLORIDE, AND POTASSIUM CHLORIDE 5; .45; .15 G/100ML; G/100ML; G/100ML
INJECTION INTRAVENOUS CONTINUOUS
Status: DISCONTINUED | OUTPATIENT
Start: 2019-05-30 | End: 2019-06-01

## 2019-05-30 RX ORDER — POTASSIUM CHLORIDE 29.8 MG/ML
20 INJECTION INTRAVENOUS
Status: COMPLETED | OUTPATIENT
Start: 2019-05-30 | End: 2019-05-30

## 2019-05-30 RX ORDER — OXYCODONE HYDROCHLORIDE AND ACETAMINOPHEN 5; 325 MG/1; MG/1
2 TABLET ORAL EVERY 4 HOURS PRN
Status: DISCONTINUED | OUTPATIENT
Start: 2019-05-30 | End: 2019-06-11 | Stop reason: HOSPADM

## 2019-05-30 RX ORDER — SODIUM CHLORIDE 9 MG/ML
INJECTION, SOLUTION INTRAVENOUS CONTINUOUS PRN
Status: DISCONTINUED | OUTPATIENT
Start: 2019-05-30 | End: 2019-05-30 | Stop reason: SDUPTHER

## 2019-05-30 RX ORDER — BUPIVACAINE HYDROCHLORIDE 5 MG/ML
INJECTION, SOLUTION EPIDURAL; INTRACAUDAL PRN
Status: DISCONTINUED | OUTPATIENT
Start: 2019-05-30 | End: 2019-05-30 | Stop reason: ALTCHOICE

## 2019-05-30 RX ORDER — OXYCODONE HYDROCHLORIDE AND ACETAMINOPHEN 5; 325 MG/1; MG/1
1 TABLET ORAL EVERY 4 HOURS PRN
Status: DISCONTINUED | OUTPATIENT
Start: 2019-05-30 | End: 2019-06-11 | Stop reason: HOSPADM

## 2019-05-30 RX ORDER — ONDANSETRON 2 MG/ML
INJECTION INTRAMUSCULAR; INTRAVENOUS PRN
Status: DISCONTINUED | OUTPATIENT
Start: 2019-05-30 | End: 2019-05-30 | Stop reason: SDUPTHER

## 2019-05-30 RX ORDER — FENTANYL CITRATE 50 UG/ML
INJECTION, SOLUTION INTRAMUSCULAR; INTRAVENOUS PRN
Status: DISCONTINUED | OUTPATIENT
Start: 2019-05-30 | End: 2019-05-30 | Stop reason: SDUPTHER

## 2019-05-30 RX ORDER — SODIUM CHLORIDE, SODIUM LACTATE, POTASSIUM CHLORIDE, AND CALCIUM CHLORIDE .6; .31; .03; .02 G/100ML; G/100ML; G/100ML; G/100ML
IRRIGANT IRRIGATION PRN
Status: DISCONTINUED | OUTPATIENT
Start: 2019-05-30 | End: 2019-05-30 | Stop reason: ALTCHOICE

## 2019-05-30 RX ORDER — PROPOFOL 10 MG/ML
INJECTION, EMULSION INTRAVENOUS PRN
Status: DISCONTINUED | OUTPATIENT
Start: 2019-05-30 | End: 2019-05-30 | Stop reason: SDUPTHER

## 2019-05-30 RX ADMIN — ONDANSETRON 4 MG: 2 INJECTION INTRAMUSCULAR; INTRAVENOUS at 18:00

## 2019-05-30 RX ADMIN — DEXAMETHASONE SODIUM PHOSPHATE 4 MG: 4 INJECTION, SOLUTION INTRAMUSCULAR; INTRAVENOUS at 18:00

## 2019-05-30 RX ADMIN — POTASSIUM CHLORIDE 20 MEQ: 400 INJECTION, SOLUTION INTRAVENOUS at 11:00

## 2019-05-30 RX ADMIN — Medication 10 ML: at 08:20

## 2019-05-30 RX ADMIN — POTASSIUM CHLORIDE 20 MEQ: 400 INJECTION, SOLUTION INTRAVENOUS at 09:24

## 2019-05-30 RX ADMIN — DEXTROSE MONOHYDRATE, SODIUM CHLORIDE, AND POTASSIUM CHLORIDE: 50; 4.5; 1.49 INJECTION, SOLUTION INTRAVENOUS at 08:03

## 2019-05-30 RX ADMIN — PHENYLEPHRINE HYDROCHLORIDE 80 MCG: 10 INJECTION, SOLUTION INTRAMUSCULAR; INTRAVENOUS; SUBCUTANEOUS at 17:18

## 2019-05-30 RX ADMIN — ENOXAPARIN SODIUM 40 MG: 40 INJECTION SUBCUTANEOUS at 08:15

## 2019-05-30 RX ADMIN — PROPOFOL 20 MG: 10 INJECTION, EMULSION INTRAVENOUS at 16:36

## 2019-05-30 RX ADMIN — VENLAFAXINE 100 MG: 25 TABLET ORAL at 20:50

## 2019-05-30 RX ADMIN — SUCCINYLCHOLINE CHLORIDE 100 MG: 20 INJECTION, SOLUTION INTRAMUSCULAR; INTRAVENOUS; PARENTERAL at 16:46

## 2019-05-30 RX ADMIN — LIDOCAINE HYDROCHLORIDE 100 MG: 20 INJECTION, SOLUTION INTRAVENOUS at 16:46

## 2019-05-30 RX ADMIN — HYDROMORPHONE HYDROCHLORIDE 0.5 MG: 1 INJECTION, SOLUTION INTRAMUSCULAR; INTRAVENOUS; SUBCUTANEOUS at 15:23

## 2019-05-30 RX ADMIN — ROCURONIUM BROMIDE 50 MG: 10 INJECTION, SOLUTION INTRAVENOUS at 16:56

## 2019-05-30 RX ADMIN — HYDROMORPHONE HYDROCHLORIDE 0.5 MG: 1 INJECTION, SOLUTION INTRAMUSCULAR; INTRAVENOUS; SUBCUTANEOUS at 06:59

## 2019-05-30 RX ADMIN — CLINDAMYCIN PHOSPHATE 600 MG: 600 INJECTION, SOLUTION INTRAVENOUS at 20:37

## 2019-05-30 RX ADMIN — FENTANYL CITRATE 100 MCG: 50 INJECTION INTRAMUSCULAR; INTRAVENOUS at 16:33

## 2019-05-30 RX ADMIN — POTASSIUM CHLORIDE 20 MEQ: 400 INJECTION, SOLUTION INTRAVENOUS at 08:09

## 2019-05-30 RX ADMIN — MAGNESIUM SULFATE HEPTAHYDRATE 4 G: 40 INJECTION, SOLUTION INTRAVENOUS at 08:09

## 2019-05-30 RX ADMIN — PHENYLEPHRINE HYDROCHLORIDE 80 MCG: 10 INJECTION, SOLUTION INTRAMUSCULAR; INTRAVENOUS; SUBCUTANEOUS at 17:58

## 2019-05-30 RX ADMIN — HYDROMORPHONE HYDROCHLORIDE 0.5 MG: 1 INJECTION, SOLUTION INTRAMUSCULAR; INTRAVENOUS; SUBCUTANEOUS at 19:29

## 2019-05-30 RX ADMIN — FENTANYL CITRATE 50 MCG: 50 INJECTION INTRAMUSCULAR; INTRAVENOUS at 18:24

## 2019-05-30 RX ADMIN — VENLAFAXINE 100 MG: 25 TABLET ORAL at 08:15

## 2019-05-30 RX ADMIN — PHENYLEPHRINE HYDROCHLORIDE 80 MCG: 10 INJECTION, SOLUTION INTRAMUSCULAR; INTRAVENOUS; SUBCUTANEOUS at 17:00

## 2019-05-30 RX ADMIN — CLINDAMYCIN PHOSPHATE 600 MG: 600 INJECTION, SOLUTION INTRAVENOUS at 04:30

## 2019-05-30 RX ADMIN — PHENYLEPHRINE HYDROCHLORIDE 80 MCG: 10 INJECTION, SOLUTION INTRAMUSCULAR; INTRAVENOUS; SUBCUTANEOUS at 17:04

## 2019-05-30 RX ADMIN — Medication 10 ML: at 20:51

## 2019-05-30 RX ADMIN — CLINDAMYCIN PHOSPHATE 600 MG: 600 INJECTION, SOLUTION INTRAVENOUS at 11:05

## 2019-05-30 RX ADMIN — HYDROMORPHONE HYDROCHLORIDE 0.5 MG: 1 INJECTION, SOLUTION INTRAMUSCULAR; INTRAVENOUS; SUBCUTANEOUS at 03:53

## 2019-05-30 RX ADMIN — MIDAZOLAM HYDROCHLORIDE 2 MG: 2 INJECTION, SOLUTION INTRAMUSCULAR; INTRAVENOUS at 16:34

## 2019-05-30 RX ADMIN — HYDROMORPHONE HYDROCHLORIDE 0.25 MG: 1 INJECTION, SOLUTION INTRAMUSCULAR; INTRAVENOUS; SUBCUTANEOUS at 21:43

## 2019-05-30 RX ADMIN — PROPOFOL 120 MG: 10 INJECTION, EMULSION INTRAVENOUS at 16:46

## 2019-05-30 RX ADMIN — HYDROMORPHONE HYDROCHLORIDE 0.5 MG: 1 INJECTION, SOLUTION INTRAMUSCULAR; INTRAVENOUS; SUBCUTANEOUS at 11:00

## 2019-05-30 RX ADMIN — SODIUM CHLORIDE: 900 INJECTION, SOLUTION INTRAVENOUS at 16:35

## 2019-05-30 ASSESSMENT — PULMONARY FUNCTION TESTS
PIF_VALUE: 9
PIF_VALUE: 6
PIF_VALUE: 9
PIF_VALUE: 9
PIF_VALUE: 6
PIF_VALUE: 0
PIF_VALUE: 10
PIF_VALUE: 18
PIF_VALUE: 8
PIF_VALUE: 32
PIF_VALUE: 33
PIF_VALUE: 33
PIF_VALUE: 22
PIF_VALUE: 0
PIF_VALUE: 9
PIF_VALUE: 33
PIF_VALUE: 32
PIF_VALUE: 6
PIF_VALUE: 9
PIF_VALUE: 32
PIF_VALUE: 10
PIF_VALUE: 22
PIF_VALUE: 21
PIF_VALUE: 9
PIF_VALUE: 31
PIF_VALUE: 33
PIF_VALUE: 10
PIF_VALUE: 6
PIF_VALUE: 33
PIF_VALUE: 9
PIF_VALUE: 32
PIF_VALUE: 20
PIF_VALUE: 26
PIF_VALUE: 6
PIF_VALUE: 32
PIF_VALUE: 0
PIF_VALUE: 19
PIF_VALUE: 18
PIF_VALUE: 9
PIF_VALUE: 9
PIF_VALUE: 31
PIF_VALUE: 9
PIF_VALUE: 34
PIF_VALUE: 0
PIF_VALUE: 9
PIF_VALUE: 3
PIF_VALUE: 10
PIF_VALUE: 31
PIF_VALUE: 0
PIF_VALUE: 33
PIF_VALUE: 9
PIF_VALUE: 32
PIF_VALUE: 31
PIF_VALUE: 9
PIF_VALUE: 9
PIF_VALUE: 21
PIF_VALUE: 21
PIF_VALUE: 31
PIF_VALUE: 6
PIF_VALUE: 10
PIF_VALUE: 0
PIF_VALUE: 9
PIF_VALUE: 9
PIF_VALUE: 33
PIF_VALUE: 22
PIF_VALUE: 19
PIF_VALUE: 9
PIF_VALUE: 21
PIF_VALUE: 6
PIF_VALUE: 33
PIF_VALUE: 22
PIF_VALUE: 3
PIF_VALUE: 19
PIF_VALUE: 21
PIF_VALUE: 28
PIF_VALUE: 1
PIF_VALUE: 33
PIF_VALUE: 9
PIF_VALUE: 21
PIF_VALUE: 34
PIF_VALUE: 32
PIF_VALUE: 32
PIF_VALUE: 18
PIF_VALUE: 19
PIF_VALUE: 21
PIF_VALUE: 22
PIF_VALUE: 6
PIF_VALUE: 8
PIF_VALUE: 22
PIF_VALUE: 19
PIF_VALUE: 21
PIF_VALUE: 9
PIF_VALUE: 21
PIF_VALUE: 9
PIF_VALUE: 2
PIF_VALUE: 9
PIF_VALUE: 10
PIF_VALUE: 9
PIF_VALUE: 31
PIF_VALUE: 9
PIF_VALUE: 33
PIF_VALUE: 6
PIF_VALUE: 32
PIF_VALUE: 19
PIF_VALUE: 32
PIF_VALUE: 10
PIF_VALUE: 9
PIF_VALUE: 21
PIF_VALUE: 31
PIF_VALUE: 19
PIF_VALUE: 9
PIF_VALUE: 31
PIF_VALUE: 0
PIF_VALUE: 33
PIF_VALUE: 6
PIF_VALUE: 21
PIF_VALUE: 33
PIF_VALUE: 22
PIF_VALUE: 0
PIF_VALUE: 21
PIF_VALUE: 19
PIF_VALUE: 10
PIF_VALUE: 18
PIF_VALUE: 32
PIF_VALUE: 34
PIF_VALUE: 18
PIF_VALUE: 32
PIF_VALUE: 33
PIF_VALUE: 22
PIF_VALUE: 34
PIF_VALUE: 32
PIF_VALUE: 32

## 2019-05-30 ASSESSMENT — PAIN SCALES - GENERAL
PAINLEVEL_OUTOF10: 0
PAINLEVEL_OUTOF10: 0
PAINLEVEL_OUTOF10: 5
PAINLEVEL_OUTOF10: 7
PAINLEVEL_OUTOF10: 8
PAINLEVEL_OUTOF10: 0
PAINLEVEL_OUTOF10: 8
PAINLEVEL_OUTOF10: 9
PAINLEVEL_OUTOF10: 10
PAINLEVEL_OUTOF10: 7
PAINLEVEL_OUTOF10: 5
PAINLEVEL_OUTOF10: 0

## 2019-05-30 ASSESSMENT — PAIN DESCRIPTION - PAIN TYPE
TYPE: SURGICAL PAIN
TYPE: SURGICAL PAIN
TYPE: ACUTE PAIN

## 2019-05-30 ASSESSMENT — PAIN DESCRIPTION - DESCRIPTORS: DESCRIPTORS: ACHING;CONSTANT

## 2019-05-30 ASSESSMENT — PAIN DESCRIPTION - LOCATION
LOCATION: LABIA;PERINEUM
LOCATION: PERINEUM;LABIA
LOCATION: HEAD

## 2019-05-30 NOTE — PROGRESS NOTES
ID Follow-up NOTE    CC:   Necrotizing soft tissue infection  Antibiotics: Clindamycin    Admit Date: 5/26/2019  Hospital Day: 5    Subjective:     Patient c/o pain at surg site, worse with dressing changes      Objective:     Afebrile last 24 hr    Patient Vitals for the past 8 hrs:   BP Temp Temp src Pulse Resp SpO2   05/30/19 0800 111/67 99 °F (37.2 °C) Oral 88 22 --   05/30/19 0700 (!) 125/100 -- -- 98 23 --   05/30/19 0600 111/62 99.4 °F (37.4 °C) Oral 85 21 95 %   05/30/19 0500 117/67 -- -- 90 23 92 %   05/30/19 0451 -- 100.4 °F (38 °C) Oral -- -- --   05/30/19 0400 125/71 -- -- 97 18 95 %   05/30/19 0300 123/70 -- -- 88 24 93 %   05/30/19 0200 107/61 -- -- 87 22 98 %     I/O last 3 completed shifts: In: 3035 [P.O.:1110; I.V.:1925]  Out: 2965 [Urine:2480; Stool:125]  I/O this shift:  In: 120 [P.O.:120]  Out: -     EXAM:  GENERAL: No apparent distress.     HEENT: Membranes moist, no oral lesion  NECK:  Supple  LUNGS: Clear b/l, no rales, no dullness  CARDIAC: RRR, no murmur appreciated  ABD:  + BS, soft / NT  EXT:  No rash, no edema, no lesions  NEURO: No focal neurologic findings  PSYCH: Orientation, sensorium, mood normal  LINES:  R IJ line in place    Data Review:  Lab Results   Component Value Date    WBC 13.5 (H) 05/30/2019    HGB 7.8 (L) 05/30/2019    HCT 23.0 (L) 05/30/2019    MCV 89.9 05/30/2019     05/30/2019     Lab Results   Component Value Date    CREATININE <0.5 (L) 05/30/2019    BUN 9 05/30/2019     (L) 05/30/2019    K 2.7 (LL) 05/30/2019    CL 98 (L) 05/30/2019    CO2 29 05/30/2019       Hepatic Function Panel:   Lab Results   Component Value Date    ALKPHOS 108 06/04/2013    ALT 54 06/04/2013    AST 40 06/04/2013    PROT 6.3 06/04/2013    BILITOT 0.4 06/04/2013    LABALBU 2.5 05/30/2019       Micro:  5/29 C diff neg    5/26 Tissue #1: GS 1+WBC, 3+GPC; cult light MRSA  5/26 Tissue #2: GS no WBC, 1+GPC; cult - light MRSA, no anaerobes isolated to date  Staph aureus mrsa   Antibiotic necrotizing ST infection, cult + MRSA  Fever - resolved  Leukocytosis     Plan:     Cont clindamycin alone - MRSA, anaerobic coverage  Diverting colostomy today 5/30 per surg  Wound care per Surg     Discussed with pt  Gallo Mike
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
## 2019-05-30 NOTE — PLAN OF CARE
Problem: Diarrhea:  Goal: Bowel elimination is within specified parameters  Description  Bowel elimination is within specified parameters  Outcome: Ongoing  Note:   Patient with rectal tube (flexiseal) in place - loose brown stool draining. Patient c/o nausea, denies abdominal cramping at this time - afebrile. Will continue to monitor. Problem: Nausea/Vomiting:  Goal: Absence of nausea/vomiting  Description  Absence of nausea/vomiting  Outcome: Ongoing  Note:   PRN Zofran 4 mg administered for pt c/o nausea. Will continue to monitor.
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
## 2019-05-30 NOTE — OP NOTE
DATE OF PROCEDURE: 5/30/2019     PREOPERATIVE DIAGNOSES: Necrotizing fascitis     POSTOPERATIVE DIAGNOSES: Same     PROCEDURE: LAPAROSCOPIC DIVERTING COLOSTOMY, WOUND EXAMINATION UNDER ANESTHESIA    SURGEON: Mariana Thomas MD     ASSISTANT: Harjit Cabrales MD     ANESTHESIA: General     COMPLICATIONS: None.      SPECIMEN: None     FINDINGS: Wound in the right labia roughly 18 x 5 x 15 deep - no other necrotic tissue present that is visible, penrose drain in place, no foul smelling odor,     INDICATIONS: The patient is a 59-year-old female who presented with necrotizing fascitis. To ensure proper wound healing, it was decided to create a diverting colostomy. The risks of surgical intervention were discussed with the patient and she elected to undergo surgical intervention.     PROCEDURE IN DETAIL:     The patient was brought to the operating theater placed supine position on the operating table. General endotracheal intubation was performed by Anesthesiology. The patient was placed in the lithotomy position. Her abdomen was then prepped and draped in the usual sterile fashion using chlorhexidine prep solution. Her perineum was prepped with betadine. A timeout was performed confirming the patient's identity as well as the operative site. All safety points were followed. SCDs were functioning. A curved incision about the umbilicus was made, using an 11 blade scalpel. This was carried down through subcutaneous tissue to expose the anterior fascia. The fascia was grasped and raised into the wound, using 2 towel clips. An incision in the fascia was then made using an 11 blade scalpel. Blunt dissection then used to gain entrance into the abdominal cavity. A 5-mm trochar was then introduced. Abdomen was insufflated. Following adequate insufflation, 2 additional 5-mm trocars were then placed. The patient was then placed in slight head down and left side up positioning.   The descending colon was then followed down to the sigmoid colon. She was noted to have a very redundant sigmoid colon which came up quite nicely to the abdominal wall. The colon was circumferentially dissected around using Harmonic device to ensure that  we were completely underneath it and the mesentery was taken down for several centimeters. The ostomy aperture was then created at the previously marked area from the ostomy nurses. The skin was then excised and dissected through the subcutaneous tissue down to the fascia. A 10-mm Nile port was inserted through ostomy site. An [place] blue load 60 mm stapler was then used to transect the colon at the proposed area. The staple line was intact and only took one staple load. The mesentery was further mobilized to have good reach through the abdominal wall and we felt confident now at this point to create a laparoscopic diverting end colostomy given no evidence of lesion in the distal colon. Clover clamp was placed on colon.       The 10-mm port was removed and the colon was then brought through the abdominal wall. We then brought our attention to the maturation of the colostomy. The staple line was then completely excised and 3-0 Vicryl sutures were then used to mature the colostomy in a slightly brooking fashion. This was performed using several 3-0 Vicryl interrupted sutures. We inspected one more time laparoscopically ensuring that the correct end had been brought up and that there was no twisting of the mesentary and there were no other abnormalities and good hemostasis. The colostomy appliance was then applied.     The abdomen was then completely desufflated. The ports were removed and skin was closed using 4-0 Monocryl and skin glue. Attention was now turned to the perineum. The wound was evaluated and it was determined that the wound looked clean and no further debridement needed. The wound was packed with saline soaked gauze, fluffs, and abd pad.     The patient tolerated the procedure well. She was extubated and brought to the IC in stable condition. All counts were correct x2 at the end of the procedure. Dr. Henrique Al was present and scrubbed for the entire case.        Michael Alves MD PGY-2  05/30/19  10:53 PM  [phone]

## 2019-05-30 NOTE — PROGRESS NOTES
Occupational and Physical Therapy  Discharge        Orders received, chart review complete. Per chart, plan is for OR today for diverting colostomy. Spoke with RN who confirms this. Will discharge from acute OT/PT at this time and await new orders post-operatively. RN aware.          Breezy Wallis, OTR/L, [address], [place] [id number]

## 2019-05-30 NOTE — PROGRESS NOTES
clear yellow urine   Perineum: wound in the right labia roughly 18 x 5 x 15 deep - penrose in place, no erythema or necrosis. No purulent drainage. Wet to dry dressing in place. ASSESSMENT/PLAN:   This is a 59 y.o. female s/p incision and drainage of perineal area with debridement of necrotizing fasciitis that demonstrated necrotic tissue from right labia to the ischiorectal fossa POD #4 (5/26) and take back for 2nd look, EUA and debridement on (5/28) POD#2.    - cont. Vanc/Zosyn for MRSA, ID on board  - awaiting blood cultures  - NPO for diverting colostomy today, ice chips for comfort   - cont.  BID Dressing changes with Krelex soaked saline gauze  - encourage IS, deep breathing  - encourage OOB, ambulation  - plan for OR later today    Mustapha Machado MD   Gen Surg PGY 1  5/30/2019  7:04 AM  [phone]

## 2019-05-30 NOTE — PROGRESS NOTES
Critical K reported at 2.9 - surgery paged, order received to redraw stat renal/mg per Hudson Ardon MD - see results below. Results for Cruz Martin (MRN [id number]) as of 5/30/2019 05:32   Ref.  Range 5/30/2019 04:34   Sodium Latest Ref Range: 136 - 145 mmol/L 134 (L)   Potassium Latest Ref Range: 3.5 - 5.1 mmol/L 2.9 (LL)   Chloride Latest Ref Range: 99 - 110 mmol/L 98 (L)   CO2 Latest Ref Range: 21 - 32 mmol/L 28   BUN Latest Ref Range: 7 - 20 mg/dL 10   Creatinine Latest Ref Range: 0.6 - 1.2 mg/dL <0.5 (L)   Anion Gap Latest Ref Range: 3 - 16  8   GFR Non- Latest Ref Range: >60  >60   GFR  Latest Ref Range: >60  >60   Magnesium Latest Ref Range: 1.80 - 2.40 mg/dL 1.30 (L)   Glucose Latest Ref Range: 70 - 99 mg/dL 107 (H)   Calcium Latest Ref Range: 8.3 - 10.6 mg/dL 8.1 (L)   Phosphorus Latest Ref Range: 2.5 - 4.9 mg/dL 3.2   Albumin Latest Ref Range: 3.4 - 5.0 g/dL 2.6 (L)

## 2019-05-30 NOTE — FLOWSHEET NOTE
Patient returned to ICU 4522 from OR at 1900 on 8L/NRB, now sating 92% on 10L/HFNC - see VS below. Patient a/o x4 and moaning, reports \"stomach\" pain - CPOT 5. New colostomy C/D/I, stoma pink/moist - no stool assessed at this time. IVF infusing. Cheryn Severance remains in place. Will continue to monitor.         05/30/19 1912   Vitals   Temp 99.1 °F (37.3 °C)   Temp Source Oral   Pulse 94   Heart Rate Source Monitor   Resp 24   /69   MAP (mmHg) 82   BP Location Right upper arm   BP Upper/Lower Upper   BP Method Automatic   Patient Position Semi fowlers   Level of Consciousness 1   MEWS Score 3   Patient Currently in Pain Yes   Cardiac Rhythm NSR   Oxygen Therapy   SpO2 92 %   Pulse Oximeter Device Mode Continuous   Pulse Oximeter Device Location Finger   O2 Device High flow nasal cannula   Skin Protection for O2 Device Yes   O2 Flow Rate (L/min) 10 L/min   Pain Assessment   Pain Assessment CPOT   Pain Level 5  (When asked what hurts, pt reports \"Stomach\")   CPOT (Critical Patient)   CPOT (Critical Patient) Facial Expression 2   CPOT (Critical Patient) Body Movement 1   CPOT (Critical Patient) Muscle Tension 1   CPOT (Critical Patient) Vocalization (extubated) 1   Score CPOT (Extubated) 5   CPOT (Critial Patient) Vent Status Extubated

## 2019-05-30 NOTE — PROGRESS NOTES
Patient with flexiseal placed 5/29 at 1100 per chart - draining loose brown stool. Patient with new onset of nausea upon initial assessment this shift at 2000, denies abdominal cramping, afenrile. Due to inserted rectal tube, unable to chart 3 separate loose stools; however, patient reports \"I have been stooling all day. \" Criteria for c-diff collection met. Patient will be placed in contact plus precautions and specimen will be sent to lab per protocol.

## 2019-05-30 NOTE — PROGRESS NOTES
AM assessment complete. Patient alert and oriented x4. Complaining of a headache, offered tylenol but patient refused. Patient denies any shortness of breath. Medications given with sips of water without issue. Bed alarm on, call light within reach, brake set, bed in lowest position. Will continue to monitor.

## 2019-05-30 NOTE — PROGRESS NOTES
Hospitalist Progress Note      PCP: Howie Ayoub    Date of Admission: 5/26/2019    Chief Complaint: Necrotizing fasciitis of the perineum    Hospital Course:  Patient feels well  For diverticular colostomy today  No chest pain shortness of breath  No abdominal pain nausea vomiting          Medications:  Reviewed      Exam:    /77   Pulse 88   Temp 99.5 °F (37.5 °C) (Oral)   Resp 21   Ht 5' 6\" (1.676 m)   Wt 150 lb 9.2 oz (68.3 kg)   LMP 07/12/2007   SpO2 95%   BMI 24.30 kg/m²     General appearance: No apparent distress, appears stated age and cooperative. HEENT: Pupils equal, round, and reactive to light. Conjunctivae/corneas clear. Neck: Supple, with full range of motion. No jugular venous distention. Trachea midline. Respiratory:  Normal respiratory effort. Clear to auscultation, bilaterally without RALES/WHEEZES/Rhonchi. Cardiovascular: Regular rate and rhythm with normal S1/S2 without MURMURS, rubs or gallops. Abdomen: Soft, non-tender, non-distended with normal bowel sounds. Musculoskeletal: No clubbing, cyanosis or EDEMA bilaterally. Full range of motion without deformity. Skin: Skin color, texture, turgor normal.  No rashes or lesions. Perineal wound dressed by general surgery  Neurologic:  Neurovascularly intact without any focal sensory/motor deficits.  Cranial nerves: II-XII intact, grossly non-focal.  Psychiatric: Alert and oriented, thought content appropriate, normal insight  Overall no change in physical exam from 5/29        Labs:   Recent Labs     05/28/19  0354 05/29/19  0405 05/30/19  0435   WBC 16.4* 14.7* 13.5*   HGB 7.2* 7.1* 7.8*   HCT 21.8* 21.0* 23.0*    297 345     Recent Labs     05/29/19  0405 05/30/19  0434 05/30/19  0541    134* 135*   K 4.0 2.9* 2.7*    98* 98*   CO2 26 28 29   BUN 16 10 9   CREATININE 0.6 <0.5* <0.5*   CALCIUM 8.7 8.1* 8.1*   PHOS 4.8 3.2 3.4     No results for input(s): AST, ALT, BILIDIR, BILITOT, ALKPHOS in the last 72 hours. No results for input(s): INR in the last 72 hours. No results for input(s): Madalynn Chris in the last 72 hours. Urinalysis:    No results found for: Vish Knock, BACTERIA, RBCUA, BLOODU, SPECGRAV, [place] [address]    Radiology:  XR CHEST PORTABLE   Final Result      Patchy consolidation in the right lower lung-atelectasis versus pneumonia. Low lung volumes. Stable cardiomediastinal silhouette. Right jugular central venous catheter. XR CHEST PORTABLE   Final Result   Impression:       1. Newly placed right IJ central venous catheter with tip overlying the cavoatrial junction. No pneumothorax. 2. Low lung volumes with ill-defined bilateral lung opacities. This could represent vascular crowding related to low lung volumes, subsegmental atelectasis, pulmonary edema, or pneumonia. CT HEAD WO CONTRAST   Final Result   Impression: No acute intracranial abnormality. No significant change since yesterday's study. CT PELVIS W CONTRAST Additional Contrast? None   Final Result   1. Stranding in the fat involving the right perineum and right gluteal region. Given the patient's history this likely reflects a cellulitis. This does extend superiorly into the pelvis involving the right pelvic sidewall and  region, presumably    reflecting cellulitis/phlegmon. There is no evidence of a drainable fluid collection at this time. XR PELVIS (1-2 VIEWS)   Final Result      No acute findings. Degenerative changes in the lower lumbar spine.           Assessment/Plan:    Active Hospital Problems    Diagnosis Date Noted    Necrotizing soft tissue infection [M79.89] 05/27/2019    Cellulitis of perineum [B53.083] 05/26/2019    Cellulitis of gluteal region [L03.317]        Acute Medical Issues Being Addressed:    78-year-old woman admitted to the hospital with necrotizing fasciitis of the cranial region    Sepsis sec to Necrotizing fasciitis of the perineal region MRSA positive  Status post incision and drainage by general surgery  On vancomycin and Zosyn and clindamycin patient now only continued on clindamycin to give MRSA coverage and anaerobic coverage  Hemodynamically stable  Daily dressings per surgery  Continue IV fluids  Seen by infectious disease  Is growing MRSA  Examination under anesthesia done   Plan for diverticular colostomytoday    I discussed with the general surgery team - Dr Addis Velazquez. To take over as primary.   If any medical issues do arise these feel free to let me know-         DVT Prophylaxis:sc lovenox   Diet: Dietary Nutrition Supplements: Protein Modular, Clear Liquid Oral Supplement, Standard High Calorie Oral Supplement  Diet NPO, After Midnight Exceptions are: Ice Chips  Code Status: Full Code      Dispo - once acute medical processes have resolved    Curtis Colvin MD

## 2019-05-30 NOTE — CARE COORDINATION
Case Management Daily Note:    Current Plan of Care:  IV Clindamycin, IVF, IV Magnesium, IV Potassium. Scheduled for diverting colostomy today      Therapy evaluations will be completed when medically appropriate    Discharge Plan:  SNF    Tentative Discharge Date:  Over weekend    Current Barriers to Discharge:  placement    Resources/Information given:   SNF List      Case Management Notes:     Met with patient and discussed discharging to a SNF for care - she is agreeable. Does not want to go to [facility] where she works. CM called insurance company to verify benefits:    Deductible of $2700 has been met  Out of Pocket of $4200 - $6098 has been met  SNF benefits are paid at 80% and require a pre-auth -   ([employer] [phone])  No network of facilities - Admitting SNF must get pre-auth    Printed list of local SNF's to give to patient but she has gone to OR at this time. CM will follow up tomorrow morning.       Patricia Avilez RN, BSN  [facility]  Case Management Department  Ph: [phone]

## 2019-05-31 LAB
ALBUMIN SERPL-MCNC: 2.6 G/DL (ref 3.4–5)
ANAEROBIC CULTURE: ABNORMAL
ANAEROBIC CULTURE: ABNORMAL
ANAEROBIC CULTURE: NORMAL
ANAEROBIC CULTURE: NORMAL
ANION GAP SERPL CALCULATED.3IONS-SCNC: 8 MMOL/L (ref 3–16)
ANION GAP SERPL CALCULATED.3IONS-SCNC: 9 MMOL/L (ref 3–16)
BANDED NEUTROPHILS RELATIVE PERCENT: 4 % (ref 0–7)
BASOPHILS ABSOLUTE: 0 K/UL (ref 0–0.2)
BASOPHILS RELATIVE PERCENT: 0 %
BLOOD CULTURE, ROUTINE: NORMAL
BUN BLDV-MCNC: 7 MG/DL (ref 7–20)
BUN BLDV-MCNC: 7 MG/DL (ref 7–20)
C-REACTIVE PROTEIN: 186.3 MG/L (ref 0–5.1)
CALCIUM SERPL-MCNC: 8 MG/DL (ref 8.3–10.6)
CALCIUM SERPL-MCNC: 8.1 MG/DL (ref 8.3–10.6)
CHLORIDE BLD-SCNC: 101 MMOL/L (ref 99–110)
CHLORIDE BLD-SCNC: 102 MMOL/L (ref 99–110)
CO2: 28 MMOL/L (ref 21–32)
CO2: 28 MMOL/L (ref 21–32)
CREAT SERPL-MCNC: <0.5 MG/DL (ref 0.6–1.2)
CREAT SERPL-MCNC: <0.5 MG/DL (ref 0.6–1.2)
CULTURE, BLOOD 2: NORMAL
EOSINOPHILS ABSOLUTE: 0 K/UL (ref 0–0.6)
EOSINOPHILS RELATIVE PERCENT: 0 %
GFR AFRICAN AMERICAN: >60
GFR AFRICAN AMERICAN: >60
GFR NON-AFRICAN AMERICAN: >60
GFR NON-AFRICAN AMERICAN: >60
GLUCOSE BLD-MCNC: 125 MG/DL (ref 70–99)
GLUCOSE BLD-MCNC: 134 MG/DL (ref 70–99)
GLUCOSE BLD-MCNC: 146 MG/DL (ref 70–99)
GLUCOSE BLD-MCNC: 147 MG/DL (ref 70–99)
GRAM STAIN RESULT: ABNORMAL
GRAM STAIN RESULT: ABNORMAL
HCT VFR BLD CALC: 23.3 % (ref 36–48)
HEMOGLOBIN: 7.9 G/DL (ref 12–16)
LYMPHOCYTES ABSOLUTE: 0.9 K/UL (ref 1–5.1)
LYMPHOCYTES RELATIVE PERCENT: 5 %
MAGNESIUM: 2.1 MG/DL (ref 1.8–2.4)
MCH RBC QN AUTO: 30.7 PG (ref 26–34)
MCHC RBC AUTO-ENTMCNC: 33.7 G/DL (ref 31–36)
MCV RBC AUTO: 91 FL (ref 80–100)
METAMYELOCYTES RELATIVE PERCENT: 5 %
MONOCYTES ABSOLUTE: 0.7 K/UL (ref 0–1.3)
MONOCYTES RELATIVE PERCENT: 4 %
NEUTROPHILS ABSOLUTE: 16 K/UL (ref 1.7–7.7)
NEUTROPHILS RELATIVE PERCENT: 82 %
ORGANISM: ABNORMAL
ORGANISM: ABNORMAL
PDW BLD-RTO: 13.3 % (ref 12.4–15.4)
PERFORMED ON: ABNORMAL
PERFORMED ON: ABNORMAL
PHOSPHORUS: 3.2 MG/DL (ref 2.5–4.9)
PLATELET # BLD: 380 K/UL (ref 135–450)
PMV BLD AUTO: 7.2 FL (ref 5–10.5)
POTASSIUM SERPL-SCNC: 4.5 MMOL/L (ref 3.5–5.1)
POTASSIUM SERPL-SCNC: 4.5 MMOL/L (ref 3.5–5.1)
PREALBUMIN: 9.6 MG/DL (ref 20–40)
RBC # BLD: 2.56 M/UL (ref 4–5.2)
RBC # BLD: NORMAL 10*6/UL
SODIUM BLD-SCNC: 137 MMOL/L (ref 136–145)
SODIUM BLD-SCNC: 139 MMOL/L (ref 136–145)
TRANSFERRIN: 118 MG/DL (ref 200–360)
WBC # BLD: 17.6 K/UL (ref 4–11)
WOUND/ABSCESS: ABNORMAL
WOUND/ABSCESS: ABNORMAL
WOUND/ABSCESS: NORMAL
WOUND/ABSCESS: NORMAL

## 2019-05-31 PROCEDURE — 2700000000 HC OXYGEN THERAPY PER DAY

## 2019-05-31 PROCEDURE — 2580000003 HC RX 258: Performed by: STUDENT IN AN ORGANIZED HEALTH CARE EDUCATION/TRAINING PROGRAM

## 2019-05-31 PROCEDURE — 99232 SBSQ HOSP IP/OBS MODERATE 35: CPT | Performed by: INTERNAL MEDICINE

## 2019-05-31 PROCEDURE — 2500000003 HC RX 250 WO HCPCS: Performed by: STUDENT IN AN ORGANIZED HEALTH CARE EDUCATION/TRAINING PROGRAM

## 2019-05-31 PROCEDURE — 6360000002 HC RX W HCPCS: Performed by: STUDENT IN AN ORGANIZED HEALTH CARE EDUCATION/TRAINING PROGRAM

## 2019-05-31 PROCEDURE — 6370000000 HC RX 637 (ALT 250 FOR IP): Performed by: STUDENT IN AN ORGANIZED HEALTH CARE EDUCATION/TRAINING PROGRAM

## 2019-05-31 PROCEDURE — 85025 COMPLETE CBC W/AUTO DIFF WBC: CPT

## 2019-05-31 PROCEDURE — 97116 GAIT TRAINING THERAPY: CPT

## 2019-05-31 PROCEDURE — 97161 PT EVAL LOW COMPLEX 20 MIN: CPT

## 2019-05-31 PROCEDURE — 97530 THERAPEUTIC ACTIVITIES: CPT

## 2019-05-31 PROCEDURE — 86140 C-REACTIVE PROTEIN: CPT

## 2019-05-31 PROCEDURE — 97165 OT EVAL LOW COMPLEX 30 MIN: CPT

## 2019-05-31 PROCEDURE — 83735 ASSAY OF MAGNESIUM: CPT

## 2019-05-31 PROCEDURE — 36415 COLL VENOUS BLD VENIPUNCTURE: CPT

## 2019-05-31 PROCEDURE — 2000000000 HC ICU R&B

## 2019-05-31 PROCEDURE — 36592 COLLECT BLOOD FROM PICC: CPT

## 2019-05-31 PROCEDURE — 94761 N-INVAS EAR/PLS OXIMETRY MLT: CPT

## 2019-05-31 PROCEDURE — 80069 RENAL FUNCTION PANEL: CPT

## 2019-05-31 PROCEDURE — 84134 ASSAY OF PREALBUMIN: CPT

## 2019-05-31 PROCEDURE — 84466 ASSAY OF TRANSFERRIN: CPT

## 2019-05-31 RX ORDER — DIPHENHYDRAMINE HCL 25 MG
25 TABLET ORAL EVERY 6 HOURS PRN
Status: DISCONTINUED | OUTPATIENT
Start: 2019-05-31 | End: 2019-06-11 | Stop reason: HOSPADM

## 2019-05-31 RX ADMIN — HYDROMORPHONE HYDROCHLORIDE 1 MG: 1 INJECTION, SOLUTION INTRAMUSCULAR; INTRAVENOUS; SUBCUTANEOUS at 04:33

## 2019-05-31 RX ADMIN — HYDROMORPHONE HYDROCHLORIDE 0.5 MG: 1 INJECTION, SOLUTION INTRAMUSCULAR; INTRAVENOUS; SUBCUTANEOUS at 17:25

## 2019-05-31 RX ADMIN — HYDROMORPHONE HYDROCHLORIDE 1 MG: 1 INJECTION, SOLUTION INTRAMUSCULAR; INTRAVENOUS; SUBCUTANEOUS at 20:29

## 2019-05-31 RX ADMIN — ONDANSETRON 4 MG: 2 INJECTION INTRAMUSCULAR; INTRAVENOUS at 21:25

## 2019-05-31 RX ADMIN — OXYCODONE HYDROCHLORIDE AND ACETAMINOPHEN 2 TABLET: 5; 325 TABLET ORAL at 00:39

## 2019-05-31 RX ADMIN — CLINDAMYCIN PHOSPHATE 600 MG: 600 INJECTION, SOLUTION INTRAVENOUS at 04:22

## 2019-05-31 RX ADMIN — OXYCODONE HYDROCHLORIDE AND ACETAMINOPHEN 2 TABLET: 5; 325 TABLET ORAL at 08:07

## 2019-05-31 RX ADMIN — Medication 10 ML: at 21:25

## 2019-05-31 RX ADMIN — ENOXAPARIN SODIUM 40 MG: 40 INJECTION SUBCUTANEOUS at 08:07

## 2019-05-31 RX ADMIN — HYDROMORPHONE HYDROCHLORIDE 1 MG: 1 INJECTION, SOLUTION INTRAMUSCULAR; INTRAVENOUS; SUBCUTANEOUS at 11:43

## 2019-05-31 RX ADMIN — DEXTROSE MONOHYDRATE, SODIUM CHLORIDE, AND POTASSIUM CHLORIDE: 50; 4.5; 1.49 INJECTION, SOLUTION INTRAVENOUS at 07:07

## 2019-05-31 RX ADMIN — HYDROMORPHONE HYDROCHLORIDE 1 MG: 1 INJECTION, SOLUTION INTRAMUSCULAR; INTRAVENOUS; SUBCUTANEOUS at 00:39

## 2019-05-31 RX ADMIN — CLINDAMYCIN PHOSPHATE 600 MG: 600 INJECTION, SOLUTION INTRAVENOUS at 20:29

## 2019-05-31 RX ADMIN — VENLAFAXINE 100 MG: 25 TABLET ORAL at 21:24

## 2019-05-31 RX ADMIN — CLINDAMYCIN PHOSPHATE 600 MG: 600 INJECTION, SOLUTION INTRAVENOUS at 11:36

## 2019-05-31 RX ADMIN — Medication 10 ML: at 08:08

## 2019-05-31 RX ADMIN — VENLAFAXINE 100 MG: 25 TABLET ORAL at 08:07

## 2019-05-31 RX ADMIN — HYDROMORPHONE HYDROCHLORIDE 0.25 MG: 1 INJECTION, SOLUTION INTRAMUSCULAR; INTRAVENOUS; SUBCUTANEOUS at 07:36

## 2019-05-31 RX ADMIN — OXYCODONE HYDROCHLORIDE AND ACETAMINOPHEN 2 TABLET: 5; 325 TABLET ORAL at 15:48

## 2019-05-31 RX ADMIN — OXYCODONE HYDROCHLORIDE AND ACETAMINOPHEN 2 TABLET: 5; 325 TABLET ORAL at 21:24

## 2019-05-31 ASSESSMENT — PAIN DESCRIPTION - PROGRESSION
CLINICAL_PROGRESSION: NOT CHANGED
CLINICAL_PROGRESSION: NOT CHANGED
CLINICAL_PROGRESSION: GRADUALLY WORSENING
CLINICAL_PROGRESSION: NOT CHANGED
CLINICAL_PROGRESSION: NOT CHANGED

## 2019-05-31 ASSESSMENT — PAIN SCALES - GENERAL
PAINLEVEL_OUTOF10: 10
PAINLEVEL_OUTOF10: 0
PAINLEVEL_OUTOF10: 8
PAINLEVEL_OUTOF10: 0
PAINLEVEL_OUTOF10: 10
PAINLEVEL_OUTOF10: 0
PAINLEVEL_OUTOF10: 10
PAINLEVEL_OUTOF10: 10
PAINLEVEL_OUTOF10: 8
PAINLEVEL_OUTOF10: 8
PAINLEVEL_OUTOF10: 0
PAINLEVEL_OUTOF10: 8
PAINLEVEL_OUTOF10: 8
PAINLEVEL_OUTOF10: 10
PAINLEVEL_OUTOF10: 5

## 2019-05-31 ASSESSMENT — PAIN DESCRIPTION - PAIN TYPE
TYPE: ACUTE PAIN;SURGICAL PAIN
TYPE: SURGICAL PAIN
TYPE: ACUTE PAIN;SURGICAL PAIN
TYPE: SURGICAL PAIN

## 2019-05-31 ASSESSMENT — PAIN DESCRIPTION - ONSET
ONSET: ON-GOING

## 2019-05-31 ASSESSMENT — PAIN DESCRIPTION - DESCRIPTORS
DESCRIPTORS: ACHING;CONSTANT;SHARP
DESCRIPTORS: SHARP
DESCRIPTORS: ACHING;BURNING;SORE;TENDER
DESCRIPTORS: ACHING;BURNING;SORE;TENDER

## 2019-05-31 ASSESSMENT — PAIN DESCRIPTION - FREQUENCY
FREQUENCY: CONTINUOUS

## 2019-05-31 ASSESSMENT — PAIN - FUNCTIONAL ASSESSMENT
PAIN_FUNCTIONAL_ASSESSMENT: PREVENTS OR INTERFERES SOME ACTIVE ACTIVITIES AND ADLS

## 2019-05-31 ASSESSMENT — PAIN DESCRIPTION - ORIENTATION: ORIENTATION: MID

## 2019-05-31 ASSESSMENT — PAIN DESCRIPTION - LOCATION
LOCATION: ABDOMEN;PERINEUM
LOCATION: ABDOMEN;PERINEUM
LOCATION: ABDOMEN
LOCATION: ABDOMEN

## 2019-05-31 NOTE — PLAN OF CARE
Problem: Nutrition  Goal: Optimal nutrition therapy  Outcome: Ongoing   Nutrition Problem: Increased nutrient needs  Intervention: Food and/or Nutrient Delivery: Continue current diet, Continue current ONS  Nutritional Goals: pt will tolerate diet and consume >75% of meals and ONS offered

## 2019-05-31 NOTE — PROGRESS NOTES
ID Follow-up NOTE    CC:   Necrotizing soft tissue infection  Antibiotics: Clindamycin    Admit Date: 5/26/2019  Hospital Day: 6    Subjective:     POD#1 colostomy    Patient c/o pain at surg site, worse with dressing changes      Objective:     Afebrile last 24 hr    Patient Vitals for the past 8 hrs:   BP Temp Temp src Pulse Resp SpO2   05/31/19 1324 -- -- -- -- 12 96 %   05/31/19 1200 (!) 127/98 98.5 °F (36.9 °C) Oral 81 13 --   05/31/19 1000 116/67 -- -- 83 11 96 %   05/31/19 0900 124/72 -- -- 80 13 97 %   05/31/19 0800 -- -- -- 82 -- --   05/31/19 0600 (!) 92/51 -- -- 74 14 95 %     I/O last 3 completed shifts: In: 4011.9 [P.O.:240; I.V.:3771.9]  Out: 3150 [Urine:3050; Stool:100]  I/O this shift:  In: 240 [P.O.:240]  Out: 185 [Urine:185]    EXAM:  GENERAL: No apparent distress.     HEENT: Membranes moist, no oral lesion  NECK:  Supple  LUNGS: Clear b/l, no rales, no dullness  CARDIAC: RRR, no murmur appreciated  ABD:  + BS, soft / NT; colostomy in place with liquid output  EXT:  No rash, no edema, no lesions  NEURO: No focal neurologic findings  PSYCH: Orientation, sensorium, mood normal  LINES:  R IJ line in place    Data Review:  Lab Results   Component Value Date    WBC 17.6 (H) 05/31/2019    HGB 7.9 (L) 05/31/2019    HCT 23.3 (L) 05/31/2019    MCV 91.0 05/31/2019     05/31/2019     Lab Results   Component Value Date    CREATININE <0.5 (L) 05/31/2019    CREATININE <0.5 (L) 05/31/2019    BUN 7 05/31/2019    BUN 7 05/31/2019     05/31/2019     05/31/2019    K 4.5 05/31/2019    K 4.5 05/31/2019     05/31/2019     05/31/2019    CO2 28 05/31/2019    CO2 28 05/31/2019       Hepatic Function Panel:   Lab Results   Component Value Date    ALKPHOS 108 06/04/2013    ALT 54 06/04/2013    AST 40 06/04/2013    PROT 6.3 06/04/2013    BILITOT 0.4 06/04/2013    LABALBU 2.6 05/31/2019       Micro:  5/29 C diff neg    5/26 Tissue #1: GS 1+WBC, 3+GPC; cult light MRSA  5/26 Tissue #2: GS no WBC, recurrent HSV, neck surg  Fall     R buttock / perineum infection -  + necrotizing ST infection, cult + MRSA  Fever - resolved  Leukocytosis     Plan:     Cont clindamycin alone - MRSA, anaerobic coverage  Wound care per Surg     Discussed with pt  Anamika Velarde

## 2019-05-31 NOTE — PROGRESS NOTES
AM assessment complete. Patient alert and oriented x4. Complaining of abdominal pain at this time. Medication given. Patient denies any shortness of breath. Patient swallowed medication with water without issue. Bed alarm on, call light within reach, brake set, bed in lowest position. Will continue to monitor.

## 2019-05-31 NOTE — PROGRESS NOTES
Surgery Daily Progress Note    CC: necrotizing fasciitis of the perineum    Subjective :  OR yesterday for diverting colostomy, no acute events overnight aside pain, having some stool output     denies nausea, vomiting, urinary complaints such as burning, chills, chest pain, SOB    ROS: A 10 point review of systems was obtained and pertinent positives and negatives were mentioned. Otherwise, no significant history other than as noted in the subjective portion of the note. Objective    Infusions:   dextrose 5% and 0.45% NaCl with KCl 20 mEq 75 mL/hr at 05/31/19 0707        I/O:I/O last 3 completed shifts: In: 4011.9 [P.O.:240; I.V.:3771.9]  Out: 3150 [Urine:3050; Stool:100]           Wt Readings from Last 1 Encounters:   05/31/19 145 lb 4.5 oz (65.9 kg)                 LABS:    Recent Labs     05/30/19  0435 05/31/19  0421   WBC 13.5* 17.6*   HGB 7.8* 7.9*   HCT 23.0* 23.3*   MCV 89.9 91.0    380        Recent Labs     05/30/19  2032 05/31/19  0421   * 137  139   K 3.9 4.5  4.5   CL 98* 101  102   CO2 28 28  28   PHOS 2.8 3.2   BUN 7 7  7   CREATININE <0.5* <0.5*  <0.5*      No results for input(s): AST, ALT, ALB, BILIDIR, BILITOT, ALKPHOS in the last 72 hours. No results for input(s): LIPASE, AMYLASE in the last 72 hours. No results for input(s): PROT, INR, APTT in the last 72 hours. No results for input(s): CKTOTAL, CKMB, CKMBINDEX, TROPONINI in the last 72 hours.       Exam:  Vitals:    05/31/19 0300 05/31/19 0400 05/31/19 0500 05/31/19 0600   BP: 109/67 104/75 (!) 105/52 (!) 92/51   Pulse: 74 71 82 74   Resp: 9 10 12 14   Temp:  98.6 °F (37 °C)     TempSrc:  Oral     SpO2: 94% 94% 96% 95%   Weight:   145 lb 4.5 oz (65.9 kg)    Height:           General appearance: alert, lying comfortably in bed  Neuro: Alert & Orientated x3,   Lungs: no labored breathing, no respiratory distress, no accessory muscle use, on 2L NC  Cardiovascular: RRR, S1, S2 normal, no m/g/r  Abdomen: soft, diverting colostomy in place, liquid stool in bag, BS+  Ext: no edema  : valverde in place  Perineum: wound in the right labia roughly 18 x 5 x 15 deep - no other necrotic tissue present that is visible, penrose drain in place, no foul smelling odor, minimal erythema around the gluteal region, healthy adipose visualized, new dakins soaked gauze packing changed. ASSESSMENT/PLAN:   This is a 59 y.o. female s/p incision and drainage of perineal area with debridement of necrotizing fasciitis that demonstrated necrotic tissue from right labia to the ischiorectal fossa POD #3 (5.26.2019) and take back for EUA and debridement on 5.28.19 for a 2nd look. And s.p lap .diverting colostomy (5.30.2019)    - cont. Clinda for MRSA and anaerobic coverage - appreciate ID recommedations  - cont. General diet with nutrition supplements - nutrition labs ordered  - cont.  BID Dressing changes with Dakins soaked gauze BID  - encourage IS   - decrease fluids to 50 given excellent urine output  - plastic surgery consulted for reconstruction    Adam Connell DO  05/31/19  7:18 AM  [phone]
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
## 2019-05-31 NOTE — ANESTHESIA POSTPROCEDURE EVALUATION
Department of Anesthesiology  Postprocedure Note    Patient: Robbie Madsen  MRN: [id number]  YOB: 1955  Date of evaluation: 5/30/2019  Time:  10:42 PM     Procedure Summary     Date:  05/30/19 Room / Location:  [place] [address] / [facility] OR    Anesthesia Start:  1635 Anesthesia Stop:  1900    Procedure:  LAPAROSCOPIC DIVERTING COLOSTOMY, WOUND EXAMINATION UNDER ANESTHESIA (N/A ) Diagnosis:  (NECROTIZING FASCIITIS)    Surgeon:  Rohan Garnica MD Responsible Provider:  Nichelle Colon MD    Anesthesia Type:  general ASA Status:  3          Anesthesia Type: general    Anum Phase I: Anum Score: 8    Anum Phase II:      Last vitals: Reviewed and per EMR flowsheets. Anesthesia Post Evaluation    Patient location during evaluation: PACU  Patient participation: complete - patient participated  Level of consciousness: awake and alert  Pain scale: please refer to nursing notes. Airway patency: patent  Nausea & Vomiting: no nausea and no vomiting  Complications: no  Cardiovascular status: hemodynamically stable  Respiratory status: spontaneous ventilation  Hydration status: stable  Comments: No phone calls received from PACU RN regarding patient.

## 2019-05-31 NOTE — PROGRESS NOTES
General Surgery           Post-Op Note  Name: Everett Pires  Procedure: LAPAROSCOPIC DIVERTING COLOSTOMY, WOUND EXAMINATION UNDER ANESTHESIA    Subjective:  Pt reports doing well. She is having some pain in her abdomen. Denies nausea or vomiting. Afebrile, hemodynamically stable. Block in place with clear yellow urine. ROS: A 10 point review of systems was conducted, significant findings as noted in HPI. All other systems negative. Objective:  Vitals:    05/30/19 1500 05/30/19 1600 05/30/19 1900 05/30/19 1912   BP: 123/77 117/62 114/70 113/69   Pulse: 91 96 92 94   Resp: 21 14 24 24   Temp:  99.4 °F (37.4 °C)  99.1 °F (37.3 °C)   TempSrc:  Oral  Oral   SpO2:   91% 92%   Weight:       Height:             Intake/Output Summary (Last 24 hours) at 5/30/2019 2152  Last data filed at 5/30/2019 1959  Gross per 24 hour   Intake 3557.87 ml   Output 3625 ml   Net -67.13 ml       IntraOp Crystalloid 1000    EBL <5    U/O 360   PostOp U/O  760    Drains NA            Continuous Infusions:   dextrose 5% and 0.45% NaCl with KCl 20 mEq 100 mL/hr at 05/30/19 0803       Active Problems:    Cellulitis of perineum    Cellulitis of gluteal region    Necrotizing soft tissue infection  Resolved Problems:    * No resolved hospital problems.  *        Physical Exam:      Vitals:    05/30/19 1912   BP: 113/69   Pulse: 94   Resp: 24   Temp: 99.1 °F (37.3 °C)   SpO2: 92%      General appearance: awake, alert, resting in bed   Neuro: A&Ox3, no focal deficits   Eyes: EOMI   Chest/Lungs: CTAB, no crackles/rales, wheezes/rhonchi, 2L NC   Cardiovascular: RRR, no murmurs/gallops/rubs   Abdomen: soft, non-tender, non-distended, port sites are well approximated, ostomy is pink and viable with sweat in bag   : Block in place with clear  Yellow urine, rectal tube in place with liquid stool   Extremities: no edema , no cyanosis, DP/PT pulses 2+ bilaterally    Assessment and Plan:  Everett Pires is a 59 y.o. female patient s/p

## 2019-05-31 NOTE — PROGRESS NOTES
[facility] PLASTICS    Patient seen and examined this AM.  No dressing change performed as it was performed prior to arrival thi AM by the surgical team.    An extensive discussion was performed with the patient outlining the steps required for reconstruction of her wound. We discussed the importance of local wound care / debridement(s) / antibiotics / and stressed the importance of nutrition. Will plan for OR on Monday for EUA / debridement / potential vac application. Once cultures return, will plan for reconstruction with gracilis flap +/- tissue transfer for the anterior labia. Nutrition labs pending. Will continue to closely follow.     Erlinda Gramajo MD  [address] [place] [address] Reconstructive Surgery  [phone]  05/31/19

## 2019-05-31 NOTE — PROGRESS NOTES
(Minimum of 2 out of 6 clinical characteristics is required to make the diagnosis of moderate or severe Protein Calorie Malnutrition based on AND/ASPEN Guidelines):  1. Energy Intake-Less than or equal to 50% of estimated energy requirement, Greater than or equal to 5 days    2. Weight Loss-No significant weight loss,    3. Fat Loss-Unable to assess(not appropriate),    4. Muscle Loss-Unable to assess,    5. Fluid Accumulation-No significant fluid accumulation,    6.   Strength-Not measured    Nutrition Risk Level: High    Nutrient Needs:  · Estimated Daily Total Kcal: 5846-5211 kcal   · Estimated Daily Protein (g):  grams   · Estimated Daily Total Fluid (ml/day): 9431-8091 mL    Nutrition Diagnosis:   · Problem: Increased nutrient needs  · Etiology: related to Increased demand for energy/nutrients     Signs and symptoms:  as evidenced by Presence of wounds    Objective Information:  · Nutrition-Focused Physical Findings: colostomy; no edema  · Wound Type: Surgical Wound  · Current Nutrition Therapies:  · Oral Diet Orders: General(diet advanced today)   · Oral Diet intake: 26-50%  · Oral Nutrition Supplement (ONS) Orders: Standard High Calorie Oral Supplement, Clear Liquid Oral Supplement, Protein Modular  · ONS intake: %(Ensure )  · Anthropometric Measures:  · Ht: 5' 6\" (167.6 cm)   · Current Body Wt: 145 lb 4.5 oz (65.9 kg)  · Admission Body Wt: 141 lb 12.1 oz (64.3 kg)  · Usual Body Wt: 135 lb (61.2 kg)(-140 lb)  · Ideal Body Wt: 130 lb (59 kg)   · BMI Classification: BMI 18.5 - 24.9 Normal Weight    Nutrition Interventions:   Continue current diet, Continue current ONS  Education Completed(high protein)    Nutrition Evaluation:   · Evaluation: Progressing toward goals   · Goals: pt will tolerate diet and consume >75% of meals and ONS offered    · Monitoring: Meal Intake, Supplement Intake, Diet Tolerance, Monitor Bowel Function, Wound Healing      Electronically signed by Silvano Nicolas RD, LD on 5/31/19 at 2:45 PM    Contact Number:   Pager: [phone]  Office: [phone]

## 2019-06-01 LAB
ALBUMIN SERPL-MCNC: 2.5 G/DL (ref 3.4–5)
ANION GAP SERPL CALCULATED.3IONS-SCNC: 7 MMOL/L (ref 3–16)
BANDED NEUTROPHILS RELATIVE PERCENT: 7 % (ref 0–7)
BASOPHILS ABSOLUTE: 0 K/UL (ref 0–0.2)
BASOPHILS RELATIVE PERCENT: 0 %
BUN BLDV-MCNC: 12 MG/DL (ref 7–20)
CALCIUM SERPL-MCNC: 8.2 MG/DL (ref 8.3–10.6)
CHLORIDE BLD-SCNC: 104 MMOL/L (ref 99–110)
CO2: 28 MMOL/L (ref 21–32)
CREAT SERPL-MCNC: 0.5 MG/DL (ref 0.6–1.2)
EOSINOPHILS ABSOLUTE: 0.4 K/UL (ref 0–0.6)
EOSINOPHILS RELATIVE PERCENT: 3 %
GFR AFRICAN AMERICAN: >60
GFR NON-AFRICAN AMERICAN: >60
GLUCOSE BLD-MCNC: 102 MG/DL (ref 70–99)
GLUCOSE BLD-MCNC: 107 MG/DL (ref 70–99)
GLUCOSE BLD-MCNC: 123 MG/DL (ref 70–99)
GLUCOSE BLD-MCNC: 154 MG/DL (ref 70–99)
HCT VFR BLD CALC: 21.5 % (ref 36–48)
HEMOGLOBIN: 7.2 G/DL (ref 12–16)
LYMPHOCYTES ABSOLUTE: 1.9 K/UL (ref 1–5.1)
LYMPHOCYTES RELATIVE PERCENT: 14 %
MAGNESIUM: 1.5 MG/DL (ref 1.8–2.4)
MCH RBC QN AUTO: 30.5 PG (ref 26–34)
MCHC RBC AUTO-ENTMCNC: 33.5 G/DL (ref 31–36)
MCV RBC AUTO: 90.9 FL (ref 80–100)
MONOCYTES ABSOLUTE: 0.1 K/UL (ref 0–1.3)
MONOCYTES RELATIVE PERCENT: 1 %
NEUTROPHILS ABSOLUTE: 11.1 K/UL (ref 1.7–7.7)
NEUTROPHILS RELATIVE PERCENT: 75 %
PDW BLD-RTO: 13.4 % (ref 12.4–15.4)
PERFORMED ON: ABNORMAL
PHOSPHORUS: 3 MG/DL (ref 2.5–4.9)
PLATELET # BLD: 370 K/UL (ref 135–450)
PMV BLD AUTO: 7 FL (ref 5–10.5)
POTASSIUM SERPL-SCNC: 4 MMOL/L (ref 3.5–5.1)
RBC # BLD: 2.36 M/UL (ref 4–5.2)
SODIUM BLD-SCNC: 139 MMOL/L (ref 136–145)
WBC # BLD: 13.5 K/UL (ref 4–11)

## 2019-06-01 PROCEDURE — 83735 ASSAY OF MAGNESIUM: CPT

## 2019-06-01 PROCEDURE — 6360000002 HC RX W HCPCS: Performed by: STUDENT IN AN ORGANIZED HEALTH CARE EDUCATION/TRAINING PROGRAM

## 2019-06-01 PROCEDURE — 6370000000 HC RX 637 (ALT 250 FOR IP): Performed by: STUDENT IN AN ORGANIZED HEALTH CARE EDUCATION/TRAINING PROGRAM

## 2019-06-01 PROCEDURE — 80069 RENAL FUNCTION PANEL: CPT

## 2019-06-01 PROCEDURE — 2000000000 HC ICU R&B

## 2019-06-01 PROCEDURE — 2500000003 HC RX 250 WO HCPCS: Performed by: STUDENT IN AN ORGANIZED HEALTH CARE EDUCATION/TRAINING PROGRAM

## 2019-06-01 PROCEDURE — 6370000000 HC RX 637 (ALT 250 FOR IP): Performed by: INTERNAL MEDICINE

## 2019-06-01 PROCEDURE — 94761 N-INVAS EAR/PLS OXIMETRY MLT: CPT

## 2019-06-01 PROCEDURE — 94150 VITAL CAPACITY TEST: CPT

## 2019-06-01 PROCEDURE — 2580000003 HC RX 258: Performed by: STUDENT IN AN ORGANIZED HEALTH CARE EDUCATION/TRAINING PROGRAM

## 2019-06-01 PROCEDURE — 85025 COMPLETE CBC W/AUTO DIFF WBC: CPT

## 2019-06-01 PROCEDURE — 36415 COLL VENOUS BLD VENIPUNCTURE: CPT

## 2019-06-01 PROCEDURE — 36592 COLLECT BLOOD FROM PICC: CPT

## 2019-06-01 RX ORDER — MAGNESIUM SULFATE IN WATER 40 MG/ML
4 INJECTION, SOLUTION INTRAVENOUS ONCE
Status: COMPLETED | OUTPATIENT
Start: 2019-06-01 | End: 2019-06-01

## 2019-06-01 RX ORDER — SODIUM CHLORIDE 9 MG/ML
INJECTION, SOLUTION INTRAVENOUS
Status: DISPENSED
Start: 2019-06-01 | End: 2019-06-02

## 2019-06-01 RX ADMIN — HYDROMORPHONE HYDROCHLORIDE 1 MG: 1 INJECTION, SOLUTION INTRAMUSCULAR; INTRAVENOUS; SUBCUTANEOUS at 13:05

## 2019-06-01 RX ADMIN — HYDROMORPHONE HYDROCHLORIDE 1 MG: 1 INJECTION, SOLUTION INTRAMUSCULAR; INTRAVENOUS; SUBCUTANEOUS at 22:56

## 2019-06-01 RX ADMIN — HYDROMORPHONE HYDROCHLORIDE 1 MG: 1 INJECTION, SOLUTION INTRAMUSCULAR; INTRAVENOUS; SUBCUTANEOUS at 16:00

## 2019-06-01 RX ADMIN — CLINDAMYCIN PHOSPHATE 600 MG: 600 INJECTION, SOLUTION INTRAVENOUS at 19:53

## 2019-06-01 RX ADMIN — OXYCODONE HYDROCHLORIDE AND ACETAMINOPHEN 2 TABLET: 5; 325 TABLET ORAL at 12:03

## 2019-06-01 RX ADMIN — HYDROMORPHONE HYDROCHLORIDE 1 MG: 1 INJECTION, SOLUTION INTRAMUSCULAR; INTRAVENOUS; SUBCUTANEOUS at 05:29

## 2019-06-01 RX ADMIN — VENLAFAXINE 100 MG: 25 TABLET ORAL at 20:01

## 2019-06-01 RX ADMIN — HYDROMORPHONE HYDROCHLORIDE 1 MG: 1 INJECTION, SOLUTION INTRAMUSCULAR; INTRAVENOUS; SUBCUTANEOUS at 19:46

## 2019-06-01 RX ADMIN — MAGNESIUM SULFATE HEPTAHYDRATE 4 G: 40 INJECTION, SOLUTION INTRAVENOUS at 10:11

## 2019-06-01 RX ADMIN — CLINDAMYCIN PHOSPHATE 600 MG: 600 INJECTION, SOLUTION INTRAVENOUS at 12:03

## 2019-06-01 RX ADMIN — OXYCODONE HYDROCHLORIDE AND ACETAMINOPHEN 2 TABLET: 5; 325 TABLET ORAL at 17:37

## 2019-06-01 RX ADMIN — CLINDAMYCIN PHOSPHATE 600 MG: 600 INJECTION, SOLUTION INTRAVENOUS at 03:21

## 2019-06-01 RX ADMIN — OXYCODONE HYDROCHLORIDE AND ACETAMINOPHEN 2 TABLET: 5; 325 TABLET ORAL at 03:17

## 2019-06-01 RX ADMIN — ENOXAPARIN SODIUM 40 MG: 40 INJECTION SUBCUTANEOUS at 09:30

## 2019-06-01 RX ADMIN — DIPHENHYDRAMINE HCL 25 MG: 25 TABLET ORAL at 03:16

## 2019-06-01 RX ADMIN — OXYCODONE HYDROCHLORIDE AND ACETAMINOPHEN 2 TABLET: 5; 325 TABLET ORAL at 07:58

## 2019-06-01 RX ADMIN — HYDROMORPHONE HYDROCHLORIDE 0.5 MG: 1 INJECTION, SOLUTION INTRAMUSCULAR; INTRAVENOUS; SUBCUTANEOUS at 08:00

## 2019-06-01 RX ADMIN — DEXTROSE MONOHYDRATE, SODIUM CHLORIDE, AND POTASSIUM CHLORIDE: 50; 4.5; 1.49 INJECTION, SOLUTION INTRAVENOUS at 01:05

## 2019-06-01 RX ADMIN — OXYCODONE HYDROCHLORIDE AND ACETAMINOPHEN 2 TABLET: 5; 325 TABLET ORAL at 21:46

## 2019-06-01 RX ADMIN — Medication 10 ML: at 21:00

## 2019-06-01 RX ADMIN — VENLAFAXINE 100 MG: 25 TABLET ORAL at 09:30

## 2019-06-01 RX ADMIN — Medication 10 ML: at 09:30

## 2019-06-01 ASSESSMENT — PAIN SCALES - GENERAL
PAINLEVEL_OUTOF10: 10
PAINLEVEL_OUTOF10: 9
PAINLEVEL_OUTOF10: 8
PAINLEVEL_OUTOF10: 10
PAINLEVEL_OUTOF10: 8
PAINLEVEL_OUTOF10: 10
PAINLEVEL_OUTOF10: 9
PAINLEVEL_OUTOF10: 10
PAINLEVEL_OUTOF10: 8
PAINLEVEL_OUTOF10: 9
PAINLEVEL_OUTOF10: 10
PAINLEVEL_OUTOF10: 9

## 2019-06-01 ASSESSMENT — PAIN DESCRIPTION - PROGRESSION
CLINICAL_PROGRESSION: NOT CHANGED

## 2019-06-01 ASSESSMENT — PAIN DESCRIPTION - PAIN TYPE
TYPE: ACUTE PAIN

## 2019-06-01 ASSESSMENT — PAIN DESCRIPTION - LOCATION
LOCATION: PERINEUM
LOCATION: PERINEUM
LOCATION: ABDOMEN;PERINEUM
LOCATION: PERINEUM
LOCATION: ABDOMEN;PERINEUM

## 2019-06-01 ASSESSMENT — PAIN DESCRIPTION - ONSET
ONSET: ON-GOING

## 2019-06-01 ASSESSMENT — PAIN DESCRIPTION - DESCRIPTORS
DESCRIPTORS: SORE
DESCRIPTORS: ACHING;BURNING
DESCRIPTORS: ACHING;BURNING

## 2019-06-01 ASSESSMENT — PAIN - FUNCTIONAL ASSESSMENT
PAIN_FUNCTIONAL_ASSESSMENT: PREVENTS OR INTERFERES SOME ACTIVE ACTIVITIES AND ADLS
PAIN_FUNCTIONAL_ASSESSMENT: PREVENTS OR INTERFERES SOME ACTIVE ACTIVITIES AND ADLS

## 2019-06-01 ASSESSMENT — PAIN DESCRIPTION - ORIENTATION
ORIENTATION: MID

## 2019-06-01 ASSESSMENT — PAIN DESCRIPTION - FREQUENCY
FREQUENCY: CONTINUOUS

## 2019-06-01 ASSESSMENT — PAIN DESCRIPTION - DIRECTION: RADIATING_TOWARDS: DENIES

## 2019-06-01 NOTE — PROGRESS NOTES
Pt up in chair for 2 hours. Pt taken to BR for soap and water bath and cleaned up. Pt walked to BR x1 assist RN. Hair care, valverde care, oral care, and ostomy care provided. Pt encouraged to do as much as possible for self, so Pt brushed her own teeth. All new linens were placed on bed prior to pt return. Lunch served with pt in bed. Pt drank entire Ensure shake prior to lunch meal because she knew it was important. Pt instructed that it is better to eat food on tray prior to supplements because the supplements can be served as snacks in between meals. Meal recordered in flow sheets.

## 2019-06-01 NOTE — PLAN OF CARE
Problem: Nausea/Vomiting:  Goal: Able to drink  Description  Able to drink  Outcome: Met This Shift  Goal: Able to eat  Description  Able to eat  Outcome: Met This Shift  Goal: Ability to achieve adequate nutritional intake will improve  Description  Ability to achieve adequate nutritional intake will improve  Outcome: Met This Shift     Problem: Pain:  Goal: Pain level will decrease  Description  Pain level will decrease  Outcome: Ongoing  Goal: Control of acute pain  Description  Patient's pain assessed every shift, post procedure, prn, or any changes in status. Pt's pain is assessed using 0-10 pain scale. Pt understands how to communicate pain to the RN using the pain scale. Pt understands to notify RN with of new onset pain. Pt's pain has been controlled this shift. Pt is receiving dilaudid for pain. Outcome: Ongoing  Note:   Pt given percocet q4 and dilaudid q3h for break through pain. Goal: Control of chronic pain  Description  Control of chronic pain  Outcome: Ongoing     Problem: Falls - Risk of:  Goal: Will remain free from falls  Description  Patient has been free from falls and injuries this shift. Yamilet fall risk assessed each shift. Bed locked in lowest position, alarm engaged. Fall bracelet in place, fall sign present outside door, fall socks present on patient. Patients call light within reach. Patient educated on use of call light. Room free of clutter. Patient has made no attempts to get out of bed. Will continue to round and assess needs. Outcome: Ongoing  Note:   Pt is a fall risk. Fall risk protocol in place. See Surya Silva Fall Score. Pt bed is in low position, bed alarm is on, side rails up, fall risk bracelet applied. , non-skid footwear in use. Patient/family educated on fall risk protocol, instructed to call for assistance when needed and belongings are in reach. assistance. Will continue with hourly rounds for po intake, pain needs, toileting and repositioning as needed.  Will improve to within specified parameters  Description  Establishment of normal bowel function will improve to within specified parameters  Outcome: Ongoing     Problem: Nausea/Vomiting:  Goal: Absence of nausea/vomiting  Description  Absence of nausea/vomiting  Outcome: Ongoing  Note:   Pt is not complaining of any N/V this shift.  Pt is having increased gas s/s protein supplement

## 2019-06-01 NOTE — PROGRESS NOTES
[facility] PLASTICS    Patient seen and examined this AM.  Feeling somewhat better than previous examination. AF/VSS    GEN: NAD  PERINEUM: Dressing intact    Nutrition labs pending    IMP: 63F with Girish's gangrene  PLAN: Continue with nutrition optimization. Plan for OR on Monday for repeat EUA / debridement / cultures / possible vac application. If cultures return clean, then will plan for flap reconstruction later in the week.     Fritz Cevallos MD  [address] [place] [address] 399 Reconstructive Surgery  [phone]  06/01/19

## 2019-06-01 NOTE — PROGRESS NOTES
Surgery Daily Progress Note    CC: necrotizing fasciitis of the perineum    Subjective :  No acute events overnight. Pain controlled, denies nausea, or emesis. Tolerating diet. Having ostomy function. Block in place. ROS: A 14 point review of systems was obtained and pertinent positives and negatives were mentioned. Otherwise, no significant history other than as noted in the subjective portion of the note. Objective    Infusions:   dextrose 5% and 0.45% NaCl with KCl 20 mEq 50 mL/hr at 06/01/19 0105        I/O:I/O last 3 completed shifts: In: 2301.3 [P.O.:480; I.V.:1421.3; IV Piggyback:400]  Out: 2230 [Urine:1980; Stool:250]           Wt Readings from Last 1 Encounters:   06/01/19 149 lb 11.1 oz (67.9 kg)                 LABS:    Recent Labs     05/31/19  0421 06/01/19  0535   WBC 17.6* 13.5*   HGB 7.9* 7.2*   HCT 23.3* 21.5*   MCV 91.0 90.9    370        Recent Labs     05/31/19  0421 06/01/19  0535     139 139   K 4.5  4.5 4.0     102 104   CO2 28  28 28   PHOS 3.2 3.0   BUN 7  7 12   CREATININE <0.5*  <0.5* 0.5*      No results for input(s): AST, ALT, ALB, BILIDIR, BILITOT, ALKPHOS in the last 72 hours. No results for input(s): LIPASE, AMYLASE in the last 72 hours. No results for input(s): PROT, INR, APTT in the last 72 hours. No results for input(s): CKTOTAL, CKMB, CKMBINDEX, TROPONINI in the last 72 hours.       Exam:  Vitals:    06/01/19 0500 06/01/19 0600 06/01/19 0700 06/01/19 0717   BP: 112/69 110/68 (!) 107/59 110/69   Pulse: 78 76 72 83   Resp: 16 15 16 17   Temp:    99 °F (37.2 °C)   TempSrc:    Oral   SpO2: 93% 91% 94% 96%   Weight:  149 lb 11.1 oz (67.9 kg)     Height:           General appearance: alert, lying comfortably in bed  Neuro: Alert & Orientated x3,   Lungs: no labored breathing, no respiratory distress, no accessory muscle use, on 1L NC  Cardiovascular: RRR, S1, S2 normal  Abdomen: soft, diverting colostomy in place, liquid stool in bag  Ext: no

## 2019-06-02 ENCOUNTER — APPOINTMENT (OUTPATIENT)
Dept: GENERAL RADIOLOGY | Age: 64
DRG: 463 | End: 2019-06-02
Payer: COMMERCIAL

## 2019-06-02 LAB
ABO/RH: NORMAL
ALBUMIN SERPL-MCNC: 2.4 G/DL (ref 3.4–5)
ANION GAP SERPL CALCULATED.3IONS-SCNC: 10 MMOL/L (ref 3–16)
ANTIBODY SCREEN: NORMAL
BANDED NEUTROPHILS RELATIVE PERCENT: 5 % (ref 0–7)
BASOPHILS ABSOLUTE: 0.2 K/UL (ref 0–0.2)
BASOPHILS RELATIVE PERCENT: 1 %
BILIRUBIN URINE: NEGATIVE
BLOOD, URINE: NEGATIVE
BUN BLDV-MCNC: 19 MG/DL (ref 7–20)
CALCIUM SERPL-MCNC: 8.6 MG/DL (ref 8.3–10.6)
CHLORIDE BLD-SCNC: 103 MMOL/L (ref 99–110)
CLARITY: CLEAR
CO2: 26 MMOL/L (ref 21–32)
COLOR: YELLOW
CREAT SERPL-MCNC: 0.6 MG/DL (ref 0.6–1.2)
EOSINOPHILS ABSOLUTE: 0.6 K/UL (ref 0–0.6)
EOSINOPHILS RELATIVE PERCENT: 4 %
GFR AFRICAN AMERICAN: >60
GFR NON-AFRICAN AMERICAN: >60
GLUCOSE BLD-MCNC: 102 MG/DL (ref 70–99)
GLUCOSE BLD-MCNC: 110 MG/DL (ref 70–99)
GLUCOSE BLD-MCNC: 112 MG/DL (ref 70–99)
GLUCOSE BLD-MCNC: 134 MG/DL (ref 70–99)
GLUCOSE URINE: NEGATIVE MG/DL
HCT VFR BLD CALC: 22.7 % (ref 36–48)
HEMATOLOGY PATH CONSULT: NO
HEMOGLOBIN: 7.5 G/DL (ref 12–16)
KETONES, URINE: NEGATIVE MG/DL
LEUKOCYTE ESTERASE, URINE: NEGATIVE
LYMPHOCYTES ABSOLUTE: 2.8 K/UL (ref 1–5.1)
LYMPHOCYTES RELATIVE PERCENT: 18 %
MAGNESIUM: 1.9 MG/DL (ref 1.8–2.4)
MCH RBC QN AUTO: 30.1 PG (ref 26–34)
MCHC RBC AUTO-ENTMCNC: 32.8 G/DL (ref 31–36)
MCV RBC AUTO: 91.7 FL (ref 80–100)
METAMYELOCYTES RELATIVE PERCENT: 3 %
MICROSCOPIC EXAMINATION: NORMAL
MONOCYTES ABSOLUTE: 0.8 K/UL (ref 0–1.3)
MONOCYTES RELATIVE PERCENT: 5 %
MYELOCYTE PERCENT: 8 %
NEUTROPHILS ABSOLUTE: 11.2 K/UL (ref 1.7–7.7)
NEUTROPHILS RELATIVE PERCENT: 56 %
NITRITE, URINE: NEGATIVE
PDW BLD-RTO: 13.4 % (ref 12.4–15.4)
PERFORMED ON: ABNORMAL
PH UA: 6 (ref 5–8)
PHOSPHORUS: 4.2 MG/DL (ref 2.5–4.9)
PLATELET # BLD: 442 K/UL (ref 135–450)
PMV BLD AUTO: 7.1 FL (ref 5–10.5)
POTASSIUM SERPL-SCNC: 4.4 MMOL/L (ref 3.5–5.1)
PROTEIN UA: NEGATIVE MG/DL
RBC # BLD: 2.48 M/UL (ref 4–5.2)
SODIUM BLD-SCNC: 139 MMOL/L (ref 136–145)
SPECIFIC GRAVITY UA: 1.01 (ref 1–1.03)
URINE TYPE: NORMAL
UROBILINOGEN, URINE: 0.2 E.U./DL
WBC # BLD: 15.6 K/UL (ref 4–11)

## 2019-06-02 PROCEDURE — 94761 N-INVAS EAR/PLS OXIMETRY MLT: CPT

## 2019-06-02 PROCEDURE — 6360000002 HC RX W HCPCS: Performed by: STUDENT IN AN ORGANIZED HEALTH CARE EDUCATION/TRAINING PROGRAM

## 2019-06-02 PROCEDURE — 80069 RENAL FUNCTION PANEL: CPT

## 2019-06-02 PROCEDURE — 36415 COLL VENOUS BLD VENIPUNCTURE: CPT

## 2019-06-02 PROCEDURE — 2580000003 HC RX 258: Performed by: STUDENT IN AN ORGANIZED HEALTH CARE EDUCATION/TRAINING PROGRAM

## 2019-06-02 PROCEDURE — 6370000000 HC RX 637 (ALT 250 FOR IP): Performed by: STUDENT IN AN ORGANIZED HEALTH CARE EDUCATION/TRAINING PROGRAM

## 2019-06-02 PROCEDURE — 36592 COLLECT BLOOD FROM PICC: CPT

## 2019-06-02 PROCEDURE — 6370000000 HC RX 637 (ALT 250 FOR IP): Performed by: SURGERY

## 2019-06-02 PROCEDURE — 2000000000 HC ICU R&B

## 2019-06-02 PROCEDURE — 86900 BLOOD TYPING SEROLOGIC ABO: CPT

## 2019-06-02 PROCEDURE — 85025 COMPLETE CBC W/AUTO DIFF WBC: CPT

## 2019-06-02 PROCEDURE — 2500000003 HC RX 250 WO HCPCS: Performed by: STUDENT IN AN ORGANIZED HEALTH CARE EDUCATION/TRAINING PROGRAM

## 2019-06-02 PROCEDURE — 99232 SBSQ HOSP IP/OBS MODERATE 35: CPT | Performed by: INTERNAL MEDICINE

## 2019-06-02 PROCEDURE — 71045 X-RAY EXAM CHEST 1 VIEW: CPT

## 2019-06-02 PROCEDURE — 86901 BLOOD TYPING SEROLOGIC RH(D): CPT

## 2019-06-02 PROCEDURE — 94150 VITAL CAPACITY TEST: CPT

## 2019-06-02 PROCEDURE — 83735 ASSAY OF MAGNESIUM: CPT

## 2019-06-02 PROCEDURE — 81003 URINALYSIS AUTO W/O SCOPE: CPT

## 2019-06-02 PROCEDURE — 86923 COMPATIBILITY TEST ELECTRIC: CPT

## 2019-06-02 PROCEDURE — 86850 RBC ANTIBODY SCREEN: CPT

## 2019-06-02 RX ORDER — SODIUM CHLORIDE 9 MG/ML
INJECTION, SOLUTION INTRAVENOUS CONTINUOUS
Status: DISCONTINUED | OUTPATIENT
Start: 2019-06-03 | End: 2019-06-06

## 2019-06-02 RX ADMIN — OXYCODONE HYDROCHLORIDE AND ACETAMINOPHEN 2 TABLET: 5; 325 TABLET ORAL at 15:44

## 2019-06-02 RX ADMIN — VENLAFAXINE 100 MG: 25 TABLET ORAL at 20:31

## 2019-06-02 RX ADMIN — ENOXAPARIN SODIUM 40 MG: 40 INJECTION SUBCUTANEOUS at 08:17

## 2019-06-02 RX ADMIN — CLINDAMYCIN PHOSPHATE 600 MG: 600 INJECTION, SOLUTION INTRAVENOUS at 11:36

## 2019-06-02 RX ADMIN — ONDANSETRON 4 MG: 2 INJECTION INTRAMUSCULAR; INTRAVENOUS at 08:49

## 2019-06-02 RX ADMIN — Medication 10 ML: at 08:49

## 2019-06-02 RX ADMIN — HYDROMORPHONE HYDROCHLORIDE 1 MG: 1 INJECTION, SOLUTION INTRAMUSCULAR; INTRAVENOUS; SUBCUTANEOUS at 12:59

## 2019-06-02 RX ADMIN — HYDROMORPHONE HYDROCHLORIDE 1 MG: 1 INJECTION, SOLUTION INTRAMUSCULAR; INTRAVENOUS; SUBCUTANEOUS at 19:00

## 2019-06-02 RX ADMIN — HYDROMORPHONE HYDROCHLORIDE 1 MG: 1 INJECTION, SOLUTION INTRAMUSCULAR; INTRAVENOUS; SUBCUTANEOUS at 06:40

## 2019-06-02 RX ADMIN — OXYCODONE HYDROCHLORIDE AND ACETAMINOPHEN 2 TABLET: 5; 325 TABLET ORAL at 08:48

## 2019-06-02 RX ADMIN — VENLAFAXINE 100 MG: 25 TABLET ORAL at 08:17

## 2019-06-02 RX ADMIN — Medication 10 ML: at 20:32

## 2019-06-02 RX ADMIN — ONDANSETRON 4 MG: 2 INJECTION INTRAMUSCULAR; INTRAVENOUS at 23:00

## 2019-06-02 RX ADMIN — MAGNESIUM OXIDE TAB 400 MG (241.3 MG ELEMENTAL MG) 200 MG: 400 (241.3 MG) TAB at 11:36

## 2019-06-02 RX ADMIN — HYDROMORPHONE HYDROCHLORIDE 1 MG: 1 INJECTION, SOLUTION INTRAMUSCULAR; INTRAVENOUS; SUBCUTANEOUS at 09:39

## 2019-06-02 RX ADMIN — CLINDAMYCIN PHOSPHATE 600 MG: 600 INJECTION, SOLUTION INTRAVENOUS at 20:30

## 2019-06-02 RX ADMIN — OXYCODONE HYDROCHLORIDE AND ACETAMINOPHEN 2 TABLET: 5; 325 TABLET ORAL at 20:30

## 2019-06-02 RX ADMIN — CLINDAMYCIN PHOSPHATE 600 MG: 600 INJECTION, SOLUTION INTRAVENOUS at 04:21

## 2019-06-02 RX ADMIN — HYDROMORPHONE HYDROCHLORIDE 1 MG: 1 INJECTION, SOLUTION INTRAMUSCULAR; INTRAVENOUS; SUBCUTANEOUS at 22:44

## 2019-06-02 RX ADMIN — OXYCODONE HYDROCHLORIDE AND ACETAMINOPHEN 2 TABLET: 5; 325 TABLET ORAL at 04:39

## 2019-06-02 ASSESSMENT — PAIN DESCRIPTION - PROGRESSION
CLINICAL_PROGRESSION: NOT CHANGED

## 2019-06-02 ASSESSMENT — PAIN SCALES - GENERAL
PAINLEVEL_OUTOF10: 10
PAINLEVEL_OUTOF10: 8
PAINLEVEL_OUTOF10: 10
PAINLEVEL_OUTOF10: 8

## 2019-06-02 ASSESSMENT — PAIN DESCRIPTION - PAIN TYPE
TYPE: ACUTE PAIN

## 2019-06-02 ASSESSMENT — PAIN DESCRIPTION - ORIENTATION
ORIENTATION: MID

## 2019-06-02 ASSESSMENT — PAIN DESCRIPTION - DIRECTION
RADIATING_TOWARDS: DENIES

## 2019-06-02 ASSESSMENT — PAIN DESCRIPTION - FREQUENCY
FREQUENCY: CONTINUOUS

## 2019-06-02 ASSESSMENT — PAIN DESCRIPTION - ONSET
ONSET: ON-GOING

## 2019-06-02 ASSESSMENT — PAIN DESCRIPTION - DESCRIPTORS
DESCRIPTORS: ACHING

## 2019-06-02 ASSESSMENT — PAIN DESCRIPTION - LOCATION
LOCATION: PERINEUM

## 2019-06-02 NOTE — PROGRESS NOTES
General Surgery   Daily Progress Note    CC: necrotizing fasciitis of the perineum    SUBJECTIVE:  Patient rested well overnight. Pain controlled, denies nausea, or emesis. Patient is tolerating a general diet well, reporting no post-prandial pain, nausea, or vomiting. Having ostomy function. Block remains in place. ROS: A 14 point review of systems was obtained and pertinent positives and negatives were mentioned. Otherwise, no significant history other than as noted in the subjective portion of the note. OBJECTIVE:   Infusions:   [START ON 6/3/2019] sodium chloride      sodium chloride        I/O:I/O last 3 completed shifts: In: 2029 [P.O.:1465; I.V.:564]  Out: 3750 [Urine:3750]           Wt Readings from Last 1 Encounters:   06/02/19 148 lb 13 oz (67.5 kg)      LABS:    Recent Labs     06/01/19  0535 06/02/19  0427   WBC 13.5* 15.6*   HGB 7.2* 7.5*   HCT 21.5* 22.7*   MCV 90.9 91.7    442      Recent Labs     06/01/19  0535 06/02/19  0427    139   K 4.0 4.4    103   CO2 28 26   PHOS 3.0 4.2   BUN 12 19   CREATININE 0.5* 0.6      No results for input(s): AST, ALT, ALB, BILIDIR, BILITOT, ALKPHOS in the last 72 hours. No results for input(s): LIPASE, AMYLASE in the last 72 hours. No results for input(s): PROT, INR, APTT in the last 72 hours. No results for input(s): CKTOTAL, CKMB, CKMBINDEX, TROPONINI in the last 72 hours.     Exam:  Vitals:    06/02/19 0200 06/02/19 0300 06/02/19 0400 06/02/19 0700   BP: 95/65 102/62 100/68 108/71   Pulse: 77 81 80 83   Resp: 12 16 11 10   Temp:   99.2 °F (37.3 °C)    TempSrc:   Oral    SpO2:   97% 94%   Weight:   148 lb 13 oz (67.5 kg)    Height:         General appearance: alert, lying comfortably in bed  Neuro: Alert & Orientated x3,   Lungs: no labored breathing, no respiratory distress, no accessory muscle use, on 1L NC  Cardiovascular: RRR, S1, S2 normal  Abdomen: soft, diverting colostomy in place with small amount of brown formed stool in bag  Ext: no edema  : valverde in place  Perineum: wound in the right labia roughly 18 x 5 x 15 deep - no other necrotic tissue present that is visible, penrose drain in place, no odor, erythema, or purulent drainage. healthy adipose visualized, new dakins soaked gauze packing changed, dressing applied. ASSESSMENT/PLAN:   This is a 59 y.o. female s/p incision and drainage of perineal area with debridement of necrotizing fasciitis that demonstrated necrotic tissue from right labia to the ischiorectal fossa POD #4 (5.26.2019) and take back for EUA and debridement on 5.28.19 for a 2nd look. And s/p lap diverting colostomy (5.30.2019) - POD3    - Continue general diet with supplements for nutritional optimization    - Nutritional markers:      - Prealb: 9.6      - Albumin 2.5      - Transferrin 118.0     - Continue BID Dressing changes with Dakins soaked gauze     - Dressing changed this AM     - Will return to the OR tomorrow with Plastic Surgery for debridement and possible wound VAC placement    - Tissue cultures + MRSA, continue clinda, per ID    - Encourage IS, out of bed to chair during the day and ambulation      Karin Ruiz MD, MPH  PGY-1 General Surgery  06/02/19  8:02 AM  [phone]    I am post-call and off campus today.  If you have any questions or concerns regarding this patient's care today, please Loren Gill MD PGY-1 [phone]

## 2019-06-02 NOTE — PLAN OF CARE
kept clean and dry. Preventative dressings have been applied. Sacral heart placed and assessed to be CDI. Pavel scale is assessed and documented each shift. Pt family is educated on importance of frequent repositioning and assessment. Patient has no skin integrity issues besides the surgical site being seen and cared for by the surgical team. Patient's skin integrity maintained throughout shift. Outcome: Ongoing  Note:   Pt at risk for skin breakdown. See Pavel score. Pt remains on bedrest. Unable to reposition self in bed. Heels elevated off bed. Sacral heart mepilex intact to protect,  site inspected and intact underneath. Will continue to turn and reposition patient every two hours and as needed. Will continue to keep patient clean and dry, applying skin care cream as needed. Pillows used for repositioning q2hs. Will continue to monitor and assess for skin breakdown. Problem: Nutrition  Goal: Optimal nutrition therapy  Outcome: Ongoing  Note:   Pt encouraged to eat as much of meal as possible and drink supplements. Pt only likes Whey Protein supplement from Dr Lo Coyle     Problem: Diarrhea:  Goal: Bowel elimination is within specified parameters  Description  Bowel elimination is within specified parameters  Outcome: Ongoing  Note:   Colostomy is putting out soft stool this shift. Goal: Passage of soft, formed stool  Description  Passage of soft, formed stool  Outcome: Ongoing  Goal: Establishment of normal bowel function will improve to within specified parameters  Description  Establishment of normal bowel function will improve to within specified parameters  Outcome: Ongoing     Problem: Nausea/Vomiting:  Goal: Absence of nausea/vomiting  Description  Absence of nausea/vomiting  Outcome: Ongoing  Note:   Pt complained of some nausea this morning, zofran given. No complaints since.    Goal: Able to drink  Description  Able to drink  Outcome: Ongoing  Goal: Able to eat  Description  Able to eat  Outcome: Ongoing  Goal: Ability to achieve adequate nutritional intake will improve  Description  Ability to achieve adequate nutritional intake will improve  Outcome: Ongoing

## 2019-06-02 NOTE — PROGRESS NOTES
Plastic Surgery   Daily Progress Note    CC: Necrotizing fasciitis of the perineum    SUBJECTIVE:  Patient rested well overnight. Pain controlled, denies nausea, or emesis. Patient is tolerating a general diet well, reporting no post-prandial pain, nausea, or vomiting. Having ostomy function. Block remains in place. ROS: A 14 point review of systems was obtained and pertinent positives and negatives were mentioned. Otherwise, no significant history other than as noted in the subjective portion of the note. OBJECTIVE:   Infusions:   [START ON 6/3/2019] sodium chloride        I/O:I/O last 3 completed shifts: In: 2029 [P.O.:1465; I.V.:564]  Out: 3750 [Urine:3750]           Wt Readings from Last 1 Encounters:   06/02/19 148 lb 13 oz (67.5 kg)      LABS:    Recent Labs     06/01/19  0535 06/02/19  0427   WBC 13.5* 15.6*   HGB 7.2* 7.5*   HCT 21.5* 22.7*   MCV 90.9 91.7    442      Recent Labs     06/01/19  0535 06/02/19  0427    139   K 4.0 4.4    103   CO2 28 26   PHOS 3.0 4.2   BUN 12 19   CREATININE 0.5* 0.6      No results for input(s): AST, ALT, ALB, BILIDIR, BILITOT, ALKPHOS in the last 72 hours. No results for input(s): LIPASE, AMYLASE in the last 72 hours. No results for input(s): PROT, INR, APTT in the last 72 hours. No results for input(s): CKTOTAL, CKMB, CKMBINDEX, TROPONINI in the last 72 hours.     Exam:  Vitals:    06/02/19 0300 06/02/19 0400 06/02/19 0700 06/02/19 0800   BP: 102/62 100/68 108/71 106/70   Pulse: 81 80 83 90   Resp: 16 11 10 23   Temp:  99.2 °F (37.3 °C)     TempSrc:  Oral     SpO2:  97% 94%    Weight:  148 lb 13 oz (67.5 kg)     Height:         General appearance: alert, lying comfortably in bed  Neuro: Alert & Orientated x3,   Lungs: no labored breathing, no respiratory distress, no accessory muscle use, on 1L NC  Cardiovascular: RRR, S1, S2 normal  Abdomen: soft, diverting colostomy in place with small amount of brown formed stool in bag  Ext: no edema  : valverde in place  Perineum: wound in the right labia roughly 18 x 5 x 15 deep - no other necrotic tissue present that is visible, penrose drain in place, no odor, erythema, or purulent drainage. healthy adipose visualized, new dakins soaked gauze packing changed, dressing applied. ASSESSMENT/PLAN:   This is a 59 y.o. female s/p incision and drainage of perineal area with debridement of necrotizing fasciitis that demonstrated necrotic tissue from right labia to the ischiorectal fossa POD #4 (5.26.2019) and take back for EUA and debridement on 5.28.19 for a 2nd look. And s/p lap diverting colostomy (5.30.2019) - POD3    - Continue general diet with supplements for nutritional optimization    - Nutritional markers:      - Prealb: 9.6      - Albumin 2.5      - Transferrin 118.0     - Continue BID Dressing changes with Dakins soaked gauze     - Dressing changed this AM     - Will return to the OR tomorrow for debridement and possible wound VAC placement    - Tissue cultures + MRSA, continue clinda, per ID    - Encourage IS, out of bed to chair during the day and ambulation      Karin Ruiz MD, MPH  PGY-1 General Surgery  06/02/19  8:03 AM  [phone]    I am post-call and off campus today. If you have any questions or concerns regarding this patient's care today, please Loren Gill MD PGY-1 [phone]     I saw and independently examined the patient today. I agree with the history of present illness, past medical/surgical histories, family history, social history, medication list and allergies as listed. The review of systems is as noted above. My physical exam confirms the findings listed above. Review of labs, pathology reports, radiology reports and medical records confirm the findings noted above. I edited the note where appropriate in italics, strikethrough font, or underline. Nutrition labs returned with poor results.   Advised patient that if she is not able to maintain appropriate amounts, will need Corpak temporarily. Plan for OR today for EUA / debridement.     Miguel Angel Rivas MD  [address] [place] [address] [address] Reconstructive Surgery  [phone]  06/03/19

## 2019-06-03 ENCOUNTER — ANESTHESIA EVENT (OUTPATIENT)
Dept: OPERATING ROOM | Age: 64
DRG: 463 | End: 2019-06-03
Payer: COMMERCIAL

## 2019-06-03 ENCOUNTER — ANESTHESIA (OUTPATIENT)
Dept: OPERATING ROOM | Age: 64
DRG: 463 | End: 2019-06-03
Payer: COMMERCIAL

## 2019-06-03 LAB
ALBUMIN SERPL-MCNC: 2.6 G/DL (ref 3.4–5)
ANION GAP SERPL CALCULATED.3IONS-SCNC: 11 MMOL/L (ref 3–16)
BANDED NEUTROPHILS RELATIVE PERCENT: 7 % (ref 0–7)
BASOPHILS ABSOLUTE: 0 K/UL (ref 0–0.2)
BASOPHILS RELATIVE PERCENT: 0 %
BUN BLDV-MCNC: 18 MG/DL (ref 7–20)
CALCIUM SERPL-MCNC: 9 MG/DL (ref 8.3–10.6)
CHLORIDE BLD-SCNC: 99 MMOL/L (ref 99–110)
CO2: 25 MMOL/L (ref 21–32)
CREAT SERPL-MCNC: 0.5 MG/DL (ref 0.6–1.2)
EOSINOPHILS ABSOLUTE: 0.3 K/UL (ref 0–0.6)
EOSINOPHILS RELATIVE PERCENT: 2 %
GFR AFRICAN AMERICAN: >60
GFR NON-AFRICAN AMERICAN: >60
GLUCOSE BLD-MCNC: 117 MG/DL (ref 70–99)
HCT VFR BLD CALC: 23.2 % (ref 36–48)
HEMOGLOBIN: 7.7 G/DL (ref 12–16)
LYMPHOCYTES ABSOLUTE: 3.3 K/UL (ref 1–5.1)
LYMPHOCYTES RELATIVE PERCENT: 21 %
MAGNESIUM: 1.6 MG/DL (ref 1.8–2.4)
MCH RBC QN AUTO: 30.6 PG (ref 26–34)
MCHC RBC AUTO-ENTMCNC: 33.3 G/DL (ref 31–36)
MCV RBC AUTO: 92 FL (ref 80–100)
METAMYELOCYTES RELATIVE PERCENT: 2 %
MONOCYTES ABSOLUTE: 0.3 K/UL (ref 0–1.3)
MONOCYTES RELATIVE PERCENT: 2 %
MYELOCYTE PERCENT: 1 %
NEUTROPHILS ABSOLUTE: 11.9 K/UL (ref 1.7–7.7)
NEUTROPHILS RELATIVE PERCENT: 65 %
PDW BLD-RTO: 13.9 % (ref 12.4–15.4)
PHOSPHORUS: 4 MG/DL (ref 2.5–4.9)
PLATELET # BLD: 476 K/UL (ref 135–450)
PMV BLD AUTO: 6.8 FL (ref 5–10.5)
POTASSIUM SERPL-SCNC: 4.7 MMOL/L (ref 3.5–5.1)
PREALBUMIN: 12.9 MG/DL (ref 20–40)
RBC # BLD: 2.52 M/UL (ref 4–5.2)
SODIUM BLD-SCNC: 135 MMOL/L (ref 136–145)
TRANSFERRIN: 153 MG/DL (ref 200–360)
WBC # BLD: 15.9 K/UL (ref 4–11)

## 2019-06-03 PROCEDURE — 84134 ASSAY OF PREALBUMIN: CPT

## 2019-06-03 PROCEDURE — 85025 COMPLETE CBC W/AUTO DIFF WBC: CPT

## 2019-06-03 PROCEDURE — 6370000000 HC RX 637 (ALT 250 FOR IP): Performed by: STUDENT IN AN ORGANIZED HEALTH CARE EDUCATION/TRAINING PROGRAM

## 2019-06-03 PROCEDURE — 83735 ASSAY OF MAGNESIUM: CPT

## 2019-06-03 PROCEDURE — 6360000002 HC RX W HCPCS: Performed by: STUDENT IN AN ORGANIZED HEALTH CARE EDUCATION/TRAINING PROGRAM

## 2019-06-03 PROCEDURE — 84466 ASSAY OF TRANSFERRIN: CPT

## 2019-06-03 PROCEDURE — 87040 BLOOD CULTURE FOR BACTERIA: CPT

## 2019-06-03 PROCEDURE — 2580000003 HC RX 258: Performed by: STUDENT IN AN ORGANIZED HEALTH CARE EDUCATION/TRAINING PROGRAM

## 2019-06-03 PROCEDURE — 80069 RENAL FUNCTION PANEL: CPT

## 2019-06-03 PROCEDURE — 99232 SBSQ HOSP IP/OBS MODERATE 35: CPT | Performed by: INTERNAL MEDICINE

## 2019-06-03 PROCEDURE — 2000000000 HC ICU R&B

## 2019-06-03 PROCEDURE — 2500000003 HC RX 250 WO HCPCS: Performed by: STUDENT IN AN ORGANIZED HEALTH CARE EDUCATION/TRAINING PROGRAM

## 2019-06-03 PROCEDURE — 36415 COLL VENOUS BLD VENIPUNCTURE: CPT

## 2019-06-03 PROCEDURE — 6370000000 HC RX 637 (ALT 250 FOR IP): Performed by: SURGERY

## 2019-06-03 PROCEDURE — 94760 N-INVAS EAR/PLS OXIMETRY 1: CPT

## 2019-06-03 PROCEDURE — 99232 SBSQ HOSP IP/OBS MODERATE 35: CPT | Performed by: SURGERY

## 2019-06-03 RX ORDER — MAGNESIUM SULFATE IN WATER 40 MG/ML
4 INJECTION, SOLUTION INTRAVENOUS ONCE
Status: COMPLETED | OUTPATIENT
Start: 2019-06-03 | End: 2019-06-03

## 2019-06-03 RX ADMIN — HYDROMORPHONE HYDROCHLORIDE 1 MG: 1 INJECTION, SOLUTION INTRAMUSCULAR; INTRAVENOUS; SUBCUTANEOUS at 12:37

## 2019-06-03 RX ADMIN — VENLAFAXINE 100 MG: 25 TABLET ORAL at 08:38

## 2019-06-03 RX ADMIN — HYDROMORPHONE HYDROCHLORIDE 1 MG: 1 INJECTION, SOLUTION INTRAMUSCULAR; INTRAVENOUS; SUBCUTANEOUS at 22:30

## 2019-06-03 RX ADMIN — HYDROMORPHONE HYDROCHLORIDE 0.25 MG: 1 INJECTION, SOLUTION INTRAMUSCULAR; INTRAVENOUS; SUBCUTANEOUS at 20:30

## 2019-06-03 RX ADMIN — CLINDAMYCIN PHOSPHATE 600 MG: 600 INJECTION, SOLUTION INTRAVENOUS at 03:46

## 2019-06-03 RX ADMIN — HYDROMORPHONE HYDROCHLORIDE 1 MG: 1 INJECTION, SOLUTION INTRAMUSCULAR; INTRAVENOUS; SUBCUTANEOUS at 16:35

## 2019-06-03 RX ADMIN — Medication 10 ML: at 08:38

## 2019-06-03 RX ADMIN — CLINDAMYCIN PHOSPHATE 600 MG: 600 INJECTION, SOLUTION INTRAVENOUS at 19:37

## 2019-06-03 RX ADMIN — SODIUM CHLORIDE: 9 INJECTION, SOLUTION INTRAVENOUS at 00:24

## 2019-06-03 RX ADMIN — OXYCODONE HYDROCHLORIDE AND ACETAMINOPHEN 2 TABLET: 5; 325 TABLET ORAL at 05:17

## 2019-06-03 RX ADMIN — HYDROMORPHONE HYDROCHLORIDE 1 MG: 1 INJECTION, SOLUTION INTRAMUSCULAR; INTRAVENOUS; SUBCUTANEOUS at 19:38

## 2019-06-03 RX ADMIN — SODIUM CHLORIDE: 9 INJECTION, SOLUTION INTRAVENOUS at 19:37

## 2019-06-03 RX ADMIN — ENOXAPARIN SODIUM 40 MG: 40 INJECTION SUBCUTANEOUS at 08:38

## 2019-06-03 RX ADMIN — HYDROMORPHONE HYDROCHLORIDE 1 MG: 1 INJECTION, SOLUTION INTRAMUSCULAR; INTRAVENOUS; SUBCUTANEOUS at 08:38

## 2019-06-03 RX ADMIN — VENLAFAXINE 100 MG: 25 TABLET ORAL at 21:02

## 2019-06-03 RX ADMIN — SODIUM CHLORIDE: 9 INJECTION, SOLUTION INTRAVENOUS at 03:46

## 2019-06-03 RX ADMIN — HYDROMORPHONE HYDROCHLORIDE 1 MG: 1 INJECTION, SOLUTION INTRAMUSCULAR; INTRAVENOUS; SUBCUTANEOUS at 03:45

## 2019-06-03 RX ADMIN — OXYCODONE HYDROCHLORIDE AND ACETAMINOPHEN 2 TABLET: 5; 325 TABLET ORAL at 11:39

## 2019-06-03 RX ADMIN — OXYCODONE HYDROCHLORIDE AND ACETAMINOPHEN 2 TABLET: 5; 325 TABLET ORAL at 21:30

## 2019-06-03 RX ADMIN — DIPHENHYDRAMINE HCL 25 MG: 25 TABLET ORAL at 21:02

## 2019-06-03 RX ADMIN — CLINDAMYCIN PHOSPHATE 600 MG: 600 INJECTION, SOLUTION INTRAVENOUS at 14:02

## 2019-06-03 RX ADMIN — MAGNESIUM SULFATE HEPTAHYDRATE 4 G: 40 INJECTION, SOLUTION INTRAVENOUS at 08:38

## 2019-06-03 ASSESSMENT — PAIN SCALES - GENERAL
PAINLEVEL_OUTOF10: 8
PAINLEVEL_OUTOF10: 10
PAINLEVEL_OUTOF10: 8
PAINLEVEL_OUTOF10: 10
PAINLEVEL_OUTOF10: 8
PAINLEVEL_OUTOF10: 10

## 2019-06-03 ASSESSMENT — PAIN DESCRIPTION - PROGRESSION
CLINICAL_PROGRESSION: NOT CHANGED

## 2019-06-03 ASSESSMENT — PAIN DESCRIPTION - LOCATION
LOCATION: PERINEUM

## 2019-06-03 ASSESSMENT — PAIN DESCRIPTION - DESCRIPTORS
DESCRIPTORS: ACHING

## 2019-06-03 ASSESSMENT — PAIN DESCRIPTION - FREQUENCY
FREQUENCY: CONTINUOUS

## 2019-06-03 ASSESSMENT — PAIN DESCRIPTION - ORIENTATION
ORIENTATION: MID

## 2019-06-03 ASSESSMENT — PAIN DESCRIPTION - DIRECTION
RADIATING_TOWARDS: DENIES

## 2019-06-03 ASSESSMENT — PAIN DESCRIPTION - ONSET
ONSET: ON-GOING

## 2019-06-03 ASSESSMENT — PAIN DESCRIPTION - PAIN TYPE
TYPE: ACUTE PAIN

## 2019-06-03 ASSESSMENT — PAIN - FUNCTIONAL ASSESSMENT
PAIN_FUNCTIONAL_ASSESSMENT: PREVENTS OR INTERFERES SOME ACTIVE ACTIVITIES AND ADLS
PAIN_FUNCTIONAL_ASSESSMENT: PREVENTS OR INTERFERES WITH ALL ACTIVE AND SOME PASSIVE ACTIVITIES

## 2019-06-03 NOTE — PROGRESS NOTES
Physical Therapy    Attempted to see pt for PT. Pt found in supine. Declines OOB despite encouragement. \"I'm having surgery today. \"  Pt declines to walk or get up to chair at this time.   Will cont per [id number], PT [id number]

## 2019-06-03 NOTE — PROGRESS NOTES
Blood cultures sent x1 peripheral, x1 central line  Central line pulled  x2 new PIV placed. R FA 20g and R AC 18g    Pt refusing to get out of bed this shift. RN has offered multiple times to go to chair. Pt is NPO for procedure, oral meds given.
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
## 2019-06-03 NOTE — PROGRESS NOTES
General Surgery   Daily Progress Note    CC: necrotizing fasciitis of the perineum    SUBJECTIVE:  Pain is controlled. Patient is tolerating a general diet well, no nausea, or vomiting. Having ostomy function with good output. Valverde remains in place. ROS: A 14 point review of systems was obtained and pertinent positives and negatives were mentioned. Otherwise, no significant history other than as noted in the subjective portion of the note. OBJECTIVE:   Infusions:   sodium chloride 75 mL/hr at 06/03/19 0346      I/O:I/O last 3 completed shifts: In: 8696 [P.O.:1530; I.V.:210]  Out: 3550 [Urine:3500; Stool:50]           Wt Readings from Last 1 Encounters:   06/03/19 149 lb 0.5 oz (67.6 kg)      LABS:    Recent Labs     06/02/19 0427 06/03/19  0332   WBC 15.6* 15.9*   HGB 7.5* 7.7*   HCT 22.7* 23.2*   MCV 91.7 92.0    476*      Recent Labs     06/02/19  0427 06/03/19  0332    135*   K 4.4 4.7    99   CO2 26 25   PHOS 4.2 4.0   BUN 19 18   CREATININE 0.6 0.5*      No results for input(s): AST, ALT, ALB, BILIDIR, BILITOT, ALKPHOS in the last 72 hours. No results for input(s): LIPASE, AMYLASE in the last 72 hours. No results for input(s): PROT, INR, APTT in the last 72 hours. No results for input(s): CKTOTAL, CKMB, CKMBINDEX, TROPONINI in the last 72 hours.     Exam:  Vitals:    06/03/19 0100 06/03/19 0200 06/03/19 0300 06/03/19 0400   BP: 101/66 99/64 106/64 102/72   Pulse: 80 75 77 90   Resp: 17 17 19 11   Temp: 99.6 °F (37.6 °C)   99.2 °F (37.3 °C)   TempSrc: Oral   Oral   SpO2: 92%   95%   Weight:    149 lb 0.5 oz (67.6 kg)   Height:         General appearance: alert, lying comfortably in bed  Neuro: Alert & Orientated x3,   Lungs: no labored breathing, no respiratory distress, no accessory muscle use, on 1L NC  Cardiovascular: RRR, S1, S2 normal  Abdomen: soft, diverting colostomy in place with small amount of brown formed stool and gas in bag  Ext: no edema  : valverde in

## 2019-06-03 NOTE — PROGRESS NOTES
ID Follow-up NOTE    CC:   Necrotizing soft tissue infection  Antibiotics: Clindamycin    Admit Date: 5/26/2019  Hospital Day: 9    Subjective:     Awaiting return to OR later today for dressing change    Patient c/o pain at surg site, worse with dressing changes    Objective:     Afebrile last 24 hr    Patient Vitals for the past 8 hrs:   BP Temp Temp src Pulse Resp SpO2   06/03/19 1500 99/60 -- -- 76 11 93 %   06/03/19 1400 (!) 105/59 -- -- 72 15 92 %   06/03/19 1300 102/61 -- -- 77 14 93 %   06/03/19 1200 (!) 109/94 98.5 °F (36.9 °C) Oral 80 10 95 %   06/03/19 1125 -- -- -- -- 14 97 %   06/03/19 1100 102/65 -- -- 80 9 --   06/03/19 1000 105/69 -- -- 79 12 --   06/03/19 0900 103/69 -- -- 73 9 --     I/O last 3 completed shifts: In: 1892 [P.O.:850; I.V.:1042]  Out: 4575 [Urine:4575]  No intake/output data recorded. EXAM:  GENERAL: No apparent distress.     HEENT: Membranes moist, no oral lesion  NECK:  Supple  LUNGS: Clear b/l, no rales, no dullness  CARDIAC: RRR, no murmur appreciated  ABD:  + BS, soft / NT; colostomy in place with stool output  EXT:  No rash, no edema, no lesions  NEURO: No focal neurologic findings  PSYCH: Orientation, sensorium, mood normal  LINES:  R IJ line in place    Data Review:  Lab Results   Component Value Date    WBC 15.9 (H) 06/03/2019    HGB 7.7 (L) 06/03/2019    HCT 23.2 (L) 06/03/2019    MCV 92.0 06/03/2019     (H) 06/03/2019     Lab Results   Component Value Date    CREATININE 0.5 (L) 06/03/2019    BUN 18 06/03/2019     (L) 06/03/2019    K 4.7 06/03/2019    CL 99 06/03/2019    CO2 25 06/03/2019       Hepatic Function Panel:   Lab Results   Component Value Date    ALKPHOS 108 06/04/2013    ALT 54 06/04/2013    AST 40 06/04/2013    PROT 6.3 06/04/2013    BILITOT 0.4 06/04/2013    LABALBU 2.6 06/03/2019       Micro:  5/29 C diff neg    5/26 Tissue #1: GS 1+WBC, 3+GPC; cult light MRSA  5/26 Tissue #2: GS no WBC, 1+GPC; cult - light MRSA, no anaerobes isolated to date  Staph aureus mrsa   Antibiotic Interpretation MARGIE Status    ceFAZolin Resistant >16 mcg/mL     clindamycin Sensitive <=0.5 mcg/mL     erythromycin Resistant >4 mcg/mL     oxacillin Resistant >2 mcg/mL     tetracycline Sensitive <=0.5 mcg/mL     trimethoprim-sulfamethoxazole Sensitive <=0.5/9.5 mcg/mL     vancomycin Sensitive 1 mcg/mL        BC x 2 - no growth to date     Imagin/26 Pelvic CT:  Stranding in the fat involving the right perineum and right gluteal region. Given the patient's history this likely reflects a cellulitis. This does extend superiorly into the pelvis involving the right pelvic sidewall and  region, presumably    reflecting cellulitis/phlegmon. There is no evidence of a drainable fluid collection at this time. Scheduled Meds:   clindamycin (CLEOCIN) IV  600 mg Intravenous Q8H    venlafaxine  100 mg Oral BID    sodium chloride flush  10 mL Intravenous 2 times per day    enoxaparin  40 mg Subcutaneous Daily       Continuous Infusions:   sodium chloride 75 mL/hr at 19 0346       PRN Meds:  phenol, diphenhydrAMINE, HYDROmorphone **OR** HYDROmorphone, oxyCODONE-acetaminophen **OR** oxyCODONE-acetaminophen, sodium chloride flush, ondansetron, acetaminophen      Assessment:     58 yo woman with hx chronic back pain, depression, recurrent HSV, neck surg     Pt fell on  (gardening), seen at [facility] ED, discharged  Presents to [facility] ED  with HA, R shoulder pain. T 99.9. Discharged     Returned to [facility] ED  with HA and pain in 'tailbone'. T 101.4. WBC 12.7. BC sent  Had erythema on R buttock and perineum (from labia extending posteriorly). Pelvic CT with 'standing', no gas. Started on vancomycin and clindamycin and zosyn added  Seen by Gen Surg. Taken to OR, had necrotic tissue debrided.     Return to OR  - wound 18 x 5 x 15 cm  Colostomy     IMP/  Hx chronic back pain, depression, recurrent HSV, neck surg  Fall     R buttock / perineum infection -  + necrotizing ST infection, cult + MRSA  Fever - resolved  Leukocytosis     Plan:     Cont clindamycin alone - MRSA, anaerobic coverage  Wound care and return to OR per Surg     Discussed with pt  Helen Drew

## 2019-06-04 VITALS — OXYGEN SATURATION: 99 % | DIASTOLIC BLOOD PRESSURE: 54 MMHG | SYSTOLIC BLOOD PRESSURE: 113 MMHG

## 2019-06-04 LAB
ALBUMIN SERPL-MCNC: 2.8 G/DL (ref 3.4–5)
ANION GAP SERPL CALCULATED.3IONS-SCNC: 14 MMOL/L (ref 3–16)
BANDED NEUTROPHILS RELATIVE PERCENT: 4 % (ref 0–7)
BASOPHILS ABSOLUTE: 0 K/UL (ref 0–0.2)
BASOPHILS RELATIVE PERCENT: 0 %
BUN BLDV-MCNC: 18 MG/DL (ref 7–20)
CALCIUM SERPL-MCNC: 9.3 MG/DL (ref 8.3–10.6)
CHLORIDE BLD-SCNC: 99 MMOL/L (ref 99–110)
CO2: 22 MMOL/L (ref 21–32)
CREAT SERPL-MCNC: 0.5 MG/DL (ref 0.6–1.2)
EOSINOPHILS ABSOLUTE: 0 K/UL (ref 0–0.6)
EOSINOPHILS RELATIVE PERCENT: 0 %
GFR AFRICAN AMERICAN: >60
GFR NON-AFRICAN AMERICAN: >60
GLUCOSE BLD-MCNC: 124 MG/DL (ref 70–99)
GLUCOSE BLD-MCNC: 92 MG/DL (ref 70–99)
GLUCOSE BLD-MCNC: 98 MG/DL (ref 70–99)
HCT VFR BLD CALC: 25.7 % (ref 36–48)
HEMOGLOBIN: 8.4 G/DL (ref 12–16)
LYMPHOCYTES ABSOLUTE: 2.4 K/UL (ref 1–5.1)
LYMPHOCYTES RELATIVE PERCENT: 17 %
MAGNESIUM: 2.1 MG/DL (ref 1.8–2.4)
MCH RBC QN AUTO: 30.2 PG (ref 26–34)
MCHC RBC AUTO-ENTMCNC: 32.8 G/DL (ref 31–36)
MCV RBC AUTO: 91.9 FL (ref 80–100)
MONOCYTES ABSOLUTE: 0.7 K/UL (ref 0–1.3)
MONOCYTES RELATIVE PERCENT: 5 %
NEUTROPHILS ABSOLUTE: 10.8 K/UL (ref 1.7–7.7)
NEUTROPHILS RELATIVE PERCENT: 74 %
PDW BLD-RTO: 13.8 % (ref 12.4–15.4)
PERFORMED ON: ABNORMAL
PERFORMED ON: NORMAL
PHOSPHORUS: 4.8 MG/DL (ref 2.5–4.9)
PLATELET # BLD: 595 K/UL (ref 135–450)
PMV BLD AUTO: 7 FL (ref 5–10.5)
POTASSIUM SERPL-SCNC: 5.1 MMOL/L (ref 3.5–5.1)
RBC # BLD: 2.79 M/UL (ref 4–5.2)
SODIUM BLD-SCNC: 135 MMOL/L (ref 136–145)
WBC # BLD: 13.9 K/UL (ref 4–11)

## 2019-06-04 PROCEDURE — 97530 THERAPEUTIC ACTIVITIES: CPT

## 2019-06-04 PROCEDURE — 6360000002 HC RX W HCPCS: Performed by: STUDENT IN AN ORGANIZED HEALTH CARE EDUCATION/TRAINING PROGRAM

## 2019-06-04 PROCEDURE — 11044 DBRDMT BONE 1ST 20 SQ CM/<: CPT | Performed by: SURGERY

## 2019-06-04 PROCEDURE — 6370000000 HC RX 637 (ALT 250 FOR IP): Performed by: STUDENT IN AN ORGANIZED HEALTH CARE EDUCATION/TRAINING PROGRAM

## 2019-06-04 PROCEDURE — 3700000000 HC ANESTHESIA ATTENDED CARE: Performed by: SURGERY

## 2019-06-04 PROCEDURE — 36415 COLL VENOUS BLD VENIPUNCTURE: CPT

## 2019-06-04 PROCEDURE — 2500000003 HC RX 250 WO HCPCS: Performed by: STUDENT IN AN ORGANIZED HEALTH CARE EDUCATION/TRAINING PROGRAM

## 2019-06-04 PROCEDURE — 99231 SBSQ HOSP IP/OBS SF/LOW 25: CPT | Performed by: INTERNAL MEDICINE

## 2019-06-04 PROCEDURE — 85025 COMPLETE CBC W/AUTO DIFF WBC: CPT

## 2019-06-04 PROCEDURE — 87186 SC STD MICRODIL/AGAR DIL: CPT

## 2019-06-04 PROCEDURE — 97605 NEG PRS WND THER DME<=50SQCM: CPT | Performed by: SURGERY

## 2019-06-04 PROCEDURE — 3600000003 HC SURGERY LEVEL 3 BASE: Performed by: SURGERY

## 2019-06-04 PROCEDURE — 2500000003 HC RX 250 WO HCPCS: Performed by: NURSE ANESTHETIST, CERTIFIED REGISTERED

## 2019-06-04 PROCEDURE — 87205 SMEAR GRAM STAIN: CPT

## 2019-06-04 PROCEDURE — 87070 CULTURE OTHR SPECIMN AEROBIC: CPT

## 2019-06-04 PROCEDURE — 83735 ASSAY OF MAGNESIUM: CPT

## 2019-06-04 PROCEDURE — 87075 CULTR BACTERIA EXCEPT BLOOD: CPT

## 2019-06-04 PROCEDURE — 2580000003 HC RX 258: Performed by: NURSE ANESTHETIST, CERTIFIED REGISTERED

## 2019-06-04 PROCEDURE — 2580000003 HC RX 258: Performed by: STUDENT IN AN ORGANIZED HEALTH CARE EDUCATION/TRAINING PROGRAM

## 2019-06-04 PROCEDURE — 80069 RENAL FUNCTION PANEL: CPT

## 2019-06-04 PROCEDURE — 0KBM0ZZ EXCISION OF PERINEUM MUSCLE, OPEN APPROACH: ICD-10-PCS | Performed by: SURGERY

## 2019-06-04 PROCEDURE — 86403 PARTICLE AGGLUT ANTBDY SCRN: CPT

## 2019-06-04 PROCEDURE — 3600000013 HC SURGERY LEVEL 3 ADDTL 15MIN: Performed by: SURGERY

## 2019-06-04 PROCEDURE — 97535 SELF CARE MNGMENT TRAINING: CPT

## 2019-06-04 PROCEDURE — 6360000002 HC RX W HCPCS: Performed by: NURSE ANESTHETIST, CERTIFIED REGISTERED

## 2019-06-04 PROCEDURE — 87077 CULTURE AEROBIC IDENTIFY: CPT

## 2019-06-04 PROCEDURE — 2000000000 HC ICU R&B

## 2019-06-04 PROCEDURE — 11047 DBRDMT BONE EACH ADDL: CPT | Performed by: SURGERY

## 2019-06-04 PROCEDURE — 97116 GAIT TRAINING THERAPY: CPT

## 2019-06-04 PROCEDURE — 2700000000 HC OXYGEN THERAPY PER DAY

## 2019-06-04 PROCEDURE — 3700000001 HC ADD 15 MINUTES (ANESTHESIA): Performed by: SURGERY

## 2019-06-04 PROCEDURE — 2709999900 HC NON-CHARGEABLE SUPPLY: Performed by: SURGERY

## 2019-06-04 PROCEDURE — 94761 N-INVAS EAR/PLS OXIMETRY MLT: CPT

## 2019-06-04 RX ORDER — DIMETHICONE, CAMPHOR (SYNTHETIC), MENTHOL, AND PHENOL 1.1; .5; .625; .5 G/100G; G/100G; G/100G; G/100G
OINTMENT TOPICAL PRN
Status: DISCONTINUED | OUTPATIENT
Start: 2019-06-04 | End: 2019-06-11 | Stop reason: HOSPADM

## 2019-06-04 RX ORDER — PROPOFOL 10 MG/ML
INJECTION, EMULSION INTRAVENOUS PRN
Status: DISCONTINUED | OUTPATIENT
Start: 2019-06-04 | End: 2019-06-04 | Stop reason: SDUPTHER

## 2019-06-04 RX ORDER — FENTANYL CITRATE 50 UG/ML
INJECTION, SOLUTION INTRAMUSCULAR; INTRAVENOUS PRN
Status: DISCONTINUED | OUTPATIENT
Start: 2019-06-04 | End: 2019-06-04 | Stop reason: SDUPTHER

## 2019-06-04 RX ORDER — DEXAMETHASONE SODIUM PHOSPHATE 4 MG/ML
INJECTION, SOLUTION INTRA-ARTICULAR; INTRALESIONAL; INTRAMUSCULAR; INTRAVENOUS; SOFT TISSUE PRN
Status: DISCONTINUED | OUTPATIENT
Start: 2019-06-04 | End: 2019-06-04 | Stop reason: SDUPTHER

## 2019-06-04 RX ORDER — LIDOCAINE HYDROCHLORIDE 20 MG/ML
INJECTION, SOLUTION INFILTRATION; PERINEURAL PRN
Status: DISCONTINUED | OUTPATIENT
Start: 2019-06-04 | End: 2019-06-04 | Stop reason: SDUPTHER

## 2019-06-04 RX ORDER — FLUCONAZOLE 2 MG/ML
400 INJECTION, SOLUTION INTRAVENOUS EVERY 24 HOURS
Status: COMPLETED | OUTPATIENT
Start: 2019-06-04 | End: 2019-06-05

## 2019-06-04 RX ORDER — SODIUM CHLORIDE, SODIUM LACTATE, POTASSIUM CHLORIDE, CALCIUM CHLORIDE 600; 310; 30; 20 MG/100ML; MG/100ML; MG/100ML; MG/100ML
INJECTION, SOLUTION INTRAVENOUS CONTINUOUS PRN
Status: DISCONTINUED | OUTPATIENT
Start: 2019-06-04 | End: 2019-06-04 | Stop reason: SDUPTHER

## 2019-06-04 RX ORDER — ONDANSETRON 2 MG/ML
INJECTION INTRAMUSCULAR; INTRAVENOUS PRN
Status: DISCONTINUED | OUTPATIENT
Start: 2019-06-04 | End: 2019-06-04 | Stop reason: SDUPTHER

## 2019-06-04 RX ADMIN — CLINDAMYCIN PHOSPHATE 600 MG: 600 INJECTION, SOLUTION INTRAVENOUS at 12:00

## 2019-06-04 RX ADMIN — HYDROMORPHONE HYDROCHLORIDE 1 MG: 1 INJECTION, SOLUTION INTRAMUSCULAR; INTRAVENOUS; SUBCUTANEOUS at 22:23

## 2019-06-04 RX ADMIN — FENTANYL CITRATE 50 MCG: 50 INJECTION INTRAMUSCULAR; INTRAVENOUS at 14:31

## 2019-06-04 RX ADMIN — FENTANYL CITRATE 50 MCG: 50 INJECTION INTRAMUSCULAR; INTRAVENOUS at 14:56

## 2019-06-04 RX ADMIN — CLINDAMYCIN PHOSPHATE 600 MG: 600 INJECTION, SOLUTION INTRAVENOUS at 19:56

## 2019-06-04 RX ADMIN — FENTANYL CITRATE 50 MCG: 50 INJECTION INTRAMUSCULAR; INTRAVENOUS at 14:03

## 2019-06-04 RX ADMIN — Medication 10 ML: at 20:50

## 2019-06-04 RX ADMIN — LIDOCAINE HYDROCHLORIDE 100 MG: 20 INJECTION, SOLUTION INFILTRATION; PERINEURAL at 13:55

## 2019-06-04 RX ADMIN — HYDROMORPHONE HYDROCHLORIDE 1 MG: 1 INJECTION, SOLUTION INTRAMUSCULAR; INTRAVENOUS; SUBCUTANEOUS at 18:46

## 2019-06-04 RX ADMIN — SODIUM CHLORIDE: 9 INJECTION, SOLUTION INTRAVENOUS at 15:51

## 2019-06-04 RX ADMIN — FLUCONAZOLE 400 MG: 2 INJECTION, SOLUTION INTRAVENOUS at 16:48

## 2019-06-04 RX ADMIN — OXYCODONE HYDROCHLORIDE AND ACETAMINOPHEN 2 TABLET: 5; 325 TABLET ORAL at 23:49

## 2019-06-04 RX ADMIN — CLINDAMYCIN PHOSPHATE 600 MG: 600 INJECTION, SOLUTION INTRAVENOUS at 03:47

## 2019-06-04 RX ADMIN — ONDANSETRON 4 MG: 2 INJECTION INTRAMUSCULAR; INTRAVENOUS at 14:30

## 2019-06-04 RX ADMIN — PROPOFOL 50 MG: 10 INJECTION, EMULSION INTRAVENOUS at 13:57

## 2019-06-04 RX ADMIN — Medication 10 ML: at 09:51

## 2019-06-04 RX ADMIN — DEXAMETHASONE SODIUM PHOSPHATE 4 MG: 4 INJECTION, SOLUTION INTRAMUSCULAR; INTRAVENOUS at 14:02

## 2019-06-04 RX ADMIN — ONDANSETRON 4 MG: 2 INJECTION INTRAMUSCULAR; INTRAVENOUS at 09:50

## 2019-06-04 RX ADMIN — HYDROMORPHONE HYDROCHLORIDE 1 MG: 1 INJECTION, SOLUTION INTRAMUSCULAR; INTRAVENOUS; SUBCUTANEOUS at 07:31

## 2019-06-04 RX ADMIN — ENOXAPARIN SODIUM 40 MG: 40 INJECTION SUBCUTANEOUS at 08:43

## 2019-06-04 RX ADMIN — VENLAFAXINE 100 MG: 25 TABLET ORAL at 20:49

## 2019-06-04 RX ADMIN — HYDROMORPHONE HYDROCHLORIDE 1 MG: 1 INJECTION, SOLUTION INTRAMUSCULAR; INTRAVENOUS; SUBCUTANEOUS at 10:48

## 2019-06-04 RX ADMIN — OXYCODONE HYDROCHLORIDE AND ACETAMINOPHEN 2 TABLET: 5; 325 TABLET ORAL at 09:09

## 2019-06-04 RX ADMIN — OXYCODONE HYDROCHLORIDE AND ACETAMINOPHEN 2 TABLET: 5; 325 TABLET ORAL at 20:49

## 2019-06-04 RX ADMIN — HYDROMORPHONE HYDROCHLORIDE 1 MG: 1 INJECTION, SOLUTION INTRAMUSCULAR; INTRAVENOUS; SUBCUTANEOUS at 15:48

## 2019-06-04 RX ADMIN — SODIUM CHLORIDE, SODIUM LACTATE, POTASSIUM CHLORIDE, AND CALCIUM CHLORIDE: 600; 310; 30; 20 INJECTION, SOLUTION INTRAVENOUS at 13:50

## 2019-06-04 RX ADMIN — HYDROMORPHONE HYDROCHLORIDE 1 MG: 1 INJECTION, SOLUTION INTRAMUSCULAR; INTRAVENOUS; SUBCUTANEOUS at 03:47

## 2019-06-04 RX ADMIN — PROPOFOL 100 MG: 10 INJECTION, EMULSION INTRAVENOUS at 13:56

## 2019-06-04 RX ADMIN — FENTANYL CITRATE 50 MCG: 50 INJECTION INTRAMUSCULAR; INTRAVENOUS at 14:58

## 2019-06-04 RX ADMIN — VENLAFAXINE 100 MG: 25 TABLET ORAL at 08:45

## 2019-06-04 RX ADMIN — DIPHENHYDRAMINE HCL 25 MG: 25 TABLET ORAL at 23:49

## 2019-06-04 ASSESSMENT — PULMONARY FUNCTION TESTS
PIF_VALUE: 2
PIF_VALUE: 8
PIF_VALUE: 7
PIF_VALUE: 8
PIF_VALUE: 24
PIF_VALUE: 6
PIF_VALUE: 1
PIF_VALUE: 5
PIF_VALUE: 6
PIF_VALUE: 6
PIF_VALUE: 7
PIF_VALUE: 7
PIF_VALUE: 6
PIF_VALUE: 1
PIF_VALUE: 0
PIF_VALUE: 8
PIF_VALUE: 5
PIF_VALUE: 6
PIF_VALUE: 5
PIF_VALUE: 5
PIF_VALUE: 6
PIF_VALUE: 5
PIF_VALUE: 8
PIF_VALUE: 4
PIF_VALUE: 7
PIF_VALUE: 4
PIF_VALUE: 7
PIF_VALUE: 5
PIF_VALUE: 8
PIF_VALUE: 7
PIF_VALUE: 5
PIF_VALUE: 8
PIF_VALUE: 5
PIF_VALUE: 8
PIF_VALUE: 5
PIF_VALUE: 5
PIF_VALUE: 14
PIF_VALUE: 26
PIF_VALUE: 9
PIF_VALUE: 5
PIF_VALUE: 8
PIF_VALUE: 7
PIF_VALUE: 8
PIF_VALUE: 5
PIF_VALUE: 4
PIF_VALUE: 5
PIF_VALUE: 5
PIF_VALUE: 8
PIF_VALUE: 4
PIF_VALUE: 4
PIF_VALUE: 17
PIF_VALUE: 5
PIF_VALUE: 5
PIF_VALUE: 7
PIF_VALUE: 5
PIF_VALUE: 6
PIF_VALUE: 5
PIF_VALUE: 19
PIF_VALUE: 8
PIF_VALUE: 5
PIF_VALUE: 5
PIF_VALUE: 7
PIF_VALUE: 6
PIF_VALUE: 5
PIF_VALUE: 8
PIF_VALUE: 6
PIF_VALUE: 8
PIF_VALUE: 5
PIF_VALUE: 4
PIF_VALUE: 5

## 2019-06-04 ASSESSMENT — PAIN DESCRIPTION - DESCRIPTORS
DESCRIPTORS: ACHING

## 2019-06-04 ASSESSMENT — PAIN DESCRIPTION - PROGRESSION
CLINICAL_PROGRESSION: GRADUALLY IMPROVING
CLINICAL_PROGRESSION: GRADUALLY WORSENING
CLINICAL_PROGRESSION: GRADUALLY IMPROVING
CLINICAL_PROGRESSION: GRADUALLY WORSENING
CLINICAL_PROGRESSION: GRADUALLY IMPROVING
CLINICAL_PROGRESSION: NOT CHANGED
CLINICAL_PROGRESSION: GRADUALLY WORSENING
CLINICAL_PROGRESSION: RAPIDLY WORSENING
CLINICAL_PROGRESSION: GRADUALLY WORSENING
CLINICAL_PROGRESSION: NOT CHANGED
CLINICAL_PROGRESSION: GRADUALLY IMPROVING
CLINICAL_PROGRESSION: GRADUALLY WORSENING

## 2019-06-04 ASSESSMENT — PAIN DESCRIPTION - PAIN TYPE
TYPE: ACUTE PAIN
TYPE: SURGICAL PAIN
TYPE: ACUTE PAIN
TYPE: ACUTE PAIN
TYPE: SURGICAL PAIN
TYPE: ACUTE PAIN
TYPE: SURGICAL PAIN
TYPE: SURGICAL PAIN
TYPE: ACUTE PAIN
TYPE: ACUTE PAIN
TYPE: SURGICAL PAIN
TYPE: SURGICAL PAIN
TYPE: ACUTE PAIN

## 2019-06-04 ASSESSMENT — PAIN DESCRIPTION - FREQUENCY
FREQUENCY: CONTINUOUS

## 2019-06-04 ASSESSMENT — PAIN DESCRIPTION - ONSET
ONSET: ON-GOING

## 2019-06-04 ASSESSMENT — PAIN DESCRIPTION - ORIENTATION
ORIENTATION: MID

## 2019-06-04 ASSESSMENT — PAIN - FUNCTIONAL ASSESSMENT
PAIN_FUNCTIONAL_ASSESSMENT: PREVENTS OR INTERFERES SOME ACTIVE ACTIVITIES AND ADLS

## 2019-06-04 ASSESSMENT — PAIN DESCRIPTION - LOCATION
LOCATION: PERINEUM
LOCATION: ABDOMEN;PERINEUM
LOCATION: PERINEUM
LOCATION: PERINEUM
LOCATION: ABDOMEN;PERINEUM
LOCATION: PERINEUM

## 2019-06-04 ASSESSMENT — PAIN SCALES - GENERAL
PAINLEVEL_OUTOF10: 8
PAINLEVEL_OUTOF10: 10
PAINLEVEL_OUTOF10: 0
PAINLEVEL_OUTOF10: 8
PAINLEVEL_OUTOF10: 10
PAINLEVEL_OUTOF10: 7
PAINLEVEL_OUTOF10: 10
PAINLEVEL_OUTOF10: 10
PAINLEVEL_OUTOF10: 8

## 2019-06-04 ASSESSMENT — PAIN DESCRIPTION - DIRECTION: RADIATING_TOWARDS: DENIES

## 2019-06-04 NOTE — PLAN OF CARE
Nausea/Vomiting:  Goal: Absence of nausea/vomiting  Description  Absence of nausea/vomiting  Outcome: Met This Shift

## 2019-06-04 NOTE — PROGRESS NOTES
colostomy    Subjective  Subjective: Pt found supine in bed upon entry; agreeable to OT. Vital Signs  Patient Currently in Pain: Yes(not rated, RN gave meds)     Orientation  Orientation  Overall Orientation Status: Within Normal Limits     Objective      Treatment included functional transfer training, ADLs, and patient education. ADL  Feeding: Independent; Beverage management  Grooming: Setup;Stand by assistance(standing at sink to wash face, brush teeth and wash hands)    Standing Balance  Time: ~12 mins total   Activity: functional mobility in room/restroom, standing for ADLs, sit<>stand transfers     Functional Mobility  Functional - Mobility Device: Rolling Walker  Activity: To/from bathroom(+ around room )  Assist Level: Contact guard assistance  Functional Mobility Comments: Pt is slow, but overall steady. Occasional cues to increase step length and stance inside walker legs    Bed mobility  Supine to Sit: Stand by assistance(HOB elevated slightly, increased time/effort to complete)    Transfers  Sit to stand: Contact guard assistance(pt pulls up from walker, cues for hand placement)  Stand to sit: Contact guard assistance    Cognition  Overall Cognitive Status: WNL         Plan   Plan  Times per week: 2-5  Times per day: Daily  Current Treatment Recommendations: Balance Training, Functional Mobility Training, Endurance Training, Self-Care / ADL  If patient discharges prior to next treatment, this note will serve as discharge summary. Will continue per POC if patient does not discharge.     AM-PAC Score        AM-PAC Inpatient Daily Activity Raw Score: 20  AM-PAC Inpatient ADL T-Scale Score : 42.03  ADL Inpatient CMS 0-100% Score: 38.32  ADL Inpatient CMS G-Code Modifier : CJ    Goals  Short term goals  Time Frame for Short term goals: discharge  Short term goal 1: pt to transfer to commode with CGA - partially met, continue goal  Short term goal 2: pt to participate in LE dressing assessment - not met  Short term goal 3: pt to stand for 5 minutes with SBA while doing ADL tasks - goal met.  Updated: standing x10 mins with SBA for ADLs/mobility in room  Patient Goals   Patient goals : not stated       Therapy Time   Individual Concurrent Group Co-treatment   Time In 0924         Time Out 0950         Minutes 26         Timed Code Treatment Minutes: Rolo Coronado 435, [place]
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
## 2019-06-04 NOTE — PROGRESS NOTES
General Surgery   Daily Progress Note    CC: necrotizing fasciitis of the perineum    SUBJECTIVE:  Pain is controlled. Patient is tolerating a general diet well, no nausea, or vomiting. Having ostomy function with good output. Valverde remains in place. ROS: A 14 point review of systems was obtained and pertinent positives and negatives were mentioned. Otherwise, no significant history other than as noted in the subjective portion of the note. OBJECTIVE:   Infusions:   sodium chloride 75 mL/hr at 06/03/19 1937      I/O:I/O last 3 completed shifts: In: 1942 [P.O.:900; I.V.:1042]  Out: 0283 [Urine:4675; Stool:20]           Wt Readings from Last 1 Encounters:   06/04/19 148 lb 13 oz (67.5 kg)      LABS:    Recent Labs     06/03/19 0332 06/04/19 0427   WBC 15.9* 13.9*   HGB 7.7* 8.4*   HCT 23.2* 25.7*   MCV 92.0 91.9   * 595*      Recent Labs     06/03/19 0332 06/04/19 0427   * 135*   K 4.7 5.1   CL 99 99   CO2 25 22   PHOS 4.0 4.8   BUN 18 18   CREATININE 0.5* 0.5*      No results for input(s): AST, ALT, ALB, BILIDIR, BILITOT, ALKPHOS in the last 72 hours. No results for input(s): LIPASE, AMYLASE in the last 72 hours. No results for input(s): PROT, INR, APTT in the last 72 hours. No results for input(s): CKTOTAL, CKMB, CKMBINDEX, TROPONINI in the last 72 hours.     Exam:  Vitals:    06/04/19 0400 06/04/19 0500 06/04/19 0530 06/04/19 0600   BP: (!) 96/52 93/80  105/68   Pulse: 81 85 90 86   Resp:       Temp: 98.6 °F (37 °C)      TempSrc: Oral      SpO2: 98%      Weight: 148 lb 13 oz (67.5 kg)      Height:         General appearance: alert, lying comfortably in bed  Neuro: Alert & Orientated x3,   Lungs: no labored breathing, no respiratory distress, no accessory muscle use, on 1L NC  Cardiovascular: RRR, S1, S2 normal  Abdomen: soft, diverting colostomy in place with small amount of brown formed stool and gas in bag  Ext: no edema  : valverde in place  Perineum: wound in the right labia approximately 18 x 5 x 15 deep - no other necrotic tissue present that is visible, penrose drain in place, no odor, erythema, or purulent drainage. healthy adipose visualized, new dakins soaked gauze packing changed, dressing applied. ASSESSMENT/PLAN:   This is a 59 y.o. female s/p incision and drainage of perineal area with debridement of necrotizing fasciitis that demonstrated necrotic tissue from right labia to the ischiorectal fossa POD #6 (5.26.2019) and take back for EUA and debridement on 5.28.19 for a 2nd look. And s/p lap diverting colostomy (5.30.2019) - POD5  - Leukocytosis is impr, UA and CXR were unremarkable. Central line removed   - Follow up blood cultures x2, including one from central line   - Follow up wound cx  - Continue general diet with supplements for nutritional optimization    - Nutritional markers: pre-albumin 12.9, albumin 2.6, transferrin 153  - Continue BID Dressing changes with Dakins soaked gauze     - Need dressing changes this AM    - Will return to the OR today with Plastic Surgery for debridement and possible wound VAC placement    - Tissue cultures + MRSA, continue clinda, per ID    - Encourage IS, out of bed to chair during the day and ambulation      Demarco Garvin MD  PGY-1 General Surgery  06/04/19 6:28 AM  [phone]    I have seen, examined, and reviewed the patients chart. I agree with the residents assessment and have made appropriate changes.     Gurwinder Roe

## 2019-06-04 NOTE — PROGRESS NOTES
OR called \"Niraj\" is patient ready for OR informed him consent was on paper light chart, pt npo x ice/sips water with meds last around 10am valverde emptied, vss, pt informed, pt with multiple rings fingers, toes states \"they dont come off\" taped on then, nose piercing noted. On colostomy small liquid brown stool flatus released and bag reattached.  NSR on momitor  Rate 76     Nestor Burkitt, RN

## 2019-06-04 NOTE — CARE COORDINATION
Met with patient this AM and discussed SNF options. She is agreeable to [facility]. LM for Celia Minor at [facility] to make sure precert had been initiated.

## 2019-06-04 NOTE — PROGRESS NOTES
ID Follow-up NOTE    CC:   Necrotizing soft tissue infection  Antibiotics: Clindamycin    Admit Date: 5/26/2019  Hospital Day: 10    Subjective:     Patient c/o pain at surg site, severe with dressing changes    Objective:     Afebrile last 24 hr    Patient Vitals for the past 8 hrs:   BP Temp Temp src Pulse Resp SpO2 Weight   06/04/19 0911 -- -- -- 85 21 92 % --   06/04/19 0910 -- -- -- 84 20 92 % --   06/04/19 0909 -- -- -- 84 28 93 % --   06/04/19 0908 -- -- -- 82 20 96 % --   06/04/19 0907 -- -- -- 80 -- 91 % --   06/04/19 0906 -- -- -- 79 29 95 % --   06/04/19 0905 -- -- -- 83 25 (!) 88 % --   06/04/19 0904 -- -- -- 86 -- 92 % --   06/04/19 0903 -- -- -- 83 15 91 % --   06/04/19 0902 -- -- -- 85 20 92 % --   06/04/19 0901 -- -- -- 84 19 93 % --   06/04/19 0900 108/71 -- -- 84 17 (!) 89 % --   06/04/19 0859 -- -- -- 83 19 95 % --   06/04/19 0858 -- -- -- 84 16 91 % --   06/04/19 0857 -- -- -- 85 (!) 32 95 % --   06/04/19 0856 -- -- -- 88 -- 94 % --   06/04/19 0855 -- -- -- 82 15 91 % --   06/04/19 0854 -- -- -- 85 22 95 % --   06/04/19 0853 -- -- -- 87 24 92 % --   06/04/19 0852 -- -- -- 83 25 93 % --   06/04/19 0851 -- -- -- 86 -- 90 % --   06/04/19 0850 -- -- -- 83 18 93 % --   06/04/19 0849 -- -- -- 83 22 94 % --   06/04/19 0848 -- -- -- 82 (!) 36 96 % --   06/04/19 0847 -- -- -- 84 (!) 33 94 % --   06/04/19 0846 -- -- -- 83 23 93 % --   06/04/19 0845 -- -- -- 82 17 91 % --   06/04/19 0844 -- -- -- 79 17 93 % --   06/04/19 0843 -- -- -- 82 30 94 % --   06/04/19 0842 -- -- -- 90 24 92 % --   06/04/19 0841 -- -- -- 87 -- 91 % --   06/04/19 0840 -- -- -- 87 -- 95 % --   06/04/19 0839 -- -- -- 85 (!) 34 93 % --   06/04/19 0838 -- -- -- 86 23 93 % --   06/04/19 0837 -- -- -- 87 30 94 % --   06/04/19 0836 -- -- -- -- -- 94 % --   06/04/19 0835 -- -- -- -- -- 93 % --   06/04/19 0834 -- -- -- -- -- 96 % --   06/04/19 0833 -- -- -- 85 -- 94 % --   06/04/19 0832 -- -- -- 88 -- 94 % --   06/04/19 0800 105/71 98.5 °F (36.9 °C) Oral 88 -- 93 % --   06/04/19 0700 111/61 -- -- 87 -- -- --   06/04/19 0600 105/68 -- -- 86 -- -- --   06/04/19 0530 -- -- -- 90 -- -- --   06/04/19 0500 93/80 -- -- 85 -- -- --   06/04/19 0400 (!) 96/52 98.6 °F (37 °C) Oral 81 -- 98 % 148 lb 13 oz (67.5 kg)   06/04/19 0300 (!) 102/56 -- -- 68 15 -- --     I/O last 3 completed shifts: In: 7445 [P.O.:900; I.V.:1842]  Out: 9319 [Urine:3475; Stool:20]  I/O this shift:  In: -   Out: 395 [Urine:395]    EXAM:  GENERAL: No apparent distress.     HEENT: Membranes moist, no oral lesion  NECK:  Supple  LUNGS: Clear b/l, no rales, no dullness  CARDIAC: RRR, no murmur appreciated  ABD:  + BS, soft / NT; colostomy in place with stool output  EXT:  No rash, no edema, no lesions  NEURO: No focal neurologic findings  PSYCH: Orientation, sensorium, mood normal  LINES:  R IJ line in place    Data Review:  Lab Results   Component Value Date    WBC 13.9 (H) 06/04/2019    HGB 8.4 (L) 06/04/2019    HCT 25.7 (L) 06/04/2019    MCV 91.9 06/04/2019     (H) 06/04/2019     Lab Results   Component Value Date    CREATININE 0.5 (L) 06/04/2019    BUN 18 06/04/2019     (L) 06/04/2019    K 5.1 06/04/2019    CL 99 06/04/2019    CO2 22 06/04/2019       Hepatic Function Panel:   Lab Results   Component Value Date    ALKPHOS 108 06/04/2013    ALT 54 06/04/2013    AST 40 06/04/2013    PROT 6.3 06/04/2013    BILITOT 0.4 06/04/2013    LABALBU 2.8 06/04/2019       Micro:  5/29 C diff neg    5/26 Tissue #1: GS 1+WBC, 3+GPC; cult light MRSA  5/26 Tissue #2: GS no WBC, 1+GPC; cult - light MRSA, no anaerobes isolated to date  Staph aureus mrsa   Antibiotic Interpretation MARGIE Status    ceFAZolin Resistant >16 mcg/mL     clindamycin Sensitive <=0.5 mcg/mL     erythromycin Resistant >4 mcg/mL     oxacillin Resistant >2 mcg/mL     tetracycline Sensitive <=0.5 mcg/mL     trimethoprim-sulfamethoxazole Sensitive <=0.5/9.5 mcg/mL     vancomycin Sensitive 1 mcg/mL       5/26 BC x 2 - no growth to date     Imagin/26 Pelvic CT:  Stranding in the fat involving the right perineum and right gluteal region. Given the patient's history this likely reflects a cellulitis. This does extend superiorly into the pelvis involving the right pelvic sidewall and  region, presumably    reflecting cellulitis/phlegmon. There is no evidence of a drainable fluid collection at this time. Scheduled Meds:   HYDROmorphone  0.25 mg Intravenous Once    clindamycin (CLEOCIN) IV  600 mg Intravenous Q8H    venlafaxine  100 mg Oral BID    sodium chloride flush  10 mL Intravenous 2 times per day    enoxaparin  40 mg Subcutaneous Daily       Continuous Infusions:   sodium chloride 75 mL/hr at 19       PRN Meds:  medicated lip ointment, phenol, diphenhydrAMINE, HYDROmorphone **OR** HYDROmorphone, oxyCODONE-acetaminophen **OR** oxyCODONE-acetaminophen, sodium chloride flush, ondansetron, acetaminophen      Assessment:     58 yo woman with hx chronic back pain, depression, recurrent HSV, neck surg     Pt fell on  (gardening), seen at [facility] ED, discharged  Presents to [facility] ED  with HA, R shoulder pain. T 99.9. Discharged     Returned to [facility] ED  with HA and pain in 'tailbone'. T 101.4. WBC 12.7. BC sent  Had erythema on R buttock and perineum (from labia extending posteriorly). Pelvic CT with 'standing', no gas. Started on vancomycin and clindamycin and zosyn added  Seen by Gen Surg. Taken to OR, had necrotic tissue debrided.     Return to OR  - wound 18 x 5 x 15 cm  Colostomy     IMP/  Hx chronic back pain, depression, recurrent HSV, neck surg  Fall     R buttock / perineum infection -  + necrotizing ST infection, cult + MRSA  Fever - resolved  Leukocytosis     Plan:     Cont clindamycin alone - MRSA, anaerobic coverage  Wound care and return to OR per Surg - OR      Discussed with pt, RN  Ron Ferrell MD

## 2019-06-04 NOTE — ANESTHESIA POSTPROCEDURE EVALUATION
Department of Anesthesiology  Postprocedure Note    Patient: Jhonny Monroy  MRN: [id number]  YOB: 1955  Date of evaluation: 6/4/2019  Time:  5:33 PM     Procedure Summary     Date:  06/04/19 Room / Location:  [facility] OR    Anesthesia Start:  9642 Anesthesia Stop:  6882    Procedure:  DEBRIDEMENT AND WASHOUT OF NECROTIZING WOUND RIGHT BUTTOCK/PERINEUM (15x9x3), WOUND VAC PLACEMENT (N/A ) Diagnosis:  (NECROTIZING FASCITIS)    Surgeon:  Betsey Floyd MD Responsible Provider:  King Larios MD    Anesthesia Type:  general ASA Status:  3          Anesthesia Type: general    Anum Phase I: Anum Score: 8    Anum Phase II:      Last vitals: Reviewed and per EMR flowsheets.        Anesthesia Post Evaluation    Patient location during evaluation: PACU  Patient participation: complete - patient participated  Level of consciousness: awake and alert  Airway patency: patent  Nausea & Vomiting: no nausea and no vomiting  Cardiovascular status: blood pressure returned to baseline  Respiratory status: acceptable  Hydration status: euvolemic

## 2019-06-04 NOTE — PROGRESS NOTES
Plastic Surgery   Daily Progress Note    CC: Necrotizing fasciitis of the perineum    SUBJECTIVE:    Patient did not go to the OR yesterday due to surgical emergencies filling OR time-slots. She did well overall during day. Pain is well controlled. Continues to get BID dressing changes. ROS: A 14 point review of systems was obtained and pertinent positives and negatives were mentioned. Otherwise, no significant history other than as noted in the subjective portion of the note. OBJECTIVE:   Infusions:   sodium chloride 75 mL/hr at 06/03/19 1937      I/O:I/O last 3 completed shifts: In: 3642 [P.O.:900; I.V.:1842]  Out: 1426 [Urine:3475; Stool:20]           Wt Readings from Last 1 Encounters:   06/04/19 148 lb 13 oz (67.5 kg)      LABS:    Recent Labs     06/03/19 0332 06/04/19 0427   WBC 15.9* 13.9*   HGB 7.7* 8.4*   HCT 23.2* 25.7*   MCV 92.0 91.9   * 595*      Recent Labs     06/03/19 0332 06/04/19 0427   * 135*   K 4.7 5.1   CL 99 99   CO2 25 22   PHOS 4.0 4.8   BUN 18 18   CREATININE 0.5* 0.5*      No results for input(s): AST, ALT, ALB, BILIDIR, BILITOT, ALKPHOS in the last 72 hours. No results for input(s): LIPASE, AMYLASE in the last 72 hours. No results for input(s): PROT, INR, APTT in the last 72 hours. No results for input(s): CKTOTAL, CKMB, CKMBINDEX, TROPONINI in the last 72 hours.     Exam:  Vitals:    06/04/19 0400 06/04/19 0500 06/04/19 0530 06/04/19 0600   BP: (!) 96/52 93/80  105/68   Pulse: 81 85 90 86   Resp:       Temp: 98.6 °F (37 °C)      TempSrc: Oral      SpO2: 98%      Weight: 148 lb 13 oz (67.5 kg)      Height:         General appearance: alert, lying comfortably in bed  Neuro: Alert & Orientated x3,   Lungs: no labored breathing, no respiratory distress, no accessory muscle use  Cardiovascular: RRR, S1, S2 normal  Abdomen: soft, diverting colostomy in place with small amount of brown formed stool in bag  Ext: no edema  : valverde in place  Perineum: wound in the right labia roughly 18 x 5 x 15 deep - no other necrotic tissue present that is visible, penrose drain in place, no odor, erythema, or purulent drainage. healthy adipose visualized, new dakins soaked gauze packing changed, dressing applied. ASSESSMENT/PLAN:   This is a 59 y.o. female s/p incision and drainage of perineal area with debridement of necrotizing fasciitis that demonstrated necrotic tissue from right labia to the ischiorectal fossa POD #9 (5.26.2019) and take back for EUA and debridement on 5.28.19 for a 2nd look. And s/p lap diverting colostomy (5.30.2019) - POD5    - Planning for further debridement in OR today    - Continue general diet with supplements for nutritional optimization    - Nutritional markers:      - Prealb: 9.6      - Albumin 2.5      - Transferrin 118.0     - Continue BID Dressing changes with Dakins soaked gauze     - Dressing changed this AM     - Will return to the OR tomorrow for debridement and possible wound VAC placement    - Antibiotics per ID    - Encourage IS, out of bed to chair during the day and ambulation      Faisal Samuels MD PGY-1  General Surgery Resident  06/04/19  7:29 AM    I am post-call and off-campus today. If you have any questions or concerns regarding this patient's care today, please page:    Virginia Calhoun MD PGY-1 [phone]    I saw and independently examined the patient today. I agree with the history of present illness, past medical/surgical histories, family history, social history, medication list and allergies as listed. The review of systems is as noted above. My physical exam confirms the findings listed above. Review of labs, pathology reports, radiology reports and medical records confirm the findings noted above. I edited the note where appropriate in italics, strikethrough font, or underline. To OR today.     Kasie Norwood MD  [address] Reconstructive Surgery  [phone]  06/04/19              I

## 2019-06-04 NOTE — PROGRESS NOTES
Patient given dilaudid 1mg IV at 731am from [facility] night shift nurse,  for dsg changes being done.     Larry Deshpande RN

## 2019-06-04 NOTE — PROGRESS NOTES
Eyes closed somewhat calmer now, taking sips water without difficulty vss. Repositioned on side. Will continue to monitor pain level and treat.     Karen Spine, RN

## 2019-06-04 NOTE — PLAN OF CARE
Nutrition Problem: Increased nutrient needs  Intervention: Food and/or Nutrient Delivery: (Resume diet/ONS following procedure)  Nutritional Goals: pt will tolerate diet and consume >75% of meals and ONS offered

## 2019-06-04 NOTE — PLAN OF CARE
Problem: Pain:  Goal: Pain level will decrease  Description  Pain level will decrease  6/4/2019 1129 by Josh Radford RN  Outcome: Ongoing  Note:   Patient receiving prn dilaudid and hydrocodone for c/o perineum pain see pain flowsheet and MAR.  6/4/2019 0617 by Dileep Cruz RN  Outcome: Met This Shift     Problem: Falls - Risk of:  Goal: Will remain free from falls  Description  Patient has been free from falls and injuries this shift. [place] fall risk assessed each shift. Bed locked in lowest position, alarm engaged. Fall bracelet in place, fall sign present outside door, fall socks present on patient. Patients call light within reach. Patient educated on use of call light. Room free of clutter. Patient has made no attempts to get out of bed. Will continue to round and assess needs. 6/4/2019 1129 by Josh Radford RN  Outcome: Ongoing  Note:   Pt is a fall risk. Fall risk protocol in place. See Reji Ivans Fall Score. Pt bed is in low position, bed alarm is on, side rails up, fall risk bracelet applied. , non-skid footwear in use. Patient/family educated on fall risk protocol, instructed to call for assistance when needed and belongings are in reach. assistance. Will continue with hourly rounds for po intake, pain needs, toileting and repositioning as needed. Will continue to monitor for needs. Problem: Risk for Impaired Skin Integrity  Goal: Tissue integrity - skin and mucous membranes  Description  Patient's skin assessed q4h hours and prn. Patient turns self prn. Skin is kept clean and dry. Preventative dressings have been applied. Sacral heart placed and assessed to be CDI. Pavel scale is assessed and documented each shift. Pt family is educated on importance of frequent repositioning and assessment. Patient has no skin integrity issues besides the surgical site being seen and cared for by the surgical team. Patient's skin integrity maintained throughout shift.      6/4/2019 1129 by Marielle Linton

## 2019-06-04 NOTE — PROGRESS NOTES
Fresh goan sheets changed repositioned in bed, dinner ordered, placed on room air sats 97% dilaudid 1mg iv given for c/o surgical pain rating 10.     Larry Deshpande RN

## 2019-06-04 NOTE — PROGRESS NOTES
Up in room with PT/OT ambulated without difficulty into bathroom brushed teeth washed face then to chair. C/o nausea, zofran 4mg ivp given will monitor denies other c/o.     Rosie Cedeño RN

## 2019-06-05 LAB
ALBUMIN SERPL-MCNC: 3.1 G/DL (ref 3.4–5)
ANION GAP SERPL CALCULATED.3IONS-SCNC: 11 MMOL/L (ref 3–16)
BANDED NEUTROPHILS RELATIVE PERCENT: 7 % (ref 0–7)
BASOPHILS ABSOLUTE: 0 K/UL (ref 0–0.2)
BASOPHILS RELATIVE PERCENT: 0 %
BUN BLDV-MCNC: 23 MG/DL (ref 7–20)
CALCIUM SERPL-MCNC: 9.2 MG/DL (ref 8.3–10.6)
CHLORIDE BLD-SCNC: 101 MMOL/L (ref 99–110)
CO2: 24 MMOL/L (ref 21–32)
CREAT SERPL-MCNC: 0.7 MG/DL (ref 0.6–1.2)
EOSINOPHILS ABSOLUTE: 0.2 K/UL (ref 0–0.6)
EOSINOPHILS RELATIVE PERCENT: 1 %
GFR AFRICAN AMERICAN: >60
GFR NON-AFRICAN AMERICAN: >60
GLUCOSE BLD-MCNC: 113 MG/DL (ref 70–99)
GLUCOSE BLD-MCNC: 119 MG/DL (ref 70–99)
GLUCOSE BLD-MCNC: 128 MG/DL (ref 70–99)
GLUCOSE BLD-MCNC: 129 MG/DL (ref 70–99)
GLUCOSE BLD-MCNC: 155 MG/DL (ref 70–99)
HCT VFR BLD CALC: 23.1 % (ref 36–48)
HEMOGLOBIN: 7.5 G/DL (ref 12–16)
LYMPHOCYTES ABSOLUTE: 1.8 K/UL (ref 1–5.1)
LYMPHOCYTES RELATIVE PERCENT: 12 %
MAGNESIUM: 1.9 MG/DL (ref 1.8–2.4)
MCH RBC QN AUTO: 29.8 PG (ref 26–34)
MCHC RBC AUTO-ENTMCNC: 32.6 G/DL (ref 31–36)
MCV RBC AUTO: 91.5 FL (ref 80–100)
METAMYELOCYTES RELATIVE PERCENT: 2 %
MONOCYTES ABSOLUTE: 0.3 K/UL (ref 0–1.3)
MONOCYTES RELATIVE PERCENT: 2 %
MYELOCYTE PERCENT: 1 %
NEUTROPHILS ABSOLUTE: 12.8 K/UL (ref 1.7–7.7)
NEUTROPHILS RELATIVE PERCENT: 75 %
PDW BLD-RTO: 13.4 % (ref 12.4–15.4)
PERFORMED ON: ABNORMAL
PHOSPHORUS: 3.8 MG/DL (ref 2.5–4.9)
PLATELET # BLD: 611 K/UL (ref 135–450)
PMV BLD AUTO: 6.9 FL (ref 5–10.5)
POTASSIUM SERPL-SCNC: 5.2 MMOL/L (ref 3.5–5.1)
RBC # BLD: 2.52 M/UL (ref 4–5.2)
SODIUM BLD-SCNC: 136 MMOL/L (ref 136–145)
WBC # BLD: 15 K/UL (ref 4–11)

## 2019-06-05 PROCEDURE — 99231 SBSQ HOSP IP/OBS SF/LOW 25: CPT | Performed by: INTERNAL MEDICINE

## 2019-06-05 PROCEDURE — 80069 RENAL FUNCTION PANEL: CPT

## 2019-06-05 PROCEDURE — 6360000002 HC RX W HCPCS: Performed by: STUDENT IN AN ORGANIZED HEALTH CARE EDUCATION/TRAINING PROGRAM

## 2019-06-05 PROCEDURE — 1200000000 HC SEMI PRIVATE

## 2019-06-05 PROCEDURE — 99232 SBSQ HOSP IP/OBS MODERATE 35: CPT | Performed by: SURGERY

## 2019-06-05 PROCEDURE — 6370000000 HC RX 637 (ALT 250 FOR IP): Performed by: STUDENT IN AN ORGANIZED HEALTH CARE EDUCATION/TRAINING PROGRAM

## 2019-06-05 PROCEDURE — 2580000003 HC RX 258: Performed by: STUDENT IN AN ORGANIZED HEALTH CARE EDUCATION/TRAINING PROGRAM

## 2019-06-05 PROCEDURE — 85025 COMPLETE CBC W/AUTO DIFF WBC: CPT

## 2019-06-05 PROCEDURE — 2500000003 HC RX 250 WO HCPCS: Performed by: STUDENT IN AN ORGANIZED HEALTH CARE EDUCATION/TRAINING PROGRAM

## 2019-06-05 PROCEDURE — 83735 ASSAY OF MAGNESIUM: CPT

## 2019-06-05 RX ORDER — MAGNESIUM SULFATE 1 G/100ML
1 INJECTION INTRAVENOUS ONCE
Status: COMPLETED | OUTPATIENT
Start: 2019-06-05 | End: 2019-06-05

## 2019-06-05 RX ADMIN — CLINDAMYCIN PHOSPHATE 600 MG: 600 INJECTION, SOLUTION INTRAVENOUS at 20:01

## 2019-06-05 RX ADMIN — HYDROMORPHONE HYDROCHLORIDE 1 MG: 1 INJECTION, SOLUTION INTRAMUSCULAR; INTRAVENOUS; SUBCUTANEOUS at 12:16

## 2019-06-05 RX ADMIN — SODIUM CHLORIDE: 9 INJECTION, SOLUTION INTRAVENOUS at 23:48

## 2019-06-05 RX ADMIN — Medication 10 ML: at 08:24

## 2019-06-05 RX ADMIN — VENLAFAXINE 100 MG: 25 TABLET ORAL at 08:23

## 2019-06-05 RX ADMIN — OXYCODONE HYDROCHLORIDE AND ACETAMINOPHEN 2 TABLET: 5; 325 TABLET ORAL at 05:28

## 2019-06-05 RX ADMIN — OXYCODONE HYDROCHLORIDE AND ACETAMINOPHEN 2 TABLET: 5; 325 TABLET ORAL at 20:14

## 2019-06-05 RX ADMIN — CLINDAMYCIN PHOSPHATE 600 MG: 600 INJECTION, SOLUTION INTRAVENOUS at 04:16

## 2019-06-05 RX ADMIN — FLUCONAZOLE 400 MG: 2 INJECTION, SOLUTION INTRAVENOUS at 16:46

## 2019-06-05 RX ADMIN — HYDROMORPHONE HYDROCHLORIDE 1 MG: 1 INJECTION, SOLUTION INTRAMUSCULAR; INTRAVENOUS; SUBCUTANEOUS at 08:26

## 2019-06-05 RX ADMIN — ENOXAPARIN SODIUM 40 MG: 40 INJECTION SUBCUTANEOUS at 08:30

## 2019-06-05 RX ADMIN — Medication 10 ML: at 20:05

## 2019-06-05 RX ADMIN — HYDROMORPHONE HYDROCHLORIDE 1 MG: 1 INJECTION, SOLUTION INTRAMUSCULAR; INTRAVENOUS; SUBCUTANEOUS at 17:48

## 2019-06-05 RX ADMIN — SODIUM CHLORIDE: 9 INJECTION, SOLUTION INTRAVENOUS at 08:23

## 2019-06-05 RX ADMIN — HYDROMORPHONE HYDROCHLORIDE 1 MG: 1 INJECTION, SOLUTION INTRAMUSCULAR; INTRAVENOUS; SUBCUTANEOUS at 22:09

## 2019-06-05 RX ADMIN — VENLAFAXINE 100 MG: 25 TABLET ORAL at 20:14

## 2019-06-05 RX ADMIN — CLINDAMYCIN PHOSPHATE 600 MG: 600 INJECTION, SOLUTION INTRAVENOUS at 12:10

## 2019-06-05 RX ADMIN — HYDROMORPHONE HYDROCHLORIDE 1 MG: 1 INJECTION, SOLUTION INTRAMUSCULAR; INTRAVENOUS; SUBCUTANEOUS at 04:22

## 2019-06-05 RX ADMIN — MAGNESIUM SULFATE HEPTAHYDRATE 1 G: 1 INJECTION, SOLUTION INTRAVENOUS at 06:27

## 2019-06-05 RX ADMIN — OXYCODONE HYDROCHLORIDE AND ACETAMINOPHEN 2 TABLET: 5; 325 TABLET ORAL at 10:54

## 2019-06-05 ASSESSMENT — PAIN DESCRIPTION - DESCRIPTORS
DESCRIPTORS: ACHING

## 2019-06-05 ASSESSMENT — PAIN DESCRIPTION - PAIN TYPE
TYPE: SURGICAL PAIN

## 2019-06-05 ASSESSMENT — PAIN DESCRIPTION - LOCATION
LOCATION: PERINEUM
LOCATION: ABDOMEN
LOCATION: ABDOMEN
LOCATION: PERINEUM

## 2019-06-05 ASSESSMENT — PAIN SCALES - GENERAL
PAINLEVEL_OUTOF10: 0
PAINLEVEL_OUTOF10: 10
PAINLEVEL_OUTOF10: 0
PAINLEVEL_OUTOF10: 9
PAINLEVEL_OUTOF10: 8
PAINLEVEL_OUTOF10: 10
PAINLEVEL_OUTOF10: 0
PAINLEVEL_OUTOF10: 9
PAINLEVEL_OUTOF10: 9
PAINLEVEL_OUTOF10: 0
PAINLEVEL_OUTOF10: 0
PAINLEVEL_OUTOF10: 8
PAINLEVEL_OUTOF10: 10
PAINLEVEL_OUTOF10: 8
PAINLEVEL_OUTOF10: 8
PAINLEVEL_OUTOF10: 10

## 2019-06-05 ASSESSMENT — PAIN DESCRIPTION - ONSET
ONSET: ON-GOING

## 2019-06-05 ASSESSMENT — PAIN DESCRIPTION - PROGRESSION
CLINICAL_PROGRESSION: GRADUALLY WORSENING
CLINICAL_PROGRESSION: GRADUALLY WORSENING
CLINICAL_PROGRESSION: GRADUALLY IMPROVING
CLINICAL_PROGRESSION: GRADUALLY IMPROVING
CLINICAL_PROGRESSION: GRADUALLY WORSENING
CLINICAL_PROGRESSION: GRADUALLY IMPROVING
CLINICAL_PROGRESSION: GRADUALLY WORSENING
CLINICAL_PROGRESSION: GRADUALLY WORSENING
CLINICAL_PROGRESSION: GRADUALLY IMPROVING

## 2019-06-05 ASSESSMENT — PAIN DESCRIPTION - FREQUENCY
FREQUENCY: CONTINUOUS

## 2019-06-05 ASSESSMENT — PAIN DESCRIPTION - ORIENTATION
ORIENTATION: MID

## 2019-06-05 ASSESSMENT — PAIN - FUNCTIONAL ASSESSMENT
PAIN_FUNCTIONAL_ASSESSMENT: PREVENTS OR INTERFERES SOME ACTIVE ACTIVITIES AND ADLS

## 2019-06-05 NOTE — PROGRESS NOTES
Occupational and Physical Therapy  Attempt        Attempted to see pt this morning. Pt was resting in bed, visitors present. Pt politely declining therapy at this time, requesting therapy return later as pt is visiting with friends. Will follow up later this date as time permits and per plan of care.          Sukhdev Jackson, OTR/L, [facility] PT  [phone]

## 2019-06-05 NOTE — PROGRESS NOTES
ID Follow-up NOTE    CC:   Necrotizing soft tissue infection  Antibiotics: Clindamycin    Admit Date: 5/26/2019  Hospital Day: 11    Subjective:     Patient c/o pain at surg site, severe with dressing changes    Objective:     Afebrile last 24 hr    Patient Vitals for the past 8 hrs:   BP Temp Temp src Pulse Resp SpO2   06/05/19 1646 104/63 98.5 °F (36.9 °C) Oral 73 13 97 %   06/05/19 1633 -- -- -- 74 8 --   06/05/19 1632 -- -- -- 73 13 --   06/05/19 1631 -- -- -- 73 14 --   06/05/19 1630 -- 98.5 °F (36.9 °C) Oral 78 10 --   06/05/19 1300 -- -- -- 80 13 --   06/05/19 1212 -- -- -- 84 18 --   06/05/19 1204 (!) 96/55 -- -- 78 -- --   06/05/19 1200 (!) 83/59 97.7 °F (36.5 °C) Oral 78 16 95 %   06/05/19 1100 105/64 -- -- 83 -- --   06/05/19 1054 -- -- -- 84 -- --   06/05/19 1048 -- -- -- 83 -- --     I/O last 3 completed shifts: In: 9462 [P.O.:1014; I.V.:2639]  Out: 4986 [Urine:3385; Drains:350]  I/O this shift:  In: -   Out: 350 [Urine:350]    EXAM:  GENERAL: No apparent distress.     HEENT: Membranes moist, no oral lesion  NECK:  Supple  LUNGS: Clear b/l, no rales, no dullness  CARDIAC: RRR, no murmur appreciated  ABD:  + BS, soft / NT; colostomy in place with stool output  EXT:  No rash, no edema, no lesions  NEURO: No focal neurologic findings  PSYCH: Orientation, sensorium, mood normal  LINES:  R IJ line in place    Data Review:  Lab Results   Component Value Date    WBC 15.0 (H) 06/05/2019    HGB 7.5 (L) 06/05/2019    HCT 23.1 (L) 06/05/2019    MCV 91.5 06/05/2019     (H) 06/05/2019     Lab Results   Component Value Date    CREATININE 0.7 06/05/2019    BUN 23 (H) 06/05/2019     06/05/2019    K 5.2 (H) 06/05/2019     06/05/2019    CO2 24 06/05/2019       Hepatic Function Panel:   Lab Results   Component Value Date    ALKPHOS 108 06/04/2013    ALT 54 06/04/2013    AST 40 06/04/2013    PROT 6.3 06/04/2013    BILITOT 0.4 06/04/2013    LABALBU 3.1 06/05/2019       Micro:  5/29 C diff neg    5/26 Tissue #1: GS 1+WBC, 3+GPC; cult light MRSA   Tissue #2: GS no WBC, 1+GPC; cult - light MRSA, no anaerobes isolated to date  Staph aureus mrsa   Antibiotic Interpretation MARGIE Status    ceFAZolin Resistant >16 mcg/mL     clindamycin Sensitive <=0.5 mcg/mL     erythromycin Resistant >4 mcg/mL     oxacillin Resistant >2 mcg/mL     tetracycline Sensitive <=0.5 mcg/mL     trimethoprim-sulfamethoxazole Sensitive <=0.5/9.5 mcg/mL     vancomycin Sensitive 1 mcg/mL        BC x 2 - no growth to date     Imagin/26 Pelvic CT:  Stranding in the fat involving the right perineum and right gluteal region. Given the patient's history this likely reflects a cellulitis. This does extend superiorly into the pelvis involving the right pelvic sidewall and  region, presumably    reflecting cellulitis/phlegmon. There is no evidence of a drainable fluid collection at this time. Scheduled Meds:   HYDROmorphone  0.25 mg Intravenous Once    clindamycin (CLEOCIN) IV  600 mg Intravenous Q8H    venlafaxine  100 mg Oral BID    sodium chloride flush  10 mL Intravenous 2 times per day    enoxaparin  40 mg Subcutaneous Daily       Continuous Infusions:   sodium chloride 75 mL/hr at 19 0823       PRN Meds:  medicated lip ointment, phenol, diphenhydrAMINE, HYDROmorphone **OR** HYDROmorphone, oxyCODONE-acetaminophen **OR** oxyCODONE-acetaminophen, sodium chloride flush, ondansetron, acetaminophen      Assessment:     60 yo woman with hx chronic back pain, depression, recurrent HSV, neck surg     Pt fell on  (gardening), seen at [facility] ED, discharged  Presents to [facility] ED  with HA, R shoulder pain. T 99.9. Discharged     Returned to [facility] ED  with HA and pain in 'tailbone'. T 101.4. WBC 12.7. BC sent  Had erythema on R buttock and perineum (from labia extending posteriorly). Pelvic CT with 'standing', no gas. Started on vancomycin and clindamycin and zosyn added  Seen by Gen Surg.   Taken to OR, had necrotic tissue debrided.     Return to OR 5/29 - wound 18 x 5 x 15 cm  Colostomy 5/30    IMP/  Hx chronic back pain, depression, recurrent HSV, neck surg  Fall     R buttock / perineum infection -  + necrotizing ST infection, cult + MRSA  Fever - resolved  Leukocytosis     Plan:     Cont clindamycin alone - MRSA, anaerobic coverage  Wound care and return to OR per Surg - OR 6/5 (gracilis m flap reconstruction per Dr Tiffanie Blackwell)     Discussed with pt  Gaston Whitney MD

## 2019-06-05 NOTE — PROGRESS NOTES
in place - clear yellow urine  Extremities: no edema or cyanosis      ASSESSMENT/PLAN:   This is a 59 y.o. female s/p incision and drainage of perineal area with debridement of necrotizing fasciitis that demonstrated necrotic tissue from right labia to the ischiorectal fossa (5.26.2019) and take back for EUA and debridement on 5.28.19 for a 2nd look. And s/p lap diverting colostomy (5.30.2019) - POD5. S/p EUA, debridement, and WV placemeent (6.4.19)  - Leukocytosis is worsening, most likely reactive from surgery yesterday. - Follow up blood cultures x2, including one from previous central line   - Follow up wound cx  - Continue general diet with supplements for nutritional optimization   - Nutritional markers: pre-albumin 12.9, albumin 2.6, transferrin 153  - Wound vac in place   - Will return to the OR today with Plastic Surgery for possible reconstruction (gracillis flap) on Friday if prelim tissue culture is negative vs wound vac change  - Tissue cultures + MRSA, continue clinda, per ID   - Difllucan was added  - Encourage IS, out of bed to chair during the day and ambulation        Susi Medina MD  PGY-1 General Surgery  06/05/19 6:38 AM  [phone]    I have seen, examined, and reviewed the patients chart. I agree with the residents assessment and have made appropriate changes.     Marilyn Ply

## 2019-06-05 NOTE — PROGRESS NOTES
Took diet well and protein supplement. Rested earlier after iv dilaudid pain medicationat 826am  Visitors at bedside of which pt wanted me to update. Percocet x 2 given at 1054am for c/o surgical pain rating it 8 will continue to monitor for alteration in comfort.

## 2019-06-05 NOTE — PROGRESS NOTES
vac in place - instillation of normal saline, adequate suction     ASSESSMENT/PLAN:   This is a 59 y.o. female s/p incision and drainage of perineal area with debridement of necrotizing fasciitis that demonstrated necrotic tissue from right labia to the ischiorectal fossa POD #10 (5.26.2019) and take back for EUA and debridement on 5.28.19 for a 2nd look. And s/p lap diverting colostomy (5.30.2019) - POD 6. Taken back to OR for excisional debridement and WV placcement POD 1.    - Continue general diet with supplements for nutritional optimization  - OR yesterday, wound vac in place  - Antibiotics per ID, cultures from OR - WBC, no orgs, still in process  - possible OR for flap Friday pending culture results   - Encourage IS, out of bed to chair during the day and ambulation      Ivelisse Giraldo MD   Gen Surg PGY 1  6/5/2019  6:55 AM  [phone]    I saw and independently examined the patient today. I agree with the history of present illness, past medical/surgical histories, family history, social history, medication list and allergies as listed. The review of systems is as noted above. My physical exam confirms the findings listed above. Review of labs, pathology reports, radiology reports and medical records confirm the findings noted above. I edited the note where appropriate in italics, strikethrough font, or underline. Doing well overall after OR yesterday. AF/VSS  WBC 15 today. Continue with nutrition optimization. Awaiting cultures results. Plan for OR on Friday for gracilis flap reconstruction.     Carmie Frankel, MD  [address] [place] [address] [address] Reconstructive Surgery  [phone]  06/05/19

## 2019-06-05 NOTE — PROGRESS NOTES
Department of Surgery:  Post-op Note      Procedure(s) Performed: DEBRIDEMENT AND WASHOUT OF NECROTIZING WOUND RIGHT BUTTOCK/PERINEUM (15x9x3), WOUND VAC PLACEMENT    Subjective:  Patient's pain is controlled, denies nausea or vomiting. Tolerating diet. Refused OOB, valverde in place. Ostomy with flatus and small jack of stool in appliance    Objective:  Anesthesia type: General      I/O    Intra op    Post op     Fluids  1000 414     EBL 0 0     Urine 250 650     Exam:/64   Pulse 82   Temp 98.6 °F (37 °C) (Oral)   Resp 12   Ht 5' 6\" (1.676 m)   Wt 148 lb 13 oz (67.5 kg)   LMP 07/12/2007   SpO2 93%   BMI 24.02 kg/m²   Post-op vital signs:  Stable   Exam:General appearance: alert, appears stated age and cooperative  Lungs: clear to auscultation bilaterally, on RA  Heart: regular rate and rhythm  Abdomen: soft, appropriately tender; bowel sounds normal; incisions clean dry and intact, well approximated, ostomy with flatus and small jack in bag, pink, viable. Perineum: wound vac in place, adequate suction  : valverde in place - clear yellow urine  Extremities: no edema or cyanosis    Assessment and Plan  This is a 59 y.o. female s/p incision and drainage of perineal area with debridement of necrotizing fasciitis that demonstrated necrotic tissue from right labia to the ischiorectal fossa POD #6 (5.26.2019) and take back for EUA and debridement on 5.28.19 for a 2nd look.  And s/p lap diverting colostomy (5.30.2019) - POD5 s/p DEBRIDEMENT AND WASHOUT OF NECROTIZING WOUND RIGHT BUTTOCK/PERINEUM (15x9x3), WOUND VAC PLACEMENT POD #0    Pain management: Dilaudid and percocet pain panel PRN  Cardiovasc: hemodynamically stable, will continue to monitor  Respiratory:  IS ordered to bedside, encourage hourly IS and deep breathing, wean oxygen as tolerated  FeNa: Fluids  NS at 75, Diet: General  :  Urine output is adequate  Ambulation: OOB to chair, encourage ambulation  Prophylaxis: SCDs, lovenox  Abx: clinda, robert Acuna MD  General Surgery PGY 1  6/4/2019 8:59 PM  Pager [phone]

## 2019-06-05 NOTE — OP NOTE
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 PROCEDURES:  1. Excisional debridement of perineum in the vagina to the sacral bone  (15 x 9 x 3 cm). 2.  Application of instillation wound VAC therapy. SURGEON:  Mathew Pope MD    ASSISTANT:  Noemi Fernández (PGY-3)    ANESTHESIA:  General.    ESTIMATED BLOOD LOSS:  50 mL. DRAINS:  None. SPECIMEN:  Periosteum for culture, additional tissue for culture. OPERATIVE INDICATIONS:  This is a 60-year-old female who presented with  a necrotizing infection of the perineum. She was taken to the operating  room urgently by General Surgery and received multiple debridements. She subsequently underwent a diverting colostomy and stabilized. Plastic Surgery was consulted for evaluation and management of the  wound. The patient was doing well in the ICU and the plan is for exam  under anesthesia given the significant pain as well as potential  debridement. The risks, benefits, alternatives, outcomes, and personnel  involved with the aforementioned procedure were discussed in detail with  the patient. After all questions were answered in a satisfactory  manner, she agreed to proceed. OPERATIVE PROCEDURE:  The patient was brought to the operating room,  placed in supine position on the operating table.   After satisfactory  induction of general endotracheal anesthesia, the patient was then  placed in the lithotomy position, prepped and draped in the usual  sterile manner. A timeout was performed confirming the patient and the  procedure to be performed. The operation commenced via evaluating the  wound. The superior aspect of the tissue above the rectum had some  healthy granulation tissue with mild amount of fibrinous exudate. On  digital palpation, the pelvic bone was readily palpable. Given the  location and the large ischiorectal fossa space, cultures were obtained  via the periosteum as well as by the labia. The Penrose drain was taken  out. The wound was copiously irrigated after being debrided with  combination of 15 blade as well as curettes to the bone. Once this was  performed, the wound was copiously irrigated with 3 liters of saline  solution using a pulse lavage. After hemostasis was checked to be  satisfactory, a new Penrose drain was brought out through a separate  left buttock incision and secured to the skin using 0 silk sutures. An  instillation wound VAC machine was then placed into position and  obtained excellent seal.  The patient was then awakened and taken to  PACU in satisfactory condition after being taken out of the lithotomy  position. There were no immediate complications. The patient tolerated  the procedure well. At the end of the case, all counts were correct.     Danya Mcnamara MD    D: 06/04/2019 19:08:19       T: 06/04/2019 23:36:34     [email or web address]  Job#: [id number]     Doc#: [id number]    CC:

## 2019-06-05 NOTE — PROGRESS NOTES
Repositioned frequently in bed pad changed, colostomy bag changed moderate amount formed brown stool noted. Watching tv on phone, dozing off/on. Diet ordered taken, awaiting dinner. Good urine output per valverde.

## 2019-06-05 NOTE — PLAN OF CARE
Problem: Pain:  Goal: Pain level will decrease  Description  Pain level will decrease  Outcome: Ongoing  Note:   Receiving prn percocet and dilaudid for c/o surgical pain rating 8-10 repositioned and emotional support also for comfort, see pain flowsheet and MAR will continue to assess and treat alteration in comfort. Problem: Falls - Risk of:  Goal: Will remain free from falls  Description  Patient has been free from falls and injuries this shift. Woodard fall risk assessed each shift. Bed locked in lowest position, alarm engaged. Fall bracelet in place, fall sign present outside door, fall socks present on patient. Patients call light within reach. Patient educated on use of call light. Room free of clutter. Patient has made no attempts to get out of bed. Will continue to round and assess needs. Outcome: Ongoing  Note:   Pt is a fall risk. Fall risk protocol in place. See Suri Jennings Fall Score. Pt bed is in low position, bed alarm is on, side rails up, fall risk bracelet applied. , non-skid footwear in use. Patient/family educated on fall risk protocol, instructed to call for assistance when needed and belongings are in reach. assistance. Will continue with hourly rounds for po intake, pain needs, toileting and repositioning as needed. Will continue to monitor for needs. Problem: Risk for Impaired Skin Integrity  Goal: Tissue integrity - skin and mucous membranes  Description  Patient's skin assessed q4h hours and prn. Patient turns self prn. Skin is kept clean and dry. Preventative dressings have been applied. Sacral heart placed and assessed to be CDI. Pavel scale is assessed and documented each shift. Pt family is educated on importance of frequent repositioning and assessment. Patient has no skin integrity issues besides the surgical site being seen and cared for by the surgical team. Patient's skin integrity maintained throughout shift. Outcome: Ongoing  Note:   Pt at risk for skin breakdown.  See Pavel score. Patient is able to reposition self in bed. Heels elevated off bed. Sacral heart mepilex intact to protect,  site inspected and intact underneath. Wound vac intact perineum. Pt up in the chair prn and with PT/OT Will continue to turn and reposition patient every two hours and as needed. Will continue to keep patient clean and dry, applying skin care cream as needed. Pillows used for repositioning q2hs. Will continue to monitor and assess for skin breakdown. Problem: Nutrition  Goal: Optimal nutrition therapy  Outcome: Ongoing  Note:   Remains on general diet with protein supplements. Encourage po intake and monitor nutritional status. Problem: Infection - Methicillin-Resistant Staphylococcus Aureus Infection:  Goal: Absence of methicillin-resistant Staphylococcus aureus infection  Description  Absence of methicillin-resistant Staphylococcus aureus infection  Outcome: Ongoing  Note:   + mrsa in perineum area on antibiotics WBC 15,000 today afebrile. Remains in contact isolation. Continue to follow protocol for contact isolation staff and visitors. Explained to patient importance of isolation and reinforce teaching.

## 2019-06-06 ENCOUNTER — ANESTHESIA EVENT (OUTPATIENT)
Dept: OPERATING ROOM | Age: 64
DRG: 463 | End: 2019-06-06
Payer: COMMERCIAL

## 2019-06-06 LAB
ALBUMIN SERPL-MCNC: 2.8 G/DL (ref 3.4–5)
ANION GAP SERPL CALCULATED.3IONS-SCNC: 15 MMOL/L (ref 3–16)
BASOPHILS ABSOLUTE: 0 K/UL (ref 0–0.2)
BASOPHILS RELATIVE PERCENT: 0 %
BLOOD BANK DISPENSE STATUS: NORMAL
BLOOD BANK PRODUCT CODE: NORMAL
BPU ID: NORMAL
BUN BLDV-MCNC: 23 MG/DL (ref 7–20)
CALCIUM SERPL-MCNC: 9.1 MG/DL (ref 8.3–10.6)
CHLORIDE BLD-SCNC: 100 MMOL/L (ref 99–110)
CO2: 21 MMOL/L (ref 21–32)
CREAT SERPL-MCNC: 0.6 MG/DL (ref 0.6–1.2)
DESCRIPTION BLOOD BANK: NORMAL
EOSINOPHILS ABSOLUTE: 0.3 K/UL (ref 0–0.6)
EOSINOPHILS RELATIVE PERCENT: 2 %
GFR AFRICAN AMERICAN: >60
GFR NON-AFRICAN AMERICAN: >60
GLUCOSE BLD-MCNC: 108 MG/DL (ref 70–99)
GLUCOSE BLD-MCNC: 114 MG/DL (ref 70–99)
GLUCOSE BLD-MCNC: 119 MG/DL (ref 70–99)
GLUCOSE BLD-MCNC: 128 MG/DL (ref 70–99)
HCT VFR BLD CALC: 23.6 % (ref 36–48)
HEMOGLOBIN: 7.5 G/DL (ref 12–16)
LYMPHOCYTES ABSOLUTE: 3.3 K/UL (ref 1–5.1)
LYMPHOCYTES RELATIVE PERCENT: 26 %
MAGNESIUM: 1.5 MG/DL (ref 1.8–2.4)
MCH RBC QN AUTO: 29.4 PG (ref 26–34)
MCHC RBC AUTO-ENTMCNC: 31.8 G/DL (ref 31–36)
MCV RBC AUTO: 92.6 FL (ref 80–100)
MONOCYTES ABSOLUTE: 0.6 K/UL (ref 0–1.3)
MONOCYTES RELATIVE PERCENT: 5 %
NEUTROPHILS ABSOLUTE: 8.4 K/UL (ref 1.7–7.7)
NEUTROPHILS RELATIVE PERCENT: 67 %
PDW BLD-RTO: 13.7 % (ref 12.4–15.4)
PERFORMED ON: ABNORMAL
PHOSPHORUS: 3.6 MG/DL (ref 2.5–4.9)
PLATELET # BLD: 738 K/UL (ref 135–450)
PMV BLD AUTO: 7 FL (ref 5–10.5)
POLYCHROMASIA: ABNORMAL
POTASSIUM SERPL-SCNC: 4.3 MMOL/L (ref 3.5–5.1)
RBC # BLD: 2.55 M/UL (ref 4–5.2)
SODIUM BLD-SCNC: 136 MMOL/L (ref 136–145)
WBC # BLD: 12.5 K/UL (ref 4–11)

## 2019-06-06 PROCEDURE — 6370000000 HC RX 637 (ALT 250 FOR IP): Performed by: STUDENT IN AN ORGANIZED HEALTH CARE EDUCATION/TRAINING PROGRAM

## 2019-06-06 PROCEDURE — 6370000000 HC RX 637 (ALT 250 FOR IP): Performed by: SURGERY

## 2019-06-06 PROCEDURE — 6360000002 HC RX W HCPCS: Performed by: STUDENT IN AN ORGANIZED HEALTH CARE EDUCATION/TRAINING PROGRAM

## 2019-06-06 PROCEDURE — 6360000002 HC RX W HCPCS: Performed by: SURGERY

## 2019-06-06 PROCEDURE — 80069 RENAL FUNCTION PANEL: CPT

## 2019-06-06 PROCEDURE — 83735 ASSAY OF MAGNESIUM: CPT

## 2019-06-06 PROCEDURE — 1200000000 HC SEMI PRIVATE

## 2019-06-06 PROCEDURE — 2500000003 HC RX 250 WO HCPCS: Performed by: STUDENT IN AN ORGANIZED HEALTH CARE EDUCATION/TRAINING PROGRAM

## 2019-06-06 PROCEDURE — 85025 COMPLETE CBC W/AUTO DIFF WBC: CPT

## 2019-06-06 PROCEDURE — 2580000003 HC RX 258: Performed by: STUDENT IN AN ORGANIZED HEALTH CARE EDUCATION/TRAINING PROGRAM

## 2019-06-06 PROCEDURE — 99231 SBSQ HOSP IP/OBS SF/LOW 25: CPT | Performed by: SURGERY

## 2019-06-06 PROCEDURE — 2580000003 HC RX 258: Performed by: SURGERY

## 2019-06-06 PROCEDURE — 36415 COLL VENOUS BLD VENIPUNCTURE: CPT

## 2019-06-06 PROCEDURE — 2500000003 HC RX 250 WO HCPCS: Performed by: SURGERY

## 2019-06-06 RX ORDER — SODIUM CHLORIDE, SODIUM LACTATE, POTASSIUM CHLORIDE, CALCIUM CHLORIDE 600; 310; 30; 20 MG/100ML; MG/100ML; MG/100ML; MG/100ML
INJECTION, SOLUTION INTRAVENOUS
Status: DISCONTINUED
Start: 2019-06-06 | End: 2019-06-07

## 2019-06-06 RX ORDER — SODIUM CHLORIDE, SODIUM LACTATE, POTASSIUM CHLORIDE, CALCIUM CHLORIDE 600; 310; 30; 20 MG/100ML; MG/100ML; MG/100ML; MG/100ML
INJECTION, SOLUTION INTRAVENOUS CONTINUOUS
Status: DISCONTINUED | OUTPATIENT
Start: 2019-06-07 | End: 2019-06-07

## 2019-06-06 RX ORDER — MAGNESIUM SULFATE IN WATER 40 MG/ML
4 INJECTION, SOLUTION INTRAVENOUS ONCE
Status: COMPLETED | OUTPATIENT
Start: 2019-06-06 | End: 2019-06-06

## 2019-06-06 RX ADMIN — OXYCODONE HYDROCHLORIDE AND ACETAMINOPHEN 2 TABLET: 5; 325 TABLET ORAL at 09:12

## 2019-06-06 RX ADMIN — OXYCODONE HYDROCHLORIDE AND ACETAMINOPHEN 2 TABLET: 5; 325 TABLET ORAL at 17:04

## 2019-06-06 RX ADMIN — CLINDAMYCIN PHOSPHATE 600 MG: 600 INJECTION, SOLUTION INTRAVENOUS at 12:26

## 2019-06-06 RX ADMIN — Medication 10 ML: at 08:39

## 2019-06-06 RX ADMIN — OXYCODONE HYDROCHLORIDE AND ACETAMINOPHEN 2 TABLET: 5; 325 TABLET ORAL at 04:09

## 2019-06-06 RX ADMIN — OXYCODONE HYDROCHLORIDE AND ACETAMINOPHEN 2 TABLET: 5; 325 TABLET ORAL at 23:23

## 2019-06-06 RX ADMIN — HYDROMORPHONE HYDROCHLORIDE 1 MG: 1 INJECTION, SOLUTION INTRAMUSCULAR; INTRAVENOUS; SUBCUTANEOUS at 02:24

## 2019-06-06 RX ADMIN — ONDANSETRON 4 MG: 2 INJECTION INTRAMUSCULAR; INTRAVENOUS at 19:22

## 2019-06-06 RX ADMIN — HYDROMORPHONE HYDROCHLORIDE 1 MG: 1 INJECTION, SOLUTION INTRAMUSCULAR; INTRAVENOUS; SUBCUTANEOUS at 05:26

## 2019-06-06 RX ADMIN — Medication 10 ML: at 20:22

## 2019-06-06 RX ADMIN — ENOXAPARIN SODIUM 40 MG: 40 INJECTION SUBCUTANEOUS at 09:12

## 2019-06-06 RX ADMIN — HYDROMORPHONE HYDROCHLORIDE 0.5 MG: 1 INJECTION, SOLUTION INTRAMUSCULAR; INTRAVENOUS; SUBCUTANEOUS at 23:23

## 2019-06-06 RX ADMIN — MAGNESIUM SULFATE HEPTAHYDRATE 4 G: 40 INJECTION, SOLUTION INTRAVENOUS at 08:37

## 2019-06-06 RX ADMIN — CLINDAMYCIN PHOSPHATE 600 MG: 600 INJECTION, SOLUTION INTRAVENOUS at 04:09

## 2019-06-06 RX ADMIN — HYDROMORPHONE HYDROCHLORIDE 1 MG: 1 INJECTION, SOLUTION INTRAMUSCULAR; INTRAVENOUS; SUBCUTANEOUS at 14:27

## 2019-06-06 RX ADMIN — VENLAFAXINE 100 MG: 25 TABLET ORAL at 20:22

## 2019-06-06 RX ADMIN — VENLAFAXINE 100 MG: 25 TABLET ORAL at 09:12

## 2019-06-06 RX ADMIN — Medication 10 ML: at 23:24

## 2019-06-06 RX ADMIN — CLINDAMYCIN PHOSPHATE 600 MG: 600 INJECTION, SOLUTION INTRAVENOUS at 19:45

## 2019-06-06 RX ADMIN — HYDROMORPHONE HYDROCHLORIDE 1 MG: 1 INJECTION, SOLUTION INTRAMUSCULAR; INTRAVENOUS; SUBCUTANEOUS at 10:34

## 2019-06-06 RX ADMIN — HYDROMORPHONE HYDROCHLORIDE 1 MG: 1 INJECTION, SOLUTION INTRAMUSCULAR; INTRAVENOUS; SUBCUTANEOUS at 19:45

## 2019-06-06 ASSESSMENT — PAIN SCALES - GENERAL
PAINLEVEL_OUTOF10: 9
PAINLEVEL_OUTOF10: 10
PAINLEVEL_OUTOF10: 10
PAINLEVEL_OUTOF10: 6
PAINLEVEL_OUTOF10: 10
PAINLEVEL_OUTOF10: 7
PAINLEVEL_OUTOF10: 6
PAINLEVEL_OUTOF10: 10
PAINLEVEL_OUTOF10: 0
PAINLEVEL_OUTOF10: 5
PAINLEVEL_OUTOF10: 8
PAINLEVEL_OUTOF10: 9
PAINLEVEL_OUTOF10: 7
PAINLEVEL_OUTOF10: 10
PAINLEVEL_OUTOF10: 9
PAINLEVEL_OUTOF10: 8
PAINLEVEL_OUTOF10: 10

## 2019-06-06 ASSESSMENT — PAIN - FUNCTIONAL ASSESSMENT
PAIN_FUNCTIONAL_ASSESSMENT: PREVENTS OR INTERFERES SOME ACTIVE ACTIVITIES AND ADLS
PAIN_FUNCTIONAL_ASSESSMENT: ACTIVITIES ARE NOT PREVENTED
PAIN_FUNCTIONAL_ASSESSMENT: PREVENTS OR INTERFERES SOME ACTIVE ACTIVITIES AND ADLS

## 2019-06-06 ASSESSMENT — PAIN DESCRIPTION - PROGRESSION
CLINICAL_PROGRESSION: NOT CHANGED

## 2019-06-06 ASSESSMENT — PAIN DESCRIPTION - FREQUENCY
FREQUENCY: CONTINUOUS

## 2019-06-06 ASSESSMENT — PAIN DESCRIPTION - PAIN TYPE
TYPE: ACUTE PAIN;SURGICAL PAIN
TYPE: SURGICAL PAIN

## 2019-06-06 ASSESSMENT — PAIN DESCRIPTION - ONSET
ONSET: ON-GOING

## 2019-06-06 ASSESSMENT — PAIN DESCRIPTION - LOCATION
LOCATION: ABDOMEN
LOCATION: ABDOMEN
LOCATION: ABDOMEN;PERINEUM
LOCATION: ABDOMEN
LOCATION: ABDOMEN

## 2019-06-06 ASSESSMENT — PAIN DESCRIPTION - DESCRIPTORS
DESCRIPTORS: DISCOMFORT
DESCRIPTORS: ACHING;DISCOMFORT
DESCRIPTORS: ACHING;DISCOMFORT
DESCRIPTORS: ACHING
DESCRIPTORS: DISCOMFORT

## 2019-06-06 ASSESSMENT — PAIN DESCRIPTION - ORIENTATION
ORIENTATION: MID
ORIENTATION: MID

## 2019-06-06 ASSESSMENT — PAIN DESCRIPTION - DIRECTION
RADIATING_TOWARDS: DENIES
RADIATING_TOWARDS: DENIES

## 2019-06-06 ASSESSMENT — PAIN SCALES - WONG BAKER
WONGBAKER_NUMERICALRESPONSE: 0
WONGBAKER_NUMERICALRESPONSE: 0

## 2019-06-06 NOTE — PROGRESS NOTES
[facility] PLASTICS    No new issues overnight. Tolerating PO.    AF/VSS    Leukocytosis resolved. Vac intact with good seal.    Cultures with light growth non-speciated GNR - will discuss with ID regarding need for any IV antibiotic regimen alteration. Plan for OR tomorrow for muscle flap and closure.     Yenny Harper MD  [address] [place] [address] [address] Reconstructive Surgery  [phone]  06/06/19

## 2019-06-06 NOTE — PLAN OF CARE
MD notified. 6/6/2019 0436 by Jp Pierce RN  Outcome: Ongoing       Problem: Nutrition  Goal: Optimal nutrition therapy  6/6/2019 0436 by Jp Pierce RN  Outcome: Ongoing       Problem: Diarrhea:  Goal: Bowel elimination is within specified parameters  Description  Bowel elimination is within specified parameters  6/6/2019 0436 by Jp Pierce RN  Outcome: Ongoing    Goal: Passage of soft, formed stool  Description  Passage of soft, formed stool  6/6/2019 0436 by Jp Pierce RN  Outcome: Ongoing  Formed stool passed this shift. Patient ostomy applianced emptied and cleansed. Goal: Establishment of normal bowel function will improve to within specified parameters  Description  Establishment of normal bowel function will improve to within specified parameters  6/6/2019 0436 by Jp Pierce RN  Outcome: Ongoing       Problem: Nausea/Vomiting:  Goal: Absence of nausea/vomiting  Description  Absence of nausea/vomiting  6/6/2019 0436 by Jp Pierce RN  Outcome: Ongoing  Patient denied nausea throughout shift  Goal: Able to drink  Description  Able to drink  6/6/2019 0436 by Jp Pierce RN  Outcome: Ongoing  Patient able to drink, but has had minimal oral intake throughout shift. Goal: Able to eat  Description  Able to eat  6/6/2019 0436 by Jp Pierce RN  Outcome: Ongoing  Patient denies difficulty eating.    Goal: Ability to achieve adequate nutritional intake will improve  Description  Ability to achieve adequate nutritional intake will improve  6/6/2019 0436 by Jp Pierce RN  Outcome: Ongoing       Problem: Infection - Methicillin-Resistant Staphylococcus Aureus Infection:  Goal: Absence of methicillin-resistant Staphylococcus aureus infection  Description  Absence of methicillin-resistant Staphylococcus aureus infection  6/6/2019 0436 by Jp Pierce RN  Outcome: Ongoing  Patient had low-grade fever of 99.2 at beginning of shift, but has temp has been normal since then. Contact precautions maintained. Antibiotics administered as prescribed. Reviewed infection control with patient. Patient verbalized understanding of contact precautions.

## 2019-06-06 NOTE — ANESTHESIA PRE PROCEDURE
Department of Anesthesiology  Preprocedure Note       Name:  Shad Rubin   Age:  59 y.o.  :  1955                                          MRN:  [id number]         Date:  2019      Surgeon: Chao Shi):  Edwige Ann MD    Procedure: MYOFASCIAL FLAP TO LABIA WITH TISSUE REARRANGEMENT (N/A )    Medications prior to admission:   Prior to Admission medications    Medication Sig Start Date End Date Taking? Authorizing Provider   diclofenac (VOLTAREN) 75 MG EC tablet Take 75 mg by mouth 2 times daily    Historical Provider, MD   gabapentin (NEURONTIN) 600 MG tablet Take 600 mg by mouth 3 times daily. Historical Provider, MD   venlafaxine (EFFEXOR) 100 MG tablet Take 100 mg by mouth 2 times daily     Historical Provider, MD   oxyCODONE (ROXICODONE) 5 MG immediate release tablet Take 5 mg by mouth every 8 hours as needed for Pain. Historical Provider, MD   prochlorperazine (COMPAZINE) 10 MG tablet Take 1 tablet by mouth every 6 hours as needed (HA) 19   Ángel Peña MD   oxyCODONE-acetaminophen (PERCOCET) 5-325 MG per tablet Take 1 tablet by mouth 2 times daily  Oh CTP RX 80491. 17  Shawna Ganser, APRN - [place]   oxyCODONE-acetaminophen (PERCOCET) 5-325 MG per tablet Take 1 tablet by mouth 2 times daily  Fill on or After 17. 3/20/17 4/19/17  Amanda Mohan MD   multivitamin [facility]) per tablet Take 1 tablet by mouth daily. Historical Provider, MD   acyclovir (ZOVIRAX) 200 MG capsule Take 200 mg by mouth every 4 hours (while awake). Historical Provider, MD       Current medications:    No current facility-administered medications for this visit. No current outpatient medications on file.      Facility-Administered Medications Ordered in Other Visits   Medication Dose Route Frequency Provider Last Rate Last Dose    [START ON 2019] lactated ringers infusion   Intravenous Continuous Loan Miramontes MD        lactated ringers infusion             HYDROmorphone (DILAUDID) injection 0.25 mg  0.25 mg Intravenous Once Lamar Jaime MD        medicated lip ointment (BLISTEX)   Topical PRN Lamar Jaime MD        phenol 1.4 % mouth spray 1 spray  1 spray Mouth/Throat Q2H PRN Lamar Jaime MD        diphenhydrAMINE (BENADRYL) tablet 25 mg  25 mg Oral Q6H PRN Lamar Jaime MD   25 mg at 06/04/19 2349    HYDROmorphone (DILAUDID) injection 0.5 mg  0.5 mg Intravenous Q3H PRN Lamar Jaime MD        Or    HYDROmorphone (DILAUDID) injection 1 mg  1 mg Intravenous Q3H PRN Lamar Jaime MD   1 mg at 06/06/19 1427    oxyCODONE-acetaminophen (PERCOCET) 5-325 MG per tablet 1 tablet  1 tablet Oral Q4H PRN Lamar Jaime MD        Or    oxyCODONE-acetaminophen (PERCOCET) 5-325 MG per tablet 2 tablet  2 tablet Oral Q4H PRN Lamar Jaime MD   2 tablet at 06/06/19 1704    clindamycin (CLEOCIN) 600 mg in dextrose 5 % 50 mL IVPB  600 mg Intravenous Q8H Lamar Jaime MD   Stopped at 06/06/19 1259    venlafaxine (EFFEXOR) tablet 100 mg  100 mg Oral BID Lamar Jaime MD   100 mg at 06/06/19 0912    sodium chloride flush 0.9 % injection 10 mL  10 mL Intravenous 2 times per day Lamar Jaime MD   10 mL at 06/06/19 0839    sodium chloride flush 0.9 % injection 10 mL  10 mL Intravenous PRN Lamar Jaime MD        ondansetron [facility]) injection 4 mg  4 mg Intravenous Q6H PRN Lamar Jaime MD   4 mg at 06/04/19 0950    enoxaparin (LOVENOX) injection 40 mg  40 mg Subcutaneous Daily Lamar Jaime MD   40 mg at 06/06/19 0912    acetaminophen (TYLENOL) tablet 650 mg  650 mg Oral Q4H PRN Lamar Jaime MD           Allergies:     Allergies   Allergen Reactions    Latex Rash    Vicodin [Hydrocodone-Acetaminophen] Nausea Only    Zocor [Simvastatin] Other (See Comments)     STATINS - LEG CRAMPS         Problem List:    Patient Active Problem List   Diagnosis Code    Hyperlipidemia E78.5    Depression F32.9    Displacement of lumbar intervertebral disc without myelopathy M51.26    Degeneration of BMI:   Wt Readings from Last 3 Encounters:   06/06/19 134 lb 0.6 oz (60.8 kg)   05/25/19 130 lb (59 kg)   04/01/18 130 lb (59 kg)     There is no height or weight on file to calculate BMI.    CBC:   Lab Results   Component Value Date    WBC 12.5 06/06/2019    RBC 2.55 06/06/2019    HGB 7.5 06/06/2019    HCT 23.6 06/06/2019    MCV 92.6 06/06/2019    RDW 13.7 06/06/2019     06/06/2019       CMP:   Lab Results   Component Value Date     06/06/2019    K 4.3 06/06/2019    K 4.1 05/26/2019     06/06/2019    CO2 21 06/06/2019    BUN 23 06/06/2019    CREATININE 0.6 06/06/2019    GFRAA >60 06/06/2019    AGRATIO 1.5 06/04/2013    LABGLOM >60 06/06/2019    GLUCOSE 119 06/06/2019    PROT 6.3 06/04/2013    CALCIUM 9.1 06/06/2019    BILITOT 0.4 06/04/2013    ALKPHOS 108 06/04/2013    AST 40 06/04/2013    ALT 54 06/04/2013       POC Tests:   Recent Labs     06/06/19  1700   POCGLU 114*       Coags:   Lab Results   Component Value Date    PROTIME 14.8 05/27/2019    INR 1.30 05/27/2019       HCG (If Applicable): No results found for: PREGTESTUR, PREGSERUM, HCG, HCGQUANT     ABGs:   Lab Results   Component Value Date    PHART 7.403 05/28/2019    PO2ART 66.7 05/28/2019    PIA9VJH 37.3 05/28/2019    IHS1EHG 23 05/28/2019    BEART -1.1 05/28/2019    P1CPWASC 93 05/28/2019        Type & Screen (If Applicable):  No results found for: LABABO, [address]    Anesthesia Evaluation  Patient summary reviewed no history of anesthetic complications:   Airway: Mallampati: I  TM distance: >3 FB   Neck ROM: full  Mouth opening: < 3 FB Dental:          Pulmonary:                              Cardiovascular:  Exercise tolerance: good (>4 METS),   (+) hyperlipidemia                 PE comment: tachycardic   Neuro/Psych:   (+) depression/anxiety              ROS comment: Chronic Back Pain GI/Hepatic/Renal:            ROS comment: Nec Fasc of perineal area.    Endo/Other:    (+) blood dyscrasia: anemia:., .                  ROS comment: EtOH use- 3-4 beers each weekend day Abdominal:           Vascular:                                          Anesthesia Plan      general     ASA 3       Induction: intravenous. MIPS: Postoperative opioids intended and Prophylactic antiemetics administered. Anesthetic plan and risks discussed with patient. Plan discussed with CRNA.                   Dannielle Hercules DO   6/6/2019

## 2019-06-06 NOTE — PLAN OF CARE
Pt remains free from falls at this time. Pt agreeable to pushing call light if needs help. Chair alarm, socks on. Continuing to encourage good food intake and taking supplemental protein drinks per MD order.

## 2019-06-06 NOTE — PROGRESS NOTES
General Surgery   Daily Progress Note    CC: necrotizing fasciitis of the perineum    SUBJECTIVE:  Pain is controlled. Patient is tolerating a general diet well, no nausea, or vomiting. Ostomy function with good output. Valverde remains in place. ROS: A 14 point review of systems was obtained and pertinent positives and negatives were mentioned. Otherwise, no significant history other than as noted in the subjective portion of the note. OBJECTIVE:   Infusions:     I/O:I/O last 3 completed shifts: In: 2061 [P.O.:720; I.V.:1341]  Out: 3050 [Urine:2775; Drains:275]           Wt Readings from Last 1 Encounters:   06/06/19 134 lb 0.6 oz (60.8 kg)      LABS:    Recent Labs     06/05/19 0420 06/06/19  0444   WBC 15.0* 12.5*   HGB 7.5* 7.5*   HCT 23.1* 23.6*   MCV 91.5 92.6   * 738*      Recent Labs     06/05/19  0420 06/06/19  0444    136   K 5.2* 4.3    100   CO2 24 21   PHOS 3.8 3.6   BUN 23* 23*   CREATININE 0.7 0.6      No results for input(s): AST, ALT, ALB, BILIDIR, BILITOT, ALKPHOS in the last 72 hours. No results for input(s): LIPASE, AMYLASE in the last 72 hours. No results for input(s): PROT, INR, APTT in the last 72 hours. No results for input(s): CKTOTAL, CKMB, CKMBINDEX, TROPONINI in the last 72 hours.     Exam:  Vitals:    06/05/19 2016 06/05/19 2345 06/06/19 0409 06/06/19 0536   BP: 102/67 (!) 102/56 105/70    Pulse: 79 69 77    Resp: 18 12 12    Temp:  98.9 °F (37.2 °C) 98.6 °F (37 °C)    TempSrc:  Oral Oral    SpO2:       Weight:    134 lb 0.6 oz (60.8 kg)   Height:         Exam:  General appearance: alert, appears stated age and cooperative  Lungs: clear to auscultation bilaterally, on RA  Heart: regular rate and rhythm  Abdomen: soft, appropriately tender; bowel sounds normal; incisions clean dry and intact, well approximated, ostomy with flatus and small jack in bag, pink, viable, some sloughing present  Perineum: wound vac in place, adequate suction  : valverde in place - clear yellow urine  Extremities: no edema or cyanosis    ASSESSMENT/PLAN:   This is a 59 y.o. female s/p incision and drainage of perineal area with debridement of necrotizing fasciitis that demonstrated necrotic tissue from right labia to the ischiorectal fossa (2019) and take back for EUA and debridement on 19 for a 2nd look. And s/p lap diverting colostomy (2019) - POD6. S/p EUA, debridement, and [place] placemeent (19)    Leukocytosis improving    - BCx x2 (6/3)-- NGTD     - WCx ()-- light growth Gram negative theresa       - Awaiting speciation and susceptibilities     - Continue clinda per ID    Continue general diet with supplements for nutritional optimization    - Nutritional markers:       - Pre-albumin 12.9 (6/3) from 9.6 ()      - Albumin 2.6 (6/3) from 2.6 ()      - Transferrin 153 (6/3) from 118 ()    Wound vac in place    - Will return to the OR tomorrow with Plastic Surgery for possible reconstruction (gracillis flap)      Continue to encourage IS, out of bed to chair during the day and frequent ambulation      Odin España MD, MPH  PGY-1 General Surgery  19  7:05 AM  [phone]    I am post-call and off campus today.  If you have any questions or concerns regarding this patient's care today, please page: Dimas Eubanks MD PGY-1 [phone]

## 2019-06-07 ENCOUNTER — ANESTHESIA (OUTPATIENT)
Dept: OPERATING ROOM | Age: 64
DRG: 463 | End: 2019-06-07
Payer: COMMERCIAL

## 2019-06-07 ENCOUNTER — APPOINTMENT (OUTPATIENT)
Dept: GENERAL RADIOLOGY | Age: 64
DRG: 463 | End: 2019-06-07
Payer: COMMERCIAL

## 2019-06-07 VITALS — OXYGEN SATURATION: 74 % | DIASTOLIC BLOOD PRESSURE: 88 MMHG | TEMPERATURE: 95.9 F | SYSTOLIC BLOOD PRESSURE: 129 MMHG

## 2019-06-07 LAB
ABO/RH: NORMAL
ALBUMIN SERPL-MCNC: 3.3 G/DL (ref 3.4–5)
ANION GAP SERPL CALCULATED.3IONS-SCNC: 13 MMOL/L (ref 3–16)
ANTIBODY SCREEN: NORMAL
BASOPHILS ABSOLUTE: 0.1 K/UL (ref 0–0.2)
BASOPHILS RELATIVE PERCENT: 0.8 %
BUN BLDV-MCNC: 21 MG/DL (ref 7–20)
CALCIUM SERPL-MCNC: 9.7 MG/DL (ref 8.3–10.6)
CHLORIDE BLD-SCNC: 97 MMOL/L (ref 99–110)
CO2: 25 MMOL/L (ref 21–32)
CREAT SERPL-MCNC: 0.6 MG/DL (ref 0.6–1.2)
EOSINOPHILS ABSOLUTE: 0.2 K/UL (ref 0–0.6)
EOSINOPHILS RELATIVE PERCENT: 2.2 %
GFR AFRICAN AMERICAN: >60
GFR NON-AFRICAN AMERICAN: >60
GLUCOSE BLD-MCNC: 107 MG/DL (ref 70–99)
HCG QUALITATIVE: NEGATIVE
HCT VFR BLD CALC: 25 % (ref 36–48)
HEMOGLOBIN: 8.2 G/DL (ref 12–16)
INR BLD: 1.15 (ref 0.86–1.14)
LYMPHOCYTES ABSOLUTE: 2.7 K/UL (ref 1–5.1)
LYMPHOCYTES RELATIVE PERCENT: 24.8 %
MAGNESIUM: 1.8 MG/DL (ref 1.8–2.4)
MCH RBC QN AUTO: 30 PG (ref 26–34)
MCHC RBC AUTO-ENTMCNC: 32.8 G/DL (ref 31–36)
MCV RBC AUTO: 91.6 FL (ref 80–100)
MONOCYTES ABSOLUTE: 0.7 K/UL (ref 0–1.3)
MONOCYTES RELATIVE PERCENT: 6.4 %
NEUTROPHILS ABSOLUTE: 7.1 K/UL (ref 1.7–7.7)
NEUTROPHILS RELATIVE PERCENT: 65.8 %
PDW BLD-RTO: 13.2 % (ref 12.4–15.4)
PHOSPHORUS: 4.2 MG/DL (ref 2.5–4.9)
PLATELET # BLD: 884 K/UL (ref 135–450)
PMV BLD AUTO: 6.8 FL (ref 5–10.5)
POTASSIUM SERPL-SCNC: 4.6 MMOL/L (ref 3.5–5.1)
PROTHROMBIN TIME: 13.1 SEC (ref 9.8–13)
RBC # BLD: 2.73 M/UL (ref 4–5.2)
SODIUM BLD-SCNC: 135 MMOL/L (ref 136–145)
WBC # BLD: 10.8 K/UL (ref 4–11)

## 2019-06-07 PROCEDURE — 2000000000 HC ICU R&B

## 2019-06-07 PROCEDURE — 87077 CULTURE AEROBIC IDENTIFY: CPT

## 2019-06-07 PROCEDURE — 2580000003 HC RX 258: Performed by: STUDENT IN AN ORGANIZED HEALTH CARE EDUCATION/TRAINING PROGRAM

## 2019-06-07 PROCEDURE — 87015 SPECIMEN INFECT AGNT CONCNTJ: CPT

## 2019-06-07 PROCEDURE — 85610 PROTHROMBIN TIME: CPT

## 2019-06-07 PROCEDURE — 2709999900 HC NON-CHARGEABLE SUPPLY: Performed by: SURGERY

## 2019-06-07 PROCEDURE — 85025 COMPLETE CBC W/AUTO DIFF WBC: CPT

## 2019-06-07 PROCEDURE — 87040 BLOOD CULTURE FOR BACTERIA: CPT

## 2019-06-07 PROCEDURE — 6360000002 HC RX W HCPCS: Performed by: SURGERY

## 2019-06-07 PROCEDURE — 6370000000 HC RX 637 (ALT 250 FOR IP): Performed by: STUDENT IN AN ORGANIZED HEALTH CARE EDUCATION/TRAINING PROGRAM

## 2019-06-07 PROCEDURE — 88305 TISSUE EXAM BY PATHOLOGIST: CPT

## 2019-06-07 PROCEDURE — 2580000003 HC RX 258: Performed by: SURGERY

## 2019-06-07 PROCEDURE — 2500000003 HC RX 250 WO HCPCS: Performed by: SURGERY

## 2019-06-07 PROCEDURE — 6370000000 HC RX 637 (ALT 250 FOR IP): Performed by: SURGERY

## 2019-06-07 PROCEDURE — 3600000014 HC SURGERY LEVEL 4 ADDTL 15MIN: Performed by: SURGERY

## 2019-06-07 PROCEDURE — 13101 CMPLX RPR TRUNK 2.6-7.5 CM: CPT | Performed by: SURGERY

## 2019-06-07 PROCEDURE — 36415 COLL VENOUS BLD VENIPUNCTURE: CPT

## 2019-06-07 PROCEDURE — C1729 CATH, DRAINAGE: HCPCS | Performed by: SURGERY

## 2019-06-07 PROCEDURE — 2580000003 HC RX 258: Performed by: NURSE ANESTHETIST, CERTIFIED REGISTERED

## 2019-06-07 PROCEDURE — 87070 CULTURE OTHR SPECIMN AEROBIC: CPT

## 2019-06-07 PROCEDURE — 87186 SC STD MICRODIL/AGAR DIL: CPT

## 2019-06-07 PROCEDURE — 15738 MUSCLE-SKIN GRAFT LEG: CPT | Performed by: SURGERY

## 2019-06-07 PROCEDURE — 80069 RENAL FUNCTION PANEL: CPT

## 2019-06-07 PROCEDURE — 2500000003 HC RX 250 WO HCPCS: Performed by: STUDENT IN AN ORGANIZED HEALTH CARE EDUCATION/TRAINING PROGRAM

## 2019-06-07 PROCEDURE — 3600000004 HC SURGERY LEVEL 4 BASE: Performed by: SURGERY

## 2019-06-07 PROCEDURE — 83735 ASSAY OF MAGNESIUM: CPT

## 2019-06-07 PROCEDURE — 3700000001 HC ADD 15 MINUTES (ANESTHESIA): Performed by: SURGERY

## 2019-06-07 PROCEDURE — 2500000003 HC RX 250 WO HCPCS: Performed by: NURSE ANESTHETIST, CERTIFIED REGISTERED

## 2019-06-07 PROCEDURE — 99233 SBSQ HOSP IP/OBS HIGH 50: CPT | Performed by: INTERNAL MEDICINE

## 2019-06-07 PROCEDURE — 11046 DBRDMT MUSC&/FSCA EA ADDL: CPT | Performed by: SURGERY

## 2019-06-07 PROCEDURE — 87176 TISSUE HOMOGENIZATION CULTR: CPT

## 2019-06-07 PROCEDURE — 13102 CMPLX RPR TRUNK ADDL 5CM/<: CPT | Performed by: SURGERY

## 2019-06-07 PROCEDURE — 87205 SMEAR GRAM STAIN: CPT

## 2019-06-07 PROCEDURE — 11043 DBRDMT MUSC&/FSCA 1ST 20/<: CPT | Performed by: SURGERY

## 2019-06-07 PROCEDURE — 87116 MYCOBACTERIA CULTURE: CPT

## 2019-06-07 PROCEDURE — 6370000000 HC RX 637 (ALT 250 FOR IP): Performed by: INTERNAL MEDICINE

## 2019-06-07 PROCEDURE — 86901 BLOOD TYPING SEROLOGIC RH(D): CPT

## 2019-06-07 PROCEDURE — 6360000002 HC RX W HCPCS: Performed by: NURSE ANESTHETIST, CERTIFIED REGISTERED

## 2019-06-07 PROCEDURE — 86850 RBC ANTIBODY SCREEN: CPT

## 2019-06-07 PROCEDURE — 87206 SMEAR FLUORESCENT/ACID STAI: CPT

## 2019-06-07 PROCEDURE — 3700000000 HC ANESTHESIA ATTENDED CARE: Performed by: SURGERY

## 2019-06-07 PROCEDURE — 6360000002 HC RX W HCPCS: Performed by: STUDENT IN AN ORGANIZED HEALTH CARE EDUCATION/TRAINING PROGRAM

## 2019-06-07 PROCEDURE — 84703 CHORIONIC GONADOTROPIN ASSAY: CPT

## 2019-06-07 PROCEDURE — 87102 FUNGUS ISOLATION CULTURE: CPT

## 2019-06-07 PROCEDURE — 86900 BLOOD TYPING SEROLOGIC ABO: CPT

## 2019-06-07 PROCEDURE — 71045 X-RAY EXAM CHEST 1 VIEW: CPT

## 2019-06-07 RX ORDER — PROPOFOL 10 MG/ML
INJECTION, EMULSION INTRAVENOUS PRN
Status: DISCONTINUED | OUTPATIENT
Start: 2019-06-07 | End: 2019-06-07 | Stop reason: SDUPTHER

## 2019-06-07 RX ORDER — MAGNESIUM SULFATE IN WATER 40 MG/ML
2 INJECTION, SOLUTION INTRAVENOUS ONCE
Status: COMPLETED | OUTPATIENT
Start: 2019-06-07 | End: 2019-06-07

## 2019-06-07 RX ORDER — ACETAMINOPHEN 325 MG/1
650 TABLET ORAL ONCE
Status: DISCONTINUED | OUTPATIENT
Start: 2019-06-08 | End: 2019-06-11 | Stop reason: HOSPADM

## 2019-06-07 RX ORDER — CIPROFLOXACIN 500 MG/1
750 TABLET, FILM COATED ORAL EVERY 12 HOURS SCHEDULED
Status: DISCONTINUED | OUTPATIENT
Start: 2019-06-07 | End: 2019-06-11 | Stop reason: HOSPADM

## 2019-06-07 RX ORDER — GINSENG 100 MG
CAPSULE ORAL PRN
Status: DISCONTINUED | OUTPATIENT
Start: 2019-06-07 | End: 2019-06-07 | Stop reason: ALTCHOICE

## 2019-06-07 RX ORDER — HYDROMORPHONE HCL 110MG/55ML
PATIENT CONTROLLED ANALGESIA SYRINGE INTRAVENOUS PRN
Status: DISCONTINUED | OUTPATIENT
Start: 2019-06-07 | End: 2019-06-07 | Stop reason: SDUPTHER

## 2019-06-07 RX ORDER — ONDANSETRON 2 MG/ML
4 INJECTION INTRAMUSCULAR; INTRAVENOUS
Status: DISCONTINUED | OUTPATIENT
Start: 2019-06-07 | End: 2019-06-07 | Stop reason: HOSPADM

## 2019-06-07 RX ORDER — GINSENG 100 MG
CAPSULE ORAL 3 TIMES DAILY
Status: DISCONTINUED | OUTPATIENT
Start: 2019-06-07 | End: 2019-06-11 | Stop reason: HOSPADM

## 2019-06-07 RX ORDER — CLINDAMYCIN PHOSPHATE 150 MG/ML
INJECTION, SOLUTION INTRAVENOUS PRN
Status: DISCONTINUED | OUTPATIENT
Start: 2019-06-07 | End: 2019-06-07 | Stop reason: SDUPTHER

## 2019-06-07 RX ORDER — SUCCINYLCHOLINE/SOD CL,ISO/PF 200MG/10ML
SYRINGE (ML) INTRAVENOUS PRN
Status: DISCONTINUED | OUTPATIENT
Start: 2019-06-07 | End: 2019-06-07 | Stop reason: SDUPTHER

## 2019-06-07 RX ORDER — FENTANYL CITRATE 50 UG/ML
25 INJECTION, SOLUTION INTRAMUSCULAR; INTRAVENOUS EVERY 5 MIN PRN
Status: DISCONTINUED | OUTPATIENT
Start: 2019-06-07 | End: 2019-06-07 | Stop reason: HOSPADM

## 2019-06-07 RX ORDER — DOCUSATE SODIUM 100 MG/1
100 CAPSULE, LIQUID FILLED ORAL 2 TIMES DAILY PRN
Status: DISCONTINUED | OUTPATIENT
Start: 2019-06-07 | End: 2019-06-07

## 2019-06-07 RX ORDER — MIDAZOLAM HYDROCHLORIDE 1 MG/ML
INJECTION INTRAMUSCULAR; INTRAVENOUS PRN
Status: DISCONTINUED | OUTPATIENT
Start: 2019-06-07 | End: 2019-06-07 | Stop reason: SDUPTHER

## 2019-06-07 RX ORDER — ROCURONIUM BROMIDE 10 MG/ML
INJECTION, SOLUTION INTRAVENOUS PRN
Status: DISCONTINUED | OUTPATIENT
Start: 2019-06-07 | End: 2019-06-07 | Stop reason: SDUPTHER

## 2019-06-07 RX ORDER — DOCUSATE SODIUM 100 MG/1
100 CAPSULE, LIQUID FILLED ORAL 2 TIMES DAILY
Status: DISCONTINUED | OUTPATIENT
Start: 2019-06-07 | End: 2019-06-11 | Stop reason: HOSPADM

## 2019-06-07 RX ORDER — FENTANYL CITRATE 50 UG/ML
INJECTION, SOLUTION INTRAMUSCULAR; INTRAVENOUS PRN
Status: DISCONTINUED | OUTPATIENT
Start: 2019-06-07 | End: 2019-06-07 | Stop reason: SDUPTHER

## 2019-06-07 RX ORDER — ONDANSETRON 2 MG/ML
INJECTION INTRAMUSCULAR; INTRAVENOUS PRN
Status: DISCONTINUED | OUTPATIENT
Start: 2019-06-07 | End: 2019-06-07 | Stop reason: SDUPTHER

## 2019-06-07 RX ORDER — FENTANYL CITRATE 50 UG/ML
50 INJECTION, SOLUTION INTRAMUSCULAR; INTRAVENOUS EVERY 5 MIN PRN
Status: DISCONTINUED | OUTPATIENT
Start: 2019-06-07 | End: 2019-06-07 | Stop reason: HOSPADM

## 2019-06-07 RX ORDER — LIDOCAINE HYDROCHLORIDE 20 MG/ML
INJECTION, SOLUTION INTRAVENOUS PRN
Status: DISCONTINUED | OUTPATIENT
Start: 2019-06-07 | End: 2019-06-07 | Stop reason: SDUPTHER

## 2019-06-07 RX ADMIN — Medication 100 MG: at 07:37

## 2019-06-07 RX ADMIN — SODIUM CHLORIDE, POTASSIUM CHLORIDE, SODIUM LACTATE AND CALCIUM CHLORIDE: 600; 310; 30; 20 INJECTION, SOLUTION INTRAVENOUS at 00:25

## 2019-06-07 RX ADMIN — OXYCODONE HYDROCHLORIDE AND ACETAMINOPHEN 2 TABLET: 5; 325 TABLET ORAL at 20:51

## 2019-06-07 RX ADMIN — MAGNESIUM SULFATE HEPTAHYDRATE 2 G: 40 INJECTION, SOLUTION INTRAVENOUS at 10:27

## 2019-06-07 RX ADMIN — HYDROMORPHONE HYDROCHLORIDE 0.5 MG: 2 INJECTION, SOLUTION INTRAMUSCULAR; INTRAVENOUS; SUBCUTANEOUS at 10:10

## 2019-06-07 RX ADMIN — HYDROMORPHONE HYDROCHLORIDE 1 MG: 1 INJECTION, SOLUTION INTRAMUSCULAR; INTRAVENOUS; SUBCUTANEOUS at 19:57

## 2019-06-07 RX ADMIN — ONDANSETRON 4 MG: 2 INJECTION INTRAMUSCULAR; INTRAVENOUS at 09:44

## 2019-06-07 RX ADMIN — PROPOFOL 100 MG: 10 INJECTION, EMULSION INTRAVENOUS at 07:37

## 2019-06-07 RX ADMIN — FENTANYL CITRATE 50 MCG: 50 INJECTION INTRAMUSCULAR; INTRAVENOUS at 08:19

## 2019-06-07 RX ADMIN — CLINDAMYCIN PHOSPHATE 600 MG: 600 INJECTION, SOLUTION INTRAVENOUS at 03:55

## 2019-06-07 RX ADMIN — BACITRACIN: 500 OINTMENT TOPICAL at 13:48

## 2019-06-07 RX ADMIN — SODIUM CHLORIDE, POTASSIUM CHLORIDE, SODIUM LACTATE AND CALCIUM CHLORIDE: 600; 310; 30; 20 INJECTION, SOLUTION INTRAVENOUS at 07:29

## 2019-06-07 RX ADMIN — VENLAFAXINE 100 MG: 25 TABLET ORAL at 20:39

## 2019-06-07 RX ADMIN — OXYCODONE HYDROCHLORIDE AND ACETAMINOPHEN 2 TABLET: 5; 325 TABLET ORAL at 03:50

## 2019-06-07 RX ADMIN — OXYCODONE HYDROCHLORIDE AND ACETAMINOPHEN 2 TABLET: 5; 325 TABLET ORAL at 14:52

## 2019-06-07 RX ADMIN — CLINDAMYCIN PHOSPHATE 600 MG: 600 INJECTION, SOLUTION INTRAVENOUS at 12:30

## 2019-06-07 RX ADMIN — ONDANSETRON 4 MG: 2 INJECTION INTRAMUSCULAR; INTRAVENOUS at 19:57

## 2019-06-07 RX ADMIN — Medication 10 ML: at 20:40

## 2019-06-07 RX ADMIN — HYDROMORPHONE HYDROCHLORIDE 1 MG: 1 INJECTION, SOLUTION INTRAMUSCULAR; INTRAVENOUS; SUBCUTANEOUS at 23:14

## 2019-06-07 RX ADMIN — CLINDAMYCIN PHOSPHATE 600 MG: 150 INJECTION, SOLUTION, CONCENTRATE INTRAVENOUS at 09:27

## 2019-06-07 RX ADMIN — CIPROFLOXACIN HYDROCHLORIDE 750 MG: 500 TABLET, FILM COATED ORAL at 20:39

## 2019-06-07 RX ADMIN — PHENYLEPHRINE HYDROCHLORIDE 10 MCG/MIN: 10 INJECTION, SOLUTION INTRAMUSCULAR; INTRAVENOUS; SUBCUTANEOUS at 07:57

## 2019-06-07 RX ADMIN — DOCUSATE SODIUM 100 MG: 100 CAPSULE, LIQUID FILLED ORAL at 14:52

## 2019-06-07 RX ADMIN — HYDROMORPHONE HYDROCHLORIDE 1 MG: 1 INJECTION, SOLUTION INTRAMUSCULAR; INTRAVENOUS; SUBCUTANEOUS at 13:55

## 2019-06-07 RX ADMIN — BACITRACIN: 500 OINTMENT TOPICAL at 20:39

## 2019-06-07 RX ADMIN — HYDROMORPHONE HYDROCHLORIDE 1 MG: 1 INJECTION, SOLUTION INTRAMUSCULAR; INTRAVENOUS; SUBCUTANEOUS at 17:10

## 2019-06-07 RX ADMIN — Medication 10 ML: at 10:32

## 2019-06-07 RX ADMIN — HYDROMORPHONE HYDROCHLORIDE 1 MG: 1 INJECTION, SOLUTION INTRAMUSCULAR; INTRAVENOUS; SUBCUTANEOUS at 11:17

## 2019-06-07 RX ADMIN — CLINDAMYCIN PHOSPHATE 600 MG: 600 INJECTION, SOLUTION INTRAVENOUS at 19:57

## 2019-06-07 RX ADMIN — FENTANYL CITRATE 50 MCG: 50 INJECTION INTRAMUSCULAR; INTRAVENOUS at 07:37

## 2019-06-07 RX ADMIN — HYDROMORPHONE HYDROCHLORIDE 0.5 MG: 2 INJECTION, SOLUTION INTRAMUSCULAR; INTRAVENOUS; SUBCUTANEOUS at 09:50

## 2019-06-07 RX ADMIN — HYDROMORPHONE HYDROCHLORIDE 0.5 MG: 2 INJECTION, SOLUTION INTRAMUSCULAR; INTRAVENOUS; SUBCUTANEOUS at 10:00

## 2019-06-07 RX ADMIN — ROCURONIUM BROMIDE 10 MG: 10 INJECTION, SOLUTION INTRAVENOUS at 08:38

## 2019-06-07 RX ADMIN — MIDAZOLAM HYDROCHLORIDE 2 MG: 2 INJECTION, SOLUTION INTRAMUSCULAR; INTRAVENOUS at 07:30

## 2019-06-07 RX ADMIN — DOCUSATE SODIUM 100 MG: 100 CAPSULE, LIQUID FILLED ORAL at 20:39

## 2019-06-07 RX ADMIN — ROCURONIUM BROMIDE 10 MG: 10 INJECTION, SOLUTION INTRAVENOUS at 07:37

## 2019-06-07 RX ADMIN — LIDOCAINE HYDROCHLORIDE 80 MG: 20 INJECTION, SOLUTION INTRAVENOUS at 07:37

## 2019-06-07 ASSESSMENT — PULMONARY FUNCTION TESTS
PIF_VALUE: 17
PIF_VALUE: 2
PIF_VALUE: 16
PIF_VALUE: 18
PIF_VALUE: 15
PIF_VALUE: 14
PIF_VALUE: 0
PIF_VALUE: 16
PIF_VALUE: 0
PIF_VALUE: 16
PIF_VALUE: 16
PIF_VALUE: 17
PIF_VALUE: 17
PIF_VALUE: 0
PIF_VALUE: 1
PIF_VALUE: 17
PIF_VALUE: 1
PIF_VALUE: 16
PIF_VALUE: 17
PIF_VALUE: 16
PIF_VALUE: 17
PIF_VALUE: 17
PIF_VALUE: 16
PIF_VALUE: 15
PIF_VALUE: 17
PIF_VALUE: 16
PIF_VALUE: 17
PIF_VALUE: 3
PIF_VALUE: 0
PIF_VALUE: 16
PIF_VALUE: 13
PIF_VALUE: 16
PIF_VALUE: 1
PIF_VALUE: 16
PIF_VALUE: 16
PIF_VALUE: 14
PIF_VALUE: 16
PIF_VALUE: 16
PIF_VALUE: 17
PIF_VALUE: 16
PIF_VALUE: 0
PIF_VALUE: 17
PIF_VALUE: 16
PIF_VALUE: 14
PIF_VALUE: 0
PIF_VALUE: 4
PIF_VALUE: 16
PIF_VALUE: 2
PIF_VALUE: 16
PIF_VALUE: 17
PIF_VALUE: 0
PIF_VALUE: 17
PIF_VALUE: 16
PIF_VALUE: 17
PIF_VALUE: 1
PIF_VALUE: 15
PIF_VALUE: 16
PIF_VALUE: 17
PIF_VALUE: 16
PIF_VALUE: 17
PIF_VALUE: 17
PIF_VALUE: 16
PIF_VALUE: 16
PIF_VALUE: 17
PIF_VALUE: 17
PIF_VALUE: 30
PIF_VALUE: 17
PIF_VALUE: 16
PIF_VALUE: 17
PIF_VALUE: 16
PIF_VALUE: 17
PIF_VALUE: 16
PIF_VALUE: 16
PIF_VALUE: 15
PIF_VALUE: 16
PIF_VALUE: 4
PIF_VALUE: 16
PIF_VALUE: 0
PIF_VALUE: 17
PIF_VALUE: 17
PIF_VALUE: 0
PIF_VALUE: 16
PIF_VALUE: 17
PIF_VALUE: 16
PIF_VALUE: 4
PIF_VALUE: 16
PIF_VALUE: 17
PIF_VALUE: 2
PIF_VALUE: 16
PIF_VALUE: 16
PIF_VALUE: 0
PIF_VALUE: 15
PIF_VALUE: 16
PIF_VALUE: 0
PIF_VALUE: 3
PIF_VALUE: 16
PIF_VALUE: 16
PIF_VALUE: 20
PIF_VALUE: 31
PIF_VALUE: 16
PIF_VALUE: 16
PIF_VALUE: 15
PIF_VALUE: 17
PIF_VALUE: 16
PIF_VALUE: 16
PIF_VALUE: 0
PIF_VALUE: 16
PIF_VALUE: 0
PIF_VALUE: 0
PIF_VALUE: 17
PIF_VALUE: 3
PIF_VALUE: 1
PIF_VALUE: 16
PIF_VALUE: 16
PIF_VALUE: 3
PIF_VALUE: 16
PIF_VALUE: 17
PIF_VALUE: 0
PIF_VALUE: 14
PIF_VALUE: 17
PIF_VALUE: 16
PIF_VALUE: 14
PIF_VALUE: 0
PIF_VALUE: 0
PIF_VALUE: 17
PIF_VALUE: 15
PIF_VALUE: 15
PIF_VALUE: 4
PIF_VALUE: 15
PIF_VALUE: 16
PIF_VALUE: 17
PIF_VALUE: 17
PIF_VALUE: 0
PIF_VALUE: 1
PIF_VALUE: 1
PIF_VALUE: 16
PIF_VALUE: 14
PIF_VALUE: 16
PIF_VALUE: 21
PIF_VALUE: 16
PIF_VALUE: 16
PIF_VALUE: 17
PIF_VALUE: 16
PIF_VALUE: 17
PIF_VALUE: 16
PIF_VALUE: 16
PIF_VALUE: 3
PIF_VALUE: 17
PIF_VALUE: 16
PIF_VALUE: 16
PIF_VALUE: 1
PIF_VALUE: 16

## 2019-06-07 ASSESSMENT — PAIN SCALES - GENERAL
PAINLEVEL_OUTOF10: 10
PAINLEVEL_OUTOF10: 8
PAINLEVEL_OUTOF10: 10
PAINLEVEL_OUTOF10: 9
PAINLEVEL_OUTOF10: 4
PAINLEVEL_OUTOF10: 0
PAINLEVEL_OUTOF10: 0
PAINLEVEL_OUTOF10: 3
PAINLEVEL_OUTOF10: 3
PAINLEVEL_OUTOF10: 10
PAINLEVEL_OUTOF10: 4
PAINLEVEL_OUTOF10: 0
PAINLEVEL_OUTOF10: 0
PAINLEVEL_OUTOF10: 10
PAINLEVEL_OUTOF10: 7
PAINLEVEL_OUTOF10: 7

## 2019-06-07 ASSESSMENT — PAIN - FUNCTIONAL ASSESSMENT
PAIN_FUNCTIONAL_ASSESSMENT: ACTIVITIES ARE NOT PREVENTED
PAIN_FUNCTIONAL_ASSESSMENT: PREVENTS OR INTERFERES SOME ACTIVE ACTIVITIES AND ADLS

## 2019-06-07 ASSESSMENT — PAIN DESCRIPTION - PROGRESSION
CLINICAL_PROGRESSION: NOT CHANGED
CLINICAL_PROGRESSION: NOT CHANGED
CLINICAL_PROGRESSION: GRADUALLY WORSENING
CLINICAL_PROGRESSION: NOT CHANGED
CLINICAL_PROGRESSION: GRADUALLY WORSENING
CLINICAL_PROGRESSION: NOT CHANGED
CLINICAL_PROGRESSION: GRADUALLY WORSENING

## 2019-06-07 ASSESSMENT — PAIN SCALES - WONG BAKER
WONGBAKER_NUMERICALRESPONSE: 0

## 2019-06-07 ASSESSMENT — PAIN DESCRIPTION - DIRECTION: RADIATING_TOWARDS: DENIES

## 2019-06-07 ASSESSMENT — PAIN DESCRIPTION - PAIN TYPE
TYPE: SURGICAL PAIN
TYPE: ACUTE PAIN;SURGICAL PAIN

## 2019-06-07 ASSESSMENT — PAIN DESCRIPTION - ONSET
ONSET: ON-GOING
ONSET: ON-GOING

## 2019-06-07 ASSESSMENT — PAIN DESCRIPTION - DESCRIPTORS: DESCRIPTORS: BURNING;DISCOMFORT

## 2019-06-07 ASSESSMENT — PAIN DESCRIPTION - ORIENTATION: ORIENTATION: MID

## 2019-06-07 ASSESSMENT — PAIN DESCRIPTION - FREQUENCY
FREQUENCY: CONTINUOUS
FREQUENCY: CONTINUOUS

## 2019-06-07 ASSESSMENT — PAIN DESCRIPTION - LOCATION: LOCATION: ABDOMEN;PERINEUM

## 2019-06-07 NOTE — PLAN OF CARE
Pt currently has solid stool in ostomy bag. PT Will remain free of falls throughout LOS. Bed lowest position. Bed alarm on. Bed lowest position. 3/4  Side rails up. Pt oriented to room. Call light within reach. Non skid footwear applied. Fall flag and fall risk sign placed. Pt notified of when to call RN. Pt at risk for impaired skin integrity. Turned q2h. Sacral Heart in place. Pt currently have groin pain from procedure. Treated with pain meds as ordered. Will continue to monitor and assess  .

## 2019-06-07 NOTE — PROGRESS NOTES
General Surgery   Daily Progress Note    CC: necrotizing fasciitis of the perineum    SUBJECTIVE:  Pain is controlled. Patient is NPO at midnight for OR today with plastic surgery. Ostomy function continues with good output. Pt received ostomy teaching yesterday. Block remains in place. ROS: A 14 point review of systems was obtained and pertinent positives and negatives were mentioned. Otherwise, no significant history other than as noted in the subjective portion of the note. OBJECTIVE:   Infusions:   lactated ringers 100 mL/hr at 06/07/19 0025      I/O:I/O last 3 completed shifts: In: 1915.3 [I.V.:1015.3; IV WTKOZRTKE:633]  Out: 0555 [Urine:2435; Drains:150]           Wt Readings from Last 1 Encounters:   06/07/19 132 lb 11.5 oz (60.2 kg)      LABS:    Recent Labs     06/06/19  0444 06/07/19  0400   WBC 12.5* 10.8   HGB 7.5* 8.2*   HCT 23.6* 25.0*   MCV 92.6 91.6   * 884*      Recent Labs     06/06/19  0444 06/07/19  0400    135*   K 4.3 4.6    97*   CO2 21 25   PHOS 3.6 4.2   BUN 23* 21*   CREATININE 0.6 0.6      No results for input(s): AST, ALT, ALB, BILIDIR, BILITOT, ALKPHOS in the last 72 hours. No results for input(s): LIPASE, AMYLASE in the last 72 hours. Recent Labs     06/07/19  0400   INR 1.15*      No results for input(s): CKTOTAL, CKMB, CKMBINDEX, TROPONINI in the last 72 hours.     Exam:  Vitals:    06/07/19 0400 06/07/19 0500 06/07/19 0600 06/07/19 0700   BP: (!) 89/51 (!) 92/52 (!) 77/50 91/69   Pulse: 78 79 71 87   Resp: 8 20 12 16   Temp: 98.2 °F (36.8 °C)      TempSrc: Oral      SpO2: 90% 92% 92% (!) 89%   Weight:    132 lb 11.5 oz (60.2 kg)   Height:         Exam:  General appearance: alert, appears stated age and cooperative  Lungs: clear to auscultation bilaterally, on RA  Heart: regular rate and rhythm  Abdomen: soft, non tender; bowel sounds normal; incisions clean dry and intact, well approximated, ostomy with flatus and stool in bag, pink, viable, with sloughing present  Perineum: wound vac in place, adequate suction  : valverde in place - clear yellow urine  Extremities: no edema or cyanosis    ASSESSMENT/PLAN:   This is a 59 y.o. female s/p incision and drainage of perineal area with debridement of necrotizing fasciitis that demonstrated necrotic tissue from right labia to the ischiorectal fossa (5/26/2019) and take back for EUA and debridement on 5.28.19 for a 2nd look. And s/p lap diverting colostomy (5/30/2019) - POD6.  S/p EUA, debridement, and [place] placemeent (6/4/19)    Leukocytosis improving    - BCx x2 (6/3)-- NGTD     - WCx (6/4)-- light growth E. coli    - Continue clinda per ID, will follow up ID recs    Continue general diet with supplements for nutritional optimization    - Nutritional markers:       - Pre-albumin 12.9 (6/3) from 9.6 (5/31)      - Albumin 2.6 (6/3) from 2.6 (5/31)      - Transferrin 153 (6/3) from 118 (5/31)    Wound vac in place    - Will return to the OR today with Plastic Surgery for possible reconstruction (gracillis flap)      Continue to encourage IS, out of bed to chair during the day and frequent ambulation      Ladora Rubinstein, MD  PGY-1 General Surgery  06/07/19 7:15 AM  [phone]

## 2019-06-07 NOTE — ANESTHESIA POSTPROCEDURE EVALUATION
Department of Anesthesiology  Postprocedure Note    Patient: Rehana Corona  MRN: [id number]  YOB: 1955  Date of evaluation: 6/7/2019  Time:  2:58 PM     Procedure Summary     Date:  06/07/19 Room / Location:  [facility] [address] [facility] 06 / Yare[facility] Vazquez OR    Anesthesia Start:  0729 Anesthesia Stop:  1020    Procedure:  EXCISIONAL DEBRIDEMENT PERINEAL WOUND 15X6X3 CM, GRACILAS MYOFASCIAL FLAP, COMPLEX CLOSURE OF VAGINA 8.5 CM (N/A ) Diagnosis:  (.)    Surgeon:  Vernon Melendez MD Responsible Provider:  Julian Navarro MD    Anesthesia Type:  general ASA Status:  2          Anesthesia Type: general    Anum Phase I: Anum Score: 8    Anum Phase II:      Last vitals: Reviewed and per EMR flowsheets.        Anesthesia Post Evaluation    Patient location during evaluation: bedside  Patient participation: complete - patient participated  Level of consciousness: awake and alert  Pain score: 3  Airway patency: patent  Nausea & Vomiting: no nausea and no vomiting  Complications: no  Cardiovascular status: blood pressure returned to baseline  Respiratory status: acceptable  Hydration status: stable

## 2019-06-07 NOTE — PROGRESS NOTES
Department of Surgery:  Post-op Note    Dx: necrotizing fasciitis of the perineum    Procedure(s) Performed: 1) Excisional debridement of perineum (15 x 6 x 3 cm), 2) Right gracilis myofascial flap for perineal reconstruction, 3) Complex closure of right labia (8.5 cm)    Subjective:   Patient's reports perineal pain semi-controlled, reports some nausea post-operatively, denies vomiting. Tolerating diet. Valverde in place, ostomy functioning hard stools. Objective:  Anesthesia type: General      I/O    Intra op    Post op     Fluids  800 0      0     Urine 200 175     Exam:/68   Pulse 92   Temp 98.4 °F (36.9 °C) (Oral)   Resp 15   Ht 5' 6\" (1.676 m)   Wt 132 lb 11.5 oz (60.2 kg)   LMP 07/12/2007   SpO2 99%   BMI 21.42 kg/m²   Post-op vital signs:  Stable   Exam:General appearance: alert, appears stated age and cooperative  Lungs: clear to auscultation bilaterally, on RA  Heart: regular rate and rhythm, S1, S2 normal, no murmur, click, rub or gallop  Abdomen: soft, non tender, non distended, ostomy pink, viable, with function  Labial wound: dressed, 2 open areas, one adjacent to anus with wet to dry packing, other distal incision, loosely closed. Bacitracin to incisions. 2 RANDA drains both serosanguinous. Pillow between legs. : valverde in place - clear yellow urine  Extremities: no edema or cyanosis    Assessment and Plan  Pt is a 59y.o. year old female w/ necrotizing fasciitis of the perineum s/p 1) Excisional debridement of perineum (15 x 6 x 3 cm), 2) Right gracilis myofascial flap for perineal reconstruction, 3) Complex closure of right labia (8.5 cm) POD #0    Pain management: dilaudid and percocet pain panel PRN  Cardiovasc: hemodynamically stable, will continue to monitor  Respiratory:  IS ordered to bedside, encourage hourly IS and deep breathing, wean oxygen as tolerated  FeNa: Fluids  SLIV, Diet: General  GI: added colace PRN for solid stool in bag and some mild abdominal pain.  She is taking good amount of narcotics at this time. :  Urine output is adequate  Ambulation: OOB to chair, encourage ambulation  Prophylaxis: SCDs, lovenox  Abx: clindamycin, ID on board, added cipro for GN coverage  Wound care: Wet to dry dressing changes to open area adjacent to rectum BID per nurses, bacitracin to incision line TID, pillow between knees to offload labial pressure, bedrest till Sunday.        Sarah Moctezuma MD  General Surgery PGY 1  6/7/2019 1:59 PM  Pager [phone]
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
## 2019-06-07 NOTE — PROGRESS NOTES
ID Follow-up NOTE    CC:   Necrotizing soft tissue infection  Antibiotics: Clindamycin    Admit Date: 5/26/2019  Hospital Day: 13    Subjective:     Returned from OR - had further excisional debridement, R gracilis myofascial flap and complex closure this am    Patient c/o severe pain at surg site    Objective:     Afebrile last 24 hr    Patient Vitals for the past 8 hrs:   BP Temp Temp src Pulse Resp SpO2 Weight   06/07/19 1045 108/65 -- -- 92 19 -- --   06/07/19 1018 121/77 98.7 °F (37.1 °C) Oral 87 16 93 % --   06/07/19 0700 91/69 -- -- 87 16 (!) 89 % 132 lb 11.5 oz (60.2 kg)   06/07/19 0600 (!) 77/50 -- -- 71 12 92 % --   06/07/19 0500 (!) 92/52 -- -- 79 20 92 % --   06/07/19 0400 (!) 89/51 98.2 °F (36.8 °C) Oral 78 8 90 % --     I/O last 3 completed shifts: In: 1915.3 [I.V.:1015.3; IV RPGTMMFUM:075]  Out: 3939 [UWXWW:4178; Drains:150]  I/O this shift: In: 800 [I.V.:800]  Out: 700 [Urine:200; Stool:400; Blood:100]    EXAM:  GENERAL: No apparent distress.     HEENT: Membranes moist, no oral lesion  NECK:  Supple  LUNGS: Clear b/l, no rales, no dullness  CARDIAC: RRR, no murmur appreciated  ABD:  + BS, soft / NT; colostomy in place with stool output  EXT:  No rash, no edema, no lesions  NEURO: No focal neurologic findings  PSYCH: Orientation, sensorium, mood normal  LINES:  R IJ line in place    Data Review:  Lab Results   Component Value Date    WBC 10.8 06/07/2019    HGB 8.2 (L) 06/07/2019    HCT 25.0 (L) 06/07/2019    MCV 91.6 06/07/2019     (H) 06/07/2019     Lab Results   Component Value Date    CREATININE 0.6 06/07/2019    BUN 21 (H) 06/07/2019     (L) 06/07/2019    K 4.6 06/07/2019    CL 97 (L) 06/07/2019    CO2 25 06/07/2019       Hepatic Function Panel:   Lab Results   Component Value Date    ALKPHOS 108 06/04/2013    ALT 54 06/04/2013    AST 40 06/04/2013    PROT 6.3 06/04/2013    BILITOT 0.4 06/04/2013    LABALBU 3.3 06/07/2019       Micro:  5/29 C diff neg    5/26 Tissue #1: GS 1+WBC, Surg.  Taken to OR, had necrotic tissue debrided. Return to OR 5/29 - wound 18 x 5 x 15 cm  Colostomy 5/30  OR 6/4 - further excisional debridement, instill VAC placed.   Culture light GBS, E faecalis, E coli    Return to OR today 6/7 -  further excisional debridement, R gracilis myofascial flap and complex closure this am    IMP/  Hx chronic back pain, depression, recurrent HSV, neck surg  Fall     R buttock / perineum infection -  + necrotizing ST infection, cult + MRSA  Fever - resolved  Leukocytosis - WBC down     Plan:     Cont Clindamycin alone - MRSA, anaerobic coverage  Add Ciprofloxacin - E coli, GNR coverage  Wound care and return to OR per Surg - OR 6/5 (gracilis m flap reconstruction per Dr Kala Carroll)     Discussed with pt  Call with ID issues over weekend  Aman Huerta MD

## 2019-06-07 NOTE — PROGRESS NOTES
Nutrition Assessment    Type and Reason for Visit: Reassess    Nutrition Recommendations:   · Encourage PO intake on General diet w/ protein at each meal and snack  · ONS per pt choice   · Start calorie count to assess adequacy of PO intake     Nutrition Assessment: Pt remains at high nutritional risk w/ varied PO intake documented when pt is on general diet w/ ONS as well. Pt was educated previously on the importance of protein and provided w/ lists of protein foods. Pt NPO and out of room for OR this am. Noted pt w/ wt loss since admission (6% loss x12 days). Will order calorie count to assess adequacy of PO intake. Continue to monitor and encourage PO intake on general diet w/ ONS     Malnutrition Assessment:  · Malnutrition Status: At risk for malnutrition  · Context: Acute illness or injury  · Findings of the 6 clinical characteristics of malnutrition (Minimum of 2 out of 6 clinical characteristics is required to make the diagnosis of moderate or severe Protein Calorie Malnutrition based on AND/ASPEN Guidelines):  1. Energy Intake-Less than or equal to 50% of estimated energy requirement, Greater than or equal to 5 days    2. Weight Loss-5% loss or greater, (in 12 days )  3. Fat Loss-Unable to assess(out of room for surgery),    4. Muscle Loss-Unable to assess,    5. Fluid Accumulation-No significant fluid accumulation,    6.   Strength-Not measured    Nutrition Risk Level: High    Nutrient Needs:  · Estimated Daily Total Kcal: 5228-5578 kcal(25-30 )  · Estimated Daily Protein (g):  grams (1.3-1.5)  · Estimated Daily Total Fluid (ml/day): 1900 mL    Nutrition Diagnosis:   · Problem: Increased nutrient needs  · Etiology: related to Increased demand for energy/nutrients     Signs and symptoms:  as evidenced by Presence of wounds    Objective Information:  · Nutrition-Focused Physical Findings: colostomy; no edema   · Wound Type: Surgical Wound, Open Wounds(wound vac)  · Current Nutrition

## 2019-06-07 NOTE — OP NOTE
Lemon Apley RN called [facility] to discontinue wound VAC per Dr. Kala Carroll. Spoke with Rosio Deleon. Representative at [facility] to inform them of this, wound vac will be picked up.  REFERENCE # [id number]

## 2019-06-08 LAB
ALBUMIN SERPL-MCNC: 3.2 G/DL (ref 3.4–5)
ANION GAP SERPL CALCULATED.3IONS-SCNC: 12 MMOL/L (ref 3–16)
BACTERIA: ABNORMAL /HPF
BASOPHILS ABSOLUTE: 0.1 K/UL (ref 0–0.2)
BASOPHILS RELATIVE PERCENT: 0.7 %
BILIRUBIN URINE: NEGATIVE
BLOOD CULTURE, ROUTINE: NORMAL
BLOOD, URINE: ABNORMAL
BUN BLDV-MCNC: 26 MG/DL (ref 7–20)
CALCIUM SERPL-MCNC: 9.7 MG/DL (ref 8.3–10.6)
CHLORIDE BLD-SCNC: 97 MMOL/L (ref 99–110)
CLARITY: CLEAR
CO2: 26 MMOL/L (ref 21–32)
COLOR: YELLOW
CREAT SERPL-MCNC: 0.6 MG/DL (ref 0.6–1.2)
CULTURE, BLOOD 3: NORMAL
EOSINOPHILS ABSOLUTE: 0.2 K/UL (ref 0–0.6)
EOSINOPHILS RELATIVE PERCENT: 1.5 %
EPITHELIAL CELLS, UA: ABNORMAL /HPF
GFR AFRICAN AMERICAN: >60
GFR NON-AFRICAN AMERICAN: >60
GLUCOSE BLD-MCNC: 123 MG/DL (ref 70–99)
GLUCOSE URINE: NEGATIVE MG/DL
HCT VFR BLD CALC: 22.4 % (ref 36–48)
HEMOGLOBIN: 7.5 G/DL (ref 12–16)
KETONES, URINE: NEGATIVE MG/DL
LEUKOCYTE ESTERASE, URINE: ABNORMAL
LYMPHOCYTES ABSOLUTE: 2.5 K/UL (ref 1–5.1)
LYMPHOCYTES RELATIVE PERCENT: 19.1 %
MAGNESIUM: 1.7 MG/DL (ref 1.8–2.4)
MCH RBC QN AUTO: 29.9 PG (ref 26–34)
MCHC RBC AUTO-ENTMCNC: 33.4 G/DL (ref 31–36)
MCV RBC AUTO: 89.6 FL (ref 80–100)
MICROSCOPIC EXAMINATION: YES
MONOCYTES ABSOLUTE: 0.9 K/UL (ref 0–1.3)
MONOCYTES RELATIVE PERCENT: 6.9 %
NEUTROPHILS ABSOLUTE: 9.3 K/UL (ref 1.7–7.7)
NEUTROPHILS RELATIVE PERCENT: 71.8 %
NITRITE, URINE: NEGATIVE
PDW BLD-RTO: 13.7 % (ref 12.4–15.4)
PH UA: 5.5 (ref 5–8)
PHOSPHORUS: 3.9 MG/DL (ref 2.5–4.9)
PLATELET # BLD: 902 K/UL (ref 135–450)
PMV BLD AUTO: 6.7 FL (ref 5–10.5)
POTASSIUM SERPL-SCNC: 4.3 MMOL/L (ref 3.5–5.1)
PROTEIN UA: ABNORMAL MG/DL
RBC # BLD: 2.5 M/UL (ref 4–5.2)
RBC UA: ABNORMAL /HPF (ref 0–2)
SODIUM BLD-SCNC: 135 MMOL/L (ref 136–145)
SPECIFIC GRAVITY UA: 1.02 (ref 1–1.03)
URINE REFLEX TO CULTURE: YES
URINE TYPE: ABNORMAL
UROBILINOGEN, URINE: 0.2 E.U./DL
WBC # BLD: 13 K/UL (ref 4–11)
WBC UA: ABNORMAL /HPF (ref 0–5)

## 2019-06-08 PROCEDURE — 2500000003 HC RX 250 WO HCPCS: Performed by: STUDENT IN AN ORGANIZED HEALTH CARE EDUCATION/TRAINING PROGRAM

## 2019-06-08 PROCEDURE — 80069 RENAL FUNCTION PANEL: CPT

## 2019-06-08 PROCEDURE — 2000000000 HC ICU R&B

## 2019-06-08 PROCEDURE — 83735 ASSAY OF MAGNESIUM: CPT

## 2019-06-08 PROCEDURE — 94669 MECHANICAL CHEST WALL OSCILL: CPT

## 2019-06-08 PROCEDURE — 6370000000 HC RX 637 (ALT 250 FOR IP): Performed by: STUDENT IN AN ORGANIZED HEALTH CARE EDUCATION/TRAINING PROGRAM

## 2019-06-08 PROCEDURE — 94799 UNLISTED PULMONARY SVC/PX: CPT

## 2019-06-08 PROCEDURE — 6360000002 HC RX W HCPCS: Performed by: STUDENT IN AN ORGANIZED HEALTH CARE EDUCATION/TRAINING PROGRAM

## 2019-06-08 PROCEDURE — 2580000003 HC RX 258: Performed by: STUDENT IN AN ORGANIZED HEALTH CARE EDUCATION/TRAINING PROGRAM

## 2019-06-08 PROCEDURE — 94150 VITAL CAPACITY TEST: CPT

## 2019-06-08 PROCEDURE — 99024 POSTOP FOLLOW-UP VISIT: CPT | Performed by: SURGERY

## 2019-06-08 PROCEDURE — 87086 URINE CULTURE/COLONY COUNT: CPT

## 2019-06-08 PROCEDURE — 85025 COMPLETE CBC W/AUTO DIFF WBC: CPT

## 2019-06-08 PROCEDURE — 94761 N-INVAS EAR/PLS OXIMETRY MLT: CPT

## 2019-06-08 PROCEDURE — 81001 URINALYSIS AUTO W/SCOPE: CPT

## 2019-06-08 PROCEDURE — 6370000000 HC RX 637 (ALT 250 FOR IP): Performed by: INTERNAL MEDICINE

## 2019-06-08 RX ORDER — DIAZEPAM 5 MG/1
5 TABLET ORAL ONCE
Status: COMPLETED | OUTPATIENT
Start: 2019-06-08 | End: 2019-06-08

## 2019-06-08 RX ORDER — MAGNESIUM SULFATE IN WATER 40 MG/ML
2 INJECTION, SOLUTION INTRAVENOUS ONCE
Status: COMPLETED | OUTPATIENT
Start: 2019-06-08 | End: 2019-06-08

## 2019-06-08 RX ADMIN — VENLAFAXINE 100 MG: 25 TABLET ORAL at 09:15

## 2019-06-08 RX ADMIN — ONDANSETRON 4 MG: 2 INJECTION INTRAMUSCULAR; INTRAVENOUS at 01:23

## 2019-06-08 RX ADMIN — DOCUSATE SODIUM 100 MG: 100 CAPSULE, LIQUID FILLED ORAL at 21:07

## 2019-06-08 RX ADMIN — CLINDAMYCIN PHOSPHATE 600 MG: 600 INJECTION, SOLUTION INTRAVENOUS at 21:08

## 2019-06-08 RX ADMIN — BACITRACIN: 500 OINTMENT TOPICAL at 14:17

## 2019-06-08 RX ADMIN — BACITRACIN: 500 OINTMENT TOPICAL at 09:28

## 2019-06-08 RX ADMIN — CIPROFLOXACIN HYDROCHLORIDE 750 MG: 500 TABLET, FILM COATED ORAL at 21:07

## 2019-06-08 RX ADMIN — OXYCODONE HYDROCHLORIDE AND ACETAMINOPHEN 2 TABLET: 5; 325 TABLET ORAL at 01:17

## 2019-06-08 RX ADMIN — ENOXAPARIN SODIUM 40 MG: 40 INJECTION SUBCUTANEOUS at 09:15

## 2019-06-08 RX ADMIN — CLINDAMYCIN PHOSPHATE 600 MG: 600 INJECTION, SOLUTION INTRAVENOUS at 14:09

## 2019-06-08 RX ADMIN — BACITRACIN: 500 OINTMENT TOPICAL at 21:08

## 2019-06-08 RX ADMIN — DOCUSATE SODIUM 100 MG: 100 CAPSULE, LIQUID FILLED ORAL at 09:15

## 2019-06-08 RX ADMIN — OXYCODONE HYDROCHLORIDE AND ACETAMINOPHEN 2 TABLET: 5; 325 TABLET ORAL at 07:47

## 2019-06-08 RX ADMIN — CIPROFLOXACIN HYDROCHLORIDE 750 MG: 500 TABLET, FILM COATED ORAL at 09:15

## 2019-06-08 RX ADMIN — Medication 10 ML: at 21:08

## 2019-06-08 RX ADMIN — CLINDAMYCIN PHOSPHATE 600 MG: 600 INJECTION, SOLUTION INTRAVENOUS at 04:25

## 2019-06-08 RX ADMIN — HYDROMORPHONE HYDROCHLORIDE 1 MG: 1 INJECTION, SOLUTION INTRAMUSCULAR; INTRAVENOUS; SUBCUTANEOUS at 04:25

## 2019-06-08 RX ADMIN — HYDROMORPHONE HYDROCHLORIDE 1 MG: 1 INJECTION, SOLUTION INTRAMUSCULAR; INTRAVENOUS; SUBCUTANEOUS at 14:35

## 2019-06-08 RX ADMIN — VENLAFAXINE 100 MG: 25 TABLET ORAL at 21:07

## 2019-06-08 RX ADMIN — MAGNESIUM SULFATE HEPTAHYDRATE 2 G: 40 INJECTION, SOLUTION INTRAVENOUS at 09:16

## 2019-06-08 RX ADMIN — HYDROMORPHONE HYDROCHLORIDE 1 MG: 1 INJECTION, SOLUTION INTRAMUSCULAR; INTRAVENOUS; SUBCUTANEOUS at 09:29

## 2019-06-08 RX ADMIN — OXYCODONE HYDROCHLORIDE AND ACETAMINOPHEN 2 TABLET: 5; 325 TABLET ORAL at 12:56

## 2019-06-08 RX ADMIN — OXYCODONE HYDROCHLORIDE AND ACETAMINOPHEN 2 TABLET: 5; 325 TABLET ORAL at 19:25

## 2019-06-08 RX ADMIN — Medication 10 ML: at 09:15

## 2019-06-08 RX ADMIN — DIAZEPAM 5 MG: 5 TABLET ORAL at 21:07

## 2019-06-08 ASSESSMENT — PAIN - FUNCTIONAL ASSESSMENT
PAIN_FUNCTIONAL_ASSESSMENT: PREVENTS OR INTERFERES WITH MANY ACTIVE NOT PASSIVE ACTIVITIES
PAIN_FUNCTIONAL_ASSESSMENT: PREVENTS OR INTERFERES WITH MANY ACTIVE NOT PASSIVE ACTIVITIES
PAIN_FUNCTIONAL_ASSESSMENT: PREVENTS OR INTERFERES SOME ACTIVE ACTIVITIES AND ADLS

## 2019-06-08 ASSESSMENT — PAIN DESCRIPTION - PROGRESSION
CLINICAL_PROGRESSION: GRADUALLY WORSENING

## 2019-06-08 ASSESSMENT — PAIN SCALES - GENERAL
PAINLEVEL_OUTOF10: 0
PAINLEVEL_OUTOF10: 8
PAINLEVEL_OUTOF10: 10
PAINLEVEL_OUTOF10: 10
PAINLEVEL_OUTOF10: 0
PAINLEVEL_OUTOF10: 10

## 2019-06-08 ASSESSMENT — PAIN DESCRIPTION - FREQUENCY
FREQUENCY: CONTINUOUS

## 2019-06-08 ASSESSMENT — PAIN DESCRIPTION - LOCATION
LOCATION: GENERALIZED;PERINEUM
LOCATION: PERINEUM
LOCATION: PERINEUM;OTHER (COMMENT)
LOCATION: ABDOMEN;PERINEUM
LOCATION: PERINEUM;OTHER (COMMENT)

## 2019-06-08 ASSESSMENT — PAIN DESCRIPTION - ORIENTATION
ORIENTATION: RIGHT;MID

## 2019-06-08 ASSESSMENT — PAIN DESCRIPTION - ONSET
ONSET: ON-GOING

## 2019-06-08 ASSESSMENT — PAIN DESCRIPTION - DESCRIPTORS
DESCRIPTORS: ACHING;BURNING;DISCOMFORT
DESCRIPTORS: ACHING;DISCOMFORT

## 2019-06-08 ASSESSMENT — PAIN DESCRIPTION - DIRECTION
RADIATING_TOWARDS: DENIES
RADIATING_TOWARDS: DENIES

## 2019-06-08 ASSESSMENT — PAIN DESCRIPTION - PAIN TYPE
TYPE: SURGICAL PAIN;ACUTE PAIN

## 2019-06-08 NOTE — OP NOTE
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 PROCEDURES:  1. Excisional debridement of perineum including vagina and ischiorectal  space by the rectum to the sacrum (15 x 6 cm). 2.  Right gracilis myofascial flap for perineal reconstruction. 3.  Complex closure of right labia majora (8.5 cm). SURGEON:  Shelley Marquez MD    ANESTHESIA:  General.    ESTIMATED BLOOD LOSS:  75 mL. DRAINS:  Two (15-Uzbek round). SPECIMEN:  Tissue for culture. OPERATIVE INDICATIONS:  This is a 72-year-old female known to the  service who developed necrotizing infection of the perineum. She  underwent diverting colostomy after debridement by General Surgery and  underwent a previous exam under anesthesia and debridement with us. She  was noted to have significant ischiorectal fossa deficit tracking to the  sacral bone. She was treated with antibiotics, on instillation wound  VAC therapy and nutrition was optimized. Her leukocytosis resolved and  her cultures were negative and she is brought back to the operating room  today for reconstruction. The risks, benefits, alternatives, outcomes,  and personnel involved with the aforementioned procedure were discussed  in detail with the patient. After all questions were answered in a  satisfactory manner, she agreed to proceed. OPERATIVE PROCEDURE:  The patient was brought to the operating room,  placed in the supine position on the operating table.   After  satisfactory induction of general endotracheal anesthesia, the patient  was then placed in the lithotomy position and prepped and draped in the  usual sterile manner. A timeout was performed confirming the patient  and the procedure to be performed. The operation commenced via  excisionally debriding the wound in its entirety using curettes and 15  blade. The outer aspect of the labia skin was excised down to healthy  fat. There was excellent granulation tissue in the posterior aspect  overlying from the area by the sacrum, and the wound did appear to be  slightly smaller in size. After hemostasis was obtained, attention was directed toward the right  medial thigh and an incision was created overlying the gracilis muscle. The subcutaneous tissue was dissected down to the superficial fascia  which was incised with electrocautery. The gracilis muscle was  circumferentially dissected and divided distally along its tendon. The  dissection was then commenced on the posterior aspect with care to  ensure that the minor pedicle was hemostased with electrocautery. Dissection continued approximately 10 cm to the proximal pedicle. The  wound was irrigated and hemostased with electrocautery. A tunnel was  then created and the gracilis muscle was easily transferred through the  tunnel into the ischiorectal fossa space. The right thigh was then  closed with drain in place. The drain was brought through a separate  stab incision and secured in place using a 3-0 nylon suture. The fascia  was closed using 2-0 Vicryl sutures in an interrupted manner followed by  a 3-0 Stratafix suture. Attention was then directed toward the perineum and using a #1 PDS  suture, the gracilis muscle was parachuted into the ischiorectal fossa  space to obliterate the dead space adjacent to the rectum. An  additional 3-0 Vicryl suture was placed proximally to ensure decreased  mobilization.   The inferior aspect was closed using 2-0 PDS suture  followed by 3-0 chromic suture. This was done loosely to allow and  facilitate for any potential drainage from this fossa. The attention  was directed toward the superior aspect and the vagina after being  widely undermined asymmetrically was closed in layers using 3-0 Monocryl  suture to approximate the deep dermis followed by interrupted horizontal  mattress 4-0 chromic sutures. By the anus, this area was left open to  allow for drainage given the previous infection. Her vagina also had  evidence of some yeast and drainage. Thus, the decision was made to  leave part of the incision open and allow for healing by secondary  intent. This is to decrease the likelihood of surgical site infection  as well as having this performed overlying the muscles to promote the  greatest granulation tissue. Once this was performed, the incisions  were dressed. The patient was then taken out of lithotomy position,  extubated, and taken to the PACU in satisfactory condition. There were  no immediate complications. The patient tolerated the procedure well. At the end of the case, all counts were correct.         Missael Ramirez MD    D: 06/07/2019 10:19:58       T: 06/07/2019 14:23:26     [email or web address]  Job#: [id number]     Doc#: [id number]    CC:

## 2019-06-08 NOTE — PROGRESS NOTES
First set of blood cx drawn and sent to lab. New IV placed in L AC and R AC peripheral removed d/t pt complaining of pain. Talked with surgery residents about replacing valverde to draw urine cultures. Plan to hold off on changing valverde at this time and discuss with Dr Karel Gonzalez in AM.  Plan to administer tylenol for fever.

## 2019-06-08 NOTE — PLAN OF CARE
Pt remains free from falls. Pt agreeable to using call light when needing help. Bed alarm, socks on. Will continue to monitor. Pt is taking protein shakes as ordered and was educated on importance of oral intake, protein intake. Pt verbalized understanding. Encouraging PO intake, Pt on calorie count. Pt having formed stools through colostomy. Pt receiving pain medication as prescribed. Pt able to express needs related to pain medication. Pt agreeable to current plan. No nausea or vomiting noted during my shift so far. Will continue to monitor and assist Pt with on-going care plan goals and education.

## 2019-06-08 NOTE — PROGRESS NOTES
Pt bathed with chlorhexidine solution, gown changed, bed pad changed. Bacitracin placed on vagina incision, wet to dry kerlex changed per orders, pt instructed to keep pressure off R leg site.

## 2019-06-08 NOTE — PROGRESS NOTES
Pt voided per bedpan. Kerlex packing changed after voiding. New bacitracin put in place. Pt turned on R side. Pain meds given PRN.

## 2019-06-08 NOTE — PROGRESS NOTES
General Surgery   Daily Progress Note    CC: necrotizing fasciitis of the perineum    SUBJECTIVE:  Febrile yesterday to 102. Resolved with tylenol and percocet. Pain controlled, but still sore. Denies nausea or emesis, tolerating diet. Ostomy functioning. ROS: A 14 point review of systems was obtained and pertinent positives and negatives were mentioned. Otherwise, no significant history other than as noted in the subjective portion of the note. OBJECTIVE:   Infusions:     I/O:I/O last 3 completed shifts: In: 2164 [P.O.:1254; I.V.:810; IV Piggyback:100]  Out: 7390 [Urine:1890; Drains:25; Stool:400; Blood:100]           Wt Readings from Last 1 Encounters:   06/08/19 136 lb 3.9 oz (61.8 kg)      LABS:    Recent Labs     06/07/19  0400 06/08/19  0429   WBC 10.8 13.0*   HGB 8.2* 7.5*   HCT 25.0* 22.4*   MCV 91.6 89.6   * 902*      Recent Labs     06/07/19  0400 06/08/19  0429   * 135*   K 4.6 4.3   CL 97* 97*   CO2 25 26   PHOS 4.2 3.9   BUN 21* 26*   CREATININE 0.6 0.6      No results for input(s): AST, ALT, ALB, BILIDIR, BILITOT, ALKPHOS in the last 72 hours. No results for input(s): LIPASE, AMYLASE in the last 72 hours. Recent Labs     06/07/19  0400   INR 1.15*      No results for input(s): CKTOTAL, CKMB, CKMBINDEX, TROPONINI in the last 72 hours.     Exam:  Vitals:    06/08/19 0400 06/08/19 0500 06/08/19 0600 06/08/19 0700   BP: 95/80 93/61 110/66 107/63   Pulse: 77 74 72 78   Resp: 15 13 16 11   Temp: 98.9 °F (37.2 °C)   99.4 °F (37.4 °C)   TempSrc: Oral   Oral   SpO2: 94%   93%   Weight:  136 lb 3.9 oz (61.8 kg)     Height:         Exam:  General appearance: alert, appears stated age and cooperative  Lungs: clear to auscultation bilaterally, on RA  Heart: regular rate and rhythm  Abdomen: soft, non tender;  incisions clean dry and intact, well approximated, ostomy with flatus and stool in bag, pink, viable, with sloughing present, functioning  Perineum: flap pink, viable, incisions

## 2019-06-09 LAB
ALBUMIN SERPL-MCNC: 3.3 G/DL (ref 3.4–5)
ANAEROBIC CULTURE: ABNORMAL
ANION GAP SERPL CALCULATED.3IONS-SCNC: 12 MMOL/L (ref 3–16)
BASOPHILS ABSOLUTE: 0.1 K/UL (ref 0–0.2)
BASOPHILS RELATIVE PERCENT: 1.3 %
BUN BLDV-MCNC: 20 MG/DL (ref 7–20)
C-REACTIVE PROTEIN: 235.3 MG/L (ref 0–5.1)
CALCIUM SERPL-MCNC: 9.5 MG/DL (ref 8.3–10.6)
CHLORIDE BLD-SCNC: 100 MMOL/L (ref 99–110)
CO2: 26 MMOL/L (ref 21–32)
CREAT SERPL-MCNC: 0.6 MG/DL (ref 0.6–1.2)
CULTURE SURGICAL: ABNORMAL
EOSINOPHILS ABSOLUTE: 0.3 K/UL (ref 0–0.6)
EOSINOPHILS RELATIVE PERCENT: 2.8 %
GFR AFRICAN AMERICAN: >60
GFR NON-AFRICAN AMERICAN: >60
GLUCOSE BLD-MCNC: 115 MG/DL (ref 70–99)
GRAM STAIN RESULT: ABNORMAL
HCT VFR BLD CALC: 22.6 % (ref 36–48)
HEMOGLOBIN: 7.6 G/DL (ref 12–16)
LYMPHOCYTES ABSOLUTE: 2.6 K/UL (ref 1–5.1)
LYMPHOCYTES RELATIVE PERCENT: 28.6 %
MAGNESIUM: 1.8 MG/DL (ref 1.8–2.4)
MCH RBC QN AUTO: 30.6 PG (ref 26–34)
MCHC RBC AUTO-ENTMCNC: 33.5 G/DL (ref 31–36)
MCV RBC AUTO: 91.3 FL (ref 80–100)
MONOCYTES ABSOLUTE: 0.7 K/UL (ref 0–1.3)
MONOCYTES RELATIVE PERCENT: 8 %
NEUTROPHILS ABSOLUTE: 5.3 K/UL (ref 1.7–7.7)
NEUTROPHILS RELATIVE PERCENT: 59.3 %
ORGANISM: ABNORMAL
PDW BLD-RTO: 13.7 % (ref 12.4–15.4)
PHOSPHORUS: 3.7 MG/DL (ref 2.5–4.9)
PLATELET # BLD: 951 K/UL (ref 135–450)
PMV BLD AUTO: 6.7 FL (ref 5–10.5)
POTASSIUM SERPL-SCNC: 4.2 MMOL/L (ref 3.5–5.1)
PREALBUMIN: 17.2 MG/DL (ref 20–40)
RBC # BLD: 2.47 M/UL (ref 4–5.2)
SODIUM BLD-SCNC: 138 MMOL/L (ref 136–145)
TRANSFERRIN: 201 MG/DL (ref 200–360)
WBC # BLD: 9 K/UL (ref 4–11)
WOUND/ABSCESS: ABNORMAL

## 2019-06-09 PROCEDURE — 94669 MECHANICAL CHEST WALL OSCILL: CPT

## 2019-06-09 PROCEDURE — 84134 ASSAY OF PREALBUMIN: CPT

## 2019-06-09 PROCEDURE — 1200000000 HC SEMI PRIVATE

## 2019-06-09 PROCEDURE — 97116 GAIT TRAINING THERAPY: CPT

## 2019-06-09 PROCEDURE — 6370000000 HC RX 637 (ALT 250 FOR IP): Performed by: INTERNAL MEDICINE

## 2019-06-09 PROCEDURE — 94668 MNPJ CHEST WALL SBSQ: CPT

## 2019-06-09 PROCEDURE — 6360000002 HC RX W HCPCS: Performed by: STUDENT IN AN ORGANIZED HEALTH CARE EDUCATION/TRAINING PROGRAM

## 2019-06-09 PROCEDURE — 94761 N-INVAS EAR/PLS OXIMETRY MLT: CPT

## 2019-06-09 PROCEDURE — 6370000000 HC RX 637 (ALT 250 FOR IP): Performed by: STUDENT IN AN ORGANIZED HEALTH CARE EDUCATION/TRAINING PROGRAM

## 2019-06-09 PROCEDURE — 80069 RENAL FUNCTION PANEL: CPT

## 2019-06-09 PROCEDURE — 84466 ASSAY OF TRANSFERRIN: CPT

## 2019-06-09 PROCEDURE — 83735 ASSAY OF MAGNESIUM: CPT

## 2019-06-09 PROCEDURE — 94667 MNPJ CHEST WALL 1ST: CPT

## 2019-06-09 PROCEDURE — 2580000003 HC RX 258: Performed by: STUDENT IN AN ORGANIZED HEALTH CARE EDUCATION/TRAINING PROGRAM

## 2019-06-09 PROCEDURE — 86140 C-REACTIVE PROTEIN: CPT

## 2019-06-09 PROCEDURE — 2500000003 HC RX 250 WO HCPCS: Performed by: STUDENT IN AN ORGANIZED HEALTH CARE EDUCATION/TRAINING PROGRAM

## 2019-06-09 PROCEDURE — 97530 THERAPEUTIC ACTIVITIES: CPT

## 2019-06-09 PROCEDURE — 99024 POSTOP FOLLOW-UP VISIT: CPT | Performed by: SURGERY

## 2019-06-09 PROCEDURE — 85025 COMPLETE CBC W/AUTO DIFF WBC: CPT

## 2019-06-09 RX ORDER — POLYETHYLENE GLYCOL 3350 17 G/17G
17 POWDER, FOR SOLUTION ORAL DAILY
Status: DISCONTINUED | OUTPATIENT
Start: 2019-06-10 | End: 2019-06-11 | Stop reason: HOSPADM

## 2019-06-09 RX ORDER — MAGNESIUM SULFATE IN WATER 40 MG/ML
2 INJECTION, SOLUTION INTRAVENOUS ONCE
Status: COMPLETED | OUTPATIENT
Start: 2019-06-09 | End: 2019-06-09

## 2019-06-09 RX ADMIN — CLINDAMYCIN PHOSPHATE 600 MG: 600 INJECTION, SOLUTION INTRAVENOUS at 14:55

## 2019-06-09 RX ADMIN — VENLAFAXINE 100 MG: 25 TABLET ORAL at 21:11

## 2019-06-09 RX ADMIN — HYDROMORPHONE HYDROCHLORIDE 1 MG: 1 INJECTION, SOLUTION INTRAMUSCULAR; INTRAVENOUS; SUBCUTANEOUS at 12:34

## 2019-06-09 RX ADMIN — OXYCODONE HYDROCHLORIDE AND ACETAMINOPHEN 2 TABLET: 5; 325 TABLET ORAL at 20:51

## 2019-06-09 RX ADMIN — Medication 10 ML: at 09:42

## 2019-06-09 RX ADMIN — BACITRACIN: 500 OINTMENT TOPICAL at 21:11

## 2019-06-09 RX ADMIN — HYDROMORPHONE HYDROCHLORIDE 1 MG: 1 INJECTION, SOLUTION INTRAMUSCULAR; INTRAVENOUS; SUBCUTANEOUS at 22:36

## 2019-06-09 RX ADMIN — Medication 10 ML: at 21:17

## 2019-06-09 RX ADMIN — CIPROFLOXACIN HYDROCHLORIDE 750 MG: 500 TABLET, FILM COATED ORAL at 21:11

## 2019-06-09 RX ADMIN — OXYCODONE HYDROCHLORIDE AND ACETAMINOPHEN 2 TABLET: 5; 325 TABLET ORAL at 14:56

## 2019-06-09 RX ADMIN — MAGNESIUM SULFATE HEPTAHYDRATE 2 G: 40 INJECTION, SOLUTION INTRAVENOUS at 08:03

## 2019-06-09 RX ADMIN — DOCUSATE SODIUM 100 MG: 100 CAPSULE, LIQUID FILLED ORAL at 09:41

## 2019-06-09 RX ADMIN — ENOXAPARIN SODIUM 40 MG: 40 INJECTION SUBCUTANEOUS at 09:42

## 2019-06-09 RX ADMIN — ONDANSETRON 4 MG: 2 INJECTION INTRAMUSCULAR; INTRAVENOUS at 00:46

## 2019-06-09 RX ADMIN — CLINDAMYCIN PHOSPHATE 600 MG: 600 INJECTION, SOLUTION INTRAVENOUS at 21:11

## 2019-06-09 RX ADMIN — HYDROMORPHONE HYDROCHLORIDE 1 MG: 1 INJECTION, SOLUTION INTRAMUSCULAR; INTRAVENOUS; SUBCUTANEOUS at 15:53

## 2019-06-09 RX ADMIN — CIPROFLOXACIN HYDROCHLORIDE 750 MG: 500 TABLET, FILM COATED ORAL at 09:41

## 2019-06-09 RX ADMIN — VENLAFAXINE 100 MG: 25 TABLET ORAL at 09:41

## 2019-06-09 RX ADMIN — BACITRACIN: 500 OINTMENT TOPICAL at 09:43

## 2019-06-09 RX ADMIN — OXYCODONE HYDROCHLORIDE AND ACETAMINOPHEN 2 TABLET: 5; 325 TABLET ORAL at 00:33

## 2019-06-09 RX ADMIN — CLINDAMYCIN PHOSPHATE 600 MG: 600 INJECTION, SOLUTION INTRAVENOUS at 05:56

## 2019-06-09 RX ADMIN — DOCUSATE SODIUM 100 MG: 100 CAPSULE, LIQUID FILLED ORAL at 21:11

## 2019-06-09 RX ADMIN — BACITRACIN: 500 OINTMENT TOPICAL at 14:56

## 2019-06-09 RX ADMIN — OXYCODONE HYDROCHLORIDE AND ACETAMINOPHEN 2 TABLET: 5; 325 TABLET ORAL at 05:56

## 2019-06-09 ASSESSMENT — PAIN DESCRIPTION - DESCRIPTORS
DESCRIPTORS: ACHING;CONSTANT;DISCOMFORT
DESCRIPTORS: ACHING;CONSTANT;DISCOMFORT
DESCRIPTORS: CONSTANT;ACHING;DISCOMFORT
DESCRIPTORS: ACHING;CONSTANT;DISCOMFORT
DESCRIPTORS: ACHING
DESCRIPTORS: ACHING;CONSTANT;DISCOMFORT

## 2019-06-09 ASSESSMENT — PAIN SCALES - GENERAL
PAINLEVEL_OUTOF10: 10
PAINLEVEL_OUTOF10: 8
PAINLEVEL_OUTOF10: 8
PAINLEVEL_OUTOF10: 5
PAINLEVEL_OUTOF10: 8
PAINLEVEL_OUTOF10: 8
PAINLEVEL_OUTOF10: 4
PAINLEVEL_OUTOF10: 10
PAINLEVEL_OUTOF10: 6
PAINLEVEL_OUTOF10: 0
PAINLEVEL_OUTOF10: 8
PAINLEVEL_OUTOF10: 10
PAINLEVEL_OUTOF10: 6
PAINLEVEL_OUTOF10: 10
PAINLEVEL_OUTOF10: 10

## 2019-06-09 ASSESSMENT — PAIN DESCRIPTION - FREQUENCY
FREQUENCY: CONTINUOUS

## 2019-06-09 ASSESSMENT — PAIN DESCRIPTION - LOCATION
LOCATION: ABDOMEN;PERINEUM
LOCATION: PERINEUM;ABDOMEN
LOCATION: ABDOMEN;PERINEUM
LOCATION: ABDOMEN;PERINEUM
LOCATION: PERINEUM
LOCATION: ABDOMEN;PERINEUM

## 2019-06-09 ASSESSMENT — PAIN DESCRIPTION - ONSET
ONSET: ON-GOING
ONSET: AWAKENED FROM SLEEP
ONSET: ON-GOING
ONSET: AWAKENED FROM SLEEP

## 2019-06-09 ASSESSMENT — PAIN DESCRIPTION - PAIN TYPE
TYPE: ACUTE PAIN;SURGICAL PAIN

## 2019-06-09 ASSESSMENT — PAIN DESCRIPTION - ORIENTATION
ORIENTATION: RIGHT
ORIENTATION: RIGHT
ORIENTATION: RIGHT;MID
ORIENTATION: RIGHT;MID
ORIENTATION: MID;RIGHT
ORIENTATION: RIGHT;MID
ORIENTATION: RIGHT;MID
ORIENTATION: MID;RIGHT

## 2019-06-09 ASSESSMENT — PAIN DESCRIPTION - PROGRESSION
CLINICAL_PROGRESSION: NOT CHANGED
CLINICAL_PROGRESSION: GRADUALLY WORSENING
CLINICAL_PROGRESSION: GRADUALLY WORSENING
CLINICAL_PROGRESSION: NOT CHANGED
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
## 2019-06-09 NOTE — PROGRESS NOTES
[facility] PLASTICS    Patient seen and examined this AM.  Doing well. Pain control has been satisfactory with regimen. BP 99/71   Pulse 80   Temp 98.8 °F (37.1 °C) (Oral)   Resp 17   Ht 5' 6\" (1.676 m)   Wt 129 lb 10.1 oz (58.8 kg)   LMP 07/12/2007   SpO2 96%   BMI 20.92 kg/m²     GEN: NAD  PERINEUM: Incision healing appropriately  Central aspect overlying muscle clean Drain serosang  EXT: Incision c/d/i  No hematoma  Drain serosang    IMP: 63F s/p perineal reconstruction with gracilis flap  PLAN: Doing well. Ok for ambulation today with assistance. Discussed with patient and nursing team not to stretch significantly, but to take short steps. No sitting on the flap. Ok to transfer out of ICU from [facility] standpoint. Disposition planning - patient will likely require facility for assistance upon DC (anticipated early next this upcoming week).     Karin Pereira MD  [address] [place] [address] [address] Reconstructive Surgery  [phone]  06/09/19

## 2019-06-09 NOTE — PROGRESS NOTES
General Surgery   Daily Progress Note    CC: necrotizing fasciitis of the perineum    SUBJECTIVE:    Patient has remained afebrile. Pain controlled. Block removed and patient is voiding without difficulty. Dressings changed and bacitracin reapplied with each void. ROS: A 14 point review of systems was obtained and pertinent positives and negatives were mentioned. Otherwise, no significant history other than as noted in the subjective portion of the note. OBJECTIVE:   Infusions:     I/O:I/O last 3 completed shifts: In: 680 [P.O.:480; I.V.:150; IV Piggyback:50]  Out: 1910 [Urine:1900; Drains:10]           Wt Readings from Last 1 Encounters:   06/09/19 129 lb 10.1 oz (58.8 kg)      LABS:    Recent Labs     06/08/19  0429 06/09/19  0632   WBC 13.0* 9.0   HGB 7.5* 7.6*   HCT 22.4* 22.6*   MCV 89.6 91.3   * 951*      Recent Labs     06/07/19  0400 06/08/19  0429   * 135*   K 4.6 4.3   CL 97* 97*   CO2 25 26   PHOS 4.2 3.9   BUN 21* 26*   CREATININE 0.6 0.6      No results for input(s): AST, ALT, ALB, BILIDIR, BILITOT, ALKPHOS in the last 72 hours. No results for input(s): LIPASE, AMYLASE in the last 72 hours. Recent Labs     06/07/19  0400   INR 1.15*      No results for input(s): CKTOTAL, CKMB, CKMBINDEX, TROPONINI in the last 72 hours.     Exam:  Vitals:    06/09/19 0300 06/09/19 0400 06/09/19 0500 06/09/19 0600   BP: 98/62 (!) 103/59 100/63 104/65   Pulse: 80 77 79 81   Resp: 16 23 20 10   Temp:   98.8 °F (37.1 °C)    TempSrc:   Oral    SpO2:   96%    Weight:    129 lb 10.1 oz (58.8 kg)   Height:         Exam:  General appearance: alert, appears stated age and cooperative  Lungs: clear to auscultation bilaterally, on RA  Heart: regular rate and rhythm  Abdomen: soft, non tender;  incisions clean dry and intact, well approximated, ostomy with flatus and stool in bag, pink, viable, with sloughing present, functioning  Perineum: flap pink, viable, incisions C/D/I, RANDA drains x2 in place with serosanguinous output. Wet to dry dressing in place. : valverde in place - clear yellow urine  Extremities: no edema or cyanosis    ASSESSMENT/PLAN:   This is a 59 y.o. female s/p incision and drainage of perineal area with debridement of necrotizing fasciitis that demonstrated necrotic tissue from right labia to the ischiorectal fossa (5/26/2019) and take back for EUA and debridement on 5.28.19 for a 2nd look. And s/p lap diverting colostomy (5/30/2019) - POD7. S/p EUA, debridement, and [place] placemeent (6/4/19), taken back for 1) Excisional debridement of perineum (15 x 6 x 3 cm), 2) Right gracilis myofascial flap for perineal reconstruction, 3) Complex closure of right labia (8.5 cm) with plastic surgery - POD 2    Leukocytosis - resolved - likely reactive to surgery     - BCx x2 (6/3)-- NGTD, new set sent yesterday    - WCx (6/4)-- light growth E. coli    - Continue clinda per ID, ID on board    Continue general diet with supplements for nutritional optimization    - Nutritional markers:       - Pre-albumin 12.9 (6/3) from 9.6 (5/31)      - Albumin 2.6 (6/3) from 2.6 (5/31)      - Transferrin 153 (6/3) from 118 (5/31)  - f/u repeat labs today     Wound      - s/p gracilis flap reconstruction (gracillis flap) yesterday POD2     - wound management per plastic surgery     - ambulation per plastics recs    Faisal Samuels MD PGY-1  General Surgery Resident  06/09/19  7:04 AM  [phone]      I am post-call and off-campus today.  If you have any questions or concerns regarding this patient's care today, please page:    Roman Lee MD PGY-1 [phone]

## 2019-06-09 NOTE — PLAN OF CARE
Problem: Pain:  Goal: Pain level will decrease  Description  Pain level will decrease  6/9/2019 1142 by Harris Alberto RN  Outcome: Ongoing  Note:   Pt pain level has been manageable this AM.  Receiving PRN pain meds. Pt satisfied with pain control. Will monitor. 6/9/2019 0440 by Juni Rasmussen RN  Note:   Pt has complaints of pain throughout the night. Treated with PRN pain medication. Pt also had complaints of leg cramp/spasm, surgical resident made aware and ordered one time dose of valium. Pt tolerated both pain medicine and valium well. Pt has been resting well throughout the night. Problem: Falls - Risk of:  Goal: Will remain free from falls  Description  Patient has been free from falls and injuries this shift. Woodard fall risk assessed each shift. Bed locked in lowest position, alarm engaged. Fall bracelet in place, fall sign present outside door, fall socks present on patient. Patients call light within reach. Patient educated on use of call light. Room free of clutter. Patient has made no attempts to get out of bed. Will continue to round and assess needs. 6/9/2019 1142 by Harris Alberto RN  Outcome: Ongoing  Note:   Pt at risk for falls. Fall protocol in place. Bed locked and in lowest position. Bed alarm engaged. Call light within reach. 6/9/2019 0440 by Juni Rasmussen RN  Note:   Pt remains free of falls at this time. Fall precautions in place, non skid socks on, 3/4 side rails up, bed alarm on, call light and side table within reach. Bed is in lowest locked position. Fall signs posted. Pt instructed to use call button if needing assistance. Will cont to monitor. Problem: Risk for Impaired Skin Integrity  Goal: Tissue integrity - skin and mucous membranes  Description  Patient's skin assessed q4h hours and prn. Patient turns self prn. Skin is kept clean and dry. Preventative dressings have been applied. Sacral heart placed and assessed to be CDI.  Pavel scale is assessed and documented each shift. Pt family is educated on importance of frequent repositioning and assessment. Patient has no skin integrity issues besides the surgical site being seen and cared for by the surgical team. Patient's skin integrity maintained throughout shift. 6/9/2019 1142 by Colton Esquivel RN  Outcome: Ongoing  6/9/2019 0440 by Mikey Landa RN  Note:   Pt at risk for skin break down d/t decreased mobility and pressure. Pt being instructed to turn self or assisted to turn q2h. Pillow support used during turning. Sacral heart in place. Skin kept clean and dry. Perineal area being cleansed after each voiding occurrence. New wet to dry dressing placed in wound and bacitracin placed on incision. Problem: Nutrition  Goal: Optimal nutrition therapy  Outcome: Ongoing  Note:   Pt on calorie count. Loss of appetite. Pt consumed 100% of 40g protein shake. Moderate amount of meal consumed. Meal ticket marked with percentage and placed in folder on door. Educated pt on importance of calorie intake for wound healing. Pt verbalized understanding. Problem: Nausea/Vomiting:  Goal: Absence of nausea/vomiting  Description  Absence of nausea/vomiting  Outcome: Ongoing  Note:   Pt denies nausea and vomiting at this time. Will monitor. Problem: Infection - Methicillin-Resistant Staphylococcus Aureus Infection:  Goal: Absence of methicillin-resistant Staphylococcus aureus infection  Description  Absence of methicillin-resistant Staphylococcus aureus infection  Outcome: Ongoing  Note:   Pt in contact for MRSA in wound. Contact precautions in place.

## 2019-06-09 NOTE — PLAN OF CARE
Problem: Pain:  Goal: Pain level will decrease  Description  Pain level will decrease  Note:   Pt has complaints of pain throughout the night. Treated with PRN pain medication. Pt also had complaints of leg cramp/spasm, surgical resident made aware and ordered one time dose of valium. Pt tolerated both pain medicine and valium well. Pt has been resting well throughout the night. Problem: Falls - Risk of:  Goal: Will remain free from falls  Description  Patient has been free from falls and injuries this shift. Woodard fall risk assessed each shift. Bed locked in lowest position, alarm engaged. Fall bracelet in place, fall sign present outside door, fall socks present on patient. Patients call light within reach. Patient educated on use of call light. Room free of clutter. Patient has made no attempts to get out of bed. Will continue to round and assess needs. Note:   Pt remains free of falls at this time. Fall precautions in place, non skid socks on, 3/4 side rails up, bed alarm on, call light and side table within reach. Bed is in lowest locked position. Fall signs posted. Pt instructed to use call button if needing assistance. Will cont to monitor. Problem: Risk for Impaired Skin Integrity  Goal: Tissue integrity - skin and mucous membranes  Description  Patient's skin assessed q4h hours and prn. Patient turns self prn. Skin is kept clean and dry. Preventative dressings have been applied. Sacral heart placed and assessed to be CDI. Pavel scale is assessed and documented each shift. Pt family is educated on importance of frequent repositioning and assessment. Patient has no skin integrity issues besides the surgical site being seen and cared for by the surgical team. Patient's skin integrity maintained throughout shift. Note:   Pt at risk for skin break down d/t decreased mobility and pressure. Pt being instructed to turn self or assisted to turn q2h.     Pillow support used during turning. Sacral heart in place. Skin kept clean and dry. Perineal area being cleansed after each voiding occurrence. New wet to dry dressing placed in wound and bacitracin placed on incision.

## 2019-06-10 LAB
ALBUMIN SERPL-MCNC: 3.3 G/DL (ref 3.4–5)
ANION GAP SERPL CALCULATED.3IONS-SCNC: 11 MMOL/L (ref 3–16)
BASOPHILS ABSOLUTE: 0.1 K/UL (ref 0–0.2)
BASOPHILS RELATIVE PERCENT: 1.4 %
BUN BLDV-MCNC: 21 MG/DL (ref 7–20)
CALCIUM SERPL-MCNC: 9.5 MG/DL (ref 8.3–10.6)
CHLORIDE BLD-SCNC: 100 MMOL/L (ref 99–110)
CO2: 26 MMOL/L (ref 21–32)
CREAT SERPL-MCNC: 0.5 MG/DL (ref 0.6–1.2)
EOSINOPHILS ABSOLUTE: 0.3 K/UL (ref 0–0.6)
EOSINOPHILS RELATIVE PERCENT: 4.2 %
GFR AFRICAN AMERICAN: >60
GFR NON-AFRICAN AMERICAN: >60
GLUCOSE BLD-MCNC: 109 MG/DL (ref 70–99)
HCT VFR BLD CALC: 22.3 % (ref 36–48)
HEMOGLOBIN: 7.4 G/DL (ref 12–16)
LYMPHOCYTES ABSOLUTE: 2.1 K/UL (ref 1–5.1)
LYMPHOCYTES RELATIVE PERCENT: 29.3 %
MAGNESIUM: 1.7 MG/DL (ref 1.8–2.4)
MCH RBC QN AUTO: 29.7 PG (ref 26–34)
MCHC RBC AUTO-ENTMCNC: 32.9 G/DL (ref 31–36)
MCV RBC AUTO: 90.1 FL (ref 80–100)
MONOCYTES ABSOLUTE: 0.7 K/UL (ref 0–1.3)
MONOCYTES RELATIVE PERCENT: 9.3 %
NEUTROPHILS ABSOLUTE: 4 K/UL (ref 1.7–7.7)
NEUTROPHILS RELATIVE PERCENT: 55.8 %
PDW BLD-RTO: 13.9 % (ref 12.4–15.4)
PHOSPHORUS: 4.1 MG/DL (ref 2.5–4.9)
PLATELET # BLD: 948 K/UL (ref 135–450)
PMV BLD AUTO: 6.7 FL (ref 5–10.5)
POTASSIUM SERPL-SCNC: 4.1 MMOL/L (ref 3.5–5.1)
RBC # BLD: 2.48 M/UL (ref 4–5.2)
SODIUM BLD-SCNC: 137 MMOL/L (ref 136–145)
URINE CULTURE, ROUTINE: NORMAL
WBC # BLD: 7.2 K/UL (ref 4–11)

## 2019-06-10 PROCEDURE — 97530 THERAPEUTIC ACTIVITIES: CPT

## 2019-06-10 PROCEDURE — 80069 RENAL FUNCTION PANEL: CPT

## 2019-06-10 PROCEDURE — 94669 MECHANICAL CHEST WALL OSCILL: CPT

## 2019-06-10 PROCEDURE — 6370000000 HC RX 637 (ALT 250 FOR IP): Performed by: STUDENT IN AN ORGANIZED HEALTH CARE EDUCATION/TRAINING PROGRAM

## 2019-06-10 PROCEDURE — 6360000002 HC RX W HCPCS: Performed by: SURGERY

## 2019-06-10 PROCEDURE — 2500000003 HC RX 250 WO HCPCS: Performed by: STUDENT IN AN ORGANIZED HEALTH CARE EDUCATION/TRAINING PROGRAM

## 2019-06-10 PROCEDURE — 99233 SBSQ HOSP IP/OBS HIGH 50: CPT | Performed by: INTERNAL MEDICINE

## 2019-06-10 PROCEDURE — 94668 MNPJ CHEST WALL SBSQ: CPT

## 2019-06-10 PROCEDURE — 6370000000 HC RX 637 (ALT 250 FOR IP): Performed by: INTERNAL MEDICINE

## 2019-06-10 PROCEDURE — 6360000002 HC RX W HCPCS: Performed by: STUDENT IN AN ORGANIZED HEALTH CARE EDUCATION/TRAINING PROGRAM

## 2019-06-10 PROCEDURE — 83735 ASSAY OF MAGNESIUM: CPT

## 2019-06-10 PROCEDURE — 97535 SELF CARE MNGMENT TRAINING: CPT

## 2019-06-10 PROCEDURE — 2580000003 HC RX 258: Performed by: STUDENT IN AN ORGANIZED HEALTH CARE EDUCATION/TRAINING PROGRAM

## 2019-06-10 PROCEDURE — 94761 N-INVAS EAR/PLS OXIMETRY MLT: CPT

## 2019-06-10 PROCEDURE — 1200000000 HC SEMI PRIVATE

## 2019-06-10 PROCEDURE — 85025 COMPLETE CBC W/AUTO DIFF WBC: CPT

## 2019-06-10 RX ORDER — MAGNESIUM SULFATE IN WATER 40 MG/ML
2 INJECTION, SOLUTION INTRAVENOUS ONCE
Status: COMPLETED | OUTPATIENT
Start: 2019-06-10 | End: 2019-06-10

## 2019-06-10 RX ORDER — SODIUM CHLORIDE 9 MG/ML
INJECTION, SOLUTION INTRAVENOUS
Status: DISPENSED
Start: 2019-06-10 | End: 2019-06-11

## 2019-06-10 RX ADMIN — MAGNESIUM SULFATE HEPTAHYDRATE 2 G: 40 INJECTION, SOLUTION INTRAVENOUS at 06:12

## 2019-06-10 RX ADMIN — BACITRACIN: 500 OINTMENT TOPICAL at 13:00

## 2019-06-10 RX ADMIN — VENLAFAXINE 100 MG: 25 TABLET ORAL at 10:19

## 2019-06-10 RX ADMIN — OXYCODONE HYDROCHLORIDE AND ACETAMINOPHEN 2 TABLET: 5; 325 TABLET ORAL at 10:19

## 2019-06-10 RX ADMIN — HYDROMORPHONE HYDROCHLORIDE 1 MG: 1 INJECTION, SOLUTION INTRAMUSCULAR; INTRAVENOUS; SUBCUTANEOUS at 19:42

## 2019-06-10 RX ADMIN — DOCUSATE SODIUM 100 MG: 100 CAPSULE, LIQUID FILLED ORAL at 22:38

## 2019-06-10 RX ADMIN — CLINDAMYCIN PHOSPHATE 600 MG: 600 INJECTION, SOLUTION INTRAVENOUS at 22:36

## 2019-06-10 RX ADMIN — POLYETHYLENE GLYCOL (3350) 17 G: 17 POWDER, FOR SOLUTION ORAL at 10:18

## 2019-06-10 RX ADMIN — OXYCODONE HYDROCHLORIDE AND ACETAMINOPHEN 2 TABLET: 5; 325 TABLET ORAL at 06:27

## 2019-06-10 RX ADMIN — DOCUSATE SODIUM 100 MG: 100 CAPSULE, LIQUID FILLED ORAL at 10:19

## 2019-06-10 RX ADMIN — CIPROFLOXACIN HYDROCHLORIDE 750 MG: 500 TABLET, FILM COATED ORAL at 10:19

## 2019-06-10 RX ADMIN — BACITRACIN: 500 OINTMENT TOPICAL at 10:21

## 2019-06-10 RX ADMIN — OXYCODONE HYDROCHLORIDE AND ACETAMINOPHEN 2 TABLET: 5; 325 TABLET ORAL at 17:52

## 2019-06-10 RX ADMIN — CLINDAMYCIN PHOSPHATE 600 MG: 600 INJECTION, SOLUTION INTRAVENOUS at 06:11

## 2019-06-10 RX ADMIN — HYDROMORPHONE HYDROCHLORIDE 1 MG: 1 INJECTION, SOLUTION INTRAMUSCULAR; INTRAVENOUS; SUBCUTANEOUS at 04:27

## 2019-06-10 RX ADMIN — OXYCODONE HYDROCHLORIDE AND ACETAMINOPHEN 2 TABLET: 5; 325 TABLET ORAL at 22:39

## 2019-06-10 RX ADMIN — CLINDAMYCIN PHOSPHATE 600 MG: 600 INJECTION, SOLUTION INTRAVENOUS at 15:57

## 2019-06-10 RX ADMIN — BACITRACIN: 500 OINTMENT TOPICAL at 22:39

## 2019-06-10 RX ADMIN — CIPROFLOXACIN HYDROCHLORIDE 750 MG: 500 TABLET, FILM COATED ORAL at 22:37

## 2019-06-10 RX ADMIN — HYDROMORPHONE HYDROCHLORIDE 1 MG: 1 INJECTION, SOLUTION INTRAMUSCULAR; INTRAVENOUS; SUBCUTANEOUS at 15:49

## 2019-06-10 RX ADMIN — OXYCODONE HYDROCHLORIDE AND ACETAMINOPHEN 2 TABLET: 5; 325 TABLET ORAL at 00:41

## 2019-06-10 RX ADMIN — Medication 10 ML: at 10:22

## 2019-06-10 RX ADMIN — ONDANSETRON 4 MG: 2 INJECTION INTRAMUSCULAR; INTRAVENOUS at 19:22

## 2019-06-10 RX ADMIN — VENLAFAXINE 100 MG: 25 TABLET ORAL at 22:38

## 2019-06-10 RX ADMIN — ENOXAPARIN SODIUM 40 MG: 40 INJECTION SUBCUTANEOUS at 10:18

## 2019-06-10 ASSESSMENT — PAIN SCALES - GENERAL
PAINLEVEL_OUTOF10: 7
PAINLEVEL_OUTOF10: 10
PAINLEVEL_OUTOF10: 7
PAINLEVEL_OUTOF10: 8
PAINLEVEL_OUTOF10: 4
PAINLEVEL_OUTOF10: 0
PAINLEVEL_OUTOF10: 8
PAINLEVEL_OUTOF10: 10
PAINLEVEL_OUTOF10: 0
PAINLEVEL_OUTOF10: 8
PAINLEVEL_OUTOF10: 8

## 2019-06-10 ASSESSMENT — PAIN DESCRIPTION - FREQUENCY
FREQUENCY: CONTINUOUS
FREQUENCY: INTERMITTENT

## 2019-06-10 ASSESSMENT — PAIN DESCRIPTION - PROGRESSION
CLINICAL_PROGRESSION: NOT CHANGED

## 2019-06-10 ASSESSMENT — PAIN DESCRIPTION - LOCATION
LOCATION: ABDOMEN;PERINEUM
LOCATION: ABDOMEN;INCISION;PERINEUM
LOCATION: ABDOMEN;PERINEUM

## 2019-06-10 ASSESSMENT — PAIN DESCRIPTION - ONSET
ONSET: ON-GOING
ONSET: ON-GOING
ONSET: AWAKENED FROM SLEEP
ONSET: ON-GOING

## 2019-06-10 ASSESSMENT — PAIN DESCRIPTION - ORIENTATION
ORIENTATION: RIGHT
ORIENTATION: RIGHT
ORIENTATION: RIGHT;MID
ORIENTATION: RIGHT
ORIENTATION: MID;RIGHT
ORIENTATION: RIGHT;MID

## 2019-06-10 ASSESSMENT — PAIN - FUNCTIONAL ASSESSMENT
PAIN_FUNCTIONAL_ASSESSMENT: PREVENTS OR INTERFERES SOME ACTIVE ACTIVITIES AND ADLS
PAIN_FUNCTIONAL_ASSESSMENT: PREVENTS OR INTERFERES SOME ACTIVE ACTIVITIES AND ADLS
PAIN_FUNCTIONAL_ASSESSMENT: PREVENTS OR INTERFERES WITH MANY ACTIVE NOT PASSIVE ACTIVITIES
PAIN_FUNCTIONAL_ASSESSMENT: PREVENTS OR INTERFERES SOME ACTIVE ACTIVITIES AND ADLS

## 2019-06-10 ASSESSMENT — PAIN DESCRIPTION - PAIN TYPE
TYPE: ACUTE PAIN;SURGICAL PAIN

## 2019-06-10 ASSESSMENT — PAIN DESCRIPTION - DESCRIPTORS
DESCRIPTORS: ACHING
DESCRIPTORS: ACHING;CONSTANT
DESCRIPTORS: ACHING
DESCRIPTORS: ACHING;CONSTANT;DISCOMFORT
DESCRIPTORS: ACHING;CONSTANT;DISCOMFORT
DESCRIPTORS: ACHING

## 2019-06-10 ASSESSMENT — PAIN DESCRIPTION - DIRECTION
RADIATING_TOWARDS: DENIES

## 2019-06-10 NOTE — PROGRESS NOTES
Nutrition Assessment    Type and Reason for Visit: Reassess    Nutrition Recommendations:   · Encourage three meals daily w/ protein source at each  · ONS per pt choice between meals    · Monitor weight status  · DC calorie count     Nutrition Assessment: Pt is improving from nutritional standpoint w/ improving PO intake. Calorie count complete which reported adequate (kcal and Pro) intake 5/8-9, met protein needs but not kcal goal on 5/7 although pt was NPO for OR in the am. Pt remains at nutritional compromise r/t increased nutrition needs w/ wound and wound vac and unintended wt loss (10% loss in 15 days). Encouraged to continue consuming three meals daily w/ protein source at each and protein ONS between meals. Malnutrition Assessment:  · Malnutrition Status: At risk for malnutrition  · Context: Acute illness or injury  · Findings of the 6 clinical characteristics of malnutrition (Minimum of 2 out of 6 clinical characteristics is required to make the diagnosis of moderate or severe Protein Calorie Malnutrition based on AND/ASPEN Guidelines):  1. Energy Intake-Greater than 75% of estimated energy requirement  2. Weight Loss-10% loss or greater, (in 15 days)  3. Fat Loss-No significant subcutaneous fat loss, Orbital, Triceps, Fat overlying the ribs  4. Muscle Loss-No significant muscle mass loss, Temples (temporalis muscle), Clavicles (pectoralis and deltoids), Calf (gastrocnemius)  5. Fluid Accumulation-No significant fluid accumulation,    6.  Strength-Not measured    Nutrition Risk Level:  Moderate    Nutrient Needs:  · Estimated Daily Total Kcal: 7995-3401 kcal (25-30)  · Estimated Daily Protein (g):  grams (1.3-1.5)  · Estimated Daily Total Fluid (ml/day): 1900mL    Nutrition Diagnosis:   · Problem: Increased nutrient needs  · Etiology: related to Increased demand for energy/nutrients     Signs and symptoms:  as evidenced by Presence of wounds    Objective Information:  · Nutrition-Focused
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
## 2019-06-10 NOTE — CARE COORDINATION
Case Management Notes:     Spoke with CM at [facility] and patient has been approved to transfer to SNF tomorrow.   Ref#  [id number]  :  Pricila Barbosa  Tele:  [phone] x [phone]  Fax:  [phone]  Approved 6/11 through 6/17 (dc 6/18 or provide further clinical review)      Jerel Cook RN, BSN  [facility]  Case Management Department  Ph: [phone]

## 2019-06-10 NOTE — PROGRESS NOTES
ID Follow-up NOTE    CC:   Necrotizing soft tissue infection  Antibiotics: Clindamycin. Ciprofloxacin    Admit Date: 5/26/2019  Hospital Day: 16    Subjective:     Patient c/o pain at surg site;, not as severe as 6/7    Objective:     Afebrile last 24 hr    Patient Vitals for the past 8 hrs:   BP Temp Temp src Pulse Resp SpO2   06/10/19 1600 113/66 98.7 °F (37.1 °C) Oral 90 18 96 %   06/10/19 1400 91/60 -- -- 86 20 --   06/10/19 1321 -- -- -- -- 17 --   06/10/19 1200 99/64 -- -- 91 12 --   06/10/19 1100 100/60 -- -- 75 15 --   06/10/19 1000 110/67 -- -- 71 13 --   06/10/19 0947 -- -- -- -- 12 92 %     I/O last 3 completed shifts: In: 170 [P.O.:120; IV Piggyback:50]  Out: 680 [Urine:675; Drains:5]  No intake/output data recorded. EXAM:  GENERAL: No apparent distress.     HEENT: Membranes moist, no oral lesion  NECK:  Supple  LUNGS: Clear b/l, no rales, no dullness  CARDIAC: RRR, no murmur appreciated  ABD:  + BS, soft / NT; colostomy in place with stool output  EXT:  No rash, no edema, no lesions  NEURO: No focal neurologic findings  PSYCH: Orientation, sensorium, mood normal  LINES:  R IJ line in place    Data Review:  Lab Results   Component Value Date    WBC 7.2 06/10/2019    HGB 7.4 (L) 06/10/2019    HCT 22.3 (L) 06/10/2019    MCV 90.1 06/10/2019     (H) 06/10/2019     Lab Results   Component Value Date    CREATININE 0.5 (L) 06/10/2019    BUN 21 (H) 06/10/2019     06/10/2019    K 4.1 06/10/2019     06/10/2019    CO2 26 06/10/2019       Hepatic Function Panel:   Lab Results   Component Value Date    ALKPHOS 108 06/04/2013    ALT 54 06/04/2013    AST 40 06/04/2013    PROT 6.3 06/04/2013    BILITOT 0.4 06/04/2013    LABALBU 3.3 06/10/2019       Micro:  6/7 Tissue cult - rare growth E coli  6/7 Tissue rare Enterococcus, light E coli    5/29 C diff neg    5/26 Tissue #1: GS 1+WBC, 3+GPC; cult light MRSA  5/26 Tissue #2: GS no WBC, 1+GPC; cult - light MRSA, no anaerobes isolated to date  Staph Taken to OR, had necrotic tissue debrided. Return to OR 5/29 - wound 18 x 5 x 15 cm  Colostomy 5/30  OR 6/4 - further excisional debridement, instill VAC placed. Culture light GBS, E faecalis, E coli    OR 6/7 -  further excisional debridement, R gracilis myofascial flap and complex closure    IMP/  Hx chronic back pain, depression, recurrent HSV, neck surg  Fall     R buttock / perineum infection -  + necrotizing ST infection, cult + MRSA.   F/u cult with E coli, Enterococcus  Fever - resolved  Leukocytosis - resolved     Plan:     Cont Clindamycin + Ciprofloxacin   Wound care and per Surg     Discharge on po clindamycin 300 tid + ciprofloxacin 750 bid x 7 days     Discussed with pt  Rad Kumar MD

## 2019-06-10 NOTE — PROGRESS NOTES
Plastic Surgery   Daily Progress Note    CC: necrotizing fasciitis of the perineum    SUBJECTIVE:  No acute events overnight. Pain controlled, no nausea or emesis, tolerating diet. Up ambulating yesterday. ROS: A 14 point review of systems was obtained and pertinent positives and negatives were mentioned. Otherwise, no significant history other than as noted in the subjective portion of the note. OBJECTIVE:   Infusions:     I/O:I/O last 3 completed shifts: In: 290 [P.O.:240; IV Piggyback:50]  Out: 3330 [Urine:1250; Drains:25]           Wt Readings from Last 1 Encounters:   06/10/19 126 lb 12.2 oz (57.5 kg)      LABS:    Recent Labs     06/09/19  0632 06/10/19  0412   WBC 9.0 7.2   HGB 7.6* 7.4*   HCT 22.6* 22.3*   MCV 91.3 90.1   * 948*      Recent Labs     06/09/19  0632 06/10/19  0412    137   K 4.2 4.1    100   CO2 26 26   PHOS 3.7 4.1   BUN 20 21*   CREATININE 0.6 0.5*      No results for input(s): AST, ALT, ALB, BILIDIR, BILITOT, ALKPHOS in the last 72 hours. No results for input(s): LIPASE, AMYLASE in the last 72 hours. No results for input(s): PROT, INR, APTT in the last 72 hours. No results for input(s): CKTOTAL, CKMB, CKMBINDEX, TROPONINI in the last 72 hours. Exam:  Vitals:    06/10/19 0300 06/10/19 0400 06/10/19 0430 06/10/19 0500   BP: 105/63 99/64  118/73   Pulse: 71 70  82   Resp: 12 13  14   Temp:  98.6 °F (37 °C)     TempSrc:  Oral     SpO2:  96%  100%   Weight:   126 lb 12.2 oz (57.5 kg)    Height:         Exam:  General appearance: alert, appears stated age and cooperative  Lungs: clear to auscultation bilaterally, on RA  Heart: regular rate and rhythm  Abdomen: soft, non tender;  incisions clean dry and intact, well approximated, ostomy with flatus and stool in bag, pink, viable, with sloughing present, functioning  Perineum: flap pink, viable, incisions C/D/I, RANDA drains x2 in place with serosanguinous output. Wet to dry dressing in place.    Extremities: Right thigh edema just superior to incision site - stable, no edema or cyanosis    ASSESSMENT/PLAN:   This is a 59 y.o. female s/p incision and drainage of perineal area with debridement of necrotizing fasciitis that demonstrated necrotic tissue from right labia to the ischiorectal fossa (5/26/2019) and take back for EUA and debridement on 5.28.19 for a 2nd look. And s/p lap diverting colostomy (5/30/2019) - POD 8.  S/p EUA, debridement, and W[place] placemeent (6/4/19), taken back for 1) Excisional debridement of perineum (15 x 6 x 3 cm), 2) Right gracilis myofascial flap for perineal reconstruction, 3) Complex closure of right labia (8.5 cm) with plastic surgery - POD 3    Leukocytosis resolved  Ok for ambulation, no sitting/stretching flap  Dressing changes and bacitracin to wound per nursing staff BID  Ok to transfer out of ICU  dispo planning - likely facility, will discuss w/ SW  PTOT: 18/24    Layo Shi MD   Gen Surg PGY 1  6/10/2019  6:41 AM  [phone]
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
## 2019-06-10 NOTE — PROGRESS NOTES
General Surgery   Daily Progress Note    CC: necrotizing fasciitis of the perineum    SUBJECTIVE:    No acute event overnight. Afebrile, hemodynamically stable. Tolerates diet, no nausea or vomiting. Ambulating. ROS: A 14 point review of systems was obtained and pertinent positives and negatives were mentioned. Otherwise, no significant history other than as noted in the subjective portion of the note. OBJECTIVE:   Infusions:     I/O:I/O last 3 completed shifts: In: 290 [P.O.:240; IV Piggyback:50]  Out: 4745 [Urine:1250; Drains:25]           Wt Readings from Last 1 Encounters:   06/10/19 126 lb 12.2 oz (57.5 kg)      LABS:    Recent Labs     06/09/19  0632 06/10/19  0412   WBC 9.0 7.2   HGB 7.6* 7.4*   HCT 22.6* 22.3*   MCV 91.3 90.1   * 948*      Recent Labs     06/09/19  0632 06/10/19  0412    137   K 4.2 4.1    100   CO2 26 26   PHOS 3.7 4.1   BUN 20 21*   CREATININE 0.6 0.5*      No results for input(s): AST, ALT, ALB, BILIDIR, BILITOT, ALKPHOS in the last 72 hours. No results for input(s): LIPASE, AMYLASE in the last 72 hours. No results for input(s): PROT, INR, APTT in the last 72 hours. No results for input(s): CKTOTAL, CKMB, CKMBINDEX, TROPONINI in the last 72 hours. Exam:  Vitals:    06/10/19 0400 06/10/19 0430 06/10/19 0500 06/10/19 0557   BP: 99/64  118/73    Pulse: 70  82    Resp: 13  14 17   Temp: 98.6 °F (37 °C)      TempSrc: Oral      SpO2: 96%  100% 93%   Weight:  126 lb 12.2 oz (57.5 kg)     Height:         Exam:  General appearance: alert, appears stated age and cooperative  Lungs: clear to auscultation bilaterally, on RA  Heart: regular rate and rhythm  Abdomen: soft, non tender;  incisions clean dry and intact, well approximated, ostomy with flatus and stool in bag, pink, viable, with sloughing present, functioning  Perineum: flap pink, viable, incisions C/D/I, RANDA drains x2 in place with serosanguinous output. Wet to dry dressing in place.    Extremities: no

## 2019-06-11 VITALS
HEART RATE: 79 BPM | SYSTOLIC BLOOD PRESSURE: 108 MMHG | WEIGHT: 126.5 LBS | HEIGHT: 66 IN | BODY MASS INDEX: 20.33 KG/M2 | DIASTOLIC BLOOD PRESSURE: 65 MMHG | TEMPERATURE: 98.2 F | OXYGEN SATURATION: 95 % | RESPIRATION RATE: 18 BRPM

## 2019-06-11 LAB
ALBUMIN SERPL-MCNC: 3.2 G/DL (ref 3.4–5)
ANION GAP SERPL CALCULATED.3IONS-SCNC: 13 MMOL/L (ref 3–16)
BASOPHILS ABSOLUTE: 0.2 K/UL (ref 0–0.2)
BASOPHILS RELATIVE PERCENT: 3 %
BUN BLDV-MCNC: 19 MG/DL (ref 7–20)
CALCIUM SERPL-MCNC: 9.3 MG/DL (ref 8.3–10.6)
CHLORIDE BLD-SCNC: 101 MMOL/L (ref 99–110)
CO2: 24 MMOL/L (ref 21–32)
CREAT SERPL-MCNC: 0.6 MG/DL (ref 0.6–1.2)
EOSINOPHILS ABSOLUTE: 0.3 K/UL (ref 0–0.6)
EOSINOPHILS RELATIVE PERCENT: 4.5 %
GFR AFRICAN AMERICAN: >60
GFR NON-AFRICAN AMERICAN: >60
GLUCOSE BLD-MCNC: 112 MG/DL (ref 70–99)
HCT VFR BLD CALC: 22.7 % (ref 36–48)
HEMOGLOBIN: 7.4 G/DL (ref 12–16)
LYMPHOCYTES ABSOLUTE: 2 K/UL (ref 1–5.1)
LYMPHOCYTES RELATIVE PERCENT: 27.5 %
MAGNESIUM: 1.8 MG/DL (ref 1.8–2.4)
MCH RBC QN AUTO: 29.9 PG (ref 26–34)
MCHC RBC AUTO-ENTMCNC: 32.8 G/DL (ref 31–36)
MCV RBC AUTO: 91.2 FL (ref 80–100)
MONOCYTES ABSOLUTE: 0.5 K/UL (ref 0–1.3)
MONOCYTES RELATIVE PERCENT: 6.8 %
NEUTROPHILS ABSOLUTE: 4.3 K/UL (ref 1.7–7.7)
NEUTROPHILS RELATIVE PERCENT: 58.2 %
PDW BLD-RTO: 14.2 % (ref 12.4–15.4)
PHOSPHORUS: 4.1 MG/DL (ref 2.5–4.9)
PLATELET # BLD: 877 K/UL (ref 135–450)
PMV BLD AUTO: 6.6 FL (ref 5–10.5)
POTASSIUM SERPL-SCNC: 4.1 MMOL/L (ref 3.5–5.1)
RBC # BLD: 2.49 M/UL (ref 4–5.2)
SODIUM BLD-SCNC: 138 MMOL/L (ref 136–145)
WBC # BLD: 7.3 K/UL (ref 4–11)

## 2019-06-11 PROCEDURE — 6360000002 HC RX W HCPCS: Performed by: STUDENT IN AN ORGANIZED HEALTH CARE EDUCATION/TRAINING PROGRAM

## 2019-06-11 PROCEDURE — 2580000003 HC RX 258: Performed by: STUDENT IN AN ORGANIZED HEALTH CARE EDUCATION/TRAINING PROGRAM

## 2019-06-11 PROCEDURE — 99232 SBSQ HOSP IP/OBS MODERATE 35: CPT | Performed by: INTERNAL MEDICINE

## 2019-06-11 PROCEDURE — 83735 ASSAY OF MAGNESIUM: CPT

## 2019-06-11 PROCEDURE — 6370000000 HC RX 637 (ALT 250 FOR IP): Performed by: STUDENT IN AN ORGANIZED HEALTH CARE EDUCATION/TRAINING PROGRAM

## 2019-06-11 PROCEDURE — 6370000000 HC RX 637 (ALT 250 FOR IP): Performed by: SURGERY

## 2019-06-11 PROCEDURE — 36415 COLL VENOUS BLD VENIPUNCTURE: CPT

## 2019-06-11 PROCEDURE — 80069 RENAL FUNCTION PANEL: CPT

## 2019-06-11 PROCEDURE — 2500000003 HC RX 250 WO HCPCS: Performed by: STUDENT IN AN ORGANIZED HEALTH CARE EDUCATION/TRAINING PROGRAM

## 2019-06-11 PROCEDURE — 85025 COMPLETE CBC W/AUTO DIFF WBC: CPT

## 2019-06-11 PROCEDURE — 6370000000 HC RX 637 (ALT 250 FOR IP): Performed by: INTERNAL MEDICINE

## 2019-06-11 RX ORDER — PSEUDOEPHEDRINE HCL 30 MG
100 TABLET ORAL 2 TIMES DAILY
Qty: 60 CAPSULE | Refills: 1 | Status: SHIPPED | OUTPATIENT
Start: 2019-06-11

## 2019-06-11 RX ORDER — GINSENG 100 MG
CAPSULE ORAL
Qty: 1 TUBE | Refills: 3 | Status: SHIPPED | OUTPATIENT
Start: 2019-06-11 | End: 2019-06-21

## 2019-06-11 RX ORDER — OXYCODONE HYDROCHLORIDE 5 MG/1
5 TABLET ORAL EVERY 8 HOURS PRN
Qty: 28 TABLET | Refills: 0 | Status: SHIPPED | OUTPATIENT
Start: 2019-06-11 | End: 2019-06-18

## 2019-06-11 RX ORDER — CLINDAMYCIN HYDROCHLORIDE 150 MG/1
300 CAPSULE ORAL 3 TIMES DAILY
Qty: 42 CAPSULE | Refills: 0 | Status: SHIPPED | OUTPATIENT
Start: 2019-06-11 | End: 2019-06-11 | Stop reason: SDUPTHER

## 2019-06-11 RX ORDER — POLYETHYLENE GLYCOL 3350 17 G/17G
17 POWDER, FOR SOLUTION ORAL DAILY
Qty: 30 EACH | Refills: 0 | Status: SHIPPED | OUTPATIENT
Start: 2019-06-11 | End: 2019-07-11

## 2019-06-11 RX ORDER — CIPROFLOXACIN 750 MG/1
750 TABLET, FILM COATED ORAL EVERY 12 HOURS SCHEDULED
Qty: 14 TABLET | Refills: 0 | Status: SHIPPED | OUTPATIENT
Start: 2019-06-11 | End: 2019-06-18

## 2019-06-11 RX ORDER — CLINDAMYCIN HYDROCHLORIDE 150 MG/1
300 CAPSULE ORAL 3 TIMES DAILY
Qty: 42 CAPSULE | Refills: 0 | Status: SHIPPED | OUTPATIENT
Start: 2019-06-11 | End: 2019-06-18

## 2019-06-11 RX ADMIN — CIPROFLOXACIN HYDROCHLORIDE 750 MG: 500 TABLET, FILM COATED ORAL at 09:19

## 2019-06-11 RX ADMIN — BACITRACIN: 500 OINTMENT TOPICAL at 09:21

## 2019-06-11 RX ADMIN — OXYCODONE HYDROCHLORIDE AND ACETAMINOPHEN 2 TABLET: 5; 325 TABLET ORAL at 12:15

## 2019-06-11 RX ADMIN — CLINDAMYCIN PHOSPHATE 600 MG: 600 INJECTION, SOLUTION INTRAVENOUS at 06:27

## 2019-06-11 RX ADMIN — HYDROMORPHONE HYDROCHLORIDE 1 MG: 1 INJECTION, SOLUTION INTRAMUSCULAR; INTRAVENOUS; SUBCUTANEOUS at 10:17

## 2019-06-11 RX ADMIN — VENLAFAXINE 100 MG: 25 TABLET ORAL at 09:19

## 2019-06-11 RX ADMIN — HYDROMORPHONE HYDROCHLORIDE 1 MG: 1 INJECTION, SOLUTION INTRAMUSCULAR; INTRAVENOUS; SUBCUTANEOUS at 01:46

## 2019-06-11 RX ADMIN — POLYETHYLENE GLYCOL (3350) 17 G: 17 POWDER, FOR SOLUTION ORAL at 09:20

## 2019-06-11 RX ADMIN — ENOXAPARIN SODIUM 40 MG: 40 INJECTION SUBCUTANEOUS at 09:20

## 2019-06-11 RX ADMIN — MAGNESIUM OXIDE TAB 400 MG (241.3 MG ELEMENTAL MG) 200 MG: 400 (241.3 MG) TAB at 09:19

## 2019-06-11 RX ADMIN — DOCUSATE SODIUM 100 MG: 100 CAPSULE, LIQUID FILLED ORAL at 09:19

## 2019-06-11 RX ADMIN — Medication 10 ML: at 09:20

## 2019-06-11 RX ADMIN — OXYCODONE HYDROCHLORIDE AND ACETAMINOPHEN 2 TABLET: 5; 325 TABLET ORAL at 04:51

## 2019-06-11 ASSESSMENT — PAIN SCALES - GENERAL
PAINLEVEL_OUTOF10: 10
PAINLEVEL_OUTOF10: 8

## 2019-06-11 ASSESSMENT — PAIN DESCRIPTION - ONSET
ONSET: ON-GOING
ONSET: ON-GOING

## 2019-06-11 ASSESSMENT — PAIN DESCRIPTION - DESCRIPTORS
DESCRIPTORS: ACHING
DESCRIPTORS: ACHING

## 2019-06-11 ASSESSMENT — PAIN DESCRIPTION - ORIENTATION
ORIENTATION: RIGHT
ORIENTATION: RIGHT

## 2019-06-11 ASSESSMENT — PAIN DESCRIPTION - LOCATION
LOCATION: ABDOMEN;PERINEUM
LOCATION: ABDOMEN;PERINEUM

## 2019-06-11 ASSESSMENT — PAIN DESCRIPTION - FREQUENCY
FREQUENCY: CONTINUOUS
FREQUENCY: CONTINUOUS

## 2019-06-11 ASSESSMENT — PAIN DESCRIPTION - PROGRESSION: CLINICAL_PROGRESSION: NOT CHANGED

## 2019-06-11 ASSESSMENT — PAIN DESCRIPTION - DIRECTION: RADIATING_TOWARDS: DENIES

## 2019-06-11 ASSESSMENT — PAIN DESCRIPTION - PAIN TYPE
TYPE: ACUTE PAIN;SURGICAL PAIN
TYPE: ACUTE PAIN;SURGICAL PAIN

## 2019-06-11 NOTE — PROGRESS NOTES
Pt asking respiratory to not wake her up tonight to do ezpap and acapella. Pt just did both with great effort, and is clear on room air.

## 2019-06-11 NOTE — PROGRESS NOTES
General Surgery   Daily Progress Note    CC: necrotizing fasciitis of the perineum    SUBJECTIVE:    No acute event overnight. Afebrile, hemodynamically stable. Tolerated diet without nausea or vomiting. Ambulating. ROS: A 14 point review of systems was obtained and pertinent positives and negatives were mentioned. Otherwise, no significant history other than as noted in the subjective portion of the note. OBJECTIVE:   Infusions:     I/O:I/O last 3 completed shifts: In: 56 [P.O.:240; I.V.:250]  Out: 885 [Urine:850; Drains:35]           Wt Readings from Last 1 Encounters:   06/11/19 126 lb 8 oz (57.4 kg)      LABS:    Recent Labs     06/10/19  0412 06/11/19  0343   WBC 7.2 7.3   HGB 7.4* 7.4*   HCT 22.3* 22.7*   MCV 90.1 91.2   * 877*      Recent Labs     06/10/19  0412 06/11/19  0343    138   K 4.1 4.1    101   CO2 26 24   PHOS 4.1 4.1   BUN 21* 19   CREATININE 0.5* 0.6      No results for input(s): AST, ALT, ALB, BILIDIR, BILITOT, ALKPHOS in the last 72 hours. No results for input(s): LIPASE, AMYLASE in the last 72 hours. No results for input(s): PROT, INR, APTT in the last 72 hours. No results for input(s): CKTOTAL, CKMB, CKMBINDEX, TROPONINI in the last 72 hours. Exam:  Vitals:    06/11/19 0300 06/11/19 0400 06/11/19 0500 06/11/19 0600   BP: 101/61 100/67 102/60 (!) 101/56   Pulse: 73 71 75 69   Resp: 15 18 15 12   Temp:  98.6 °F (37 °C)     TempSrc:  Oral     SpO2:  94%     Weight:    126 lb 8 oz (57.4 kg)   Height:         Exam:  General appearance: alert, appears stated age and cooperative  Lungs: clear to auscultation bilaterally, on RA  Heart: regular rate and rhythm  Abdomen: soft, non tender;  incisions clean dry and intact, well approximated, ostomy with flatus and stool in bag, pink, viable, with sloughing present  Perineum: flap pink, viable, incisions C/D/I, RANDA drains x2 in place with serosanguinous output. Wet to dry dressing in place.    Extremities: no edema or

## 2019-06-11 NOTE — PROGRESS NOTES
for EUA and debridement on 5.28.19 for a 2nd look. And s/p lap diverting colostomy (5/30/2019) - POD 8.  S/p EUA, debridement, and WV placemeent (6/4/19), taken back for 1) Excisional debridement of perineum (15 x 6 x 3 cm), 2) Right gracilis myofascial flap for perineal reconstruction, 3) Complex closure of right labia (8.5 cm) with plastic surgery - POD 4    Leukocytosis resolved  Ok for ambulation, no sitting/stretching flap  Dressing changes and bacitracin to wound per nursing staff BID  Ok to transfer out of ICU  dispo planning - precert obtained-plan for D/C today  PTOT: 18/24    NATHEN Hollis  Resident Support Staff  [phone]

## 2019-06-11 NOTE — PROGRESS NOTES
Report called to Zach [address] AVS and [address] reviewed. All questions answered. RN denies additional questions at this time. Provided with contact information if questions do arise. Currently awaiting transport arrival, scheduled for noon.

## 2019-06-11 NOTE — PROGRESS NOTES
ID Follow-up NOTE    CC:   Necrotizing soft tissue infection  Antibiotics: Clindamycin. Ciprofloxacin    Admit Date: 5/26/2019  Hospital Day: 17    Subjective:     Patient c/o pain at surg site    Objective:     Afebrile last >48 hr    Patient Vitals for the past 8 hrs:   BP Temp Temp src Pulse Resp SpO2 Weight   06/11/19 0900 113/78 -- -- 69 17 -- --   06/11/19 0800 (!) 100/55 98.2 °F (36.8 °C) Oral 75 16 96 % --   06/11/19 0700 110/63 -- -- 70 20 -- --   06/11/19 0600 (!) 101/56 -- -- 69 12 -- 126 lb 8 oz (57.4 kg)   06/11/19 0500 102/60 -- -- 75 15 -- --   06/11/19 0400 100/67 98.6 °F (37 °C) Oral 71 18 94 % --   06/11/19 0300 101/61 -- -- 73 15 -- --     I/O last 3 completed shifts: In: 56 [P.O.:240; I.V.:250]  Out: 885 [Urine:850; Drains:35]  No intake/output data recorded. EXAM:  GENERAL: No apparent distress.     HEENT: Membranes moist, no oral lesion  NECK:  Supple  LUNGS: Clear b/l, no rales, no dullness  CARDIAC: RRR, no murmur appreciated  ABD:  + BS, soft / NT; colostomy in place with stool output  EXT:  No rash, no edema, no lesions  NEURO: No focal neurologic findings  PSYCH: Orientation, sensorium, mood normal  LINES:  R IJ line in place    Data Review:  Lab Results   Component Value Date    WBC 7.3 06/11/2019    HGB 7.4 (L) 06/11/2019    HCT 22.7 (L) 06/11/2019    MCV 91.2 06/11/2019     (H) 06/11/2019     Lab Results   Component Value Date    CREATININE 0.6 06/11/2019    BUN 19 06/11/2019     06/11/2019    K 4.1 06/11/2019     06/11/2019    CO2 24 06/11/2019       Hepatic Function Panel:   Lab Results   Component Value Date    ALKPHOS 108 06/04/2013    ALT 54 06/04/2013    AST 40 06/04/2013    PROT 6.3 06/04/2013    BILITOT 0.4 06/04/2013    LABALBU 3.2 06/11/2019       Micro:  6/7 Tissue cult - rare growth E coli  6/7 Tissue rare Enterococcus, light E coli    5/29 C diff neg    5/26 Tissue #1: GS 1+WBC, 3+GPC; cult light MRSA  5/26 Tissue #2: GS no WBC, 1+GPC; cult - light MRSA, no anaerobes isolated to date  Staph aureus mrsa   Antibiotic Interpretation MARGIE Status    ceFAZolin Resistant >16 mcg/mL     clindamycin Sensitive <=0.5 mcg/mL     erythromycin Resistant >4 mcg/mL     oxacillin Resistant >2 mcg/mL     tetracycline Sensitive <=0.5 mcg/mL     trimethoprim-sulfamethoxazole Sensitive <=0.5/9.5 mcg/mL     vancomycin Sensitive 1 mcg/mL        BC x 2 - no growth to date     Imagin/26 Pelvic CT:  Stranding in the fat involving the right perineum and right gluteal region. Given the patient's history this likely reflects a cellulitis. This does extend superiorly into the pelvis involving the right pelvic sidewall and  region, presumably    reflecting cellulitis/phlegmon. There is no evidence of a drainable fluid collection at this time. Scheduled Meds:   polyethylene glycol  17 g Oral Daily    enoxaparin  40 mg Subcutaneous Daily    bacitracin   Topical TID    ciprofloxacin  750 mg Oral 2 times per day    docusate sodium  100 mg Oral BID    acetaminophen  650 mg Oral Once    HYDROmorphone  0.25 mg Intravenous Once    clindamycin (CLEOCIN) IV  600 mg Intravenous Q8H    venlafaxine  100 mg Oral BID    sodium chloride flush  10 mL Intravenous 2 times per day       Continuous Infusions:      PRN Meds:  medicated lip ointment, phenol, diphenhydrAMINE, HYDROmorphone **OR** HYDROmorphone, oxyCODONE-acetaminophen **OR** oxyCODONE-acetaminophen, sodium chloride flush, ondansetron      Assessment:     58 yo woman with hx chronic back pain, depression, recurrent HSV, neck surg     Pt fell on  (gardening), seen at [facility] ED, discharged  Presents to [facility] ED  with HA, R shoulder pain. T 99.9. Discharged     Returned to [facility] ED  with HA and pain in 'tailbone'. T 101.4. WBC 12.7. BC sent  Had erythema on R buttock and perineum (from labia extending posteriorly). Pelvic CT with 'standing', no gas.      Started on vancomycin and clindamycin and zosyn added  Seen by Gen Surg. Taken to OR, had necrotic tissue debrided. Return to OR 5/29 - wound 18 x 5 x 15 cm  Colostomy 5/30  OR 6/4 - further excisional debridement, instill VAC placed. Culture light GBS, E faecalis, E coli    OR 6/7 -  further excisional debridement, R gracilis myofascial flap and complex closure    IMP/  Hx chronic back pain, depression, recurrent HSV, neck surg  Fall     R buttock / perineum infection -  + necrotizing ST infection, cult + MRSA.   F/u cult with E coli, Enterococcus  Fever - resolved  Leukocytosis - resolved     Plan:     Cont Clindamycin + Ciprofloxacin   Wound care and per Surg     Discharge on po clindamycin 300 tid + ciprofloxacin 750 bid x 7 days     Discussed with pt  Lauryn Roa MD

## 2019-06-11 NOTE — CARE COORDINATION
Case Management Discharge Assessment    2019  [facility])  Clinical Case Management Department    Patient: Samuel Guevara  MRN: [id number] / : 1955  ACCT: [de-identified]          Admission Documentation  Attending Provider: Yumiko Gutierrez MD  Admit date/time: 2019 12:16 PM  Status: Inpatient [101]  Diagnosis: Cellulitis of perineum     Readmission within last 30 days:  no     Living Situation  Discharge Planning  Living Arrangements: Family Members  Support Systems: Friends/Neighbors, Family Members  Potential Assistance Needed: N/A  Type of Home Care Services: None  Patient expects to be discharged to[de-identified] Home with daughter and dogs    Service Assessment:       Values / Beliefs  Do you have any ethnic, cultural, sacramental, or spiritual Latter-day needs you would like us to be aware of while you are in the hospital?: No    Advance Directives (For Healthcare)  Pre-existing DNR Comfort Care/DNR Arrest/DNI Order: No  Healthcare Directive: No, patient does not have an advance directive for healthcare treatment  Information on Healthcare Directives Requested: No  Patient Requests Assistance: No              Pharmacy: per SNF   Potential Assistance Purchasing Medications:  No  Does patient want to participate in local refill/meds to beds program?: No    Notification completed in HENS/PAS? Yes     IMM  No - not required      Discharge disposition:      LOC  Skilled Nursing    Discharging to Facility/ Agency   · Name: [facility]  · Address:   [address] [place] [address]  · Phone:  [phone] - Nursing please call report  · Fax:  [phone] - orders have been faxed. Nursing please complete your part of AVS and put in ambulance packet at [facility] desk. Transportation  · Mode:  BLS Stretcher  · Company:  [employer]  · Phone:   [phone]  · ETA:   12 noon    Kiarra and her family were provided with choice of provider; she and her family are in agreement with the discharge plan.       Care Transitions patient: Michelle Delacruz, RN, BSN  [facility]  Case Management Department  Ph: [phone]

## 2019-06-11 NOTE — PROGRESS NOTES
Pt. requesting ostomy appliance change before leaving in 30 min. Stating,\"I don't know when it was changed last\". Pt. Assisted to toilet and demonstrated with little pt. Assist emptying, then all removed and new 2 3/4\" wafer cut to fit budded moist red stoma 1 5/8\" L x 1 3/4\" W, muco-cutaneous junciton intact with stitches visible. Demonstrated opening and closing pouch twice. Pt. Verbalizing understanding but tearing stating fear of getting this infection again. instructed on good and frequent handwashing and utilizing hand  more often and taking no chances after touching potential dirty source and then scratching or rubbing skin. 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 Ostomy belt applied with instructed demonstration.

## 2019-06-12 LAB
ANAEROBIC CULTURE: ABNORMAL
BLOOD CULTURE, ROUTINE: NORMAL
GRAM STAIN RESULT: ABNORMAL
ORGANISM: ABNORMAL
ORGANISM: ABNORMAL
WOUND/ABSCESS: ABNORMAL
WOUND/ABSCESS: ABNORMAL

## 2019-06-13 ENCOUNTER — TELEPHONE (OUTPATIENT)
Dept: SURGERY | Age: 64
End: 2019-06-13

## 2019-06-13 LAB
ANAEROBIC CULTURE: ABNORMAL
CULTURE, BLOOD 2: NORMAL
GRAM STAIN RESULT: ABNORMAL
ORGANISM: ABNORMAL
WOUND/ABSCESS: ABNORMAL

## 2019-06-13 NOTE — TELEPHONE ENCOUNTER
[facility] called to report that the [place] came out of her right thigh area last night. The 2 RANDA drains are still in tack and functioning. Please call with questions or directions.

## 2019-06-14 NOTE — TELEPHONE ENCOUNTER
[facility] spoke to Julianne Wagner , per Dr.Hobler gaona to leave out drain.

## 2019-06-17 ENCOUNTER — OFFICE VISIT (OUTPATIENT)
Dept: SURGERY | Age: 64
End: 2019-06-17

## 2019-06-17 VITALS
DIASTOLIC BLOOD PRESSURE: 63 MMHG | SYSTOLIC BLOOD PRESSURE: 100 MMHG | BODY MASS INDEX: 21.08 KG/M2 | WEIGHT: 131.2 LBS | TEMPERATURE: 97.9 F | HEIGHT: 66 IN

## 2019-06-17 DIAGNOSIS — Z09 POSTOP CHECK: Primary | ICD-10-CM

## 2019-06-17 PROCEDURE — 99024 POSTOP FOLLOW-UP VISIT: CPT | Performed by: SURGERY
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
## 2019-06-18 ENCOUNTER — HOSPITAL ENCOUNTER (EMERGENCY)
Age: 64
Discharge: HOME | End: 2019-06-19
Attending: EMERGENCY MEDICINE
Payer: COMMERCIAL

## 2019-06-18 DIAGNOSIS — K94.00 COMPLICATION OF COLOSTOMY (HCC): Primary | ICD-10-CM

## 2019-06-18 PROCEDURE — 99285 EMERGENCY DEPT VISIT HI MDM: CPT

## 2019-06-18 ASSESSMENT — PAIN DESCRIPTION - LOCATION: LOCATION: ABDOMEN

## 2019-06-18 ASSESSMENT — PAIN SCALES - GENERAL: PAINLEVEL_OUTOF10: 8

## 2019-06-18 ASSESSMENT — PAIN DESCRIPTION - PAIN TYPE: TYPE: ACUTE PAIN

## 2019-06-19 VITALS
TEMPERATURE: 98.8 F | SYSTOLIC BLOOD PRESSURE: 111 MMHG | HEART RATE: 68 BPM | DIASTOLIC BLOOD PRESSURE: 71 MMHG | RESPIRATION RATE: 20 BRPM | OXYGEN SATURATION: 95 %

## 2019-06-19 LAB
ALBUMIN SERPL-MCNC: 3.4 G/DL (ref 3.4–5)
ALP BLD-CCNC: 147 U/L (ref 40–129)
ALT SERPL-CCNC: 50 U/L (ref 10–40)
ANION GAP SERPL CALCULATED.3IONS-SCNC: 12 MMOL/L (ref 3–16)
ANION GAP SERPL CALCULATED.3IONS-SCNC: 13 MMOL/L (ref 3–16)
AST SERPL-CCNC: 52 U/L (ref 15–37)
BASOPHILS ABSOLUTE: 0 K/UL (ref 0–0.2)
BASOPHILS RELATIVE PERCENT: 0.3 %
BILIRUB SERPL-MCNC: <0.2 MG/DL (ref 0–1)
BILIRUBIN DIRECT: <0.2 MG/DL (ref 0–0.3)
BILIRUBIN, INDIRECT: ABNORMAL MG/DL (ref 0–1)
BUN BLDV-MCNC: 25 MG/DL (ref 7–20)
BUN BLDV-MCNC: 26 MG/DL (ref 7–20)
CALCIUM SERPL-MCNC: 9.1 MG/DL (ref 8.3–10.6)
CALCIUM SERPL-MCNC: 9.2 MG/DL (ref 8.3–10.6)
CHLORIDE BLD-SCNC: 102 MMOL/L (ref 99–110)
CHLORIDE BLD-SCNC: 104 MMOL/L (ref 99–110)
CO2: 22 MMOL/L (ref 21–32)
CO2: 24 MMOL/L (ref 21–32)
CREAT SERPL-MCNC: 0.6 MG/DL (ref 0.6–1.2)
CREAT SERPL-MCNC: 0.7 MG/DL (ref 0.6–1.2)
EOSINOPHILS ABSOLUTE: 1.2 K/UL (ref 0–0.6)
EOSINOPHILS RELATIVE PERCENT: 13.9 %
GFR AFRICAN AMERICAN: >60
GFR AFRICAN AMERICAN: >60
GFR NON-AFRICAN AMERICAN: >60
GFR NON-AFRICAN AMERICAN: >60
GLUCOSE BLD-MCNC: 111 MG/DL (ref 70–99)
GLUCOSE BLD-MCNC: 112 MG/DL (ref 70–99)
HCT VFR BLD CALC: 26.2 % (ref 36–48)
HEMOGLOBIN: 8.4 G/DL (ref 12–16)
LACTATE: 0.72 MMOL/L (ref 0.4–2)
LIPASE: 86 U/L (ref 13–60)
LYMPHOCYTES ABSOLUTE: 3.4 K/UL (ref 1–5.1)
LYMPHOCYTES RELATIVE PERCENT: 39.1 %
MCH RBC QN AUTO: 28.6 PG (ref 26–34)
MCHC RBC AUTO-ENTMCNC: 32.2 G/DL (ref 31–36)
MCV RBC AUTO: 88.8 FL (ref 80–100)
MONOCYTES ABSOLUTE: 0.5 K/UL (ref 0–1.3)
MONOCYTES RELATIVE PERCENT: 6.2 %
NEUTROPHILS ABSOLUTE: 3.5 K/UL (ref 1.7–7.7)
NEUTROPHILS RELATIVE PERCENT: 40.5 %
PDW BLD-RTO: 14.9 % (ref 12.4–15.4)
PERFORMED ON: NORMAL
PLATELET # BLD: 510 K/UL (ref 135–450)
PMV BLD AUTO: 7 FL (ref 5–10.5)
POC SAMPLE TYPE: NORMAL
POTASSIUM REFLEX MAGNESIUM: 4 MMOL/L (ref 3.5–5.1)
POTASSIUM REFLEX MAGNESIUM: 6.3 MMOL/L (ref 3.5–5.1)
RBC # BLD: 2.95 M/UL (ref 4–5.2)
SODIUM BLD-SCNC: 137 MMOL/L (ref 136–145)
SODIUM BLD-SCNC: 140 MMOL/L (ref 136–145)
TOTAL PROTEIN: 7.9 G/DL (ref 6.4–8.2)
WBC # BLD: 8.7 K/UL (ref 4–11)

## 2019-06-19 PROCEDURE — 6360000002 HC RX W HCPCS: Performed by: PHYSICIAN ASSISTANT

## 2019-06-19 PROCEDURE — 6370000000 HC RX 637 (ALT 250 FOR IP): Performed by: PHYSICIAN ASSISTANT

## 2019-06-19 PROCEDURE — 83690 ASSAY OF LIPASE: CPT

## 2019-06-19 PROCEDURE — 96361 HYDRATE IV INFUSION ADD-ON: CPT

## 2019-06-19 PROCEDURE — 96374 THER/PROPH/DIAG INJ IV PUSH: CPT

## 2019-06-19 PROCEDURE — 83605 ASSAY OF LACTIC ACID: CPT

## 2019-06-19 PROCEDURE — 2580000003 HC RX 258: Performed by: PHYSICIAN ASSISTANT

## 2019-06-19 PROCEDURE — 80076 HEPATIC FUNCTION PANEL: CPT

## 2019-06-19 PROCEDURE — 80048 BASIC METABOLIC PNL TOTAL CA: CPT

## 2019-06-19 PROCEDURE — 85025 COMPLETE CBC W/AUTO DIFF WBC: CPT

## 2019-06-19 RX ORDER — OXYCODONE HYDROCHLORIDE 5 MG/1
5 TABLET ORAL ONCE
Status: COMPLETED | OUTPATIENT
Start: 2019-06-19 | End: 2019-06-19

## 2019-06-19 RX ORDER — MORPHINE SULFATE 4 MG/ML
4 INJECTION, SOLUTION INTRAMUSCULAR; INTRAVENOUS ONCE
Status: COMPLETED | OUTPATIENT
Start: 2019-06-19 | End: 2019-06-19

## 2019-06-19 RX ORDER — 0.9 % SODIUM CHLORIDE 0.9 %
1000 INTRAVENOUS SOLUTION INTRAVENOUS ONCE
Status: COMPLETED | OUTPATIENT
Start: 2019-06-19 | End: 2019-06-19

## 2019-06-19 RX ADMIN — MORPHINE SULFATE 4 MG: 4 INJECTION INTRAVENOUS at 00:22

## 2019-06-19 RX ADMIN — OXYCODONE HYDROCHLORIDE 5 MG: 5 TABLET ORAL at 01:29

## 2019-06-19 RX ADMIN — SODIUM CHLORIDE 1000 ML: 9 INJECTION, SOLUTION INTRAVENOUS at 01:24

## 2019-06-19 ASSESSMENT — ENCOUNTER SYMPTOMS
SHORTNESS OF BREATH: 0
CONSTIPATION: 0
DIARRHEA: 0
CHEST TIGHTNESS: 0
BLOOD IN STOOL: 0
VOMITING: 1
ABDOMINAL DISTENTION: 0

## 2019-06-19 ASSESSMENT — PAIN SCALES - GENERAL
PAINLEVEL_OUTOF10: 8
PAINLEVEL_OUTOF10: 5
PAINLEVEL_OUTOF10: 8

## 2019-06-19 ASSESSMENT — PAIN DESCRIPTION - LOCATION
LOCATION: ABDOMEN

## 2019-06-19 NOTE — ED TRIAGE NOTES
Pt presents to ED from [facility] via EMS. Pt reports new pain x2 days in colostomy that was placed one week ago.

## 2019-06-19 NOTE — CONSULTS
Department of General Surgery  Surgical Service    Consultation    Chief Complaints:  Irritation around new end colostomy site    [place]:  Ken Pop is a 59 y.o.  female with a PMHx of necrotizing fascitis of her perineum who underwent multiple debridements, followed by a diverting colostomy and a gracilis flap who presents with irritation around her ostomy site for past 2 days with some abdominal pain. Tolerating diet, ostomy working, no nausea or vomiting. Working with PTOT. Past Medical History:   has a past medical history of Chronic back pain, Depression, Fracture, and MRSA (methicillin resistant staph aureus) culture positive. Past Surgical History:   has a past surgical history that includes Neck surgery; Tubal ligation; Cholecystectomy; incision and drainage (N/A, 5/26/2019); Pressure ulcer debridement (N/A, 5/28/2019); Small intestine surgery (N/A, 5/30/2019); pr rmvl devital tiss n-slctv dbrdmt w/o anes 1 sess (N/A, 6/4/2019); and Vulva surgery (N/A, 6/7/2019). Medications:  Prior to Admission medications    Medication Sig Start Date End Date Taking? Authorizing Provider   bacitracin 500 UNIT/GM ointment Apply topically 2 times daily. 6/11/19 6/21/19  Fernando Anderson MD   docusate sodium (COLACE, DULCOLAX) 100 MG CAPS Take 100 mg by mouth 2 times daily 6/11/19   Fernando Anderson MD   polyethylene glycol [place] [facility]) packet Take 17 g by mouth daily 6/11/19 7/11/19  Fernando Anderson MD   diclofenac (VOLTAREN) 75 MG EC tablet Take 75 mg by mouth 2 times daily    Historical Provider, MD   gabapentin (NEURONTIN) 600 MG tablet Take 600 mg by mouth 3 times daily. Historical Provider, MD   venlafaxine (EFFEXOR) 100 MG tablet Take 100 mg by mouth 2 times daily     Historical Provider, MD   prochlorperazine (COMPAZINE) 10 MG tablet Take 1 tablet by mouth every 6 hours as needed (HA) 5/25/19   Michael Gómez MD   multivitamin [facility]) per tablet Take 1 tablet by mouth daily. bands or left shift. Lactic acid normal. No hernia present, no acute issues around ostomy. Okay to discharge back to facility.      Discussed with Dr. Sky Eddy D.O.  1:02 AM  6/19/2019  [phone]

## 2019-06-19 NOTE — ED PROVIDER NOTES
[facility]          PHYSICIAN ASSISTANT NOTE       Date of evaluation: 6/18/2019    Chief Complaint     Abdominal Pain      History of Present Illness     Angeli Restrepo is a 59 y.o. female with a past medical history of debridement of necrotizing fasciitis of perineal area (5/26, 5/28, 6/4) with colostomy (5/30) and perineal reconstruction (6/7) presenting to the emergency department for abdominal pain and irritation around colostomy site. Since colostomy was placed she has had discomfort surrounding bag. She cannot tell if this is irritation of her skin or abdominal pain localized to this area. She has had good output from ostomy site with  No blood or mucus in stool. 1 episode of nonbilious nonbloody emesis today. Yesterday she had a low grade temperature according to her nurse at her nursing home. Currently no fevers, chills or myalgias. who presents tot he emergency department for abdominal pain. Review of Systems     Review of Systems   Constitutional: Negative for chills, diaphoresis, fatigue and fever. Respiratory: Negative for chest tightness and shortness of breath. Cardiovascular: Negative for chest pain and leg swelling. Gastrointestinal: Positive for vomiting (1 episode). Negative for abdominal distention, blood in stool, constipation and diarrhea. Genitourinary: Negative for dysuria. Past Medical, Surgical, Family, and Social History     She has a past medical history of Chronic back pain, Depression, Fracture, and MRSA (methicillin resistant staph aureus) culture positive. She has a past surgical history that includes Neck surgery; Tubal ligation; Cholecystectomy; incision and drainage (N/A, 5/26/2019); Pressure ulcer debridement (N/A, 5/28/2019); Small intestine surgery (N/A, 5/30/2019); pr rmvl devital tiss n-slctv dbrdmt w/o anes 1 sess (N/A, 6/4/2019); and Vulva surgery (N/A, 6/7/2019). Her family history is not on file.   She reports that she has quit smoking. She has never used smokeless tobacco. She reports that she drinks alcohol. She reports that she does not use drugs. Medications     Previous Medications    ACYCLOVIR (ZOVIRAX) 200 MG CAPSULE    Take 200 mg by mouth every 4 hours (while awake). BACITRACIN 500 UNIT/GM OINTMENT    Apply topically 2 times daily. DICLOFENAC (VOLTAREN) 75 MG EC TABLET    Take 75 mg by mouth 2 times daily    DOCUSATE SODIUM (COLACE, DULCOLAX) 100 MG CAPS    Take 100 mg by mouth 2 times daily    GABAPENTIN (NEURONTIN) 600 MG TABLET    Take 600 mg by mouth 3 times daily. MULTIVITAMIN (THERAGRAN) PER TABLET    Take 1 tablet by mouth daily. POLYETHYLENE GLYCOL (GLYCOLAX) PACKET    Take 17 g by mouth daily    PROCHLORPERAZINE (COMPAZINE) 10 MG TABLET    Take 1 tablet by mouth every 6 hours as needed (HA)    VENLAFAXINE (EFFEXOR) 100 MG TABLET    Take 100 mg by mouth 2 times daily        Allergies     She is allergic to latex; vicodin [hydrocodone-acetaminophen]; and zocor [simvastatin]. Physical Exam     INITIAL VITALS: BP: 118/84, Temp: 98.8 °F (37.1 °C), Pulse: 55, Resp: 20, SpO2: 96 %  Physical Exam   Constitutional: She appears well-developed and well-nourished. HENT:   Head: Normocephalic. Cardiovascular: Normal rate, regular rhythm, normal heart sounds and intact distal pulses. Pulmonary/Chest: Effort normal and breath sounds normal. No respiratory distress. Abdominal: Soft. Bowel sounds are normal. She exhibits no distension. There is no tenderness. Good output at ostomy site, ostomy is pink, no blood in stool   Neurological: She is alert. Psychiatric: She has a normal mood and affect.  Her behavior is normal.       Diagnostic Results   RADIOLOGY:  No orders to display       LABS:   Results for orders placed or performed during the hospital encounter of 06/18/19   CBC Auto Differential   Result Value Ref Range    WBC 8.7 4.0 - 11.0 K/uL    RBC 2.95 (L) 4.00 - 5.20 M/uL Hemoglobin 8.4 (L) 12.0 - 16.0 g/dL    Hematocrit 26.2 (L) 36.0 - 48.0 %    MCV 88.8 80.0 - 100.0 fL    MCH 28.6 26.0 - 34.0 pg    MCHC 32.2 31.0 - 36.0 g/dL    RDW 14.9 12.4 - 15.4 %    Platelets 696 (H) 891 - 450 K/uL    MPV 7.0 5.0 - 10.5 fL    Neutrophils % 40.5 %    Lymphocytes % 39.1 %    Monocytes % 6.2 %    Eosinophils % 13.9 %    Basophils % 0.3 %    Neutrophils # 3.5 1.7 - 7.7 K/uL    Lymphocytes # 3.4 1.0 - 5.1 K/uL    Monocytes # 0.5 0.0 - 1.3 K/uL    Eosinophils # 1.2 (H) 0.0 - 0.6 K/uL    Basophils # 0.0 0.0 - 0.2 K/uL   Basic Metabolic Panel w/ Reflex to MG   Result Value Ref Range    Sodium 137 136 - 145 mmol/L    Potassium reflex Magnesium 6.3 (HH) 3.5 - 5.1 mmol/L    Chloride 102 99 - 110 mmol/L    CO2 22 21 - 32 mmol/L    Anion Gap 13 3 - 16    Glucose 111 (H) 70 - 99 mg/dL    BUN 25 (H) 7 - 20 mg/dL    CREATININE 0.7 0.6 - 1.2 mg/dL    GFR Non-African American >60 >60    GFR African American >60 >60    Calcium 9.2 8.3 - 10.6 mg/dL   Hepatic Function Panel   Result Value Ref Range    Total Protein 7.9 6.4 - 8.2 g/dL    Alb 3.4 3.4 - 5.0 g/dL    Alkaline Phosphatase 147 (H) 40 - 129 U/L    ALT 50 (H) 10 - 40 U/L    AST 52 (H) 15 - 37 U/L    Total Bilirubin <0.2 0.0 - 1.0 mg/dL    Bilirubin, Direct <0.2 0.0 - 0.3 mg/dL    Bilirubin, Indirect see below 0.0 - 1.0 mg/dL   Lipase   Result Value Ref Range    Lipase 86.0 (H) 13.0 - 60.0 U/L   POCT Venous   Result Value Ref Range    Lactate 0.72 0.40 - 2.00 mmol/L    Sample Type ANAHI     Performed on SEE BELOW        ED BEDSIDE ULTRASOUND:  None    RECENT VITALS:  BP: 111/71, Temp: 98.8 °F (37.1 °C), Pulse: 68, Resp: 20, SpO2: 95 %     Procedures   None    ED Course     Nursing Notes, Past Medical Hx,Past Surgical Hx, Social Hx, Allergies, and Family Hx were reviewed.     The patient was given the following medications:  Orders Placed This Encounter   Medications    morphine injection 4 mg    0.9 % sodium chloride bolus       CONSULTS:  IP CONSULT TO GENERAL SURGERY    MEDICAL DECISION MAKING / ASSESSMENT / PLAN     Yajaira Hester is a 59 y.o. female presenting tot he emergency department for irritation around ostomy site since colostomy placed on 5/30/19. Upon presentation patient was well appearing, in no acute distress. Abdomen soft and nontender. Ostomy site is pink, well perfused with good stool output. General surgery consulted who evaluated the patient and bedside and agree that symptoms today likely localized irritation from ostomy. No evidence of acute abdomen or further complications from necrotizing fasciitis debridement/reconstuction of perineum. Labs today showed no elevated lactate or leukocytosis. Hemaglobin improved from previous at 8.4 today. Mild dehydration with elevated BUN, no kidney injury with creatinine 0.7. Given 1L fluid bolus, tolerating PO without difficulty. Mild transaminitis with negative Foy's sign and no concern for cholangitis or choledocholithiasis. Also no elevation of Lipase to suggest pancreatitis. Also no evidence of bowel obstruction. At this time we felt that she was appropriate for discharge back to nursing facility. She is in agreement with this plan and understands return precautions    This patient was also evaluated by the attending physician. All care plans were discussed and agreed upon. Clinical Impression     1.  Complication of colostomy [facility])        Disposition     PATIENT REFERRED TO:  [facility] Emergency Department  [address]  [address]  [phone]    As needed, If symptoms worsen      Scheduled follow up appointment:   6/24/2019   1:45 PM   [facility]          DISCHARGE MEDICATIONS:  New Prescriptions    No medications on file       DISPOSITION         Vamsi Oliveira PA-C  06/19/19 0114

## 2019-06-19 NOTE — ED PROVIDER NOTES
ED Attending Attestation Note     Date of evaluation: 6/18/2019    This patient was seen by the advance practice provider. I have seen and examined the patient, agree with the workup, evaluation, management and diagnosis. The care plan has been discussed. My assessment reveals patient with recent history of necrotizing fasciitis with diverting colostomy presents complaining of pain at the ostomy site. Patient has a soft abdomen on exam.  Ostomy appears pink with stool and gas in the ostomy bag. Will check laboratory studies, consult general surgery.       Kings Swenson MD  06/19/19 [id number]

## 2019-06-23 NOTE — PROGRESS NOTES
[facility]    PROCEDURE: 1. Excisional debridement of perineum including vagina and ischiorectal  space by the rectum to the sacrum (15 x 6 cm). 2.  Right gracilis myofascial flap for perineal reconstruction. 3.  Complex closure of right labia majora (8.5 cm). DATE: 6/7/19    Olivier Wolfe has been recovering well since her procedure. Pain has been well controlled with intermittent pain medications     EXAM    LMP 07/12/2007     GEN: NAD   PERINEUM: Incision healing with excellent healthy granulation tissue in the locations intentionally left to heal by secondary intent  EXT: Incision healing well  No hematoma/seroma  Drain serosang    IMP: 59 y. o.female s/p gracilis flap for perineal reconstruction  PLAN: Doing very well. Drain removed. No additional packing required - perform local wound care with BID Bacitracin ointment to the perineal incision. Will follow-up in 1 month for wound check. If all ok, then will refer back to Dr. Roberto Bell for discussion regarding reversal of her stoma.      Yady Doss MD  [address] [place] [address] [address] Reconstructive Surgery  [phone]  06/24/19

## 2019-06-24 ENCOUNTER — OFFICE VISIT (OUTPATIENT)
Dept: SURGERY | Age: 64
End: 2019-06-24

## 2019-06-24 VITALS
HEIGHT: 66 IN | SYSTOLIC BLOOD PRESSURE: 115 MMHG | BODY MASS INDEX: 21.63 KG/M2 | TEMPERATURE: 99.1 F | WEIGHT: 134.6 LBS | HEART RATE: 106 BPM | DIASTOLIC BLOOD PRESSURE: 76 MMHG | RESPIRATION RATE: 16 BRPM | OXYGEN SATURATION: 94 %

## 2019-06-24 DIAGNOSIS — Z09 POSTOP CHECK: Primary | ICD-10-CM

## 2019-06-24 LAB
FUNGUS (MYCOLOGY) CULTURE: NORMAL
FUNGUS (MYCOLOGY) CULTURE: NORMAL
FUNGUS STAIN: NORMAL
FUNGUS STAIN: NORMAL

## 2019-06-24 PROCEDURE — 99024 POSTOP FOLLOW-UP VISIT: CPT | Performed by: SURGERY

## 2019-06-26 ENCOUNTER — TELEPHONE (OUTPATIENT)
Dept: SURGERY | Age: 64
End: 2019-06-26

## 2019-06-26 NOTE — TELEPHONE ENCOUNTER
Spoke with skilled care nurse. Faxed over order for wound care. No additional packing required-perform local wound care with BID bacitracin ointment to the perineal incision and follow up her in one month for wound check.

## 2019-07-01 ENCOUNTER — TELEPHONE (OUTPATIENT)
Dept: SURGERY | Age: 64
End: 2019-07-01

## 2019-07-03 ENCOUNTER — TELEPHONE (OUTPATIENT)
Dept: SURGERY | Age: 64
End: 2019-07-03

## 2019-07-04 ENCOUNTER — HOSPITAL ENCOUNTER (EMERGENCY)
Age: 64
Discharge: HOME | End: 2019-07-04
Attending: EMERGENCY MEDICINE
Payer: COMMERCIAL

## 2019-07-04 VITALS
WEIGHT: 135 LBS | DIASTOLIC BLOOD PRESSURE: 77 MMHG | HEART RATE: 78 BPM | SYSTOLIC BLOOD PRESSURE: 120 MMHG | BODY MASS INDEX: 21.69 KG/M2 | OXYGEN SATURATION: 98 % | TEMPERATURE: 98.1 F | HEIGHT: 66 IN | RESPIRATION RATE: 18 BRPM

## 2019-07-04 DIAGNOSIS — R23.8 SKIN BREAKDOWN: Primary | ICD-10-CM

## 2019-07-04 PROCEDURE — 99282 EMERGENCY DEPT VISIT SF MDM: CPT

## 2019-07-04 ASSESSMENT — PAIN DESCRIPTION - PAIN TYPE: TYPE: ACUTE PAIN

## 2019-07-04 ASSESSMENT — PAIN DESCRIPTION - DESCRIPTORS: DESCRIPTORS: SORE

## 2019-07-04 ASSESSMENT — PAIN DESCRIPTION - FREQUENCY: FREQUENCY: CONTINUOUS

## 2019-07-04 ASSESSMENT — PAIN SCALES - GENERAL: PAINLEVEL_OUTOF10: 8

## 2019-07-04 ASSESSMENT — PAIN DESCRIPTION - LOCATION: LOCATION: ABDOMEN
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
## 2019-07-09 LAB
AFB CULTURE (MYCOBACTERIA): NORMAL
AFB CULTURE (MYCOBACTERIA): NORMAL
AFB SMEAR: NORMAL
AFB SMEAR: NORMAL

## 2019-07-12 ENCOUNTER — OFFICE VISIT (OUTPATIENT)
Dept: SURGERY | Age: 64
End: 2019-07-12

## 2019-07-12 VITALS
WEIGHT: 136 LBS | SYSTOLIC BLOOD PRESSURE: 121 MMHG | BODY MASS INDEX: 21.86 KG/M2 | DIASTOLIC BLOOD PRESSURE: 81 MMHG | TEMPERATURE: 98.5 F | HEIGHT: 66 IN

## 2019-07-12 DIAGNOSIS — Z09 POSTOP CHECK: Primary | ICD-10-CM

## 2019-07-12 PROCEDURE — 99024 POSTOP FOLLOW-UP VISIT: CPT | Performed by: SURGERY

## 2019-07-16 ENCOUNTER — TELEPHONE (OUTPATIENT)
Dept: SURGERY | Age: 64
End: 2019-07-16

## 2019-07-16 NOTE — TELEPHONE ENCOUNTER
The patient was in the office to see Dr. Farhana Diop on 7-. The patient believes that she is suppose to be having surgery with Dr. Farhana Diop on 8-1-2019, but she has not heard anything. Please call.

## 2019-07-23 ENCOUNTER — TELEPHONE (OUTPATIENT)
Dept: SURGERY | Age: 64
End: 2019-07-23

## 2019-07-31 RX ORDER — CIPROFLOXACIN 2 MG/ML
400 INJECTION, SOLUTION INTRAVENOUS ONCE
Status: CANCELLED | OUTPATIENT
Start: 2019-08-06

## 2019-07-31 RX ORDER — SODIUM CHLORIDE, SODIUM LACTATE, POTASSIUM CHLORIDE, CALCIUM CHLORIDE 600; 310; 30; 20 MG/100ML; MG/100ML; MG/100ML; MG/100ML
INJECTION, SOLUTION INTRAVENOUS CONTINUOUS
Status: CANCELLED | OUTPATIENT
Start: 2019-08-06

## 2019-08-01 ENCOUNTER — HOSPITAL ENCOUNTER (OUTPATIENT)
Dept: PREADMISSION TESTING | Age: 64
Discharge: HOME | End: 2019-08-05
Payer: COMMERCIAL

## 2019-08-01 VITALS
HEART RATE: 75 BPM | HEIGHT: 66 IN | WEIGHT: 136 LBS | TEMPERATURE: 98.8 F | OXYGEN SATURATION: 98 % | BODY MASS INDEX: 21.86 KG/M2 | RESPIRATION RATE: 14 BRPM

## 2019-08-01 LAB
A/G RATIO: 1.1 (ref 1.1–2.2)
ABO/RH: NORMAL
ALBUMIN SERPL-MCNC: 4.2 G/DL (ref 3.4–5)
ALP BLD-CCNC: 127 U/L (ref 40–129)
ALT SERPL-CCNC: 36 U/L (ref 10–40)
ANION GAP SERPL CALCULATED.3IONS-SCNC: 15 MMOL/L (ref 3–16)
ANTIBODY SCREEN: NORMAL
AST SERPL-CCNC: 37 U/L (ref 15–37)
BILIRUB SERPL-MCNC: <0.2 MG/DL (ref 0–1)
BILIRUBIN URINE: NEGATIVE
BLOOD, URINE: NEGATIVE
BUN BLDV-MCNC: 27 MG/DL (ref 7–20)
CALCIUM SERPL-MCNC: 9.4 MG/DL (ref 8.3–10.6)
CHLORIDE BLD-SCNC: 103 MMOL/L (ref 99–110)
CLARITY: CLEAR
CO2: 23 MMOL/L (ref 21–32)
COLOR: YELLOW
CREAT SERPL-MCNC: 0.6 MG/DL (ref 0.6–1.2)
GFR AFRICAN AMERICAN: >60
GFR NON-AFRICAN AMERICAN: >60
GLOBULIN: 3.8 G/DL
GLUCOSE BLD-MCNC: 101 MG/DL (ref 70–99)
GLUCOSE URINE: NEGATIVE MG/DL
HCT VFR BLD CALC: 35.3 % (ref 36–48)
HEMOGLOBIN: 11.5 G/DL (ref 12–16)
KETONES, URINE: NEGATIVE MG/DL
LEUKOCYTE ESTERASE, URINE: NEGATIVE
MCH RBC QN AUTO: 26.9 PG (ref 26–34)
MCHC RBC AUTO-ENTMCNC: 32.7 G/DL (ref 31–36)
MCV RBC AUTO: 82.4 FL (ref 80–100)
MICROSCOPIC EXAMINATION: NORMAL
NITRITE, URINE: NEGATIVE
PDW BLD-RTO: 16.2 % (ref 12.4–15.4)
PH UA: 5.5 (ref 5–8)
PLATELET # BLD: 246 K/UL (ref 135–450)
PMV BLD AUTO: 7.9 FL (ref 5–10.5)
POTASSIUM SERPL-SCNC: 5 MMOL/L (ref 3.5–5.1)
PROTEIN UA: NEGATIVE MG/DL
RBC # BLD: 4.28 M/UL (ref 4–5.2)
SODIUM BLD-SCNC: 141 MMOL/L (ref 136–145)
SPECIFIC GRAVITY UA: 1.01 (ref 1–1.03)
TOTAL PROTEIN: 8 G/DL (ref 6.4–8.2)
URINE TYPE: NORMAL
UROBILINOGEN, URINE: 0.2 E.U./DL
WBC # BLD: 5.6 K/UL (ref 4–11)

## 2019-08-01 PROCEDURE — 81003 URINALYSIS AUTO W/O SCOPE: CPT

## 2019-08-01 PROCEDURE — 86900 BLOOD TYPING SEROLOGIC ABO: CPT

## 2019-08-01 PROCEDURE — 80053 COMPREHEN METABOLIC PANEL: CPT

## 2019-08-01 PROCEDURE — 86850 RBC ANTIBODY SCREEN: CPT

## 2019-08-01 PROCEDURE — 85027 COMPLETE CBC AUTOMATED: CPT

## 2019-08-01 PROCEDURE — 86901 BLOOD TYPING SEROLOGIC RH(D): CPT

## 2019-08-01 ASSESSMENT — PAIN SCALES - GENERAL: PAINLEVEL_OUTOF10: 0

## 2019-08-01 NOTE — PROGRESS NOTES
[facility]                          Date of Procedure 8/6/19 Time of Procedure    PRIOR TO PROCEDURE DATE:  1. Please follow any guidelines/instructions prior to your procedure as advised by your surgeon. 2. Arrange for someone to drive you home and be with you for the first 24 hours after discharge for your safety after your procedure for which you received sedation. Ensure it is someone we can share information with regarding your discharge. 3. You must contact your surgeon for instructions IF:   You are taking any blood thinners, aspirin, anti-inflammatory or vitamin E.   There is a change in your physical condition such as a cold, fever, rash, cuts, sores or any other infection, especially near your surgical site. 4. Do not drink alcohol the day before or day of your procedure. 5. A Pre-op History and Physical for surgery MUST be completed by your Physician or Urgent Care within 30 days of your procedure date. Please bring a copy with you on the day of your procedure and along with any other testing performed. THE DAY OF YOUR PROCEDURE:  1. Follow instructions for ARRIVAL TIME as DIRECTED BY YOUR SURGEON. If your surgeon does not give you a specific arrival time, please arrive --per surgeon    2. Enter the MAIN entrance from [facility] and follow the signs to the free [employer] or [facility] parking (offered free of charge 6am-5pm). 3. Enter the Main Entrance of the hospital (do NOT enter from the lower level of the parking garage). Upon entrance, check in with the  at the main desk on your left. If no one is available at the desk, proceed into the [facility] Waiting Room and go through the door directly into the [facility]. There is a Check-in desk ACROSS from Room 5 (marked with a sign hanging from the ceiling).  The phone number for the surgery center is [phone].    4. DO NOT EAT ANYTHING eight hours prior to surgery. May have 8 ounces of water 4 hours prior to surgery (exception would be medication instructions below only)     5. MEDICATIONS    Take the following medications with a SMALL sip of water: none   Use your usual dose of inhalers the morning of surgery. BRING your rescue inhaler with you to hospital.    Anesthesia does NOT want you to take insulin the morning of surgery. They will control your blood sugar while you are at the hospital. Please contact your ordering physician for instructions regarding your insulin the night before your procedure. If you have an insulin pump, please keep it set on basal rate. 6. Do not swallow water when brushing teeth. No gum, candy, mints or ice chips. Refrain from smoking or at least decrease the amount. 7. Dress in loose, comfortable clothing appropriate for redressing after your procedure. Do not wear jewelry (including body piercings), make-up (especially NO eye make-up), fingernail polish (NO toenail polish if foot/leg surgery), lotion, powders or metal hairclips. 8. Dentures, glasses, or contacts will need to be removed before your procedure. Bring cases for your glasses, contacts, dentures, or hearing aids to protect them while you are in surgery. 9. If you use a CPAP, please bring it with you on the day of your procedure. 10. We recommend that valuable personal  belongings, such as cash, cell phones, e-tablets or jewelry, be left at home during your stay. The hospital will not be responsible for valuables that are not secured in the hospital safe. However, if your insurance requires a co-pay, you may want to bring a method of payment, i.e. Check or credit card, if you wish to pay your co-pay the day of surgery. 11. If you are to stay overnight, you may bring a bag with personal items. Please have any large items you may need brought in by your family after your arrival to your hospital room.     12. If you have a Living Will or Durable Power arrange to have someone at home with you for the first 24 hours after discharge. 3. You and your family will be given written instructions about your diet, activity, dressing care, medications, and return visits. 4. Once at home, should issues with nausea, pain, or bleeding occur, or should you notice any signs of infection, you should call your surgeon. 5. Always clean your hands before and after caring for your wound. Do not let your family touch your surgery site without cleaning their hands. 6. Narcotic pain medications can cause significant constipation. You may want to add a stool softener to your postoperative medication schedule or speak to your surgeon on how best to manage this SIDE EFFECT. SPECIAL INSTRUCTIONS   Follow bowel prep per surgeon instructions    Thank you for allowing us to care for you. We strive to exceed your expectations in the overall delivery of care and service provided to you and your family. If you need to contact us for any reason, please call us at [phone]. Instructions reviewed and copy given to patient during preadmission testing visit. Shane Zimmer. 8/1/2019 .8:38 AM      ADDITIONAL EDUCATIONAL INFORMATION REVIEWED / PROVIDED TO YOU AND YOUR FAMILY:  Yes Taking Control of Your Pain   Yes FAQs about Surgical Site Infections           [email or web address].[email or web address] pdf )  Yes Hibiclens® Bathing Instructions  Yes Antibacterial Soap    Yes Incentive Spirometer given to patient- PLEASE BRING THIS SPIROMETER BACK WITH YOU ON THE DAY OF YOUR SURGERY

## 2019-08-02 ENCOUNTER — OFFICE VISIT (OUTPATIENT)
Dept: SURGERY | Age: 64
End: 2019-08-02

## 2019-08-02 VITALS
WEIGHT: 133 LBS | HEART RATE: 84 BPM | BODY MASS INDEX: 21.38 KG/M2 | SYSTOLIC BLOOD PRESSURE: 136 MMHG | TEMPERATURE: 99.1 F | HEIGHT: 66 IN | RESPIRATION RATE: 13 BRPM | DIASTOLIC BLOOD PRESSURE: 85 MMHG | OXYGEN SATURATION: 96 %

## 2019-08-02 DIAGNOSIS — Z09 POSTOP CHECK: Primary | ICD-10-CM

## 2019-08-02 PROCEDURE — 99024 POSTOP FOLLOW-UP VISIT: CPT | Performed by: SURGERY

## 2019-08-05 ENCOUNTER — ANESTHESIA EVENT (OUTPATIENT)
Dept: OPERATING ROOM | Age: 64
DRG: 331 | End: 2019-08-05
Payer: COMMERCIAL

## 2019-08-06 ENCOUNTER — HOSPITAL ENCOUNTER (INPATIENT)
Age: 64
LOS: 4 days | Discharge: HOME HEALTH | DRG: 331 | End: 2019-08-10
Attending: SURGERY | Admitting: SURGERY
Payer: COMMERCIAL

## 2019-08-06 ENCOUNTER — ANESTHESIA (OUTPATIENT)
Dept: OPERATING ROOM | Age: 64
DRG: 331 | End: 2019-08-06
Payer: COMMERCIAL

## 2019-08-06 VITALS — TEMPERATURE: 98.4 F | SYSTOLIC BLOOD PRESSURE: 111 MMHG | DIASTOLIC BLOOD PRESSURE: 60 MMHG | OXYGEN SATURATION: 99 %

## 2019-08-06 DIAGNOSIS — Z98.890 S/P COLOSTOMY TAKEDOWN: Primary | ICD-10-CM

## 2019-08-06 LAB
ABO/RH: NORMAL
ANTIBODY SCREEN: NORMAL

## 2019-08-06 PROCEDURE — 88304 TISSUE EXAM BY PATHOLOGIST: CPT

## 2019-08-06 PROCEDURE — 2720000010 HC SURG SUPPLY STERILE: Performed by: SURGERY

## 2019-08-06 PROCEDURE — 86901 BLOOD TYPING SEROLOGIC RH(D): CPT

## 2019-08-06 PROCEDURE — 2500000003 HC RX 250 WO HCPCS: Performed by: NURSE ANESTHETIST, CERTIFIED REGISTERED

## 2019-08-06 PROCEDURE — 6360000002 HC RX W HCPCS: Performed by: NURSE ANESTHETIST, CERTIFIED REGISTERED

## 2019-08-06 PROCEDURE — 2709999900 HC NON-CHARGEABLE SUPPLY: Performed by: SURGERY

## 2019-08-06 PROCEDURE — 2580000003 HC RX 258: Performed by: SURGERY

## 2019-08-06 PROCEDURE — 6360000002 HC RX W HCPCS: Performed by: SURGERY

## 2019-08-06 PROCEDURE — 2580000003 HC RX 258: Performed by: ANESTHESIOLOGY

## 2019-08-06 PROCEDURE — 0DQK0ZZ REPAIR ASCENDING COLON, OPEN APPROACH: ICD-10-PCS | Performed by: STUDENT IN AN ORGANIZED HEALTH CARE EDUCATION/TRAINING PROGRAM

## 2019-08-06 PROCEDURE — 86900 BLOOD TYPING SEROLOGIC ABO: CPT

## 2019-08-06 PROCEDURE — 7100000000 HC PACU RECOVERY - FIRST 15 MIN: Performed by: SURGERY

## 2019-08-06 PROCEDURE — 6360000002 HC RX W HCPCS: Performed by: STUDENT IN AN ORGANIZED HEALTH CARE EDUCATION/TRAINING PROGRAM

## 2019-08-06 PROCEDURE — 2500000003 HC RX 250 WO HCPCS: Performed by: SURGERY

## 2019-08-06 PROCEDURE — 6370000000 HC RX 637 (ALT 250 FOR IP): Performed by: STUDENT IN AN ORGANIZED HEALTH CARE EDUCATION/TRAINING PROGRAM

## 2019-08-06 PROCEDURE — 2580000003 HC RX 258: Performed by: STUDENT IN AN ORGANIZED HEALTH CARE EDUCATION/TRAINING PROGRAM

## 2019-08-06 PROCEDURE — 3600000014 HC SURGERY LEVEL 4 ADDTL 15MIN: Performed by: SURGERY

## 2019-08-06 PROCEDURE — 7100000001 HC PACU RECOVERY - ADDTL 15 MIN: Performed by: SURGERY

## 2019-08-06 PROCEDURE — 6360000002 HC RX W HCPCS: Performed by: ANESTHESIOLOGY

## 2019-08-06 PROCEDURE — 1200000000 HC SEMI PRIVATE

## 2019-08-06 PROCEDURE — 0WQF0ZZ REPAIR ABDOMINAL WALL, OPEN APPROACH: ICD-10-PCS | Performed by: STUDENT IN AN ORGANIZED HEALTH CARE EDUCATION/TRAINING PROGRAM

## 2019-08-06 PROCEDURE — 3700000001 HC ADD 15 MINUTES (ANESTHESIA): Performed by: SURGERY

## 2019-08-06 PROCEDURE — C1713 ANCHOR/SCREW BN/BN,TIS/BN: HCPCS | Performed by: SURGERY

## 2019-08-06 PROCEDURE — 3700000000 HC ANESTHESIA ATTENDED CARE: Performed by: SURGERY

## 2019-08-06 PROCEDURE — 3600000004 HC SURGERY LEVEL 4 BASE: Performed by: SURGERY

## 2019-08-06 PROCEDURE — 86850 RBC ANTIBODY SCREEN: CPT

## 2019-08-06 DEVICE — MESH SURG W8XL8CM FLAT SHT BIO-A: Type: IMPLANTABLE DEVICE | Status: FUNCTIONAL

## 2019-08-06 RX ORDER — DICYCLOMINE HCL 20 MG
20 TABLET ORAL PRN
COMMUNITY
Start: 2018-04-12 | End: 2021-07-02 | Stop reason: ALTCHOICE

## 2019-08-06 RX ORDER — OXYCODONE HYDROCHLORIDE 5 MG/1
10 TABLET ORAL EVERY 4 HOURS PRN
Status: DISCONTINUED | OUTPATIENT
Start: 2019-08-06 | End: 2019-08-10 | Stop reason: HOSPADM

## 2019-08-06 RX ORDER — MAGNESIUM HYDROXIDE 1200 MG/15ML
LIQUID ORAL CONTINUOUS PRN
Status: COMPLETED | OUTPATIENT
Start: 2019-08-06 | End: 2019-08-06

## 2019-08-06 RX ORDER — 0.9 % SODIUM CHLORIDE 0.9 %
10 VIAL (ML) INJECTION DAILY
Status: DISCONTINUED | OUTPATIENT
Start: 2019-08-06 | End: 2019-08-08

## 2019-08-06 RX ORDER — PROPOFOL 10 MG/ML
INJECTION, EMULSION INTRAVENOUS PRN
Status: DISCONTINUED | OUTPATIENT
Start: 2019-08-06 | End: 2019-08-06 | Stop reason: SDUPTHER

## 2019-08-06 RX ORDER — LABETALOL 20 MG/4 ML (5 MG/ML) INTRAVENOUS SYRINGE
5 EVERY 10 MIN PRN
Status: DISCONTINUED | OUTPATIENT
Start: 2019-08-06 | End: 2019-08-06 | Stop reason: HOSPADM

## 2019-08-06 RX ORDER — CIPROFLOXACIN 2 MG/ML
400 INJECTION, SOLUTION INTRAVENOUS ONCE
Status: COMPLETED | OUTPATIENT
Start: 2019-08-06 | End: 2019-08-06

## 2019-08-06 RX ORDER — ACETAMINOPHEN 500 MG
1000 TABLET ORAL EVERY 6 HOURS
Status: DISCONTINUED | OUTPATIENT
Start: 2019-08-06 | End: 2019-08-10 | Stop reason: HOSPADM

## 2019-08-06 RX ORDER — FAMCICLOVIR 250 MG/1
1 TABLET, FILM COATED ORAL DAILY
COMMUNITY
Start: 2018-12-21

## 2019-08-06 RX ORDER — OMEPRAZOLE 20 MG/1
20 CAPSULE, DELAYED RELEASE ORAL DAILY
COMMUNITY

## 2019-08-06 RX ORDER — PROMETHAZINE HYDROCHLORIDE 25 MG/ML
6.25 INJECTION, SOLUTION INTRAMUSCULAR; INTRAVENOUS
Status: DISCONTINUED | OUTPATIENT
Start: 2019-08-06 | End: 2019-08-06 | Stop reason: HOSPADM

## 2019-08-06 RX ORDER — FENTANYL CITRATE 50 UG/ML
50 INJECTION, SOLUTION INTRAMUSCULAR; INTRAVENOUS EVERY 5 MIN PRN
Status: DISCONTINUED | OUTPATIENT
Start: 2019-08-06 | End: 2019-08-06 | Stop reason: HOSPADM

## 2019-08-06 RX ORDER — EPHEDRINE SULFATE 50 MG/ML
INJECTION, SOLUTION INTRAVENOUS PRN
Status: DISCONTINUED | OUTPATIENT
Start: 2019-08-06 | End: 2019-08-06 | Stop reason: SDUPTHER

## 2019-08-06 RX ORDER — FENTANYL CITRATE 50 UG/ML
25 INJECTION, SOLUTION INTRAMUSCULAR; INTRAVENOUS EVERY 5 MIN PRN
Status: DISCONTINUED | OUTPATIENT
Start: 2019-08-06 | End: 2019-08-06 | Stop reason: HOSPADM

## 2019-08-06 RX ORDER — SODIUM CHLORIDE, SODIUM LACTATE, POTASSIUM CHLORIDE, CALCIUM CHLORIDE 600; 310; 30; 20 MG/100ML; MG/100ML; MG/100ML; MG/100ML
INJECTION, SOLUTION INTRAVENOUS CONTINUOUS
Status: DISCONTINUED | OUTPATIENT
Start: 2019-08-06 | End: 2019-08-06

## 2019-08-06 RX ORDER — ONDANSETRON 2 MG/ML
4 INJECTION INTRAMUSCULAR; INTRAVENOUS
Status: DISCONTINUED | OUTPATIENT
Start: 2019-08-06 | End: 2019-08-06 | Stop reason: HOSPADM

## 2019-08-06 RX ORDER — SODIUM CHLORIDE, SODIUM LACTATE, POTASSIUM CHLORIDE, CALCIUM CHLORIDE 600; 310; 30; 20 MG/100ML; MG/100ML; MG/100ML; MG/100ML
INJECTION, SOLUTION INTRAVENOUS CONTINUOUS
Status: DISCONTINUED | OUTPATIENT
Start: 2019-08-06 | End: 2019-08-07

## 2019-08-06 RX ORDER — FENTANYL CITRATE 50 UG/ML
INJECTION, SOLUTION INTRAMUSCULAR; INTRAVENOUS PRN
Status: DISCONTINUED | OUTPATIENT
Start: 2019-08-06 | End: 2019-08-06 | Stop reason: SDUPTHER

## 2019-08-06 RX ORDER — GLYCOPYRROLATE 1 MG/5 ML
SYRINGE (ML) INTRAVENOUS PRN
Status: DISCONTINUED | OUTPATIENT
Start: 2019-08-06 | End: 2019-08-06 | Stop reason: SDUPTHER

## 2019-08-06 RX ORDER — LIDOCAINE HYDROCHLORIDE 20 MG/ML
INJECTION, SOLUTION INTRAVENOUS PRN
Status: DISCONTINUED | OUTPATIENT
Start: 2019-08-06 | End: 2019-08-06 | Stop reason: SDUPTHER

## 2019-08-06 RX ORDER — DEXAMETHASONE SODIUM PHOSPHATE 4 MG/ML
INJECTION, SOLUTION INTRA-ARTICULAR; INTRALESIONAL; INTRAMUSCULAR; INTRAVENOUS; SOFT TISSUE PRN
Status: DISCONTINUED | OUTPATIENT
Start: 2019-08-06 | End: 2019-08-06 | Stop reason: SDUPTHER

## 2019-08-06 RX ORDER — ONDANSETRON 2 MG/ML
4 INJECTION INTRAMUSCULAR; INTRAVENOUS EVERY 6 HOURS PRN
Status: DISCONTINUED | OUTPATIENT
Start: 2019-08-06 | End: 2019-08-10 | Stop reason: HOSPADM

## 2019-08-06 RX ORDER — OXYCODONE HYDROCHLORIDE 5 MG/1
5 TABLET ORAL EVERY 4 HOURS PRN
Status: DISCONTINUED | OUTPATIENT
Start: 2019-08-06 | End: 2019-08-10 | Stop reason: HOSPADM

## 2019-08-06 RX ORDER — PANTOPRAZOLE SODIUM 40 MG/10ML
40 INJECTION, POWDER, LYOPHILIZED, FOR SOLUTION INTRAVENOUS DAILY
Status: DISCONTINUED | OUTPATIENT
Start: 2019-08-07 | End: 2019-08-08

## 2019-08-06 RX ORDER — BUPIVACAINE HYDROCHLORIDE 5 MG/ML
INJECTION, SOLUTION EPIDURAL; INTRACAUDAL PRN
Status: DISCONTINUED | OUTPATIENT
Start: 2019-08-06 | End: 2019-08-06 | Stop reason: ALTCHOICE

## 2019-08-06 RX ORDER — TRAZODONE HYDROCHLORIDE 50 MG/1
1 TABLET ORAL PRN
Refills: 1 | COMMUNITY
Start: 2019-07-14 | End: 2019-12-09 | Stop reason: ALTCHOICE

## 2019-08-06 RX ORDER — MIDAZOLAM HYDROCHLORIDE 1 MG/ML
INJECTION INTRAMUSCULAR; INTRAVENOUS PRN
Status: DISCONTINUED | OUTPATIENT
Start: 2019-08-06 | End: 2019-08-06 | Stop reason: SDUPTHER

## 2019-08-06 RX ORDER — ONDANSETRON 2 MG/ML
INJECTION INTRAMUSCULAR; INTRAVENOUS PRN
Status: DISCONTINUED | OUTPATIENT
Start: 2019-08-06 | End: 2019-08-06 | Stop reason: SDUPTHER

## 2019-08-06 RX ORDER — OXYCODONE HYDROCHLORIDE AND ACETAMINOPHEN 5; 325 MG/1; MG/1
1 TABLET ORAL
Status: DISCONTINUED | OUTPATIENT
Start: 2019-08-06 | End: 2019-08-06 | Stop reason: HOSPADM

## 2019-08-06 RX ORDER — NEOSTIGMINE METHYLSULFATE 5 MG/5 ML
SYRINGE (ML) INTRAVENOUS PRN
Status: DISCONTINUED | OUTPATIENT
Start: 2019-08-06 | End: 2019-08-06 | Stop reason: SDUPTHER

## 2019-08-06 RX ORDER — ACETAMINOPHEN 10 MG/ML
1000 INJECTION, SOLUTION INTRAVENOUS ONCE
Status: COMPLETED | OUTPATIENT
Start: 2019-08-06 | End: 2019-08-06

## 2019-08-06 RX ORDER — SODIUM CHLORIDE 0.9 % (FLUSH) 0.9 %
10 SYRINGE (ML) INJECTION PRN
Status: DISCONTINUED | OUTPATIENT
Start: 2019-08-06 | End: 2019-08-10 | Stop reason: HOSPADM

## 2019-08-06 RX ORDER — HYDRALAZINE HYDROCHLORIDE 20 MG/ML
5 INJECTION INTRAMUSCULAR; INTRAVENOUS EVERY 10 MIN PRN
Status: DISCONTINUED | OUTPATIENT
Start: 2019-08-06 | End: 2019-08-06 | Stop reason: HOSPADM

## 2019-08-06 RX ORDER — ROCURONIUM BROMIDE 10 MG/ML
INJECTION, SOLUTION INTRAVENOUS PRN
Status: DISCONTINUED | OUTPATIENT
Start: 2019-08-06 | End: 2019-08-06 | Stop reason: SDUPTHER

## 2019-08-06 RX ORDER — OXYCODONE HYDROCHLORIDE 5 MG/1
1 TABLET ORAL PRN
Status: ON HOLD | COMMUNITY
Start: 2019-03-05 | End: 2019-08-09 | Stop reason: HOSPADM

## 2019-08-06 RX ORDER — SODIUM CHLORIDE 0.9 % (FLUSH) 0.9 %
10 SYRINGE (ML) INJECTION EVERY 12 HOURS SCHEDULED
Status: DISCONTINUED | OUTPATIENT
Start: 2019-08-06 | End: 2019-08-10 | Stop reason: HOSPADM

## 2019-08-06 RX ADMIN — DEXAMETHASONE SODIUM PHOSPHATE 4 MG: 4 INJECTION, SOLUTION INTRAMUSCULAR; INTRAVENOUS at 10:34

## 2019-08-06 RX ADMIN — FENTANYL CITRATE 50 MCG: 50 INJECTION INTRAMUSCULAR; INTRAVENOUS at 13:19

## 2019-08-06 RX ADMIN — ROCURONIUM BROMIDE 10 MG: 10 INJECTION, SOLUTION INTRAVENOUS at 12:04

## 2019-08-06 RX ADMIN — EPHEDRINE SULFATE 10 MG: 50 INJECTION, SOLUTION INTRAMUSCULAR; INTRAVENOUS; SUBCUTANEOUS at 09:45

## 2019-08-06 RX ADMIN — FENTANYL CITRATE 25 MCG: 50 INJECTION INTRAMUSCULAR; INTRAVENOUS at 14:52

## 2019-08-06 RX ADMIN — HYDROMORPHONE HYDROCHLORIDE 0.5 MG: 1 INJECTION, SOLUTION INTRAMUSCULAR; INTRAVENOUS; SUBCUTANEOUS at 13:30

## 2019-08-06 RX ADMIN — SODIUM CHLORIDE, POTASSIUM CHLORIDE, SODIUM LACTATE AND CALCIUM CHLORIDE: 600; 310; 30; 20 INJECTION, SOLUTION INTRAVENOUS at 17:04

## 2019-08-06 RX ADMIN — ACETAMINOPHEN 1000 MG: 500 TABLET, COATED ORAL at 20:37

## 2019-08-06 RX ADMIN — FENTANYL CITRATE 25 MCG: 50 INJECTION INTRAMUSCULAR; INTRAVENOUS at 09:49

## 2019-08-06 RX ADMIN — ROCURONIUM BROMIDE 10 MG: 10 INJECTION, SOLUTION INTRAVENOUS at 11:29

## 2019-08-06 RX ADMIN — HYDROMORPHONE HYDROCHLORIDE 0.5 MG: 1 INJECTION, SOLUTION INTRAMUSCULAR; INTRAVENOUS; SUBCUTANEOUS at 14:26

## 2019-08-06 RX ADMIN — EPHEDRINE SULFATE 10 MG: 50 INJECTION, SOLUTION INTRAMUSCULAR; INTRAVENOUS; SUBCUTANEOUS at 10:20

## 2019-08-06 RX ADMIN — HYDROMORPHONE HYDROCHLORIDE 0.5 MG: 1 INJECTION, SOLUTION INTRAMUSCULAR; INTRAVENOUS; SUBCUTANEOUS at 21:31

## 2019-08-06 RX ADMIN — HYDROMORPHONE HYDROCHLORIDE 0.5 MG: 1 INJECTION, SOLUTION INTRAMUSCULAR; INTRAVENOUS; SUBCUTANEOUS at 18:26

## 2019-08-06 RX ADMIN — OXYCODONE HYDROCHLORIDE 10 MG: 5 TABLET ORAL at 23:31

## 2019-08-06 RX ADMIN — EPHEDRINE SULFATE 10 MG: 50 INJECTION, SOLUTION INTRAMUSCULAR; INTRAVENOUS; SUBCUTANEOUS at 11:33

## 2019-08-06 RX ADMIN — ROCURONIUM BROMIDE 10 MG: 10 INJECTION, SOLUTION INTRAVENOUS at 10:11

## 2019-08-06 RX ADMIN — EPHEDRINE SULFATE 5 MG: 50 INJECTION, SOLUTION INTRAMUSCULAR; INTRAVENOUS; SUBCUTANEOUS at 10:46

## 2019-08-06 RX ADMIN — ROCURONIUM BROMIDE 40 MG: 10 INJECTION, SOLUTION INTRAVENOUS at 09:41

## 2019-08-06 RX ADMIN — FENTANYL CITRATE 50 MCG: 50 INJECTION INTRAMUSCULAR; INTRAVENOUS at 13:11

## 2019-08-06 RX ADMIN — Medication 0.2 MG: at 09:58

## 2019-08-06 RX ADMIN — Medication 3 MG: at 12:39

## 2019-08-06 RX ADMIN — ONDANSETRON 4 MG: 2 INJECTION INTRAMUSCULAR; INTRAVENOUS at 10:34

## 2019-08-06 RX ADMIN — FENTANYL CITRATE 25 MCG: 50 INJECTION INTRAMUSCULAR; INTRAVENOUS at 10:31

## 2019-08-06 RX ADMIN — HYDROMORPHONE HYDROCHLORIDE 0.5 MG: 1 INJECTION, SOLUTION INTRAMUSCULAR; INTRAVENOUS; SUBCUTANEOUS at 14:10

## 2019-08-06 RX ADMIN — VENLAFAXINE 100 MG: 25 TABLET ORAL at 20:37

## 2019-08-06 RX ADMIN — EPHEDRINE SULFATE 5 MG: 50 INJECTION, SOLUTION INTRAMUSCULAR; INTRAVENOUS; SUBCUTANEOUS at 10:48

## 2019-08-06 RX ADMIN — Medication 0.5 MG: at 12:39

## 2019-08-06 RX ADMIN — SODIUM CHLORIDE, POTASSIUM CHLORIDE, SODIUM LACTATE AND CALCIUM CHLORIDE: 600; 310; 30; 20 INJECTION, SOLUTION INTRAVENOUS at 08:35

## 2019-08-06 RX ADMIN — ROCURONIUM BROMIDE 20 MG: 10 INJECTION, SOLUTION INTRAVENOUS at 10:55

## 2019-08-06 RX ADMIN — ONDANSETRON 4 MG: 2 INJECTION INTRAMUSCULAR; INTRAVENOUS at 20:41

## 2019-08-06 RX ADMIN — LIDOCAINE HYDROCHLORIDE 100 MG: 20 INJECTION, SOLUTION INTRAVENOUS at 09:39

## 2019-08-06 RX ADMIN — OXYCODONE HYDROCHLORIDE 10 MG: 5 TABLET ORAL at 17:02

## 2019-08-06 RX ADMIN — CIPROFLOXACIN 400 MG: 2 INJECTION, SOLUTION INTRAVENOUS at 09:31

## 2019-08-06 RX ADMIN — FENTANYL CITRATE 50 MCG: 50 INJECTION INTRAMUSCULAR; INTRAVENOUS at 14:43

## 2019-08-06 RX ADMIN — FENTANYL CITRATE 50 MCG: 50 INJECTION INTRAMUSCULAR; INTRAVENOUS at 12:30

## 2019-08-06 RX ADMIN — SODIUM CHLORIDE, SODIUM LACTATE, POTASSIUM CHLORIDE, AND CALCIUM CHLORIDE: 600; 310; 30; 20 INJECTION, SOLUTION INTRAVENOUS at 10:53

## 2019-08-06 RX ADMIN — FENTANYL CITRATE 50 MCG: 50 INJECTION INTRAMUSCULAR; INTRAVENOUS at 13:02

## 2019-08-06 RX ADMIN — FENTANYL CITRATE 50 MCG: 50 INJECTION INTRAMUSCULAR; INTRAVENOUS at 09:39

## 2019-08-06 RX ADMIN — PROPOFOL 160 MG: 10 INJECTION, EMULSION INTRAVENOUS at 09:40

## 2019-08-06 RX ADMIN — METRONIDAZOLE 500 MG: 500 INJECTION, SOLUTION INTRAVENOUS at 09:32

## 2019-08-06 RX ADMIN — ACETAMINOPHEN 1000 MG: 10 INJECTION, SOLUTION INTRAVENOUS at 14:24

## 2019-08-06 RX ADMIN — SODIUM CHLORIDE, SODIUM LACTATE, POTASSIUM CHLORIDE, AND CALCIUM CHLORIDE: 600; 310; 30; 20 INJECTION, SOLUTION INTRAVENOUS at 09:39

## 2019-08-06 RX ADMIN — MIDAZOLAM HYDROCHLORIDE 2 MG: 2 INJECTION, SOLUTION INTRAMUSCULAR; INTRAVENOUS at 09:36

## 2019-08-06 ASSESSMENT — PULMONARY FUNCTION TESTS
PIF_VALUE: 19
PIF_VALUE: 14
PIF_VALUE: 15
PIF_VALUE: 1
PIF_VALUE: 19
PIF_VALUE: 14
PIF_VALUE: 16
PIF_VALUE: 26
PIF_VALUE: 19
PIF_VALUE: 15
PIF_VALUE: 15
PIF_VALUE: 19
PIF_VALUE: 15
PIF_VALUE: 19
PIF_VALUE: 19
PIF_VALUE: 28
PIF_VALUE: 19
PIF_VALUE: 14
PIF_VALUE: 15
PIF_VALUE: 19
PIF_VALUE: 19
PIF_VALUE: 25
PIF_VALUE: 15
PIF_VALUE: 24
PIF_VALUE: 19
PIF_VALUE: 23
PIF_VALUE: 16
PIF_VALUE: 23
PIF_VALUE: 20
PIF_VALUE: 15
PIF_VALUE: 25
PIF_VALUE: 24
PIF_VALUE: 25
PIF_VALUE: 19
PIF_VALUE: 23
PIF_VALUE: 24
PIF_VALUE: 25
PIF_VALUE: 24
PIF_VALUE: 19
PIF_VALUE: 24
PIF_VALUE: 19
PIF_VALUE: 26
PIF_VALUE: 24
PIF_VALUE: 25
PIF_VALUE: 16
PIF_VALUE: 19
PIF_VALUE: 25
PIF_VALUE: 19
PIF_VALUE: 19
PIF_VALUE: 2
PIF_VALUE: 19
PIF_VALUE: 28
PIF_VALUE: 15
PIF_VALUE: 19
PIF_VALUE: 24
PIF_VALUE: 19
PIF_VALUE: 19
PIF_VALUE: 2
PIF_VALUE: 27
PIF_VALUE: 23
PIF_VALUE: 25
PIF_VALUE: 19
PIF_VALUE: 14
PIF_VALUE: 25
PIF_VALUE: 15
PIF_VALUE: 17
PIF_VALUE: 14
PIF_VALUE: 25
PIF_VALUE: 19
PIF_VALUE: 19
PIF_VALUE: 18
PIF_VALUE: 25
PIF_VALUE: 19
PIF_VALUE: 15
PIF_VALUE: 24
PIF_VALUE: 23
PIF_VALUE: 19
PIF_VALUE: 15
PIF_VALUE: 28
PIF_VALUE: 19
PIF_VALUE: 18
PIF_VALUE: 15
PIF_VALUE: 28
PIF_VALUE: 19
PIF_VALUE: 24
PIF_VALUE: 2
PIF_VALUE: 22
PIF_VALUE: 15
PIF_VALUE: 19
PIF_VALUE: 24
PIF_VALUE: 19
PIF_VALUE: 25
PIF_VALUE: 24
PIF_VALUE: 28
PIF_VALUE: 19
PIF_VALUE: 2
PIF_VALUE: 14
PIF_VALUE: 14
PIF_VALUE: 28
PIF_VALUE: 24
PIF_VALUE: 19
PIF_VALUE: 2
PIF_VALUE: 25
PIF_VALUE: 19
PIF_VALUE: 14
PIF_VALUE: 21
PIF_VALUE: 15
PIF_VALUE: 27
PIF_VALUE: 28
PIF_VALUE: 15
PIF_VALUE: 25
PIF_VALUE: 18
PIF_VALUE: 15
PIF_VALUE: 19
PIF_VALUE: 21
PIF_VALUE: 28
PIF_VALUE: 25
PIF_VALUE: 15
PIF_VALUE: 24
PIF_VALUE: 16
PIF_VALUE: 19
PIF_VALUE: 19
PIF_VALUE: 14
PIF_VALUE: 15
PIF_VALUE: 15
PIF_VALUE: 19
PIF_VALUE: 14
PIF_VALUE: 23
PIF_VALUE: 19
PIF_VALUE: 18
PIF_VALUE: 14
PIF_VALUE: 19
PIF_VALUE: 1
PIF_VALUE: 0
PIF_VALUE: 19
PIF_VALUE: 2
PIF_VALUE: 25
PIF_VALUE: 15
PIF_VALUE: 16
PIF_VALUE: 19
PIF_VALUE: 25
PIF_VALUE: 19
PIF_VALUE: 24
PIF_VALUE: 15
PIF_VALUE: 19
PIF_VALUE: 27
PIF_VALUE: 28
PIF_VALUE: 26
PIF_VALUE: 19
PIF_VALUE: 18
PIF_VALUE: 19
PIF_VALUE: 28
PIF_VALUE: 14
PIF_VALUE: 19
PIF_VALUE: 19
PIF_VALUE: 25
PIF_VALUE: 19
PIF_VALUE: 14
PIF_VALUE: 19
PIF_VALUE: 14
PIF_VALUE: 19
PIF_VALUE: 2
PIF_VALUE: 14
PIF_VALUE: 24
PIF_VALUE: 15
PIF_VALUE: 28
PIF_VALUE: 19
PIF_VALUE: 19
PIF_VALUE: 2
PIF_VALUE: 14
PIF_VALUE: 14
PIF_VALUE: 19
PIF_VALUE: 19
PIF_VALUE: 2
PIF_VALUE: 14

## 2019-08-06 ASSESSMENT — PAIN SCALES - GENERAL
PAINLEVEL_OUTOF10: 10
PAINLEVEL_OUTOF10: 9
PAINLEVEL_OUTOF10: 7
PAINLEVEL_OUTOF10: 8
PAINLEVEL_OUTOF10: 0
PAINLEVEL_OUTOF10: 10
PAINLEVEL_OUTOF10: 7
PAINLEVEL_OUTOF10: 8
PAINLEVEL_OUTOF10: 8
PAINLEVEL_OUTOF10: 7
PAINLEVEL_OUTOF10: 10
PAINLEVEL_OUTOF10: 8
PAINLEVEL_OUTOF10: 9
PAINLEVEL_OUTOF10: 6
PAINLEVEL_OUTOF10: 5

## 2019-08-06 ASSESSMENT — PAIN - FUNCTIONAL ASSESSMENT: PAIN_FUNCTIONAL_ASSESSMENT: 0-10

## 2019-08-06 NOTE — BRIEF OP NOTE
Brief Postoperative Note  ______________________________________________________________    Patient: Elizabeth Olmstead  YOB: 1955  MRN: [id number]  Date of Procedure: 2019    Pre-Op Diagnosis: POSTOP CHECK    Post-Op Diagnosis: Same       Procedure(s):  LAPAROSCOPIC COLOSTOMY TAKEDOWN; LAPAROSCOPIC SPLENIC MOBILIZATION; REPAIR OF PERISTOMAL HERNIA WITH MESH    Anesthesia: General    Surgeon(s):  Georgia Sousa MD    Assistant: Kassidy Mcdaniel    Estimated Blood Loss (mL): less than 50     Complications: None    Specimens:   ID Type Source Tests Collected by Time Destination   A : DESCENDING COLON AND SIGMOID COLON Specimen Abdomen SURGICAL PATHOLOGY Georgia Sousa MD 2019 1140        Implants:  * No implants in log *      Drains:   Closed/Suction Drain Right; Anterior Thigh Bulb 15 [place] (Active)       Closed/Suction Drain Anterior Other (Comment) Bulb 15 [place] (Active)       Colostomy LLQ (Active)       Urethral Catheter Straight-tip (Active)       Findings: Lap end colostomy take down, open repair of parastomal hernia with Bio-A (8 cm x 8 cm) mesh     Kassidy Mcdaniel MD  Date: 2019  Time: 12:47 PM
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
## 2019-08-06 NOTE — OP NOTE
splenic flexure was mobilized using LigaSure by incising the [employer]. The recto-sigmoid stump was identified and mobilized by lysing some adhesions to the abdominal side wall and the left fallopian tube, as well as taking down proximal mesentery with LigaSure to allow for tension free anastomosis. The ureter was identified and protected throughout this dissection. A laparoscopic locking bowel clamp was used to grasp the stump and it was placed next to the ostomy site in preparation for extraction. Next, the attention was turned to the ostomy site itself. The [place] and gauze were removed from the ostomy site. The skin was circumferentially cut with Bovie electrocautery. The incision was deepened through skin and subcutaneous tissue until the fascia was identified and peritoneal cavity was entered and the colostomy was freed from its surrounding attachments. Bowel wall was protected throughout this dissection. A linear cutting JOSELINE stapler with a blue load was used to remove the old colostomy site from the more proximal colon. The sigmoid stump was delivered into the operating field and was brought side to side with the descending colon. Two silk stay sutures were placed at this point on the antimesenteric borders. Next, two more blue loads af the JOSELINE stapler were sued to create a side-to-side functional end-to-end anastomosis. The anastomosis was reinforced with Lembert sutures using 3-0 Silks. The anastomosis was returned into the abdominal cavity. Next, the attention was turrned to repairing the dorothy-stomal hernia. The posterior and anterior sheath were clearly identified and retro-rectus space was developed around the hernia site. The posterior sheath was approximated using 0-Vicryl suture with a simple running stitch. An 8 cm x 8 cm Bio-A mesh was brought into the operating filed.   It was cut into shape to fit into the created retro-rectus space and was placed to cover the posterior sheath. Next, the anterior sheath was closed using 1-PDS suture with a simple running stitch. The pneumoperitoneum was then re-established. The anastomosis was examined and was found to be hemostatic, properly oriented (not twisted), and tension free. A piece of omentum was used to cover the anastomosis. Hernia repair was examined, suture line was noted to be intact without any bowel or omentum stuck in the suture line. Trocar sites were examined and were noted to be hemostatic. Pneumoperitoneum was released. The old ostomy site was irrigated with sterile saline and hemostasis was ensured with electrocautery. It was approximated with a purse-string stitch using 3-0 Vicryl, packed with iodoform packing, and dressed with gauze and paper tape. 5 mm trocar sites were closed with deep dermal sutures using 4-0 Monocryl and skin glue. Laps and instruments counts were correct x 2. Patient tolerated procedure well. There were no immediate complications. Dr. Mckenzie Mckinney was present, scrubbed, and directed the course of the entire operation from beginning to end.        Shant Zhou MD PGY 5  General Surgery Chief Resident

## 2019-08-07 LAB
ALBUMIN SERPL-MCNC: 3.8 G/DL (ref 3.4–5)
ANION GAP SERPL CALCULATED.3IONS-SCNC: 13 MMOL/L (ref 3–16)
BASOPHILS ABSOLUTE: 0 K/UL (ref 0–0.2)
BASOPHILS RELATIVE PERCENT: 0.4 %
BUN BLDV-MCNC: 13 MG/DL (ref 7–20)
CALCIUM SERPL-MCNC: 8.6 MG/DL (ref 8.3–10.6)
CHLORIDE BLD-SCNC: 102 MMOL/L (ref 99–110)
CO2: 24 MMOL/L (ref 21–32)
CREAT SERPL-MCNC: 0.6 MG/DL (ref 0.6–1.2)
EOSINOPHILS ABSOLUTE: 0.1 K/UL (ref 0–0.6)
EOSINOPHILS RELATIVE PERCENT: 1.2 %
GFR AFRICAN AMERICAN: >60
GFR NON-AFRICAN AMERICAN: >60
GLUCOSE BLD-MCNC: 104 MG/DL (ref 70–99)
HCT VFR BLD CALC: 29.2 % (ref 36–48)
HEMOGLOBIN: 9.5 G/DL (ref 12–16)
LYMPHOCYTES ABSOLUTE: 2.1 K/UL (ref 1–5.1)
LYMPHOCYTES RELATIVE PERCENT: 31.5 %
MAGNESIUM: 1.5 MG/DL (ref 1.8–2.4)
MCH RBC QN AUTO: 27.1 PG (ref 26–34)
MCHC RBC AUTO-ENTMCNC: 32.5 G/DL (ref 31–36)
MCV RBC AUTO: 83.2 FL (ref 80–100)
MONOCYTES ABSOLUTE: 0.5 K/UL (ref 0–1.3)
MONOCYTES RELATIVE PERCENT: 7.5 %
NEUTROPHILS ABSOLUTE: 4 K/UL (ref 1.7–7.7)
NEUTROPHILS RELATIVE PERCENT: 59.4 %
PDW BLD-RTO: 16.1 % (ref 12.4–15.4)
PHOSPHORUS: 3 MG/DL (ref 2.5–4.9)
PLATELET # BLD: 221 K/UL (ref 135–450)
PMV BLD AUTO: 7.8 FL (ref 5–10.5)
POTASSIUM SERPL-SCNC: 3.6 MMOL/L (ref 3.5–5.1)
RBC # BLD: 3.51 M/UL (ref 4–5.2)
SODIUM BLD-SCNC: 139 MMOL/L (ref 136–145)
WBC # BLD: 6.7 K/UL (ref 4–11)

## 2019-08-07 PROCEDURE — 83735 ASSAY OF MAGNESIUM: CPT

## 2019-08-07 PROCEDURE — 80069 RENAL FUNCTION PANEL: CPT

## 2019-08-07 PROCEDURE — C9113 INJ PANTOPRAZOLE SODIUM, VIA: HCPCS | Performed by: STUDENT IN AN ORGANIZED HEALTH CARE EDUCATION/TRAINING PROGRAM

## 2019-08-07 PROCEDURE — 1200000000 HC SEMI PRIVATE

## 2019-08-07 PROCEDURE — 2580000003 HC RX 258: Performed by: STUDENT IN AN ORGANIZED HEALTH CARE EDUCATION/TRAINING PROGRAM

## 2019-08-07 PROCEDURE — 6370000000 HC RX 637 (ALT 250 FOR IP): Performed by: STUDENT IN AN ORGANIZED HEALTH CARE EDUCATION/TRAINING PROGRAM

## 2019-08-07 PROCEDURE — 6360000002 HC RX W HCPCS: Performed by: STUDENT IN AN ORGANIZED HEALTH CARE EDUCATION/TRAINING PROGRAM

## 2019-08-07 PROCEDURE — 36415 COLL VENOUS BLD VENIPUNCTURE: CPT

## 2019-08-07 PROCEDURE — 85025 COMPLETE CBC W/AUTO DIFF WBC: CPT

## 2019-08-07 PROCEDURE — 2500000003 HC RX 250 WO HCPCS: Performed by: STUDENT IN AN ORGANIZED HEALTH CARE EDUCATION/TRAINING PROGRAM

## 2019-08-07 PROCEDURE — 97165 OT EVAL LOW COMPLEX 30 MIN: CPT

## 2019-08-07 PROCEDURE — 97530 THERAPEUTIC ACTIVITIES: CPT

## 2019-08-07 PROCEDURE — 97535 SELF CARE MNGMENT TRAINING: CPT

## 2019-08-07 RX ORDER — ACYCLOVIR 200 MG/1
200 CAPSULE ORAL
Status: DISCONTINUED | OUTPATIENT
Start: 2019-08-07 | End: 2019-08-10 | Stop reason: HOSPADM

## 2019-08-07 RX ORDER — POTASSIUM CHLORIDE 7.45 MG/ML
10 INJECTION INTRAVENOUS
Status: COMPLETED | OUTPATIENT
Start: 2019-08-07 | End: 2019-08-07

## 2019-08-07 RX ORDER — DEXTROSE, SODIUM CHLORIDE, AND POTASSIUM CHLORIDE 5; .45; .15 G/100ML; G/100ML; G/100ML
INJECTION INTRAVENOUS CONTINUOUS
Status: DISCONTINUED | OUTPATIENT
Start: 2019-08-07 | End: 2019-08-08

## 2019-08-07 RX ORDER — GABAPENTIN 600 MG/1
600 TABLET ORAL 3 TIMES DAILY
Status: DISCONTINUED | OUTPATIENT
Start: 2019-08-07 | End: 2019-08-10 | Stop reason: HOSPADM

## 2019-08-07 RX ORDER — DOCUSATE SODIUM 100 MG/1
100 CAPSULE, LIQUID FILLED ORAL 2 TIMES DAILY
Status: DISCONTINUED | OUTPATIENT
Start: 2019-08-07 | End: 2019-08-10 | Stop reason: HOSPADM

## 2019-08-07 RX ORDER — TRAZODONE HYDROCHLORIDE 50 MG/1
50 TABLET ORAL NIGHTLY PRN
Status: DISCONTINUED | OUTPATIENT
Start: 2019-08-07 | End: 2019-08-10 | Stop reason: HOSPADM

## 2019-08-07 RX ORDER — MAGNESIUM SULFATE IN WATER 40 MG/ML
4 INJECTION, SOLUTION INTRAVENOUS ONCE
Status: COMPLETED | OUTPATIENT
Start: 2019-08-07 | End: 2019-08-07

## 2019-08-07 RX ADMIN — ACYCLOVIR 200 MG: 200 CAPSULE ORAL at 17:36

## 2019-08-07 RX ADMIN — PANTOPRAZOLE SODIUM 40 MG: 40 INJECTION, POWDER, LYOPHILIZED, FOR SOLUTION INTRAVENOUS at 09:11

## 2019-08-07 RX ADMIN — HYDROMORPHONE HYDROCHLORIDE 0.25 MG: 1 INJECTION, SOLUTION INTRAMUSCULAR; INTRAVENOUS; SUBCUTANEOUS at 18:40

## 2019-08-07 RX ADMIN — DOCUSATE SODIUM 100 MG: 100 CAPSULE, LIQUID FILLED ORAL at 20:52

## 2019-08-07 RX ADMIN — ACETAMINOPHEN 1000 MG: 500 TABLET, COATED ORAL at 20:52

## 2019-08-07 RX ADMIN — HYDROMORPHONE HYDROCHLORIDE 0.5 MG: 1 INJECTION, SOLUTION INTRAMUSCULAR; INTRAVENOUS; SUBCUTANEOUS at 06:06

## 2019-08-07 RX ADMIN — ACYCLOVIR 200 MG: 200 CAPSULE ORAL at 20:52

## 2019-08-07 RX ADMIN — GABAPENTIN 600 MG: 600 TABLET, FILM COATED ORAL at 09:11

## 2019-08-07 RX ADMIN — GABAPENTIN 600 MG: 600 TABLET, FILM COATED ORAL at 20:52

## 2019-08-07 RX ADMIN — OXYCODONE HYDROCHLORIDE 10 MG: 5 TABLET ORAL at 17:36

## 2019-08-07 RX ADMIN — POTASSIUM CHLORIDE, DEXTROSE MONOHYDRATE AND SODIUM CHLORIDE: 150; 5; 450 INJECTION, SOLUTION INTRAVENOUS at 17:06

## 2019-08-07 RX ADMIN — GABAPENTIN 600 MG: 600 TABLET, FILM COATED ORAL at 14:27

## 2019-08-07 RX ADMIN — HYDROMORPHONE HYDROCHLORIDE 0.5 MG: 1 INJECTION, SOLUTION INTRAMUSCULAR; INTRAVENOUS; SUBCUTANEOUS at 00:33

## 2019-08-07 RX ADMIN — Medication 10 ML: at 20:52

## 2019-08-07 RX ADMIN — ACYCLOVIR 200 MG: 200 CAPSULE ORAL at 06:50

## 2019-08-07 RX ADMIN — ACETAMINOPHEN 1000 MG: 500 TABLET, COATED ORAL at 09:21

## 2019-08-07 RX ADMIN — VENLAFAXINE 100 MG: 25 TABLET ORAL at 09:22

## 2019-08-07 RX ADMIN — DOCUSATE SODIUM 100 MG: 100 CAPSULE, LIQUID FILLED ORAL at 09:11

## 2019-08-07 RX ADMIN — ENOXAPARIN SODIUM 40 MG: 40 INJECTION SUBCUTANEOUS at 09:11

## 2019-08-07 RX ADMIN — POTASSIUM CHLORIDE 10 MEQ: 7.46 INJECTION, SOLUTION INTRAVENOUS at 17:07

## 2019-08-07 RX ADMIN — ACETAMINOPHEN 1000 MG: 500 TABLET, COATED ORAL at 14:27

## 2019-08-07 RX ADMIN — ACETAMINOPHEN 1000 MG: 500 TABLET, COATED ORAL at 02:33

## 2019-08-07 RX ADMIN — ACYCLOVIR 200 MG: 200 CAPSULE ORAL at 09:11

## 2019-08-07 RX ADMIN — VENLAFAXINE 100 MG: 25 TABLET ORAL at 20:53

## 2019-08-07 RX ADMIN — POTASSIUM CHLORIDE 10 MEQ: 7.46 INJECTION, SOLUTION INTRAVENOUS at 15:05

## 2019-08-07 RX ADMIN — MAGNESIUM SULFATE HEPTAHYDRATE 4 G: 40 INJECTION, SOLUTION INTRAVENOUS at 09:21

## 2019-08-07 RX ADMIN — ACYCLOVIR 200 MG: 200 CAPSULE ORAL at 14:27

## 2019-08-07 RX ADMIN — POTASSIUM CHLORIDE, DEXTROSE MONOHYDRATE AND SODIUM CHLORIDE: 150; 5; 450 INJECTION, SOLUTION INTRAVENOUS at 06:39

## 2019-08-07 RX ADMIN — HYDROMORPHONE HYDROCHLORIDE 0.25 MG: 1 INJECTION, SOLUTION INTRAMUSCULAR; INTRAVENOUS; SUBCUTANEOUS at 22:19

## 2019-08-07 RX ADMIN — POTASSIUM CHLORIDE 10 MEQ: 7.46 INJECTION, SOLUTION INTRAVENOUS at 14:27

## 2019-08-07 RX ADMIN — OXYCODONE HYDROCHLORIDE 10 MG: 5 TABLET ORAL at 03:41

## 2019-08-07 RX ADMIN — TRAZODONE HYDROCHLORIDE 50 MG: 50 TABLET ORAL at 20:52

## 2019-08-07 RX ADMIN — Medication 10 ML: at 09:12

## 2019-08-07 RX ADMIN — HYDROMORPHONE HYDROCHLORIDE 0.25 MG: 1 INJECTION, SOLUTION INTRAMUSCULAR; INTRAVENOUS; SUBCUTANEOUS at 13:39

## 2019-08-07 RX ADMIN — POTASSIUM CHLORIDE 10 MEQ: 7.46 INJECTION, SOLUTION INTRAVENOUS at 16:05

## 2019-08-07 RX ADMIN — OXYCODONE HYDROCHLORIDE 10 MG: 5 TABLET ORAL at 10:25

## 2019-08-07 ASSESSMENT — PAIN DESCRIPTION - FREQUENCY
FREQUENCY: INTERMITTENT

## 2019-08-07 ASSESSMENT — PAIN DESCRIPTION - LOCATION
LOCATION: ABDOMEN

## 2019-08-07 ASSESSMENT — PAIN DESCRIPTION - DESCRIPTORS
DESCRIPTORS: BURNING;ACHING
DESCRIPTORS: ACHING;CRAMPING;DISCOMFORT
DESCRIPTORS: ACHING;DISCOMFORT
DESCRIPTORS: ACHING;CRAMPING

## 2019-08-07 ASSESSMENT — PAIN DESCRIPTION - ORIENTATION
ORIENTATION: MID;LOWER
ORIENTATION: MID;LOWER
ORIENTATION: MID
ORIENTATION: LOWER;MID
ORIENTATION: MID;LOWER
ORIENTATION: MID;LOWER
ORIENTATION: LOWER

## 2019-08-07 ASSESSMENT — PAIN DESCRIPTION - PROGRESSION
CLINICAL_PROGRESSION: GRADUALLY WORSENING

## 2019-08-07 ASSESSMENT — PAIN SCALES - GENERAL
PAINLEVEL_OUTOF10: 8
PAINLEVEL_OUTOF10: 3
PAINLEVEL_OUTOF10: 2
PAINLEVEL_OUTOF10: 10
PAINLEVEL_OUTOF10: 6
PAINLEVEL_OUTOF10: 8
PAINLEVEL_OUTOF10: 7
PAINLEVEL_OUTOF10: 8
PAINLEVEL_OUTOF10: 9
PAINLEVEL_OUTOF10: 7
PAINLEVEL_OUTOF10: 3
PAINLEVEL_OUTOF10: 4
PAINLEVEL_OUTOF10: 8
PAINLEVEL_OUTOF10: 2
PAINLEVEL_OUTOF10: 9
PAINLEVEL_OUTOF10: 6

## 2019-08-07 ASSESSMENT — PAIN DESCRIPTION - PAIN TYPE
TYPE: SURGICAL PAIN

## 2019-08-07 ASSESSMENT — PAIN DESCRIPTION - ONSET
ONSET: ON-GOING
ONSET: GRADUAL

## 2019-08-07 NOTE — PLAN OF CARE
Problem: Falls - Risk of:  Goal: Will remain free from falls  Description  Will remain free from falls  Outcome: Ongoing     Problem: HEMODYNAMIC STATUS  Goal: Patient has stable vital signs and fluid balance  Outcome: Ongoing     Problem: SKIN INTEGRITY  Goal: Skin integrity is maintained or improved  Outcome: Ongoing     Problem: Pain:  Goal: Pain level will decrease  Description  Pain level will decrease  Outcome: Ongoing

## 2019-08-08 LAB
ALBUMIN SERPL-MCNC: 3.4 G/DL (ref 3.4–5)
ANION GAP SERPL CALCULATED.3IONS-SCNC: 12 MMOL/L (ref 3–16)
BACTERIA: ABNORMAL /HPF
BASOPHILS ABSOLUTE: 0 K/UL (ref 0–0.2)
BASOPHILS RELATIVE PERCENT: 0.2 %
BILIRUBIN URINE: NEGATIVE
BLOOD, URINE: ABNORMAL
BUN BLDV-MCNC: 7 MG/DL (ref 7–20)
CALCIUM SERPL-MCNC: 8.3 MG/DL (ref 8.3–10.6)
CHLORIDE BLD-SCNC: 100 MMOL/L (ref 99–110)
CLARITY: CLEAR
CO2: 22 MMOL/L (ref 21–32)
COLOR: YELLOW
CREAT SERPL-MCNC: 0.6 MG/DL (ref 0.6–1.2)
EOSINOPHILS ABSOLUTE: 0.1 K/UL (ref 0–0.6)
EOSINOPHILS RELATIVE PERCENT: 2.1 %
EPITHELIAL CELLS, UA: ABNORMAL /HPF
GFR AFRICAN AMERICAN: >60
GFR NON-AFRICAN AMERICAN: >60
GLUCOSE BLD-MCNC: 110 MG/DL (ref 70–99)
GLUCOSE URINE: NEGATIVE MG/DL
HCT VFR BLD CALC: 29.1 % (ref 36–48)
HEMOGLOBIN: 9.6 G/DL (ref 12–16)
KETONES, URINE: NEGATIVE MG/DL
LEUKOCYTE ESTERASE, URINE: NEGATIVE
LYMPHOCYTES ABSOLUTE: 1.1 K/UL (ref 1–5.1)
LYMPHOCYTES RELATIVE PERCENT: 20.8 %
MAGNESIUM: 1.7 MG/DL (ref 1.8–2.4)
MCH RBC QN AUTO: 27.9 PG (ref 26–34)
MCHC RBC AUTO-ENTMCNC: 32.9 G/DL (ref 31–36)
MCV RBC AUTO: 84.8 FL (ref 80–100)
MICROSCOPIC EXAMINATION: YES
MONOCYTES ABSOLUTE: 0.3 K/UL (ref 0–1.3)
MONOCYTES RELATIVE PERCENT: 5.7 %
NEUTROPHILS ABSOLUTE: 3.7 K/UL (ref 1.7–7.7)
NEUTROPHILS RELATIVE PERCENT: 71.2 %
NITRITE, URINE: NEGATIVE
PDW BLD-RTO: 16.5 % (ref 12.4–15.4)
PH UA: 6.5 (ref 5–8)
PHOSPHORUS: 2.3 MG/DL (ref 2.5–4.9)
PLATELET # BLD: 210 K/UL (ref 135–450)
PMV BLD AUTO: 7.4 FL (ref 5–10.5)
POTASSIUM SERPL-SCNC: 4 MMOL/L (ref 3.5–5.1)
PROTEIN UA: NEGATIVE MG/DL
RBC # BLD: 3.42 M/UL (ref 4–5.2)
RBC UA: ABNORMAL /HPF (ref 0–2)
SODIUM BLD-SCNC: 134 MMOL/L (ref 136–145)
SPECIFIC GRAVITY UA: <=1.005 (ref 1–1.03)
URINE TYPE: ABNORMAL
UROBILINOGEN, URINE: 0.2 E.U./DL
WBC # BLD: 5.1 K/UL (ref 4–11)
WBC UA: ABNORMAL /HPF (ref 0–5)

## 2019-08-08 PROCEDURE — 2500000003 HC RX 250 WO HCPCS: Performed by: STUDENT IN AN ORGANIZED HEALTH CARE EDUCATION/TRAINING PROGRAM

## 2019-08-08 PROCEDURE — 2580000003 HC RX 258: Performed by: STUDENT IN AN ORGANIZED HEALTH CARE EDUCATION/TRAINING PROGRAM

## 2019-08-08 PROCEDURE — 6370000000 HC RX 637 (ALT 250 FOR IP): Performed by: STUDENT IN AN ORGANIZED HEALTH CARE EDUCATION/TRAINING PROGRAM

## 2019-08-08 PROCEDURE — 1200000000 HC SEMI PRIVATE

## 2019-08-08 PROCEDURE — 94799 UNLISTED PULMONARY SVC/PX: CPT

## 2019-08-08 PROCEDURE — 94761 N-INVAS EAR/PLS OXIMETRY MLT: CPT

## 2019-08-08 PROCEDURE — 6360000002 HC RX W HCPCS: Performed by: STUDENT IN AN ORGANIZED HEALTH CARE EDUCATION/TRAINING PROGRAM

## 2019-08-08 PROCEDURE — 97161 PT EVAL LOW COMPLEX 20 MIN: CPT

## 2019-08-08 PROCEDURE — 83735 ASSAY OF MAGNESIUM: CPT

## 2019-08-08 PROCEDURE — 2700000000 HC OXYGEN THERAPY PER DAY

## 2019-08-08 PROCEDURE — 80069 RENAL FUNCTION PANEL: CPT

## 2019-08-08 PROCEDURE — 81001 URINALYSIS AUTO W/SCOPE: CPT

## 2019-08-08 PROCEDURE — 99024 POSTOP FOLLOW-UP VISIT: CPT | Performed by: SURGERY

## 2019-08-08 PROCEDURE — 36415 COLL VENOUS BLD VENIPUNCTURE: CPT

## 2019-08-08 PROCEDURE — 94150 VITAL CAPACITY TEST: CPT

## 2019-08-08 PROCEDURE — 85025 COMPLETE CBC W/AUTO DIFF WBC: CPT

## 2019-08-08 PROCEDURE — 97116 GAIT TRAINING THERAPY: CPT

## 2019-08-08 RX ORDER — MAGNESIUM SULFATE IN WATER 40 MG/ML
2 INJECTION, SOLUTION INTRAVENOUS ONCE
Status: COMPLETED | OUTPATIENT
Start: 2019-08-08 | End: 2019-08-08

## 2019-08-08 RX ORDER — PANTOPRAZOLE SODIUM 40 MG/1
40 TABLET, DELAYED RELEASE ORAL
Status: DISCONTINUED | OUTPATIENT
Start: 2019-08-08 | End: 2019-08-10 | Stop reason: HOSPADM

## 2019-08-08 RX ADMIN — OXYCODONE HYDROCHLORIDE 10 MG: 5 TABLET ORAL at 23:08

## 2019-08-08 RX ADMIN — HYDROMORPHONE HYDROCHLORIDE 0.5 MG: 1 INJECTION, SOLUTION INTRAMUSCULAR; INTRAVENOUS; SUBCUTANEOUS at 19:33

## 2019-08-08 RX ADMIN — ACYCLOVIR 200 MG: 200 CAPSULE ORAL at 23:01

## 2019-08-08 RX ADMIN — DOCUSATE SODIUM 100 MG: 100 CAPSULE, LIQUID FILLED ORAL at 20:46

## 2019-08-08 RX ADMIN — ACETAMINOPHEN 1000 MG: 500 TABLET, COATED ORAL at 19:34

## 2019-08-08 RX ADMIN — MAGNESIUM SULFATE HEPTAHYDRATE 2 G: 40 INJECTION, SOLUTION INTRAVENOUS at 08:35

## 2019-08-08 RX ADMIN — OXYCODONE HYDROCHLORIDE 10 MG: 5 TABLET ORAL at 15:52

## 2019-08-08 RX ADMIN — GABAPENTIN 600 MG: 600 TABLET, FILM COATED ORAL at 15:53

## 2019-08-08 RX ADMIN — Medication 10 ML: at 20:46

## 2019-08-08 RX ADMIN — OXYCODONE HYDROCHLORIDE 10 MG: 5 TABLET ORAL at 06:30

## 2019-08-08 RX ADMIN — HYDROMORPHONE HYDROCHLORIDE 0.5 MG: 1 INJECTION, SOLUTION INTRAMUSCULAR; INTRAVENOUS; SUBCUTANEOUS at 14:38

## 2019-08-08 RX ADMIN — ACETAMINOPHEN 1000 MG: 500 TABLET, COATED ORAL at 04:09

## 2019-08-08 RX ADMIN — SODIUM PHOSPHATE, MONOBASIC, MONOHYDRATE 15 MMOL: 276; 142 INJECTION, SOLUTION INTRAVENOUS at 11:14

## 2019-08-08 RX ADMIN — PANTOPRAZOLE SODIUM 40 MG: 40 TABLET, DELAYED RELEASE ORAL at 08:35

## 2019-08-08 RX ADMIN — ACETAMINOPHEN 1000 MG: 500 TABLET, COATED ORAL at 14:39

## 2019-08-08 RX ADMIN — ENOXAPARIN SODIUM 40 MG: 40 INJECTION SUBCUTANEOUS at 08:34

## 2019-08-08 RX ADMIN — ACYCLOVIR 200 MG: 200 CAPSULE ORAL at 19:34

## 2019-08-08 RX ADMIN — ACYCLOVIR 200 MG: 200 CAPSULE ORAL at 14:39

## 2019-08-08 RX ADMIN — POTASSIUM CHLORIDE, DEXTROSE MONOHYDRATE AND SODIUM CHLORIDE: 150; 5; 450 INJECTION, SOLUTION INTRAVENOUS at 04:10

## 2019-08-08 RX ADMIN — ACYCLOVIR 200 MG: 200 CAPSULE ORAL at 06:30

## 2019-08-08 RX ADMIN — GABAPENTIN 600 MG: 600 TABLET, FILM COATED ORAL at 08:35

## 2019-08-08 RX ADMIN — OXYCODONE HYDROCHLORIDE 10 MG: 5 TABLET ORAL at 11:14

## 2019-08-08 RX ADMIN — VENLAFAXINE 100 MG: 25 TABLET ORAL at 20:46

## 2019-08-08 RX ADMIN — DOCUSATE SODIUM 100 MG: 100 CAPSULE, LIQUID FILLED ORAL at 08:35

## 2019-08-08 RX ADMIN — ACYCLOVIR 200 MG: 200 CAPSULE ORAL at 11:14

## 2019-08-08 RX ADMIN — GABAPENTIN 600 MG: 600 TABLET, FILM COATED ORAL at 20:46

## 2019-08-08 RX ADMIN — VENLAFAXINE 100 MG: 25 TABLET ORAL at 08:34

## 2019-08-08 ASSESSMENT — PAIN SCALES - GENERAL
PAINLEVEL_OUTOF10: 0
PAINLEVEL_OUTOF10: 7
PAINLEVEL_OUTOF10: 8
PAINLEVEL_OUTOF10: 0
PAINLEVEL_OUTOF10: 7
PAINLEVEL_OUTOF10: 7
PAINLEVEL_OUTOF10: 8
PAINLEVEL_OUTOF10: 9
PAINLEVEL_OUTOF10: 8

## 2019-08-08 ASSESSMENT — PAIN DESCRIPTION - FREQUENCY
FREQUENCY: CONTINUOUS
FREQUENCY: INTERMITTENT

## 2019-08-08 ASSESSMENT — PAIN DESCRIPTION - ORIENTATION
ORIENTATION: LEFT
ORIENTATION: LOWER
ORIENTATION: LEFT
ORIENTATION: LOWER;LEFT

## 2019-08-08 ASSESSMENT — PAIN DESCRIPTION - PAIN TYPE
TYPE: ACUTE PAIN;SURGICAL PAIN

## 2019-08-08 ASSESSMENT — PAIN DESCRIPTION - ONSET
ONSET: ON-GOING
ONSET: GRADUAL

## 2019-08-08 ASSESSMENT — PAIN DESCRIPTION - DESCRIPTORS
DESCRIPTORS: DISCOMFORT
DESCRIPTORS: DISCOMFORT;SHARP;SHOOTING
DESCRIPTORS: DISCOMFORT

## 2019-08-08 ASSESSMENT — PAIN - FUNCTIONAL ASSESSMENT
PAIN_FUNCTIONAL_ASSESSMENT: ACTIVITIES ARE NOT PREVENTED
PAIN_FUNCTIONAL_ASSESSMENT: PREVENTS OR INTERFERES SOME ACTIVE ACTIVITIES AND ADLS
PAIN_FUNCTIONAL_ASSESSMENT: ACTIVITIES ARE NOT PREVENTED
PAIN_FUNCTIONAL_ASSESSMENT: PREVENTS OR INTERFERES SOME ACTIVE ACTIVITIES AND ADLS

## 2019-08-08 ASSESSMENT — PAIN DESCRIPTION - LOCATION
LOCATION: ABDOMEN

## 2019-08-08 ASSESSMENT — PAIN DESCRIPTION - PROGRESSION
CLINICAL_PROGRESSION: NOT CHANGED
CLINICAL_PROGRESSION: GRADUALLY WORSENING
CLINICAL_PROGRESSION: NOT CHANGED
CLINICAL_PROGRESSION: GRADUALLY WORSENING

## 2019-08-09 ENCOUNTER — TELEPHONE (OUTPATIENT)
Dept: SURGERY | Age: 64
End: 2019-08-09

## 2019-08-09 LAB
ALBUMIN SERPL-MCNC: 3.2 G/DL (ref 3.4–5)
ANION GAP SERPL CALCULATED.3IONS-SCNC: 12 MMOL/L (ref 3–16)
BASOPHILS ABSOLUTE: 0 K/UL (ref 0–0.2)
BASOPHILS RELATIVE PERCENT: 0.5 %
BUN BLDV-MCNC: 9 MG/DL (ref 7–20)
CALCIUM SERPL-MCNC: 8.7 MG/DL (ref 8.3–10.6)
CHLORIDE BLD-SCNC: 107 MMOL/L (ref 99–110)
CO2: 24 MMOL/L (ref 21–32)
CREAT SERPL-MCNC: 0.5 MG/DL (ref 0.6–1.2)
EOSINOPHILS ABSOLUTE: 0.2 K/UL (ref 0–0.6)
EOSINOPHILS RELATIVE PERCENT: 4.8 %
GFR AFRICAN AMERICAN: >60
GFR NON-AFRICAN AMERICAN: >60
GLUCOSE BLD-MCNC: 98 MG/DL (ref 70–99)
HCT VFR BLD CALC: 28.8 % (ref 36–48)
HEMOGLOBIN: 9.2 G/DL (ref 12–16)
LYMPHOCYTES ABSOLUTE: 1.9 K/UL (ref 1–5.1)
LYMPHOCYTES RELATIVE PERCENT: 42.5 %
MAGNESIUM: 1.8 MG/DL (ref 1.8–2.4)
MCH RBC QN AUTO: 27.7 PG (ref 26–34)
MCHC RBC AUTO-ENTMCNC: 32.1 G/DL (ref 31–36)
MCV RBC AUTO: 86.3 FL (ref 80–100)
MONOCYTES ABSOLUTE: 0.3 K/UL (ref 0–1.3)
MONOCYTES RELATIVE PERCENT: 7.8 %
NEUTROPHILS ABSOLUTE: 2 K/UL (ref 1.7–7.7)
NEUTROPHILS RELATIVE PERCENT: 44.4 %
PDW BLD-RTO: 16.3 % (ref 12.4–15.4)
PHOSPHORUS: 3.7 MG/DL (ref 2.5–4.9)
PLATELET # BLD: 218 K/UL (ref 135–450)
PMV BLD AUTO: 7.7 FL (ref 5–10.5)
POTASSIUM SERPL-SCNC: 3.9 MMOL/L (ref 3.5–5.1)
RBC # BLD: 3.34 M/UL (ref 4–5.2)
SODIUM BLD-SCNC: 143 MMOL/L (ref 136–145)
WBC # BLD: 4.4 K/UL (ref 4–11)

## 2019-08-09 PROCEDURE — 6370000000 HC RX 637 (ALT 250 FOR IP): Performed by: STUDENT IN AN ORGANIZED HEALTH CARE EDUCATION/TRAINING PROGRAM

## 2019-08-09 PROCEDURE — 36415 COLL VENOUS BLD VENIPUNCTURE: CPT

## 2019-08-09 PROCEDURE — 85025 COMPLETE CBC W/AUTO DIFF WBC: CPT

## 2019-08-09 PROCEDURE — 83735 ASSAY OF MAGNESIUM: CPT

## 2019-08-09 PROCEDURE — 6360000002 HC RX W HCPCS: Performed by: STUDENT IN AN ORGANIZED HEALTH CARE EDUCATION/TRAINING PROGRAM

## 2019-08-09 PROCEDURE — 80069 RENAL FUNCTION PANEL: CPT

## 2019-08-09 PROCEDURE — 2580000003 HC RX 258: Performed by: STUDENT IN AN ORGANIZED HEALTH CARE EDUCATION/TRAINING PROGRAM

## 2019-08-09 PROCEDURE — 1200000000 HC SEMI PRIVATE

## 2019-08-09 PROCEDURE — 6360000002 HC RX W HCPCS: Performed by: SURGERY

## 2019-08-09 RX ORDER — POTASSIUM CHLORIDE 750 MG/1
10 TABLET, EXTENDED RELEASE ORAL ONCE
Status: COMPLETED | OUTPATIENT
Start: 2019-08-09 | End: 2019-08-09

## 2019-08-09 RX ORDER — OXYCODONE HYDROCHLORIDE 5 MG/1
5 TABLET ORAL EVERY 6 HOURS PRN
Qty: 28 TABLET | Refills: 0 | Status: SHIPPED | OUTPATIENT
Start: 2019-08-09 | End: 2019-08-16

## 2019-08-09 RX ADMIN — ACETAMINOPHEN 1000 MG: 500 TABLET, COATED ORAL at 15:29

## 2019-08-09 RX ADMIN — Medication 10 ML: at 19:57

## 2019-08-09 RX ADMIN — POTASSIUM CHLORIDE 10 MEQ: 10 TABLET, EXTENDED RELEASE ORAL at 08:10

## 2019-08-09 RX ADMIN — OXYCODONE HYDROCHLORIDE 10 MG: 5 TABLET ORAL at 13:02

## 2019-08-09 RX ADMIN — DOCUSATE SODIUM 100 MG: 100 CAPSULE, LIQUID FILLED ORAL at 08:10

## 2019-08-09 RX ADMIN — ACYCLOVIR 200 MG: 200 CAPSULE ORAL at 21:36

## 2019-08-09 RX ADMIN — HYDROMORPHONE HYDROCHLORIDE 0.5 MG: 1 INJECTION, SOLUTION INTRAMUSCULAR; INTRAVENOUS; SUBCUTANEOUS at 05:54

## 2019-08-09 RX ADMIN — GABAPENTIN 600 MG: 600 TABLET, FILM COATED ORAL at 08:10

## 2019-08-09 RX ADMIN — OXYCODONE HYDROCHLORIDE 5 MG: 5 TABLET ORAL at 08:16

## 2019-08-09 RX ADMIN — Medication 400 MG: at 08:10

## 2019-08-09 RX ADMIN — HYDROMORPHONE HYDROCHLORIDE 0.5 MG: 1 INJECTION, SOLUTION INTRAMUSCULAR; INTRAVENOUS; SUBCUTANEOUS at 01:00

## 2019-08-09 RX ADMIN — ACYCLOVIR 200 MG: 200 CAPSULE ORAL at 11:59

## 2019-08-09 RX ADMIN — DOCUSATE SODIUM 100 MG: 100 CAPSULE, LIQUID FILLED ORAL at 21:35

## 2019-08-09 RX ADMIN — ACETAMINOPHEN 1000 MG: 500 TABLET, COATED ORAL at 01:00

## 2019-08-09 RX ADMIN — ACYCLOVIR 200 MG: 200 CAPSULE ORAL at 05:54

## 2019-08-09 RX ADMIN — PANTOPRAZOLE SODIUM 40 MG: 40 TABLET, DELAYED RELEASE ORAL at 05:54

## 2019-08-09 RX ADMIN — ACETAMINOPHEN 1000 MG: 500 TABLET, COATED ORAL at 08:10

## 2019-08-09 RX ADMIN — ACYCLOVIR 200 MG: 200 CAPSULE ORAL at 18:48

## 2019-08-09 RX ADMIN — HYDROMORPHONE HYDROCHLORIDE 0.5 MG: 1 INJECTION, SOLUTION INTRAMUSCULAR; INTRAVENOUS; SUBCUTANEOUS at 19:57

## 2019-08-09 RX ADMIN — VENLAFAXINE 100 MG: 25 TABLET ORAL at 21:36

## 2019-08-09 RX ADMIN — GABAPENTIN 600 MG: 600 TABLET, FILM COATED ORAL at 21:35

## 2019-08-09 RX ADMIN — Medication 10 ML: at 08:18

## 2019-08-09 RX ADMIN — VENLAFAXINE 100 MG: 25 TABLET ORAL at 08:10

## 2019-08-09 RX ADMIN — ACYCLOVIR 200 MG: 200 CAPSULE ORAL at 15:30

## 2019-08-09 RX ADMIN — ENOXAPARIN SODIUM 40 MG: 40 INJECTION SUBCUTANEOUS at 08:10

## 2019-08-09 RX ADMIN — OXYCODONE HYDROCHLORIDE 10 MG: 5 TABLET ORAL at 18:48

## 2019-08-09 RX ADMIN — ACETAMINOPHEN 1000 MG: 500 TABLET, COATED ORAL at 21:35

## 2019-08-09 RX ADMIN — GABAPENTIN 600 MG: 600 TABLET, FILM COATED ORAL at 15:29

## 2019-08-09 RX ADMIN — OXYCODONE HYDROCHLORIDE 10 MG: 5 TABLET ORAL at 23:47

## 2019-08-09 ASSESSMENT — PAIN DESCRIPTION - ONSET
ONSET: GRADUAL
ONSET: ON-GOING

## 2019-08-09 ASSESSMENT — PAIN DESCRIPTION - DESCRIPTORS
DESCRIPTORS: ACHING;DISCOMFORT;SORE
DESCRIPTORS: ACHING;CONSTANT;SORE
DESCRIPTORS: ACHING;SORE;DISCOMFORT
DESCRIPTORS: DISCOMFORT
DESCRIPTORS: ACHING;CONSTANT;SORE;SHARP
DESCRIPTORS: CRAMPING;ACHING
DESCRIPTORS: CRAMPING;ACHING
DESCRIPTORS: ACHING;CRAMPING

## 2019-08-09 ASSESSMENT — PAIN DESCRIPTION - ORIENTATION
ORIENTATION: LEFT;LOWER
ORIENTATION: MID
ORIENTATION: LEFT
ORIENTATION: MID
ORIENTATION: LEFT;LOWER
ORIENTATION: MID

## 2019-08-09 ASSESSMENT — PAIN SCALES - GENERAL
PAINLEVEL_OUTOF10: 7
PAINLEVEL_OUTOF10: 8
PAINLEVEL_OUTOF10: 7
PAINLEVEL_OUTOF10: 0
PAINLEVEL_OUTOF10: 8
PAINLEVEL_OUTOF10: 0
PAINLEVEL_OUTOF10: 8
PAINLEVEL_OUTOF10: 8
PAINLEVEL_OUTOF10: 6
PAINLEVEL_OUTOF10: 6

## 2019-08-09 ASSESSMENT — PAIN DESCRIPTION - LOCATION
LOCATION: ABDOMEN

## 2019-08-09 ASSESSMENT — PAIN DESCRIPTION - FREQUENCY
FREQUENCY: INTERMITTENT
FREQUENCY: CONTINUOUS
FREQUENCY: INTERMITTENT
FREQUENCY: INTERMITTENT
FREQUENCY: CONTINUOUS
FREQUENCY: INTERMITTENT

## 2019-08-09 ASSESSMENT — PAIN DESCRIPTION - PROGRESSION
CLINICAL_PROGRESSION: GRADUALLY WORSENING
CLINICAL_PROGRESSION: NOT CHANGED

## 2019-08-09 ASSESSMENT — PAIN DESCRIPTION - PAIN TYPE
TYPE: ACUTE PAIN;SURGICAL PAIN

## 2019-08-09 NOTE — TELEPHONE ENCOUNTER
Please fax to attn: to [facility] in [address]. Thank you! Pt was informed of positive stool test FOBT. Pt said he has a colonoscopy scheduled for March.

## 2019-08-10 VITALS
RESPIRATION RATE: 16 BRPM | SYSTOLIC BLOOD PRESSURE: 99 MMHG | HEART RATE: 72 BPM | WEIGHT: 132.28 LBS | OXYGEN SATURATION: 99 % | TEMPERATURE: 97.4 F | BODY MASS INDEX: 21.26 KG/M2 | HEIGHT: 66 IN | DIASTOLIC BLOOD PRESSURE: 63 MMHG

## 2019-08-10 LAB
ALBUMIN SERPL-MCNC: 3.7 G/DL (ref 3.4–5)
ANION GAP SERPL CALCULATED.3IONS-SCNC: 12 MMOL/L (ref 3–16)
BASOPHILS ABSOLUTE: 0 K/UL (ref 0–0.2)
BASOPHILS RELATIVE PERCENT: 0.5 %
BUN BLDV-MCNC: 14 MG/DL (ref 7–20)
CALCIUM SERPL-MCNC: 9.4 MG/DL (ref 8.3–10.6)
CHLORIDE BLD-SCNC: 102 MMOL/L (ref 99–110)
CO2: 28 MMOL/L (ref 21–32)
CREAT SERPL-MCNC: 0.7 MG/DL (ref 0.6–1.2)
EOSINOPHILS ABSOLUTE: 0.2 K/UL (ref 0–0.6)
EOSINOPHILS RELATIVE PERCENT: 4.5 %
GFR AFRICAN AMERICAN: >60
GFR NON-AFRICAN AMERICAN: >60
GLUCOSE BLD-MCNC: 109 MG/DL (ref 70–99)
HCT VFR BLD CALC: 30.1 % (ref 36–48)
HEMOGLOBIN: 9.7 G/DL (ref 12–16)
LYMPHOCYTES ABSOLUTE: 2.4 K/UL (ref 1–5.1)
LYMPHOCYTES RELATIVE PERCENT: 45.3 %
MAGNESIUM: 1.5 MG/DL (ref 1.8–2.4)
MCH RBC QN AUTO: 27.1 PG (ref 26–34)
MCHC RBC AUTO-ENTMCNC: 32.1 G/DL (ref 31–36)
MCV RBC AUTO: 84.4 FL (ref 80–100)
MONOCYTES ABSOLUTE: 0.3 K/UL (ref 0–1.3)
MONOCYTES RELATIVE PERCENT: 5.9 %
NEUTROPHILS ABSOLUTE: 2.3 K/UL (ref 1.7–7.7)
NEUTROPHILS RELATIVE PERCENT: 43.8 %
PDW BLD-RTO: 16.4 % (ref 12.4–15.4)
PHOSPHORUS: 4.1 MG/DL (ref 2.5–4.9)
PLATELET # BLD: 345 K/UL (ref 135–450)
PMV BLD AUTO: 7.7 FL (ref 5–10.5)
POTASSIUM SERPL-SCNC: 4.3 MMOL/L (ref 3.5–5.1)
RBC # BLD: 3.56 M/UL (ref 4–5.2)
SODIUM BLD-SCNC: 142 MMOL/L (ref 136–145)
WBC # BLD: 5.3 K/UL (ref 4–11)

## 2019-08-10 PROCEDURE — 36415 COLL VENOUS BLD VENIPUNCTURE: CPT

## 2019-08-10 PROCEDURE — 6360000002 HC RX W HCPCS: Performed by: STUDENT IN AN ORGANIZED HEALTH CARE EDUCATION/TRAINING PROGRAM

## 2019-08-10 PROCEDURE — 6370000000 HC RX 637 (ALT 250 FOR IP): Performed by: STUDENT IN AN ORGANIZED HEALTH CARE EDUCATION/TRAINING PROGRAM

## 2019-08-10 PROCEDURE — 83735 ASSAY OF MAGNESIUM: CPT

## 2019-08-10 PROCEDURE — 85025 COMPLETE CBC W/AUTO DIFF WBC: CPT

## 2019-08-10 PROCEDURE — 2580000003 HC RX 258: Performed by: STUDENT IN AN ORGANIZED HEALTH CARE EDUCATION/TRAINING PROGRAM

## 2019-08-10 PROCEDURE — 80069 RENAL FUNCTION PANEL: CPT

## 2019-08-10 RX ADMIN — OXYCODONE HYDROCHLORIDE 5 MG: 5 TABLET ORAL at 12:00

## 2019-08-10 RX ADMIN — OXYCODONE HYDROCHLORIDE 10 MG: 5 TABLET ORAL at 04:35

## 2019-08-10 RX ADMIN — GABAPENTIN 600 MG: 600 TABLET, FILM COATED ORAL at 09:38

## 2019-08-10 RX ADMIN — PANTOPRAZOLE SODIUM 40 MG: 40 TABLET, DELAYED RELEASE ORAL at 07:27

## 2019-08-10 RX ADMIN — Medication 10 ML: at 09:41

## 2019-08-10 RX ADMIN — DOCUSATE SODIUM 100 MG: 100 CAPSULE, LIQUID FILLED ORAL at 09:38

## 2019-08-10 RX ADMIN — ACYCLOVIR 200 MG: 200 CAPSULE ORAL at 09:39

## 2019-08-10 RX ADMIN — ACYCLOVIR 200 MG: 200 CAPSULE ORAL at 07:27

## 2019-08-10 RX ADMIN — ENOXAPARIN SODIUM 40 MG: 40 INJECTION SUBCUTANEOUS at 09:39

## 2019-08-10 RX ADMIN — VENLAFAXINE 100 MG: 25 TABLET ORAL at 09:38

## 2019-08-10 RX ADMIN — HYDROMORPHONE HYDROCHLORIDE 0.5 MG: 1 INJECTION, SOLUTION INTRAMUSCULAR; INTRAVENOUS; SUBCUTANEOUS at 01:25

## 2019-08-10 RX ADMIN — ACETAMINOPHEN 1000 MG: 500 TABLET, COATED ORAL at 09:38

## 2019-08-10 ASSESSMENT — PAIN SCALES - GENERAL
PAINLEVEL_OUTOF10: 6
PAINLEVEL_OUTOF10: 8
PAINLEVEL_OUTOF10: 7
PAINLEVEL_OUTOF10: 4
PAINLEVEL_OUTOF10: 0
PAINLEVEL_OUTOF10: 8
PAINLEVEL_OUTOF10: 6
PAINLEVEL_OUTOF10: 0

## 2019-08-10 ASSESSMENT — PAIN DESCRIPTION - PAIN TYPE
TYPE: SURGICAL PAIN
TYPE: SURGICAL PAIN
TYPE: ACUTE PAIN;SURGICAL PAIN

## 2019-08-10 ASSESSMENT — PAIN DESCRIPTION - LOCATION
LOCATION: ABDOMEN

## 2019-08-10 ASSESSMENT — PAIN DESCRIPTION - ONSET
ONSET: ON-GOING
ONSET: ON-GOING

## 2019-08-10 ASSESSMENT — PAIN DESCRIPTION - FREQUENCY
FREQUENCY: CONTINUOUS
FREQUENCY: INTERMITTENT
FREQUENCY: CONTINUOUS

## 2019-08-10 ASSESSMENT — PAIN DESCRIPTION - DESCRIPTORS
DESCRIPTORS: ACHING
DESCRIPTORS: ACHING;CRAMPING
DESCRIPTORS: ACHING

## 2019-08-10 ASSESSMENT — PAIN DESCRIPTION - ORIENTATION
ORIENTATION: MID;LOWER
ORIENTATION: MID;LOWER
ORIENTATION: MID

## 2019-08-10 ASSESSMENT — PAIN DESCRIPTION - PROGRESSION
CLINICAL_PROGRESSION: NOT CHANGED
CLINICAL_PROGRESSION: NOT CHANGED

## 2019-08-10 NOTE — PROGRESS NOTES
Block removed, 325 in drainage bag documented.
General Surgery   Daily Progress Note  Patient: Durene Heal    CC: Colostomy Takedown    SUBJECTIVE:  Complained of pain and swelling in R ankle last night. Otherwise no acute events. Pain well controlled. Passing gas, no BM. Tolerating diet, no nausea. ROS: A 14 point review of systems was conducted, significant findings as noted above. All other systems negative. OBJECTIVE:    VS - wnl, afebrile     Infusions:   I/O:I/O last 3 completed shifts: In: 5892 [P.O.:1680; I.V.:10]  Out: -      I/O: Urine - 500, 400, 200 (1100). Wt Readings from Last 1 Encounters:   08/07/19 132 lb 4.4 oz (60 kg)       Exam:  /76   Pulse 75   Temp 98.8 °F (37.1 °C) (Oral)   Resp 16   Ht 5' 6\" (1.676 m)   Wt 132 lb 4.4 oz (60 kg)   LMP 07/12/2007   SpO2 92%   BMI 21.35 kg/m²   General appearance: alert, in no apparent distress   Neuro: A&Ox3, no focal deficits  Lungs: Normal effort; no adventitious breath sounds  Heart: regular rate and rhythm  Abdomen: soft, appropriatley tender, mildly distended, incisions c/d/i and well approximated, no erythema, colostomy site with slight seropurulent drainage, with wick in place, packing was advance. Dressing change. SS output. Pain around old ostomy site is improving, continues tolerating dressing changes more. Extremities: No edema, no cyanosis            LABS:   Recent Labs     08/09/19  0517 08/10/19  0511   WBC 4.4 5.3   HGB 9.2* 9.7*   HCT 28.8* 30.1*   MCV 86.3 84.4    345        Recent Labs     08/09/19  0517 08/10/19  0511    142   K 3.9 4.3    102   CO2 24 28   PHOS 3.7 4.1   BUN 9 14   CREATININE 0.5* 0.7      No results for input(s): AST, ALT, ALB, BILIDIR, BILITOT, ALKPHOS in the last 72 hours. No results for input(s): LIPASE, AMYLASE in the last 72 hours. No results for input(s): PROT, INR, APTT in the last 72 hours. No results for input(s): CKTOTAL, CKMB, CKMBINDEX, TROPONINI in the last 72 hours.     ASSESSMENT/PLAN:
Occupational Therapy   Occupational Therapy Initial Assessment/tx  Date: 2019   Patient Name: Marycarmen Gray  MRN: [id number]     : 1955    Date of Service: 2019    Discharge Recommendations:  Marycarmen Gray scored a 21/24 on the AM-PAC ADL Inpatient form. Current research shows that an AM-PAC score of 18 or greater is typically associated with a discharge to the patient's home setting. Based on the patients AM-PAC score and their current ADL deficits, it is recommended that the patient have 2-3 sessions per week of Occupational Therapy at d/c to increase the patients independence. OT Equipment Recommendations  Equipment Needed: No    Assessment   Performance deficits / Impairments: Decreased functional mobility ; Decreased ADL status  Assessment: Pt is functioning below baseline level, needing A with bed mobility and LE dressing, CGA/SBA with functional transfers/mobility. Pt's pain is most limiting factor. She should progress well and reports her daughter can stay with her and provide A at discharge. No ongoing OT at discharge. Treatment Diagnosis: impaired ADLs and transfers  Prognosis: Good  Decision Making: Low Complexity  Patient Education: role of OT, log rolling-needs practice  REQUIRES OT FOLLOW UP: Yes  Activity Tolerance  Activity Tolerance: Patient Tolerated treatment well  Safety Devices  Safety Devices in place: Yes  Type of devices: Left in chair;Nurse notified; Chair alarm in place;Call light within reach           Patient Diagnosis(es): There were no encounter diagnoses. has a past medical history of Chronic back pain, Depression, Fracture, and MRSA (methicillin resistant staph aureus) culture positive. has a past surgical history that includes Neck surgery; Tubal ligation; Cholecystectomy; incision and drainage (N/A, 2019); Pressure ulcer debridement (N/A, 2019);  Small intestine surgery (N/A, 2019); pr rmvl devital tiss n-slctv dbrdmt w/o anes 1
Patient alert and oriented times 4, VSS, BP runs low that is her norm, patient has had a slight oral temp, PO Tylenol given and nurse instructed importance of using IS, patient is on room air. Patient complains of pain in abdomen where packing is, PRN Roxicodone and PRN Dilaudid given (see MAR) and no complaints of NV. Eating and tolerating a low fiber, calls out appropriately when in need. Patient is ambulating in halls, voiding and having BMs.
Patient is alert and oriented x 4. Patient rates her pain '6-8' out of 0-10 pain scale. Patient medicated with Oxycodone 5 mg PO per patient request and per doctor orders. Patient up ad chidi with a steady gait. Patient tolerating general diet with no difficulties. Order received to d/c patient to home. IV d/c'd without episode. Patient given 1 prescription for home with discharge instructions. All home medications were reviewed with the patient prior to discharge. Patient left via wheelchair with all belongings around 1330.
VSS. A/o x4. C/o of 8/10 pain, receiving scheduled tylenol q6 along w PRN pain meds for breakthrough pain. No BM overnight. Voiding w out difficulty. SCD boots on. Instructed on IS. Call light within reach. Resting comfortably.  Will cont to monitor
VSS. Pt c/o abd pain 7-8/10. Pt continues to pass flatus. No bm on Friday. Abd softly distended. Pt medicated with prn oxycodone and dilaudid. Sleeping for intervals. Call light in reach. Will continue to monitor.   Electronically signed by Vinicio Jones RN on 8/10/19 at 2:16 AM
Device  Assistance: Independent  Quality of Gait: normal safe and steady gait with no LOB or near LOB   Distance: 500'   Stairs/Curb  Stairs?: Yes  Stairs  # Steps : 4  Stairs Height: 6\"  Rails: Left ascending  Device: No Device  Assistance: Modified independent   Comment: safe and steady stair negotiation with use of rail - no LOB or near LOB      Balance  Posture: Good  Sitting - Static: Good  Sitting - Dynamic: Good  Standing - Static: Good  Standing - Dynamic: Good      PT evaluation and treatment completed. Treatment included gait and transfer training as well as patient education. Plan   Plan  Times per week: d/c acute PT  Safety Devices  Type of devices: Left in chair, Gait belt, Call light within reach, Chair alarm in place, Nurse notified    Therapy Time   Individual Concurrent Group Co-treatment   Time In 1005         Time Out 1043         Minutes 38           Timed Code Treatment Minutes:  23    Total Treatment Minutes:  38    If the patient is discharged before the next treatment session, this note will serve as the discharge summary.      Davie Orosco, PT, DPT [id number]

## 2019-08-19 ENCOUNTER — TELEPHONE (OUTPATIENT)
Dept: SURGERY | Age: 64
End: 2019-08-19

## 2019-08-19 ENCOUNTER — OFFICE VISIT (OUTPATIENT)
Dept: SURGERY | Age: 64
End: 2019-08-19

## 2019-08-19 VITALS
DIASTOLIC BLOOD PRESSURE: 70 MMHG | SYSTOLIC BLOOD PRESSURE: 126 MMHG | HEIGHT: 66 IN | WEIGHT: 134 LBS | TEMPERATURE: 99.3 F | BODY MASS INDEX: 21.53 KG/M2

## 2019-08-19 DIAGNOSIS — Z09 POSTOP CHECK: Primary | ICD-10-CM

## 2019-08-19 PROCEDURE — 99024 POSTOP FOLLOW-UP VISIT: CPT | Performed by: SURGERY

## 2019-08-30 NOTE — PROGRESS NOTES
ID Follow-up NOTE    CC:   Necrotizing soft tissue infection  Antibiotics: Clindamycin    Admit Date: 5/26/2019  Hospital Day: 8    Subjective:     POD#3 colostomy    Patient c/o pain at surg site, worse with dressing changes    Objective:     Afebrile last 24 hr    Patient Vitals for the past 8 hrs:   BP Temp Temp src Pulse Resp SpO2 Weight   06/02/19 1004 -- -- -- -- -- 97 % --   06/02/19 1000 100/61 -- -- 87 22 95 % --   06/02/19 0900 -- -- -- 89 -- 99 % --   06/02/19 0800 106/70 98.8 °F (37.1 °C) Oral 90 23 95 % --   06/02/19 0700 108/71 -- -- 83 10 94 % --   06/02/19 0400 100/68 99.2 °F (37.3 °C) Oral 80 11 97 % 148 lb 13 oz (67.5 kg)   06/02/19 0300 102/62 -- -- 81 16 -- --     I/O last 3 completed shifts: In: 2029 [P.O.:1465; I.V.:564]  Out: 3750 [GEGLM:9458]  I/O this shift:  In: -   Out: 350 [Urine:350]    EXAM:  GENERAL: No apparent distress.     HEENT: Membranes moist, no oral lesion  NECK:  Supple  LUNGS: Clear b/l, no rales, no dullness  CARDIAC: RRR, no murmur appreciated  ABD:  + BS, soft / NT; colostomy in place with stool output  EXT:  No rash, no edema, no lesions  NEURO: No focal neurologic findings  PSYCH: Orientation, sensorium, mood normal  LINES:  R IJ line in place    Data Review:  Lab Results   Component Value Date    WBC 15.6 (H) 06/02/2019    HGB 7.5 (L) 06/02/2019    HCT 22.7 (L) 06/02/2019    MCV 91.7 06/02/2019     06/02/2019     Lab Results   Component Value Date    CREATININE 0.6 06/02/2019    BUN 19 06/02/2019     06/02/2019    K 4.4 06/02/2019     06/02/2019    CO2 26 06/02/2019       Hepatic Function Panel:   Lab Results   Component Value Date    ALKPHOS 108 06/04/2013    ALT 54 06/04/2013    AST 40 06/04/2013    PROT 6.3 06/04/2013    BILITOT 0.4 06/04/2013    LABALBU 2.4 06/02/2019       Micro:  5/29 C diff neg    5/26 Tissue #1: GS 1+WBC, 3+GPC; cult light MRSA  5/26 Tissue #2: GS no WBC, 1+GPC; cult - light MRSA, no anaerobes isolated to date  Staph aureus mrsa   Antibiotic Interpretation MARGIE Status    ceFAZolin Resistant >16 mcg/mL     clindamycin Sensitive <=0.5 mcg/mL     erythromycin Resistant >4 mcg/mL     oxacillin Resistant >2 mcg/mL     tetracycline Sensitive <=0.5 mcg/mL     trimethoprim-sulfamethoxazole Sensitive <=0.5/9.5 mcg/mL     vancomycin Sensitive 1 mcg/mL        BC x 2 - no growth to date     Imagin/26 Pelvic CT:  Stranding in the fat involving the right perineum and right gluteal region. Given the patient's history this likely reflects a cellulitis. This does extend superiorly into the pelvis involving the right pelvic sidewall and  region, presumably    reflecting cellulitis/phlegmon. There is no evidence of a drainable fluid collection at this time. Scheduled Meds:   magnesium oxide  200 mg Oral Once    clindamycin (CLEOCIN) IV  600 mg Intravenous Q8H    venlafaxine  100 mg Oral BID    sodium chloride flush  10 mL Intravenous 2 times per day    enoxaparin  40 mg Subcutaneous Daily       Continuous Infusions:   [START ON 6/3/2019] sodium chloride         PRN Meds:  phenol, diphenhydrAMINE, HYDROmorphone **OR** HYDROmorphone, oxyCODONE-acetaminophen **OR** oxyCODONE-acetaminophen, sodium chloride flush, ondansetron, acetaminophen      Assessment:     60 yo woman with hx chronic back pain, depression, recurrent HSV, neck surg     Pt fell on  (gardening), seen at [facility] ED, discharged  Presents to [facility] ED  with HA, R shoulder pain. T 99.9. Discharged     Returned to [facility] ED  with HA and pain in 'tailbone'. T 101.4. WBC 12.7. BC sent  Had erythema on R buttock and perineum (from labia extending posteriorly). Pelvic CT with 'standing', no gas. Started on vancomycin and clindamycin and zosyn added  Seen by Gen Surg. Taken to OR, had necrotic tissue debrided.     Return to OR  - wound 18 x 5 x 15 cm  Colostomy     IMP/  Hx chronic back pain, depression, recurrent HSV, neck surg  Fall     R buttock / perineum infection -  + necrotizing ST infection, cult + MRSA  Fever - resolved  Leukocytosis     Plan:     Cont clindamycin alone - MRSA, anaerobic coverage  Wound care per Surg     Discussed with pt  Rodrick Anderson parent

## 2019-09-06 ENCOUNTER — OFFICE VISIT (OUTPATIENT)
Dept: SURGERY | Age: 64
End: 2019-09-06

## 2019-09-06 VITALS
HEIGHT: 66 IN | SYSTOLIC BLOOD PRESSURE: 121 MMHG | DIASTOLIC BLOOD PRESSURE: 78 MMHG | BODY MASS INDEX: 22.34 KG/M2 | TEMPERATURE: 98 F | WEIGHT: 139 LBS

## 2019-09-06 DIAGNOSIS — Z09 POSTOP CHECK: Primary | ICD-10-CM

## 2019-09-06 PROCEDURE — 99024 POSTOP FOLLOW-UP VISIT: CPT | Performed by: SURGERY

## 2019-09-06 NOTE — PROGRESS NOTES
performed by Krystal Deleon MD at [address] [facility] [address] N/A 6/7/2019    EXCISIONAL DEBRIDEMENT PERINEAL WOUND 15X6X3 CM, GRACILAS MYOFASCIAL FLAP, COMPLEX CLOSURE OF VAGINA 8.5 CM performed by Gabriella Mckeon MD at [address] History     Socioeconomic History    Marital status:      Spouse name: Not on file    Number of children: Not on file    Years of education: Not on file    Highest education level: Not on file   Occupational History    Not on file   Social Needs    Financial resource strain: Not on file    Food insecurity:     Worry: Not on file     Inability: Not on file    Transportation needs:     Medical: Not on file     Non-medical: Not on file   Tobacco Use    Smoking status: Former Smoker    Smokeless tobacco: Never Used   Substance and Sexual Activity    Alcohol use: Yes     Comment: 2 X WEEKLY    Drug use: No    Sexual activity: Not on file   Lifestyle    Physical activity:     Days per week: Not on file     Minutes per session: Not on file    Stress: Not on file   Relationships    Social connections:     Talks on phone: Not on file     Gets together: Not on file     Attends Yazdanism service: Not on file     Active member of club or organization: Not on file     Attends meetings of clubs or organizations: Not on file     Relationship status: Not on file    Intimate partner violence:     Fear of current or ex partner: Not on file     Emotionally abused: Not on file     Physically abused: Not on file     Forced sexual activity: Not on file   Other Topics Concern    Not on file   Social History Narrative    Not on file       Allergy:   Allergies   Allergen Reactions    Latex Rash    Vicodin [Hydrocodone-Acetaminophen] Nausea Only    Zocor [Simvastatin] Other (See Comments)     STATINS - LEG CRAMPS         PHYSICAL EXAM:  VITALS:  /70 (Site: Left Upper Arm, Position: Sitting, Cuff Size: Medium Adult)   Temp 99.3 °F
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
## 2019-12-07 ENCOUNTER — HOSPITAL ENCOUNTER (EMERGENCY)
Age: 64
Discharge: HOME | End: 2019-12-08
Attending: EMERGENCY MEDICINE
Payer: COMMERCIAL

## 2019-12-07 ENCOUNTER — APPOINTMENT (OUTPATIENT)
Dept: CT IMAGING | Age: 64
End: 2019-12-07
Payer: COMMERCIAL

## 2019-12-07 DIAGNOSIS — R10.84 GENERALIZED ABDOMINAL PAIN: Primary | ICD-10-CM

## 2019-12-07 LAB
ALBUMIN SERPL-MCNC: 4.4 G/DL (ref 3.4–5)
ALP BLD-CCNC: 109 U/L (ref 40–129)
ALT SERPL-CCNC: 41 U/L (ref 10–40)
ANION GAP SERPL CALCULATED.3IONS-SCNC: 14 MMOL/L (ref 3–16)
AST SERPL-CCNC: 39 U/L (ref 15–37)
BASE EXCESS VENOUS: -2 (ref -3–3)
BASOPHILS ABSOLUTE: 0 K/UL (ref 0–0.2)
BASOPHILS RELATIVE PERCENT: 0.5 %
BILIRUB SERPL-MCNC: <0.2 MG/DL (ref 0–1)
BILIRUBIN DIRECT: <0.2 MG/DL (ref 0–0.3)
BILIRUBIN, INDIRECT: ABNORMAL MG/DL (ref 0–1)
BUN BLDV-MCNC: 28 MG/DL (ref 7–20)
CALCIUM SERPL-MCNC: 8.6 MG/DL (ref 8.3–10.6)
CHLORIDE BLD-SCNC: 107 MMOL/L (ref 99–110)
CO2: 20 MMOL/L (ref 21–32)
CREAT SERPL-MCNC: 0.6 MG/DL (ref 0.6–1.2)
EOSINOPHILS ABSOLUTE: 0.1 K/UL (ref 0–0.6)
EOSINOPHILS RELATIVE PERCENT: 0.9 %
GFR AFRICAN AMERICAN: >60
GFR NON-AFRICAN AMERICAN: >60
GLUCOSE BLD-MCNC: 132 MG/DL (ref 70–99)
HCO3 VENOUS: 22.9 MMOL/L (ref 23–29)
HCT VFR BLD CALC: 33.4 % (ref 36–48)
HEMOGLOBIN: 11 G/DL (ref 12–16)
LACTATE: 1.29 MMOL/L (ref 0.4–2)
LIPASE: 15 U/L (ref 13–60)
LYMPHOCYTES ABSOLUTE: 0.5 K/UL (ref 1–5.1)
LYMPHOCYTES RELATIVE PERCENT: 10 %
MCH RBC QN AUTO: 30.5 PG (ref 26–34)
MCHC RBC AUTO-ENTMCNC: 33 G/DL (ref 31–36)
MCV RBC AUTO: 92.2 FL (ref 80–100)
MONOCYTES ABSOLUTE: 0.1 K/UL (ref 0–1.3)
MONOCYTES RELATIVE PERCENT: 2.6 %
NEUTROPHILS ABSOLUTE: 4.6 K/UL (ref 1.7–7.7)
NEUTROPHILS RELATIVE PERCENT: 86 %
O2 SAT, VEN: 44 %
PCO2, VEN: 39.6 MM HG (ref 40–50)
PDW BLD-RTO: 15.1 % (ref 12.4–15.4)
PERFORMED ON: ABNORMAL
PH VENOUS: 7.37 (ref 7.35–7.45)
PLATELET # BLD: 273 K/UL (ref 135–450)
PMV BLD AUTO: 7.7 FL (ref 5–10.5)
PO2, VEN: 25 MM HG
POC SAMPLE TYPE: ABNORMAL
POTASSIUM REFLEX MAGNESIUM: 4 MMOL/L (ref 3.5–5.1)
RBC # BLD: 3.62 M/UL (ref 4–5.2)
SODIUM BLD-SCNC: 141 MMOL/L (ref 136–145)
TCO2 CALC VENOUS: 24 MMOL/L
TOTAL PROTEIN: 7.1 G/DL (ref 6.4–8.2)
WBC # BLD: 5.4 K/UL (ref 4–11)

## 2019-12-07 PROCEDURE — 96374 THER/PROPH/DIAG INJ IV PUSH: CPT

## 2019-12-07 PROCEDURE — 85025 COMPLETE CBC W/AUTO DIFF WBC: CPT

## 2019-12-07 PROCEDURE — 2580000003 HC RX 258: Performed by: STUDENT IN AN ORGANIZED HEALTH CARE EDUCATION/TRAINING PROGRAM

## 2019-12-07 PROCEDURE — 82803 BLOOD GASES ANY COMBINATION: CPT

## 2019-12-07 PROCEDURE — 80076 HEPATIC FUNCTION PANEL: CPT

## 2019-12-07 PROCEDURE — 99285 EMERGENCY DEPT VISIT HI MDM: CPT

## 2019-12-07 PROCEDURE — 6360000002 HC RX W HCPCS: Performed by: STUDENT IN AN ORGANIZED HEALTH CARE EDUCATION/TRAINING PROGRAM

## 2019-12-07 PROCEDURE — 6360000004 HC RX CONTRAST MEDICATION: Performed by: STUDENT IN AN ORGANIZED HEALTH CARE EDUCATION/TRAINING PROGRAM

## 2019-12-07 PROCEDURE — 93005 ELECTROCARDIOGRAM TRACING: CPT | Performed by: STUDENT IN AN ORGANIZED HEALTH CARE EDUCATION/TRAINING PROGRAM

## 2019-12-07 PROCEDURE — 74177 CT ABD & PELVIS W/CONTRAST: CPT

## 2019-12-07 PROCEDURE — 96375 TX/PRO/DX INJ NEW DRUG ADDON: CPT

## 2019-12-07 PROCEDURE — 83690 ASSAY OF LIPASE: CPT

## 2019-12-07 PROCEDURE — 81001 URINALYSIS AUTO W/SCOPE: CPT

## 2019-12-07 PROCEDURE — 96361 HYDRATE IV INFUSION ADD-ON: CPT

## 2019-12-07 PROCEDURE — 80048 BASIC METABOLIC PNL TOTAL CA: CPT

## 2019-12-07 PROCEDURE — 83605 ASSAY OF LACTIC ACID: CPT

## 2019-12-07 PROCEDURE — 96376 TX/PRO/DX INJ SAME DRUG ADON: CPT

## 2019-12-07 RX ORDER — MORPHINE SULFATE 4 MG/ML
INJECTION, SOLUTION INTRAMUSCULAR; INTRAVENOUS
Status: DISCONTINUED
Start: 2019-12-07 | End: 2019-12-08 | Stop reason: HOSPADM

## 2019-12-07 RX ORDER — ONDANSETRON 2 MG/ML
4 INJECTION INTRAMUSCULAR; INTRAVENOUS ONCE
Status: COMPLETED | OUTPATIENT
Start: 2019-12-07 | End: 2019-12-07

## 2019-12-07 RX ORDER — SODIUM CHLORIDE, SODIUM LACTATE, POTASSIUM CHLORIDE, CALCIUM CHLORIDE 600; 310; 30; 20 MG/100ML; MG/100ML; MG/100ML; MG/100ML
1000 INJECTION, SOLUTION INTRAVENOUS ONCE
Status: COMPLETED | OUTPATIENT
Start: 2019-12-07 | End: 2019-12-07

## 2019-12-07 RX ORDER — MORPHINE SULFATE 4 MG/ML
4 INJECTION, SOLUTION INTRAMUSCULAR; INTRAVENOUS ONCE
Status: COMPLETED | OUTPATIENT
Start: 2019-12-07 | End: 2019-12-07

## 2019-12-07 RX ADMIN — ONDANSETRON 4 MG: 2 INJECTION INTRAMUSCULAR; INTRAVENOUS at 18:32

## 2019-12-07 RX ADMIN — SODIUM CHLORIDE, POTASSIUM CHLORIDE, SODIUM LACTATE AND CALCIUM CHLORIDE 1000 ML: 600; 310; 30; 20 INJECTION, SOLUTION INTRAVENOUS at 18:32

## 2019-12-07 RX ADMIN — IOPAMIDOL 80 ML: 755 INJECTION, SOLUTION INTRAVENOUS at 20:45

## 2019-12-07 RX ADMIN — MORPHINE SULFATE 4 MG: 4 INJECTION INTRAVENOUS at 18:32

## 2019-12-07 RX ADMIN — MORPHINE SULFATE 4 MG: 4 INJECTION INTRAVENOUS at 20:29

## 2019-12-07 ASSESSMENT — ENCOUNTER SYMPTOMS
BLOOD IN STOOL: 0
RHINORRHEA: 0
COUGH: 0
VOMITING: 1
DIARRHEA: 1
BACK PAIN: 0
ABDOMINAL PAIN: 1
SHORTNESS OF BREATH: 0
NAUSEA: 1

## 2019-12-07 ASSESSMENT — PAIN DESCRIPTION - ORIENTATION: ORIENTATION: RIGHT;LEFT;UPPER;MID;LOWER

## 2019-12-07 ASSESSMENT — PAIN DESCRIPTION - PAIN TYPE: TYPE: ACUTE PAIN

## 2019-12-07 ASSESSMENT — PAIN SCALES - GENERAL
PAINLEVEL_OUTOF10: 10
PAINLEVEL_OUTOF10: 10

## 2019-12-07 ASSESSMENT — PAIN DESCRIPTION - FREQUENCY: FREQUENCY: CONTINUOUS

## 2019-12-07 ASSESSMENT — PAIN DESCRIPTION - DESCRIPTORS: DESCRIPTORS: CONSTANT;CRAMPING

## 2019-12-07 ASSESSMENT — PAIN DESCRIPTION - LOCATION: LOCATION: ABDOMEN

## 2019-12-08 ENCOUNTER — HOSPITAL ENCOUNTER (INPATIENT)
Age: 64
LOS: 1 days | Discharge: HOME | DRG: 392 | End: 2019-12-10
Attending: EMERGENCY MEDICINE | Admitting: INTERNAL MEDICINE
Payer: COMMERCIAL

## 2019-12-08 VITALS
DIASTOLIC BLOOD PRESSURE: 72 MMHG | BODY MASS INDEX: 23.3 KG/M2 | RESPIRATION RATE: 20 BRPM | TEMPERATURE: 100 F | SYSTOLIC BLOOD PRESSURE: 115 MMHG | HEIGHT: 66 IN | HEART RATE: 95 BPM | OXYGEN SATURATION: 91 % | WEIGHT: 145 LBS

## 2019-12-08 DIAGNOSIS — R11.2 NAUSEA VOMITING AND DIARRHEA: Primary | ICD-10-CM

## 2019-12-08 DIAGNOSIS — R19.7 NAUSEA VOMITING AND DIARRHEA: Primary | ICD-10-CM

## 2019-12-08 LAB
ALBUMIN SERPL-MCNC: 4.2 G/DL (ref 3.4–5)
ALP BLD-CCNC: 110 U/L (ref 40–129)
ALT SERPL-CCNC: 41 U/L (ref 10–40)
ANION GAP SERPL CALCULATED.3IONS-SCNC: 11 MMOL/L (ref 3–16)
AST SERPL-CCNC: 39 U/L (ref 15–37)
BACTERIA: ABNORMAL /HPF
BACTERIA: ABNORMAL /HPF
BASOPHILS ABSOLUTE: 0 K/UL (ref 0–0.2)
BASOPHILS RELATIVE PERCENT: 0.3 %
BILIRUB SERPL-MCNC: <0.2 MG/DL (ref 0–1)
BILIRUBIN DIRECT: <0.2 MG/DL (ref 0–0.3)
BILIRUBIN URINE: NEGATIVE
BILIRUBIN URINE: NEGATIVE
BILIRUBIN, INDIRECT: ABNORMAL MG/DL (ref 0–1)
BLOOD, URINE: ABNORMAL
BLOOD, URINE: ABNORMAL
BUN BLDV-MCNC: 14 MG/DL (ref 7–20)
C DIFF TOXIN/ANTIGEN: NORMAL
CALCIUM SERPL-MCNC: 8.6 MG/DL (ref 8.3–10.6)
CHLORIDE BLD-SCNC: 103 MMOL/L (ref 99–110)
CLARITY: CLEAR
CLARITY: CLEAR
CO2: 21 MMOL/L (ref 21–32)
COLOR: YELLOW
COLOR: YELLOW
CREAT SERPL-MCNC: 0.5 MG/DL (ref 0.6–1.2)
EKG ATRIAL RATE: 92 BPM
EKG DIAGNOSIS: NORMAL
EKG P AXIS: 46 DEGREES
EKG P-R INTERVAL: 144 MS
EKG Q-T INTERVAL: 372 MS
EKG QRS DURATION: 84 MS
EKG QTC CALCULATION (BAZETT): 460 MS
EKG R AXIS: -44 DEGREES
EKG T AXIS: 38 DEGREES
EKG VENTRICULAR RATE: 92 BPM
EOSINOPHILS ABSOLUTE: 0.1 K/UL (ref 0–0.6)
EOSINOPHILS RELATIVE PERCENT: 1.3 %
EPITHELIAL CELLS, UA: ABNORMAL /HPF
GFR AFRICAN AMERICAN: >60
GFR NON-AFRICAN AMERICAN: >60
GLUCOSE BLD-MCNC: 100 MG/DL (ref 70–99)
GLUCOSE URINE: NEGATIVE MG/DL
GLUCOSE URINE: NEGATIVE MG/DL
HCT VFR BLD CALC: 31.3 % (ref 36–48)
HEMOGLOBIN: 10.3 G/DL (ref 12–16)
KETONES, URINE: NEGATIVE MG/DL
KETONES, URINE: NEGATIVE MG/DL
LACTIC ACID: 0.7 MMOL/L (ref 0.4–2)
LEUKOCYTE ESTERASE, URINE: NEGATIVE
LEUKOCYTE ESTERASE, URINE: NEGATIVE
LIPASE: 19 U/L (ref 13–60)
LYMPHOCYTES ABSOLUTE: 1 K/UL (ref 1–5.1)
LYMPHOCYTES RELATIVE PERCENT: 21.6 %
MAGNESIUM: 1.6 MG/DL (ref 1.8–2.4)
MCH RBC QN AUTO: 30.4 PG (ref 26–34)
MCHC RBC AUTO-ENTMCNC: 33.1 G/DL (ref 31–36)
MCV RBC AUTO: 91.9 FL (ref 80–100)
MICROSCOPIC EXAMINATION: YES
MICROSCOPIC EXAMINATION: YES
MONOCYTES ABSOLUTE: 0.3 K/UL (ref 0–1.3)
MONOCYTES RELATIVE PERCENT: 6 %
MUCUS: ABNORMAL /LPF
NEUTROPHILS ABSOLUTE: 3.3 K/UL (ref 1.7–7.7)
NEUTROPHILS RELATIVE PERCENT: 70.8 %
NITRITE, URINE: NEGATIVE
NITRITE, URINE: NEGATIVE
OCCULT BLOOD SCREENING: NORMAL
PDW BLD-RTO: 14.7 % (ref 12.4–15.4)
PH UA: 6 (ref 5–8)
PH UA: 6 (ref 5–8)
PLATELET # BLD: 226 K/UL (ref 135–450)
PMV BLD AUTO: 7.7 FL (ref 5–10.5)
POTASSIUM REFLEX MAGNESIUM: 3.5 MMOL/L (ref 3.5–5.1)
PROTEIN UA: 30 MG/DL
PROTEIN UA: NEGATIVE MG/DL
RBC # BLD: 3.4 M/UL (ref 4–5.2)
RBC UA: ABNORMAL /HPF (ref 0–2)
RBC UA: ABNORMAL /HPF (ref 0–2)
SODIUM BLD-SCNC: 135 MMOL/L (ref 136–145)
SPECIFIC GRAVITY UA: 1.01 (ref 1–1.03)
SPECIFIC GRAVITY UA: >=1.03 (ref 1–1.03)
TOTAL PROTEIN: 7.2 G/DL (ref 6.4–8.2)
URINE REFLEX TO CULTURE: ABNORMAL
URINE REFLEX TO CULTURE: ABNORMAL
URINE TYPE: ABNORMAL
URINE TYPE: ABNORMAL
UROBILINOGEN, URINE: 0.2 E.U./DL
UROBILINOGEN, URINE: 0.2 E.U./DL
WBC # BLD: 4.6 K/UL (ref 4–11)
WBC UA: ABNORMAL /HPF (ref 0–5)
WBC UA: ABNORMAL /HPF (ref 0–5)

## 2019-12-08 PROCEDURE — 87425 ROTAVIRUS AG IA: CPT

## 2019-12-08 PROCEDURE — 85025 COMPLETE CBC W/AUTO DIFF WBC: CPT

## 2019-12-08 PROCEDURE — 2580000003 HC RX 258: Performed by: STUDENT IN AN ORGANIZED HEALTH CARE EDUCATION/TRAINING PROGRAM

## 2019-12-08 PROCEDURE — 6360000002 HC RX W HCPCS: Performed by: STUDENT IN AN ORGANIZED HEALTH CARE EDUCATION/TRAINING PROGRAM

## 2019-12-08 PROCEDURE — 83690 ASSAY OF LIPASE: CPT

## 2019-12-08 PROCEDURE — 86702 HIV-2 ANTIBODY: CPT

## 2019-12-08 PROCEDURE — 87390 HIV-1 AG IA: CPT

## 2019-12-08 PROCEDURE — 87505 NFCT AGENT DETECTION GI: CPT

## 2019-12-08 PROCEDURE — 87324 CLOSTRIDIUM AG IA: CPT

## 2019-12-08 PROCEDURE — G0328 FECAL BLOOD SCRN IMMUNOASSAY: HCPCS

## 2019-12-08 PROCEDURE — 82705 FATS/LIPIDS FECES QUAL: CPT

## 2019-12-08 PROCEDURE — 96375 TX/PRO/DX INJ NEW DRUG ADDON: CPT

## 2019-12-08 PROCEDURE — 80076 HEPATIC FUNCTION PANEL: CPT

## 2019-12-08 PROCEDURE — 81001 URINALYSIS AUTO W/SCOPE: CPT

## 2019-12-08 PROCEDURE — 83605 ASSAY OF LACTIC ACID: CPT

## 2019-12-08 PROCEDURE — 96374 THER/PROPH/DIAG INJ IV PUSH: CPT

## 2019-12-08 PROCEDURE — 80048 BASIC METABOLIC PNL TOTAL CA: CPT

## 2019-12-08 PROCEDURE — 83735 ASSAY OF MAGNESIUM: CPT

## 2019-12-08 PROCEDURE — 87209 SMEAR COMPLEX STAIN: CPT

## 2019-12-08 PROCEDURE — 99284 EMERGENCY DEPT VISIT MOD MDM: CPT

## 2019-12-08 PROCEDURE — 87449 NOS EACH ORGANISM AG IA: CPT

## 2019-12-08 PROCEDURE — 36415 COLL VENOUS BLD VENIPUNCTURE: CPT

## 2019-12-08 PROCEDURE — 86701 HIV-1ANTIBODY: CPT

## 2019-12-08 PROCEDURE — 96361 HYDRATE IV INFUSION ADD-ON: CPT

## 2019-12-08 PROCEDURE — 87177 OVA AND PARASITES SMEARS: CPT

## 2019-12-08 RX ORDER — MORPHINE SULFATE 4 MG/ML
4 INJECTION, SOLUTION INTRAMUSCULAR; INTRAVENOUS ONCE
Status: COMPLETED | OUTPATIENT
Start: 2019-12-08 | End: 2019-12-08

## 2019-12-08 RX ORDER — LANOLIN ALCOHOL/MO/W.PET/CERES
400 CREAM (GRAM) TOPICAL ONCE
Status: COMPLETED | OUTPATIENT
Start: 2019-12-08 | End: 2019-12-09

## 2019-12-08 RX ORDER — SODIUM CHLORIDE, SODIUM LACTATE, POTASSIUM CHLORIDE, CALCIUM CHLORIDE 600; 310; 30; 20 MG/100ML; MG/100ML; MG/100ML; MG/100ML
1000 INJECTION, SOLUTION INTRAVENOUS ONCE
Status: COMPLETED | OUTPATIENT
Start: 2019-12-08 | End: 2019-12-09

## 2019-12-08 RX ORDER — ONDANSETRON 2 MG/ML
4 INJECTION INTRAMUSCULAR; INTRAVENOUS ONCE
Status: COMPLETED | OUTPATIENT
Start: 2019-12-08 | End: 2019-12-08

## 2019-12-08 RX ADMIN — MORPHINE SULFATE 4 MG: 4 INJECTION INTRAVENOUS at 20:49

## 2019-12-08 RX ADMIN — ONDANSETRON 4 MG: 2 INJECTION INTRAMUSCULAR; INTRAVENOUS at 20:48

## 2019-12-08 RX ADMIN — SODIUM CHLORIDE, POTASSIUM CHLORIDE, SODIUM LACTATE AND CALCIUM CHLORIDE 1000 ML: 600; 310; 30; 20 INJECTION, SOLUTION INTRAVENOUS at 20:49

## 2019-12-08 ASSESSMENT — PAIN DESCRIPTION - FREQUENCY: FREQUENCY: CONTINUOUS

## 2019-12-08 ASSESSMENT — PAIN DESCRIPTION - ONSET: ONSET: PROGRESSIVE

## 2019-12-08 ASSESSMENT — PAIN SCALES - GENERAL
PAINLEVEL_OUTOF10: 10
PAINLEVEL_OUTOF10: 10

## 2019-12-09 PROBLEM — E87.6 HYPOKALEMIA: Status: ACTIVE | Noted: 2019-12-09

## 2019-12-09 PROBLEM — E86.0 DEHYDRATION: Status: ACTIVE | Noted: 2019-12-09

## 2019-12-09 PROBLEM — R11.2 NAUSEA AND VOMITING: Status: ACTIVE | Noted: 2019-12-09

## 2019-12-09 LAB
ALBUMIN SERPL-MCNC: 3.7 G/DL (ref 3.4–5)
ANION GAP SERPL CALCULATED.3IONS-SCNC: 10 MMOL/L (ref 3–16)
ANION GAP SERPL CALCULATED.3IONS-SCNC: 10 MMOL/L (ref 3–16)
BASOPHILS ABSOLUTE: 0 K/UL (ref 0–0.2)
BASOPHILS RELATIVE PERCENT: 0.4 %
BUN BLDV-MCNC: 12 MG/DL (ref 7–20)
BUN BLDV-MCNC: 14 MG/DL (ref 7–20)
CALCIUM SERPL-MCNC: 8.4 MG/DL (ref 8.3–10.6)
CALCIUM SERPL-MCNC: 8.6 MG/DL (ref 8.3–10.6)
CHLORIDE BLD-SCNC: 107 MMOL/L (ref 99–110)
CHLORIDE BLD-SCNC: 108 MMOL/L (ref 99–110)
CO2: 22 MMOL/L (ref 21–32)
CO2: 23 MMOL/L (ref 21–32)
CREAT SERPL-MCNC: 0.6 MG/DL (ref 0.6–1.2)
CREAT SERPL-MCNC: 0.6 MG/DL (ref 0.6–1.2)
EOSINOPHILS ABSOLUTE: 0.1 K/UL (ref 0–0.6)
EOSINOPHILS RELATIVE PERCENT: 1.8 %
GFR AFRICAN AMERICAN: >60
GFR AFRICAN AMERICAN: >60
GFR NON-AFRICAN AMERICAN: >60
GFR NON-AFRICAN AMERICAN: >60
GI BACTERIAL PATHOGENS BY PCR: NORMAL
GIARDIA ANTIGEN STOOL: NORMAL
GLUCOSE BLD-MCNC: 81 MG/DL (ref 70–99)
GLUCOSE BLD-MCNC: 93 MG/DL (ref 70–99)
HCT VFR BLD CALC: 27 % (ref 36–48)
HEMOGLOBIN: 8.9 G/DL (ref 12–16)
HIV AG/AB: NORMAL
HIV ANTIGEN: NORMAL
HIV-1 ANTIBODY: NORMAL
HIV-2 AB: NORMAL
LYMPHOCYTES ABSOLUTE: 1.1 K/UL (ref 1–5.1)
LYMPHOCYTES RELATIVE PERCENT: 32.8 %
MAGNESIUM: 1.8 MG/DL (ref 1.8–2.4)
MCH RBC QN AUTO: 30.3 PG (ref 26–34)
MCHC RBC AUTO-ENTMCNC: 32.7 G/DL (ref 31–36)
MCV RBC AUTO: 92.6 FL (ref 80–100)
MONOCYTES ABSOLUTE: 0.3 K/UL (ref 0–1.3)
MONOCYTES RELATIVE PERCENT: 8.3 %
NEUTROPHILS ABSOLUTE: 1.9 K/UL (ref 1.7–7.7)
NEUTROPHILS RELATIVE PERCENT: 56.7 %
PDW BLD-RTO: 14.8 % (ref 12.4–15.4)
PHOSPHORUS: 2.8 MG/DL (ref 2.5–4.9)
PLATELET # BLD: 194 K/UL (ref 135–450)
PMV BLD AUTO: 7.5 FL (ref 5–10.5)
POTASSIUM REFLEX MAGNESIUM: 2.9 MMOL/L (ref 3.5–5.1)
POTASSIUM SERPL-SCNC: 3.7 MMOL/L (ref 3.5–5.1)
RBC # BLD: 2.92 M/UL (ref 4–5.2)
SODIUM BLD-SCNC: 139 MMOL/L (ref 136–145)
SODIUM BLD-SCNC: 141 MMOL/L (ref 136–145)
WBC # BLD: 3.4 K/UL (ref 4–11)

## 2019-12-09 PROCEDURE — 2580000003 HC RX 258: Performed by: INTERNAL MEDICINE

## 2019-12-09 PROCEDURE — 6360000002 HC RX W HCPCS: Performed by: INTERNAL MEDICINE

## 2019-12-09 PROCEDURE — 6370000000 HC RX 637 (ALT 250 FOR IP): Performed by: INTERNAL MEDICINE

## 2019-12-09 PROCEDURE — 86701 HIV-1ANTIBODY: CPT

## 2019-12-09 PROCEDURE — 1200000000 HC SEMI PRIVATE

## 2019-12-09 PROCEDURE — 86702 HIV-2 ANTIBODY: CPT

## 2019-12-09 PROCEDURE — 6370000000 HC RX 637 (ALT 250 FOR IP): Performed by: STUDENT IN AN ORGANIZED HEALTH CARE EDUCATION/TRAINING PROGRAM

## 2019-12-09 PROCEDURE — 83735 ASSAY OF MAGNESIUM: CPT

## 2019-12-09 PROCEDURE — 80069 RENAL FUNCTION PANEL: CPT

## 2019-12-09 PROCEDURE — 85025 COMPLETE CBC W/AUTO DIFF WBC: CPT

## 2019-12-09 PROCEDURE — 36415 COLL VENOUS BLD VENIPUNCTURE: CPT

## 2019-12-09 PROCEDURE — 87390 HIV-1 AG IA: CPT

## 2019-12-09 RX ORDER — SODIUM CHLORIDE 0.9 % (FLUSH) 0.9 %
10 SYRINGE (ML) INJECTION EVERY 12 HOURS SCHEDULED
Status: DISCONTINUED | OUTPATIENT
Start: 2019-12-09 | End: 2019-12-10 | Stop reason: HOSPADM

## 2019-12-09 RX ORDER — ACETAMINOPHEN 325 MG/1
650 TABLET ORAL EVERY 4 HOURS PRN
Status: DISCONTINUED | OUTPATIENT
Start: 2019-12-09 | End: 2019-12-10 | Stop reason: HOSPADM

## 2019-12-09 RX ORDER — ACETAMINOPHEN 325 MG/1
650 TABLET ORAL ONCE
Status: COMPLETED | OUTPATIENT
Start: 2019-12-09 | End: 2019-12-09

## 2019-12-09 RX ORDER — POTASSIUM CHLORIDE 750 MG/1
30 TABLET, EXTENDED RELEASE ORAL
Status: DISPENSED | OUTPATIENT
Start: 2019-12-09 | End: 2019-12-09

## 2019-12-09 RX ORDER — SODIUM CHLORIDE 0.9 % (FLUSH) 0.9 %
10 SYRINGE (ML) INJECTION PRN
Status: DISCONTINUED | OUTPATIENT
Start: 2019-12-09 | End: 2019-12-10 | Stop reason: HOSPADM

## 2019-12-09 RX ORDER — ACYCLOVIR 400 MG/1
400 TABLET ORAL EVERY 8 HOURS
Status: DISCONTINUED | OUTPATIENT
Start: 2019-12-09 | End: 2019-12-10 | Stop reason: HOSPADM

## 2019-12-09 RX ORDER — ONDANSETRON 2 MG/ML
4 INJECTION INTRAMUSCULAR; INTRAVENOUS EVERY 4 HOURS PRN
Status: DISCONTINUED | OUTPATIENT
Start: 2019-12-09 | End: 2019-12-10 | Stop reason: HOSPADM

## 2019-12-09 RX ORDER — MORPHINE SULFATE 2 MG/ML
2 INJECTION, SOLUTION INTRAMUSCULAR; INTRAVENOUS
Status: DISCONTINUED | OUTPATIENT
Start: 2019-12-09 | End: 2019-12-10 | Stop reason: HOSPADM

## 2019-12-09 RX ORDER — MORPHINE SULFATE 4 MG/ML
4 INJECTION, SOLUTION INTRAMUSCULAR; INTRAVENOUS
Status: DISCONTINUED | OUTPATIENT
Start: 2019-12-09 | End: 2019-12-10 | Stop reason: HOSPADM

## 2019-12-09 RX ORDER — SODIUM CHLORIDE, SODIUM LACTATE, POTASSIUM CHLORIDE, CALCIUM CHLORIDE 600; 310; 30; 20 MG/100ML; MG/100ML; MG/100ML; MG/100ML
INJECTION, SOLUTION INTRAVENOUS CONTINUOUS
Status: DISCONTINUED | OUTPATIENT
Start: 2019-12-09 | End: 2019-12-10

## 2019-12-09 RX ORDER — SODIUM CHLORIDE 9 MG/ML
INJECTION, SOLUTION INTRAVENOUS CONTINUOUS
Status: DISCONTINUED | OUTPATIENT
Start: 2019-12-09 | End: 2019-12-09

## 2019-12-09 RX ORDER — PANTOPRAZOLE SODIUM 40 MG/1
40 TABLET, DELAYED RELEASE ORAL
Status: DISCONTINUED | OUTPATIENT
Start: 2019-12-09 | End: 2019-12-10 | Stop reason: HOSPADM

## 2019-12-09 RX ORDER — GABAPENTIN 600 MG/1
600 TABLET ORAL 3 TIMES DAILY
Status: DISCONTINUED | OUTPATIENT
Start: 2019-12-09 | End: 2019-12-10 | Stop reason: HOSPADM

## 2019-12-09 RX ADMIN — Medication 400 MG: at 00:16

## 2019-12-09 RX ADMIN — POTASSIUM CHLORIDE 30 MEQ: 10 TABLET, EXTENDED RELEASE ORAL at 08:56

## 2019-12-09 RX ADMIN — MORPHINE SULFATE 2 MG: 2 INJECTION, SOLUTION INTRAMUSCULAR; INTRAVENOUS at 21:36

## 2019-12-09 RX ADMIN — MORPHINE SULFATE 4 MG: 4 INJECTION INTRAVENOUS at 15:37

## 2019-12-09 RX ADMIN — PANTOPRAZOLE SODIUM 40 MG: 40 TABLET, DELAYED RELEASE ORAL at 14:09

## 2019-12-09 RX ADMIN — GABAPENTIN 600 MG: 600 TABLET, FILM COATED ORAL at 09:01

## 2019-12-09 RX ADMIN — ACETAMINOPHEN 650 MG: 325 TABLET ORAL at 01:12

## 2019-12-09 RX ADMIN — SODIUM CHLORIDE, POTASSIUM CHLORIDE, SODIUM LACTATE AND CALCIUM CHLORIDE: 600; 310; 30; 20 INJECTION, SOLUTION INTRAVENOUS at 14:10

## 2019-12-09 RX ADMIN — MORPHINE SULFATE 2 MG: 2 INJECTION, SOLUTION INTRAMUSCULAR; INTRAVENOUS at 02:21

## 2019-12-09 RX ADMIN — SODIUM CHLORIDE: 9 INJECTION, SOLUTION INTRAVENOUS at 02:17

## 2019-12-09 RX ADMIN — GABAPENTIN 600 MG: 600 TABLET, FILM COATED ORAL at 21:35

## 2019-12-09 RX ADMIN — Medication 10 ML: at 08:56

## 2019-12-09 RX ADMIN — GABAPENTIN 600 MG: 600 TABLET, FILM COATED ORAL at 15:31

## 2019-12-09 RX ADMIN — MORPHINE SULFATE 2 MG: 2 INJECTION, SOLUTION INTRAMUSCULAR; INTRAVENOUS at 18:25

## 2019-12-09 RX ADMIN — VENLAFAXINE 100 MG: 75 TABLET ORAL at 09:02

## 2019-12-09 RX ADMIN — VENLAFAXINE 100 MG: 75 TABLET ORAL at 22:20

## 2019-12-09 RX ADMIN — SODIUM CHLORIDE, POTASSIUM CHLORIDE, SODIUM LACTATE AND CALCIUM CHLORIDE: 600; 310; 30; 20 INJECTION, SOLUTION INTRAVENOUS at 21:44

## 2019-12-09 RX ADMIN — ENOXAPARIN SODIUM 40 MG: 40 INJECTION, SOLUTION INTRAVENOUS; SUBCUTANEOUS at 08:56

## 2019-12-09 RX ADMIN — ACYCLOVIR 400 MG: 400 TABLET ORAL at 19:16

## 2019-12-09 RX ADMIN — ACYCLOVIR 400 MG: 400 TABLET ORAL at 09:01

## 2019-12-09 RX ADMIN — POTASSIUM CHLORIDE 30 MEQ: 10 TABLET, EXTENDED RELEASE ORAL at 14:56

## 2019-12-09 ASSESSMENT — PAIN DESCRIPTION - ORIENTATION
ORIENTATION: MID

## 2019-12-09 ASSESSMENT — PAIN DESCRIPTION - DESCRIPTORS
DESCRIPTORS: CRAMPING
DESCRIPTORS: ACHING
DESCRIPTORS: CRAMPING

## 2019-12-09 ASSESSMENT — PAIN DESCRIPTION - FREQUENCY
FREQUENCY: CONTINUOUS

## 2019-12-09 ASSESSMENT — PAIN SCALES - GENERAL
PAINLEVEL_OUTOF10: 6
PAINLEVEL_OUTOF10: 8
PAINLEVEL_OUTOF10: 10
PAINLEVEL_OUTOF10: 8
PAINLEVEL_OUTOF10: 4
PAINLEVEL_OUTOF10: 8

## 2019-12-09 ASSESSMENT — PAIN DESCRIPTION - ONSET
ONSET: ON-GOING
ONSET: PROGRESSIVE
ONSET: PROGRESSIVE

## 2019-12-09 ASSESSMENT — PAIN DESCRIPTION - PAIN TYPE
TYPE: ACUTE PAIN

## 2019-12-09 ASSESSMENT — PAIN DESCRIPTION - LOCATION
LOCATION: ABDOMEN

## 2019-12-09 ASSESSMENT — PAIN DESCRIPTION - PROGRESSION: CLINICAL_PROGRESSION: GRADUALLY WORSENING

## 2019-12-10 VITALS
HEART RATE: 66 BPM | WEIGHT: 145 LBS | RESPIRATION RATE: 16 BRPM | DIASTOLIC BLOOD PRESSURE: 64 MMHG | OXYGEN SATURATION: 95 % | SYSTOLIC BLOOD PRESSURE: 109 MMHG | TEMPERATURE: 98.4 F | BODY MASS INDEX: 23.3 KG/M2 | HEIGHT: 66 IN

## 2019-12-10 LAB
ALBUMIN SERPL-MCNC: 3.6 G/DL (ref 3.4–5)
ANION GAP SERPL CALCULATED.3IONS-SCNC: 12 MMOL/L (ref 3–16)
BASOPHILS ABSOLUTE: 0 K/UL (ref 0–0.2)
BASOPHILS RELATIVE PERCENT: 0.6 %
BUN BLDV-MCNC: 10 MG/DL (ref 7–20)
CALCIUM SERPL-MCNC: 8.7 MG/DL (ref 8.3–10.6)
CHLORIDE BLD-SCNC: 105 MMOL/L (ref 99–110)
CO2: 23 MMOL/L (ref 21–32)
CREAT SERPL-MCNC: 0.6 MG/DL (ref 0.6–1.2)
EOSINOPHILS ABSOLUTE: 0.1 K/UL (ref 0–0.6)
EOSINOPHILS RELATIVE PERCENT: 3.2 %
FECAL NEUTRAL FAT: NORMAL
FECAL SPLIT FATS: NORMAL
GFR AFRICAN AMERICAN: >60
GFR NON-AFRICAN AMERICAN: >60
GLUCOSE BLD-MCNC: 99 MG/DL (ref 70–99)
HCT VFR BLD CALC: 27.6 % (ref 36–48)
HEMOGLOBIN: 9.1 G/DL (ref 12–16)
HIV AG/AB: NORMAL
HIV ANTIGEN: NORMAL
HIV-1 ANTIBODY: NORMAL
HIV-2 AB: NORMAL
LYMPHOCYTES ABSOLUTE: 1.4 K/UL (ref 1–5.1)
LYMPHOCYTES RELATIVE PERCENT: 52.7 %
MCH RBC QN AUTO: 30.5 PG (ref 26–34)
MCHC RBC AUTO-ENTMCNC: 33 G/DL (ref 31–36)
MCV RBC AUTO: 92.2 FL (ref 80–100)
MONOCYTES ABSOLUTE: 0.3 K/UL (ref 0–1.3)
MONOCYTES RELATIVE PERCENT: 10.6 %
NEUTROPHILS ABSOLUTE: 0.9 K/UL (ref 1.7–7.7)
NEUTROPHILS RELATIVE PERCENT: 32.9 %
PDW BLD-RTO: 14.7 % (ref 12.4–15.4)
PHOSPHORUS: 3.1 MG/DL (ref 2.5–4.9)
PLATELET # BLD: 219 K/UL (ref 135–450)
PMV BLD AUTO: 7.5 FL (ref 5–10.5)
POTASSIUM SERPL-SCNC: 3.7 MMOL/L (ref 3.5–5.1)
RBC # BLD: 2.99 M/UL (ref 4–5.2)
SODIUM BLD-SCNC: 140 MMOL/L (ref 136–145)
WBC # BLD: 2.7 K/UL (ref 4–11)

## 2019-12-10 PROCEDURE — 36415 COLL VENOUS BLD VENIPUNCTURE: CPT

## 2019-12-10 PROCEDURE — 6370000000 HC RX 637 (ALT 250 FOR IP): Performed by: INTERNAL MEDICINE

## 2019-12-10 PROCEDURE — 6360000002 HC RX W HCPCS: Performed by: INTERNAL MEDICINE

## 2019-12-10 PROCEDURE — 85025 COMPLETE CBC W/AUTO DIFF WBC: CPT

## 2019-12-10 PROCEDURE — 2580000003 HC RX 258: Performed by: INTERNAL MEDICINE

## 2019-12-10 PROCEDURE — 80069 RENAL FUNCTION PANEL: CPT

## 2019-12-10 RX ADMIN — ACYCLOVIR 400 MG: 400 TABLET ORAL at 02:16

## 2019-12-10 RX ADMIN — MORPHINE SULFATE 2 MG: 2 INJECTION, SOLUTION INTRAMUSCULAR; INTRAVENOUS at 06:46

## 2019-12-10 RX ADMIN — ACYCLOVIR 400 MG: 400 TABLET ORAL at 08:08

## 2019-12-10 RX ADMIN — GABAPENTIN 600 MG: 600 TABLET, FILM COATED ORAL at 08:08

## 2019-12-10 RX ADMIN — Medication 10 ML: at 08:09

## 2019-12-10 RX ADMIN — VENLAFAXINE 100 MG: 75 TABLET ORAL at 08:09

## 2019-12-10 RX ADMIN — MORPHINE SULFATE 2 MG: 2 INJECTION, SOLUTION INTRAMUSCULAR; INTRAVENOUS at 02:16

## 2019-12-10 RX ADMIN — PANTOPRAZOLE SODIUM 40 MG: 40 TABLET, DELAYED RELEASE ORAL at 06:43

## 2019-12-10 ASSESSMENT — PAIN DESCRIPTION - PAIN TYPE
TYPE: ACUTE PAIN
TYPE: ACUTE PAIN

## 2019-12-10 ASSESSMENT — PAIN DESCRIPTION - LOCATION
LOCATION: ABDOMEN
LOCATION: ABDOMEN

## 2019-12-10 ASSESSMENT — PAIN SCALES - GENERAL
PAINLEVEL_OUTOF10: 6
PAINLEVEL_OUTOF10: 5
PAINLEVEL_OUTOF10: 6

## 2019-12-10 ASSESSMENT — PAIN DESCRIPTION - PROGRESSION: CLINICAL_PROGRESSION: GRADUALLY IMPROVING

## 2019-12-10 ASSESSMENT — PAIN DESCRIPTION - ONSET: ONSET: ON-GOING

## 2019-12-10 ASSESSMENT — PAIN DESCRIPTION - FREQUENCY: FREQUENCY: CONTINUOUS

## 2019-12-10 ASSESSMENT — PAIN DESCRIPTION - DESCRIPTORS
DESCRIPTORS: CRAMPING
DESCRIPTORS: ACHING

## 2019-12-10 ASSESSMENT — PAIN DESCRIPTION - ORIENTATION: ORIENTATION: MID

## 2019-12-11 LAB
OVA AND PARASITE TRICHROME: NEGATIVE
OVA AND PARASITE WET MOUNT: NEGATIVE
ROTAVIRUS ANTIGEN: POSITIVE

## 2020-01-08 PROBLEM — E86.0 DEHYDRATION: Status: RESOLVED | Noted: 2019-12-09 | Resolved: 2020-01-08

## 2021-04-10 NOTE — ANESTHESIA PROCEDURE NOTES
Arterial Line:    An arterial line was placed using surface landmarks, in the OR for the following indication(s): continuous blood pressure monitoring and blood sampling needed. A 20 gauge (size), 1 and 3/8 inch (length), Angiocath (type) catheter was placed, Seldinger technique not used, into the left radial artery, secured by tape, suture and Tegaderm. Anesthesia type: Local  Local infiltration: None    Events:  patient tolerated procedure well with no complications.   Anesthesiologist: Rossana Page MD  Performed: Anesthesiologist   Preanesthetic Checklist  Completed: patient identified, site marked, surgical consent, pre-op evaluation, timeout performed, IV checked, risks and benefits discussed, monitors and equipment checked, anesthesia consent given, oxygen available and patient being monitored Chief Complaint:  Patient is a 78y old  Male who presents with a chief complaint of GLENDA, Acute Hemolytic Anemia, Transaminitis (10 Apr 2021 14:32)          HPI:    The patient is a     The patient denies dysphagia, nausea and vomiting, abdominal pain, diarrhea, unintentional weight loss, change in bowel habits or NSAID use.      Allergies:  No Known Allergies      Home Medications:    Hospital Medications:  acetaminophen   Tablet .. 650 milliGRAM(s) Oral every 6 hours PRN  cyanocobalamin 1000 MICROGram(s) Oral daily  folic acid 1 milliGRAM(s) Oral daily  heparin   Injectable 5000 Unit(s) SubCutaneous every 8 hours  levETIRAcetam 500 milliGRAM(s) Oral two times a day  meropenem  IVPB 500 milliGRAM(s) IV Intermittent every 24 hours  metoprolol succinate ER 25 milliGRAM(s) Oral daily  midodrine. 5 milliGRAM(s) Oral three times a day  multivitamin 1 Tablet(s) Oral daily  predniSONE   Tablet 2.5 milliGRAM(s) Oral daily      PMHX/PSHX:  Hemolytic anemia    Hyperlipemia    Chronic kidney disease (CKD)    Kidney stones    Diverticulitis    Hyperlipidemia    GERD (gastroesophageal reflux disease)    Seizure    Viral encephalitis    HLD (hyperlipidemia)    GERD (gastroesophageal reflux disease)    Lung nodule    West Nile encephalomyelitis    S/P percutaneous endoscopic gastrostomy (PEG) tube placement    S/P tonsillectomy        Family history:  No pertinent family history in first degree relatives        Social History:   Denies ethanol use.  Denies illicit drug use.    ROS:     General:  No wt loss, fevers, chills, night sweats, fatigue,   Eyes:  Good vision, no reported pain  ENT:  No sore throat, pain, runny nose, dysphagia  CV:  No pain, palpitations, hypo/hypertension  Resp:  No dyspnea, cough, tachypnea, wheezing  GI:  See HPI  :  No pain, bleeding, incontinence, nocturia  Muscle:  No pain, weakness  Neuro:  No weakness, tingling, memory problems  Psych:  No fatigue, insomnia, mood problems, depression  Endocrine:  No polyuria, polydipsia, cold/heat intolerance  Heme:  No petechiae, ecchymosis, easy bruisability  Integumentary:  No rash, edema      PHYSICAL EXAM:     GENERAL:  Appears stated age, well-groomed, well-nourished, no distress  HEENT:  NC/AT,  conjunctivae anicteric, clear and pink,   NECK: supple, trachea midline  CHEST:  Full & symmetric excursion, no increased effort, breath sounds clear  HEART:  Regular rhythm, no JVD  ABDOMEN:  Soft, non-tender, non-distended, normoactive bowel sounds,  no masses , no hepatosplenomegaly  EXTREMITIES:  no cyanosis,clubbing or edema  SKIN:  No rash, erythema, or, ecchymoses, no jaundice  NEURO:  Alert, non-focal, no asterixis  PSYCH: Appropriate affect, oriented to place and time  RECTAL: Deferred      Vital Signs:  Vital Signs Last 24 Hrs  T(C): 37.2 (10 Apr 2021 17:54), Max: 37.2 (10 Apr 2021 17:54)  T(F): 98.9 (10 Apr 2021 17:54), Max: 98.9 (10 Apr 2021 17:54)  HR: 77 (10 Apr 2021 17:54) (55 - 77)  BP: 103/67 (10 Apr 2021 17:54) (101/65 - 119/73)  BP(mean): --  RR: 18 (10 Apr 2021 17:54) (18 - 18)  SpO2: 93% (10 Apr 2021 17:54) (93% - 100%)  Daily     Daily     LABS: Labs personally reviewed by me:                        6.8    2.90  )-----------( 180      ( 10 Apr 2021 12:04 )             21.9     04-10    136  |  108  |  58<H>  ----------------------------<  105<H>  4.9   |  18<L>  |  3.73<H>    Ca    8.2<L>      10 Apr 2021 12:04  Phos  3.6     04-10  Mg     2.6     04-10    TPro  5.7<L>  /  Alb  3.6  /  TBili  0.6  /  DBili  0.2  /  AST  158<H>  /  ALT  496<H>  /  AlkPhos  32<L>  04-10    LIVER FUNCTIONS - ( 10 Apr 2021 12:04 )  Alb: 3.6 g/dL / Pro: 5.7 g/dL / ALK PHOS: 32 U/L / ALT: 496 U/L / AST: 158 U/L / GGT: x             Urinalysis Basic - ( 2021 20:33 )    Color: Yellow / Appearance: Slightly Turbid / S.022 / pH: x  Gluc: x / Ketone: Negative  / Bili: Negative / Urobili: 2 mg/dL   Blood: x / Protein: 30 mg/dL / Nitrite: Negative   Leuk Esterase: Negative / RBC: 1 /hpf / WBC 1 /HPF   Sq Epi: x / Non Sq Epi: 1 /hpf / Bacteria: Negative          Imaging personally reviewed by me:           Chief Complaint:  Patient is a 78y old  Male who presents with a chief complaint of GLENDA, Acute Hemolytic Anemia, Transaminitis (10 Apr 2021 14:32)          HPI:    The patient is a 78 year old gentleman, former smoker, diagnosed with disseminated nocardiosis 2020 in the setting of chronic steroid use for a hemolytic anemia. He also had his gallbladder removed on 3/5. He had West Nile viral encephalitis several years ago complicated by a seizure disorder. He has had multiple [facility] admissions for various infections.  Currently taking Bactrim for nocardiosis, found to have worsening anemia, elevated transaminases.  He denies abdominal pain, melena.  He had a biliary stent placed on 3/3.      PMHX:  Hemolytic anemia        The patient denies dysphagia, nausea and vomiting, abdominal pain, diarrhea, unintentional weight loss, change in bowel habits or NSAID use.      Allergies:  No Known Allergies      Home Medications:    Hospital Medications:  acetaminophen   Tablet .. 650 milliGRAM(s) Oral every 6 hours PRN  cyanocobalamin 1000 MICROGram(s) Oral daily  folic acid 1 milliGRAM(s) Oral daily  heparin   Injectable 5000 Unit(s) SubCutaneous every 8 hours  levETIRAcetam 500 milliGRAM(s) Oral two times a day  meropenem  IVPB 500 milliGRAM(s) IV Intermittent every 24 hours  metoprolol succinate ER 25 milliGRAM(s) Oral daily  midodrine. 5 milliGRAM(s) Oral three times a day  multivitamin 1 Tablet(s) Oral daily  predniSONE   Tablet 2.5 milliGRAM(s) Oral daily      PMHX/PSHX:  Hemolytic anemia    Hyperlipemia    Chronic kidney disease (CKD)    Kidney stones    Diverticulitis    Hyperlipidemia    GERD (gastroesophageal reflux disease)    Seizure    Viral encephalitis    HLD (hyperlipidemia)    GERD (gastroesophageal reflux disease)    Lung nodule    West Nile encephalomyelitis    S/P percutaneous endoscopic gastrostomy (PEG) tube placement    S/P tonsillectomy        Family history:  No pertinent family history in first degree relatives        Social History:   Denies ethanol use.  Denies illicit drug use.    ROS:     General:  No wt loss, fevers, chills, night sweats, fatigue,   Eyes:  Good vision, no reported pain  ENT:  No sore throat, pain, runny nose, dysphagia  CV:  No pain, palpitations, hypo/hypertension  Resp:  No dyspnea, cough, tachypnea, wheezing  GI:  See HPI  :  No pain, bleeding, incontinence, nocturia  Muscle:  No pain, weakness  Neuro:  No weakness, tingling, memory problems  Psych:  No fatigue, insomnia, mood problems, depression  Endocrine:  No polyuria, polydipsia, cold/heat intolerance  Heme:  No petechiae, ecchymosis, easy bruisability  Integumentary:  No rash, edema      PHYSICAL EXAM:     GENERAL:  Appears stated age, well-groomed, well-nourished, no distress  HEENT:  NC/AT,  conjunctivae anicteric, clear and pink,   NECK: supple, trachea midline  CHEST:  Full & symmetric excursion, no increased effort, breath sounds clear  HEART:  Regular rhythm, no JVD  ABDOMEN:  Soft, non-tender, non-distended, normoactive bowel sounds,  no masses , no hepatosplenomegaly  EXTREMITIES:  no cyanosis,clubbing or edema  SKIN:  No rash, erythema, or, ecchymoses, no jaundice  NEURO:  Alert, non-focal, no asterixis  PSYCH: Appropriate affect, oriented to place and time  RECTAL: Deferred      Vital Signs:  Vital Signs Last 24 Hrs  T(C): 37.2 (10 Apr 2021 17:54), Max: 37.2 (10 Apr 2021 17:54)  T(F): 98.9 (10 Apr 2021 17:54), Max: 98.9 (10 Apr 2021 17:54)  HR: 77 (10 Apr 2021 17:54) (55 - 77)  BP: 103/67 (10 Apr 2021 17:54) (101/65 - 119/73)  BP(mean): --  RR: 18 (10 Apr 2021 17:54) (18 - 18)  SpO2: 93% (10 Apr 2021 17:54) (93% - 100%)  Daily     Daily     LABS: Labs personally reviewed by me:                        6.8    2.90  )-----------( 180      ( 10 Apr 2021 12:04 )             21.9     04-10    136  |  108  |  58<H>  ----------------------------<  105<H>  4.9   |  18<L>  |  3.73<H>    Ca    8.2<L>      10 Apr 2021 12:04  Phos  3.6     04-10  Mg     2.6     04-10    TPro  5.7<L>  /  Alb  3.6  /  TBili  0.6  /  DBili  0.2  /  AST  158<H>  /  ALT  496<H>  /  AlkPhos  32<L>  04-10    LIVER FUNCTIONS - ( 10 Apr 2021 12:04 )  Alb: 3.6 g/dL / Pro: 5.7 g/dL / ALK PHOS: 32 U/L / ALT: 496 U/L / AST: 158 U/L / GGT: x             Urinalysis Basic - ( 2021 20:33 )    Color: Yellow / Appearance: Slightly Turbid / S.022 / pH: x  Gluc: x / Ketone: Negative  / Bili: Negative / Urobili: 2 mg/dL   Blood: x / Protein: 30 mg/dL / Nitrite: Negative   Leuk Esterase: Negative / RBC: 1 /hpf / WBC 1 /HPF   Sq Epi: x / Non Sq Epi: 1 /hpf / Bacteria: Negative          Imaging personally reviewed by me:

## 2021-07-02 ENCOUNTER — HOSPITAL ENCOUNTER (EMERGENCY)
Age: 66
Discharge: HOME | End: 2021-07-02
Attending: EMERGENCY MEDICINE
Payer: COMMERCIAL

## 2021-07-02 ENCOUNTER — APPOINTMENT (OUTPATIENT)
Dept: CT IMAGING | Age: 66
End: 2021-07-02
Payer: COMMERCIAL

## 2021-07-02 VITALS
OXYGEN SATURATION: 99 % | BODY MASS INDEX: 22.5 KG/M2 | SYSTOLIC BLOOD PRESSURE: 138 MMHG | DIASTOLIC BLOOD PRESSURE: 75 MMHG | WEIGHT: 140 LBS | RESPIRATION RATE: 16 BRPM | HEIGHT: 66 IN | HEART RATE: 76 BPM | TEMPERATURE: 98 F

## 2021-07-02 DIAGNOSIS — N76.2 VULVAR CELLULITIS: Primary | ICD-10-CM

## 2021-07-02 LAB
ANION GAP SERPL CALCULATED.3IONS-SCNC: 12 MMOL/L (ref 3–16)
BASOPHILS ABSOLUTE: 0.1 K/UL (ref 0–0.2)
BASOPHILS RELATIVE PERCENT: 0.7 %
BUN BLDV-MCNC: 25 MG/DL (ref 7–20)
CALCIUM SERPL-MCNC: 9.1 MG/DL (ref 8.3–10.6)
CHLORIDE BLD-SCNC: 106 MMOL/L (ref 99–110)
CO2: 21 MMOL/L (ref 21–32)
CREAT SERPL-MCNC: 0.7 MG/DL (ref 0.6–1.2)
EOSINOPHILS ABSOLUTE: 0.2 K/UL (ref 0–0.6)
EOSINOPHILS RELATIVE PERCENT: 2.3 %
GFR AFRICAN AMERICAN: >60
GFR NON-AFRICAN AMERICAN: >60
GLUCOSE BLD-MCNC: 103 MG/DL (ref 70–99)
HCT VFR BLD CALC: 35.2 % (ref 36–48)
HEMOGLOBIN: 11.8 G/DL (ref 12–16)
LYMPHOCYTES ABSOLUTE: 2.9 K/UL (ref 1–5.1)
LYMPHOCYTES RELATIVE PERCENT: 31.9 %
MCH RBC QN AUTO: 31 PG (ref 26–34)
MCHC RBC AUTO-ENTMCNC: 33.6 G/DL (ref 31–36)
MCV RBC AUTO: 92.2 FL (ref 80–100)
MONOCYTES ABSOLUTE: 0.6 K/UL (ref 0–1.3)
MONOCYTES RELATIVE PERCENT: 6.4 %
NEUTROPHILS ABSOLUTE: 5.3 K/UL (ref 1.7–7.7)
NEUTROPHILS RELATIVE PERCENT: 58.7 %
PDW BLD-RTO: 13.7 % (ref 12.4–15.4)
PLATELET # BLD: 333 K/UL (ref 135–450)
PMV BLD AUTO: 8.6 FL (ref 5–10.5)
POTASSIUM REFLEX MAGNESIUM: 4.6 MMOL/L (ref 3.5–5.1)
RBC # BLD: 3.82 M/UL (ref 4–5.2)
SODIUM BLD-SCNC: 139 MMOL/L (ref 136–145)
WBC # BLD: 9 K/UL (ref 4–11)

## 2021-07-02 PROCEDURE — 85025 COMPLETE CBC W/AUTO DIFF WBC: CPT

## 2021-07-02 PROCEDURE — 6370000000 HC RX 637 (ALT 250 FOR IP): Performed by: PHYSICIAN ASSISTANT

## 2021-07-02 PROCEDURE — 72193 CT PELVIS W/DYE: CPT

## 2021-07-02 PROCEDURE — 80048 BASIC METABOLIC PNL TOTAL CA: CPT

## 2021-07-02 PROCEDURE — 2500000003 HC RX 250 WO HCPCS: Performed by: PHYSICIAN ASSISTANT

## 2021-07-02 PROCEDURE — 6360000004 HC RX CONTRAST MEDICATION: Performed by: PHYSICIAN ASSISTANT

## 2021-07-02 PROCEDURE — 99282 EMERGENCY DEPT VISIT SF MDM: CPT

## 2021-07-02 RX ORDER — LIDOCAINE HYDROCHLORIDE AND EPINEPHRINE 10; 10 MG/ML; UG/ML
3 INJECTION, SOLUTION INFILTRATION; PERINEURAL ONCE
Status: COMPLETED | OUTPATIENT
Start: 2021-07-02 | End: 2021-07-02

## 2021-07-02 RX ORDER — CEPHALEXIN 500 MG/1
500 CAPSULE ORAL ONCE
Status: COMPLETED | OUTPATIENT
Start: 2021-07-02 | End: 2021-07-02

## 2021-07-02 RX ORDER — SULFAMETHOXAZOLE AND TRIMETHOPRIM 800; 160 MG/1; MG/1
1 TABLET ORAL 2 TIMES DAILY
Qty: 14 TABLET | Refills: 0 | Status: SHIPPED | OUTPATIENT
Start: 2021-07-02 | End: 2021-07-09

## 2021-07-02 RX ORDER — CEPHALEXIN 500 MG/1
500 CAPSULE ORAL 4 TIMES DAILY
Qty: 28 CAPSULE | Refills: 0 | Status: SHIPPED | OUTPATIENT
Start: 2021-07-02 | End: 2021-07-09

## 2021-07-02 RX ORDER — SULFAMETHOXAZOLE AND TRIMETHOPRIM 800; 160 MG/1; MG/1
1 TABLET ORAL ONCE
Status: COMPLETED | OUTPATIENT
Start: 2021-07-02 | End: 2021-07-02

## 2021-07-02 RX ADMIN — CEPHALEXIN 500 MG: 500 CAPSULE ORAL at 22:51

## 2021-07-02 RX ADMIN — SULFAMETHOXAZOLE AND TRIMETHOPRIM 1 TABLET: 800; 160 TABLET ORAL at 22:51

## 2021-07-02 RX ADMIN — IOPAMIDOL 80 ML: 755 INJECTION, SOLUTION INTRAVENOUS at 21:28

## 2021-07-02 RX ADMIN — LIDOCAINE HYDROCHLORIDE,EPINEPHRINE BITARTRATE 3 ML: 10; .01 INJECTION, SOLUTION INFILTRATION; PERINEURAL at 21:05

## 2021-07-02 ASSESSMENT — PAIN SCALES - GENERAL: PAINLEVEL_OUTOF10: 5

## 2021-07-02 NOTE — ED PROVIDER NOTES
[address] [facility] ENCOUNTER          PHYSICIAN ASSISTANT NOTE       Date of evaluation: 7/2/2021    Chief Complaint     Vaginal Pain (Pain and swelling)      History of Present Illness     Stoney Becerra is a 60 y.o. female, with a history of depression, chronic back pain and vulvar necrotizing fasciitis in June 2019, who presents to the ED with complaints of left labial pain and swelling that started 2 days ago and has progressively worsened. She denies any drainage. Is had no fever, chills, nausea or vomiting. No vaginal discharge or itching. She did see her gynecologist just prior to arrival and was referred into the ED given her history of necrotizing fasciitis for evaluation of the same. She rates her pain a 4 out of 10 in severity. Has not taken anything at home for her discomfort. She otherwise has no complaints. Review of Systems     Review of Systems   Constitutional: Negative for chills and fever. HENT: Negative for congestion, rhinorrhea and sore throat. Respiratory: Negative for cough and shortness of breath. Cardiovascular: Negative for chest pain and palpitations. Gastrointestinal: Negative for abdominal pain, nausea and vomiting. Genitourinary: Positive for vaginal pain (left labia). Negative for dysuria, frequency and urgency. Musculoskeletal: Negative for arthralgias and myalgias. Skin: Negative for rash and wound. Neurological: Negative for dizziness, light-headedness and headaches. All other systems reviewed and are negative. Past Medical, Surgical, Family, and Social History     She has a past medical history of Chronic back pain, Depression, Fracture, and MRSA (methicillin resistant staph aureus) culture positive. She has a past surgical history that includes Neck surgery; Tubal ligation; Cholecystectomy; incision and drainage (N/A, 5/26/2019); Pressure ulcer debridement (N/A, 5/28/2019);  Small intestine surgery (N/A, 5/30/2019); pr rmvl devital tiss n-slctv dbrdmt w/o anes 1 sess (N/A, 6/4/2019); Vulva surgery (N/A, 6/7/2019); Colon surgery; and Small intestine surgery (N/A, 8/6/2019). Her family history includes Arthritis in her mother; Breast Cancer in her maternal grandmother; Diabetes in her mother; Heart Attack in her mother; High Blood Pressure in her mother; Other in her father. She reports that she has quit smoking. She has never used smokeless tobacco. She reports current alcohol use. She reports that she does not use drugs. Medications     Previous Medications    DOCUSATE SODIUM (COLACE, DULCOLAX) 100 MG CAPS    Take 100 mg by mouth 2 times daily    FAMCICLOVIR (FAMVIR) 250 MG TABLET    Take 1 tablet by mouth daily     METHOCARBAMOL PO    Take 1 tablet by mouth 2 times daily    MULTIVITAMIN (THERAGRAN) PER TABLET    Take 1 tablet by mouth daily. OMEPRAZOLE (PRILOSEC) 20 MG DELAYED RELEASE CAPSULE    Take 20 mg by mouth Daily     VENLAFAXINE (EFFEXOR) 100 MG TABLET    Take 100 mg by mouth 2 times daily        Allergies     She is allergic to latex, vicodin [hydrocodone-acetaminophen], and zocor [simvastatin]. Physical Exam     INITIAL VITALS: BP: 138/75, Temp: 98 °F (36.7 °C), Pulse: 76, Resp: 16, SpO2: 99 %  Physical Exam  Vitals reviewed. Constitutional:       General: She is not in acute distress. Appearance: She is well-developed and normal weight. HENT:      Head: Normocephalic and atraumatic. Mouth/Throat:      Mouth: Mucous membranes are moist.   Eyes:      Extraocular Movements: Extraocular movements intact. Conjunctiva/sclera: Conjunctivae normal.   Neck:      Trachea: Phonation normal.   Cardiovascular:      Rate and Rhythm: Normal rate and regular rhythm. Heart sounds: No murmur heard. No friction rub. No gallop. Pulmonary:      Effort: Pulmonary effort is normal. No respiratory distress. Breath sounds: No wheezing, rhonchi or rales.    Abdominal:      General: There is no distension. Palpations: Abdomen is soft. Tenderness: There is no abdominal tenderness. Genitourinary:     Labia:         Left: Tenderness present. No rash or lesion. Comments: There is generalized swelling and firmness noted to the left labia majora with no fluctuance or drainage. Musculoskeletal:      Cervical back: Normal range of motion and neck supple. Skin:     General: Skin is warm and dry. Findings: No petechiae or rash. Neurological:      Mental Status: She is alert and oriented to person, place, and time. Psychiatric:         Mood and Affect: Mood and affect normal.         Behavior: Behavior normal.           ED Course     Nursing Notes, Past Medical Hx,Past Surgical Hx, Social Hx, Allergies, and Family Hx were reviewed. The patient was given the following medications:  Orders Placed This Encounter   Medications    lidocaine-EPINEPHrine 1 %-1:060672 injection 3 mL    iopamidol (ISOVUE-370) 76 % injection 80 mL    cephALEXin (KEFLEX) capsule 500 mg     Order Specific Question:   Antimicrobial Indications     Answer:   Skin and Soft Tissue Infection    sulfamethoxazole-trimethoprim (BACTRIM DS;SEPTRA DS) 800-160 MG per tablet 1 tablet     Order Specific Question:   Antimicrobial Indications     Answer:   Skin and Soft Tissue Infection    sulfamethoxazole-trimethoprim (BACTRIM DS) 800-160 MG per tablet     Sig: Take 1 tablet by mouth 2 times daily for 7 days     Dispense:  14 tablet     Refill:  0    cephALEXin (KEFLEX) 500 MG capsule     Sig: Take 1 capsule by mouth 4 times daily for 7 days     Dispense:  28 capsule     Refill:  0       CONSULTS:  None    MEDICAL DECISION MAKING / ASSESSMENT / Shivani René is a 60 y.o. female presenting with localized left labial swelling and tenderness with prior necrotizing fasciitis involving the right vulva. IV access was established for diagnostics. She declined medication.     Labs included unremarkable CBC and renal panel.  CT of the pelvis with IV contrast shows mild thickening of the left labia, probably inflammatory, but there is no drainable fluid collection or soft tissue gas. Given that there was no evidence of fluid collection on the CT, no incision and drainage was performed. She was given Keflex and Bactrim here in the department as it is late and most pharmacies are not open. She is given a prescription for the same. She is instructed on warm compresses. She will follow-up with her gynecologist next week for recheck or return to the ED for any worsening symptoms. This patient was also evaluated by the attending physician. All care plans were discussed and agreed upon. Clinical Impression     1.  Vulvar cellulitis        Disposition     PATIENT REFERRED TO:  Darryl Moore  [address] [place] [address] [id number] [facility] [id number] [phone]    Call on 7/6/2021  for recheck appointment and additional management      DISCHARGE MEDICATIONS:  Discharge Medication List as of 7/2/2021 10:56 PM      START taking these medications    Details   sulfamethoxazole-trimethoprim (BACTRIM DS) 800-160 MG per tablet Take 1 tablet by mouth 2 times daily for 7 days, Disp-14 tablet, R-0Print      cephALEXin (KEFLEX) 500 MG capsule Take 1 capsule by mouth 4 times daily for 7 days, Disp-28 capsule, R-0Print             DISPOSITION  Discharged     [place], [place]  07/03/21 [id number]

## 2021-07-02 NOTE — ED NOTES
Bed: A10-10  Expected date:   Expected time:   Means of arrival:   Comments:  Drew Dash RN  07/02/21 0596

## 2021-07-03 ASSESSMENT — ENCOUNTER SYMPTOMS
COUGH: 0
VOMITING: 0
SHORTNESS OF BREATH: 0
RHINORRHEA: 0
SORE THROAT: 0
ABDOMINAL PAIN: 0
NAUSEA: 0

## 2021-07-03 NOTE — ED PROVIDER NOTES
ED Attending Attestation Note     Date of evaluation: 7/2/2021    This patient was seen by the advance practice provider. I have seen and examined the patient, agree with the workup, evaluation, management and diagnosis. The care plan has been discussed. My assessment reveals patient here for swelling redness to her left labia. She has had a history of a necrotizing fasciitis of her labia and perineum in the past.  She was seen by her gynecologist today who sent her in for a rule out of necrotizing fasciitis. She said no fever chills or sweats. On physical exam afebrile and patient does have what appears to be a red area mid left labia that is swollen. .  No adenopathy and no crepitus in the area. Gadiel Donnelly MD  07/02/21 2111    CT pelvis revealed no evidence for necrotizing fasciitis and it was noted there is no drainable fluid in this swollen labia.        Gadiel Donnelly MD  07/02/21 1381

## 2021-07-03 NOTE — ED NOTES
Patient discharged to home via family. Written discharge instructions reviewed with understanding. Copy of AVS and signed prescription sent home with patient. Patient able to walk from ED without assistance.        Eric Rome RN  07/02/21 0151

## 2022-09-26 ENCOUNTER — OFFICE VISIT (OUTPATIENT)
Dept: PAIN MANAGEMENT | Age: 67
End: 2022-09-26
Payer: COMMERCIAL

## 2022-09-26 VITALS
WEIGHT: 146.2 LBS | DIASTOLIC BLOOD PRESSURE: 86 MMHG | BODY MASS INDEX: 23.6 KG/M2 | SYSTOLIC BLOOD PRESSURE: 139 MMHG | OXYGEN SATURATION: 97 % | HEART RATE: 79 BPM

## 2022-09-26 DIAGNOSIS — M96.1 POSTLAMINECTOMY SYNDROME, CERVICAL REGION: ICD-10-CM

## 2022-09-26 DIAGNOSIS — G89.4 CHRONIC PAIN SYNDROME: ICD-10-CM

## 2022-09-26 DIAGNOSIS — M47.817 LUMBOSACRAL SPONDYLOSIS WITHOUT MYELOPATHY: Primary | ICD-10-CM

## 2022-09-26 DIAGNOSIS — Z79.891 ENCOUNTER FOR LONG-TERM OPIATE ANALGESIC USE: ICD-10-CM

## 2022-09-26 DIAGNOSIS — Z51.81 ENCOUNTER FOR THERAPEUTIC DRUG MONITORING: ICD-10-CM

## 2022-09-26 PROCEDURE — 99244 OFF/OP CNSLTJ NEW/EST MOD 40: CPT | Performed by: NURSE PRACTITIONER

## 2022-09-26 RX ORDER — NALOXONE HYDROCHLORIDE 4 MG/.1ML
1 SPRAY NASAL PRN
Qty: 1 EACH | Refills: 0 | Status: SHIPPED | OUTPATIENT
Start: 2022-09-26

## 2022-09-26 RX ORDER — ERYTHROMYCIN 5 MG/G
OINTMENT OPHTHALMIC
COMMUNITY
Start: 2022-09-20

## 2022-09-26 RX ORDER — HYDROCODONE BITARTRATE AND ACETAMINOPHEN 5; 325 MG/1; MG/1
1 TABLET ORAL EVERY 8 HOURS PRN
Qty: 84 TABLET | Refills: 0 | Status: SHIPPED | OUTPATIENT
Start: 2022-09-26 | End: 2022-10-24 | Stop reason: SDUPTHER

## 2022-09-26 RX ORDER — TRAZODONE HYDROCHLORIDE 50 MG/1
TABLET ORAL
COMMUNITY
Start: 2022-09-19

## 2022-09-26 RX ORDER — CELECOXIB 200 MG/1
200 CAPSULE ORAL DAILY
Qty: 60 CAPSULE | Refills: 0 | Status: SHIPPED | OUTPATIENT
Start: 2022-09-26 | End: 2022-10-24 | Stop reason: SDUPTHER

## 2022-09-26 ASSESSMENT — ENCOUNTER SYMPTOMS
SHORTNESS OF BREATH: 0
BACK PAIN: 1
ABDOMINAL PAIN: 0
WHEEZING: 0
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
## 2022-10-24 ENCOUNTER — OFFICE VISIT (OUTPATIENT)
Dept: PAIN MANAGEMENT | Age: 67
End: 2022-10-24
Payer: COMMERCIAL

## 2022-10-24 VITALS
SYSTOLIC BLOOD PRESSURE: 137 MMHG | WEIGHT: 142.8 LBS | BODY MASS INDEX: 23.05 KG/M2 | OXYGEN SATURATION: 95 % | DIASTOLIC BLOOD PRESSURE: 77 MMHG | HEART RATE: 61 BPM

## 2022-10-24 DIAGNOSIS — M96.1 POSTLAMINECTOMY SYNDROME, CERVICAL REGION: ICD-10-CM

## 2022-10-24 DIAGNOSIS — Z79.891 ENCOUNTER FOR LONG-TERM OPIATE ANALGESIC USE: ICD-10-CM

## 2022-10-24 DIAGNOSIS — G89.4 CHRONIC PAIN SYNDROME: ICD-10-CM

## 2022-10-24 DIAGNOSIS — M47.816 SPONDYLOSIS OF LUMBAR REGION WITHOUT MYELOPATHY OR RADICULOPATHY: Primary | Chronic | ICD-10-CM

## 2022-10-24 DIAGNOSIS — M47.817 LUMBOSACRAL SPONDYLOSIS WITHOUT MYELOPATHY: ICD-10-CM

## 2022-10-24 PROCEDURE — 99214 OFFICE O/P EST MOD 30 MIN: CPT | Performed by: NURSE PRACTITIONER

## 2022-10-24 PROCEDURE — 1123F ACP DISCUSS/DSCN MKR DOCD: CPT | Performed by: NURSE PRACTITIONER

## 2022-10-24 RX ORDER — HYDROCODONE BITARTRATE AND ACETAMINOPHEN 5; 325 MG/1; MG/1
1 TABLET ORAL EVERY 8 HOURS PRN
Qty: 84 TABLET | Refills: 0 | Status: SHIPPED | OUTPATIENT
Start: 2022-10-24 | End: 2022-11-21 | Stop reason: SDUPTHER

## 2022-10-24 RX ORDER — CELECOXIB 200 MG/1
200 CAPSULE ORAL 2 TIMES DAILY
Qty: 60 CAPSULE | Refills: 0 | Status: SHIPPED | OUTPATIENT
Start: 2022-10-24 | End: 2022-11-21 | Stop reason: SDUPTHER

## 2022-10-24 NOTE — PROGRESS NOTES
Andriy Maikol  1955  [id number]      HISTORY OF PRESENT ILLNESS: Ms. Consuelo Hogue is a 73 y.o. female returns for a follow up visit for pain management  She has a diagnosis of   1. Spondylosis of lumbar region without myelopathy or radiculopathy    2. Chronic pain syndrome    3. Lumbosacral spondylosis without myelopathy    4. Postlaminectomy syndrome, cervical region    . As per Information Obtained from the PADT (Patient Assessment and Documentation Tool)    She complains of pain in the  low back and neck pain. She rates the pain 7/10 and describes it as aching. Current treatment regimen has helped relieve about 20% of the pain. She denies any side effects from the current pain regimen. Patient reports that since the last follow up visit the physical functioning is unchanged, family/social relationships are unchanged, mood is unchanged sleep patterns are unchanged, and that the overall functioning is unchanged. Patient denies misusing/abusing her narcotic pain medications or using any illegal drugs. Upon obtaining medical history from Ms. Consuelo Hogue    ALLERGIES: Patients list of allergies were reviewed     MEDICATIONS: Ms. Consuelo Hogue list of medications were reviewed. Her current medications are   Outpatient Medications Prior to Visit   Medication Sig Dispense Refill    erythromycin (ROMYCIN) 5 MG/GM ophthalmic ointment PLACE 0.5 INCHES INTO RIGHT EYE EVERY 6 HOURS FOR 10 DAYS      traZODone (DESYREL) 50 MG tablet TAKE 1 TABLET BY MOUTH AT BEDTIME      celecoxib (CELEBREX) 200 MG capsule Take 1 capsule by mouth daily 60 capsule 0    HYDROcodone-acetaminophen (NORCO) 5-325 MG per tablet Take 1 tablet by mouth every 8 hours as needed for Pain for up to 28 days.  84 tablet 0    naloxone (NARCAN) 4 MG/0.1ML LIQD nasal spray 1 spray by Nasal route as needed for Opioid Reversal 1 each 0    METHOCARBAMOL PO Take 1 tablet by mouth 2 times daily      famciclovir (FAMVIR) 250 MG tablet Take 1 tablet by mouth daily       omeprazole (PRILOSEC) 20 MG delayed release capsule Take 20 mg by mouth Daily       docusate sodium (COLACE, DULCOLAX) 100 MG CAPS Take 100 mg by mouth 2 times daily 60 capsule 1    venlafaxine (EFFEXOR) 100 MG tablet Take 100 mg by mouth 2 times daily       multivitamin (THERAGRAN) per tablet Take 1 tablet by mouth daily. No facility-administered medications prior to visit. SOCIAL/FAMILY/PAST MEDICAL HISTORY: Ms. Hein Purchase, family and past medical history was reviewed. REVIEW OF SYSTEMS:    Respiratory: Negative for apnea, chest tightness and shortness of breath or change in baseline breathing. Gastrointestinal: Negative for nausea, vomiting, abdominal pain, diarrhea, constipation, blood in stool and abdominal distention. PHYSICAL EXAM:   Nursing note and vitals reviewed. /77   Pulse 61   Wt 142 lb 12.8 oz (64.8 kg)   LMP 07/12/2007   SpO2 95%   BMI 23.05 kg/m²   Constitutional: She appears well-developed and well-nourished. No acute distress. Skin: Skin is warm and dry, good turgor. No rash noted. She is not diaphoretic. Cardiovascular: Normal rate, regular rhythm, normal heart sounds, and does not have murmur. Pulmonary/Chest: Effort normal. No respiratory distress. She does not have wheezes in the lung fields. She has no rales. Neurological/Psychiatric:She is alert and oriented to person, place, and time. Coordination is  normal.  Her mood isAppropriate and affect is Neutral/Euthymic(normal) .      Prescription pain medication monitoring:                  MEDD current = 15              ORT Score = 1              Other Risk factors - history of THC use, will monitor closely              Date of Last Medication Agreement: 09/26/2022              Date Naloxone prescribed:09/26/2022              UDT:                          Date of last UDT: 09/26/2022                          Adverse report: No              [place]AR[place]:                          Checked today: Yes                          Adverse report: No    IMPRESSION:   1. Spondylosis of lumbar region without myelopathy or radiculopathy    2. Chronic pain syndrome    3. Lumbosacral spondylosis without myelopathy    4. Postlaminectomy syndrome, cervical region        PLAN:  Informed verbal consent was obtained:    -Continue Norco 5 mg TID PRN pain   -she is still working 40 hours/week  -she has a back brace that she is not wearing but will try to wear during work for worsening pain while working in [employer]  -UDS reviewed today and consistent  -cont lidocaine patches PRN  -increase celebrex to BID for worsening pain   -F/U 28 days and reassess    Analgesic Plan:              Continue present regimen:yes              Adjust dose of present analgesic:no              Switch analgesics:no              Add/Adjust concomitant therapy:no      Current Outpatient Medications   Medication Sig Dispense Refill    erythromycin (ROMYCIN) 5 MG/GM ophthalmic ointment PLACE 0.5 INCHES INTO RIGHT EYE EVERY 6 HOURS FOR 10 DAYS      traZODone (DESYREL) 50 MG tablet TAKE 1 TABLET BY MOUTH AT BEDTIME      celecoxib (CELEBREX) 200 MG capsule Take 1 capsule by mouth daily 60 capsule 0    HYDROcodone-acetaminophen (NORCO) 5-325 MG per tablet Take 1 tablet by mouth every 8 hours as needed for Pain for up to 28 days. 84 tablet 0    naloxone (NARCAN) 4 MG/0.1ML LIQD nasal spray 1 spray by Nasal route as needed for Opioid Reversal 1 each 0    METHOCARBAMOL PO Take 1 tablet by mouth 2 times daily      famciclovir (FAMVIR) 250 MG tablet Take 1 tablet by mouth daily       omeprazole (PRILOSEC) 20 MG delayed release capsule Take 20 mg by mouth Daily       docusate sodium (COLACE, DULCOLAX) 100 MG CAPS Take 100 mg by mouth 2 times daily 60 capsule 1    venlafaxine (EFFEXOR) 100 MG tablet Take 100 mg by mouth 2 times daily       multivitamin (THERAGRAN) per tablet Take 1 tablet by mouth daily.          No current facility-administered medications for this visit. I will continue her current medication regimen  which is part of the above treatment schedule. It has been helping with Ms. Gold's chronic  medical problems which for this visit include: The primary encounter diagnosis was Spondylosis of lumbar region without myelopathy or radiculopathy. Diagnoses of Chronic pain syndrome, Lumbosacral spondylosis without myelopathy, and Postlaminectomy syndrome, cervical region were also pertinent to this visit. Risks and benefits of the medications and other alternative treatments  including no treatment were discussed with the patient. The common side effects of these medications were also explained to the patient. Informed verbal consent was obtained. Goals of current treatment regimen include improvement in pain, restoration of functioning- with focus on improvement in physical performance, general activity, work or disability,emotional distress, health care utilization and  decreased medication consumption. Will continue to monitor progress towards achieving/maintaining therapeutic goals with special emphasis on  1. Improvement in perceived interfernce  of pain with ADL's. Ability to do home exercises independently. Ability to do household chores indoor and/or outdoor work and social and leisure activities. Improve psychosocial and physical functioning. - she is showing progression towards this treatment goal with the current regimen. She was advised against drinking alcohol with the narcotic pain medicines, advised against driving or handling machinery while adjusting the dose of medicines or if having cognitive  issues related to the current medications. Risk of overdose and death, if medicines not taken as prescribed, were also discussed. If the patient develops new symptoms or if the symptoms worsen, the patient should call the office.     While transcribing every attempt was made to maintain the accuracy of the note in terms of it's contents,there may have been some errors made inadvertently. Thank you for allowing me to participate in the care of this patient.     Rodolfo Moreno NP-VAHE    Cc: Jabier Diaz MD

## 2022-11-07 ENCOUNTER — HOSPITAL ENCOUNTER (OUTPATIENT)
Age: 67
Setting detail: OUTPATIENT SURGERY
Discharge: HOME | End: 2022-11-07
Attending: INTERNAL MEDICINE | Admitting: INTERNAL MEDICINE
Payer: MEDICARE

## 2022-11-07 VITALS
SYSTOLIC BLOOD PRESSURE: 123 MMHG | OXYGEN SATURATION: 97 % | TEMPERATURE: 97.1 F | DIASTOLIC BLOOD PRESSURE: 80 MMHG | HEIGHT: 66 IN | WEIGHT: 140 LBS | HEART RATE: 67 BPM | BODY MASS INDEX: 22.5 KG/M2 | RESPIRATION RATE: 16 BRPM

## 2022-11-07 DIAGNOSIS — R10.9 ABDOMINAL PAIN, UNSPECIFIED ABDOMINAL LOCATION: ICD-10-CM

## 2022-11-07 DIAGNOSIS — K21.9 GASTROESOPHAGEAL REFLUX DISEASE, UNSPECIFIED WHETHER ESOPHAGITIS PRESENT: ICD-10-CM

## 2022-11-07 DIAGNOSIS — Z86.010 HISTORY OF COLON POLYPS: ICD-10-CM

## 2022-11-07 PROCEDURE — 7100000010 HC PHASE II RECOVERY - FIRST 15 MIN: Performed by: INTERNAL MEDICINE

## 2022-11-07 PROCEDURE — 2709999900 HC NON-CHARGEABLE SUPPLY: Performed by: INTERNAL MEDICINE

## 2022-11-07 PROCEDURE — 88305 TISSUE EXAM BY PATHOLOGIST: CPT

## 2022-11-07 PROCEDURE — 7100000011 HC PHASE II RECOVERY - ADDTL 15 MIN: Performed by: INTERNAL MEDICINE

## 2022-11-07 PROCEDURE — 6370000000 HC RX 637 (ALT 250 FOR IP): Performed by: INTERNAL MEDICINE

## 2022-11-07 PROCEDURE — 6360000002 HC RX W HCPCS: Performed by: INTERNAL MEDICINE

## 2022-11-07 PROCEDURE — 99152 MOD SED SAME PHYS/QHP 5/>YRS: CPT | Performed by: INTERNAL MEDICINE

## 2022-11-07 PROCEDURE — 99153 MOD SED SAME PHYS/QHP EA: CPT | Performed by: INTERNAL MEDICINE

## 2022-11-07 PROCEDURE — 3609010600 HC COLONOSCOPY POLYPECTOMY SNARE/COLD BIOPSY: Performed by: INTERNAL MEDICINE

## 2022-11-07 PROCEDURE — 2580000003 HC RX 258: Performed by: INTERNAL MEDICINE

## 2022-11-07 PROCEDURE — 3609012400 HC EGD TRANSORAL BIOPSY SINGLE/MULTIPLE: Performed by: INTERNAL MEDICINE

## 2022-11-07 RX ORDER — MIDAZOLAM HYDROCHLORIDE 1 MG/ML
INJECTION INTRAMUSCULAR; INTRAVENOUS PRN
Status: DISCONTINUED | OUTPATIENT
Start: 2022-11-07 | End: 2022-11-07 | Stop reason: ALTCHOICE

## 2022-11-07 RX ORDER — ROSUVASTATIN CALCIUM 5 MG/1
TABLET, COATED ORAL
COMMUNITY
Start: 2022-10-28

## 2022-11-07 RX ORDER — SODIUM CHLORIDE, SODIUM LACTATE, POTASSIUM CHLORIDE, CALCIUM CHLORIDE 600; 310; 30; 20 MG/100ML; MG/100ML; MG/100ML; MG/100ML
INJECTION, SOLUTION INTRAVENOUS CONTINUOUS
Status: DISCONTINUED | OUTPATIENT
Start: 2022-11-07 | End: 2022-11-07 | Stop reason: HOSPADM

## 2022-11-07 RX ORDER — DIPHENHYDRAMINE HYDROCHLORIDE 50 MG/ML
INJECTION INTRAMUSCULAR; INTRAVENOUS PRN
Status: DISCONTINUED | OUTPATIENT
Start: 2022-11-07 | End: 2022-11-07 | Stop reason: ALTCHOICE

## 2022-11-07 RX ORDER — FENTANYL CITRATE 50 UG/ML
INJECTION, SOLUTION INTRAMUSCULAR; INTRAVENOUS PRN
Status: DISCONTINUED | OUTPATIENT
Start: 2022-11-07 | End: 2022-11-07 | Stop reason: ALTCHOICE

## 2022-11-07 RX ADMIN — SODIUM CHLORIDE, SODIUM LACTATE, POTASSIUM CHLORIDE, AND CALCIUM CHLORIDE: .6; .31; .03; .02 INJECTION, SOLUTION INTRAVENOUS at 10:57

## 2022-11-07 ASSESSMENT — PAIN SCALES - GENERAL
PAINLEVEL_OUTOF10: 0

## 2022-11-07 ASSESSMENT — PULMONARY FUNCTION TESTS
PIF_VALUE: 1

## 2022-11-07 ASSESSMENT — PAIN DESCRIPTION - DESCRIPTORS: DESCRIPTORS: CRAMPING;DISCOMFORT;ACHING

## 2022-11-07 ASSESSMENT — PAIN - FUNCTIONAL ASSESSMENT: PAIN_FUNCTIONAL_ASSESSMENT: 0-10
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
## 2022-11-07 NOTE — PROGRESS NOTES
Discharge instructions discussed with patient/ given to friend John Paul Díaz on phone. IV dc/d, dressed and discharged home via wc to car- John Paul Díaz driving her home.

## 2022-11-07 NOTE — DISCHARGE INSTRUCTIONS
ENDOSCOPY DISCHARGE INSTRUCTIONS:    Call the physician that did your procedure for any questions or concerns:           DR. Reyes [facility]:  [phone]               ACTIVITY:    There are potential side effects to the medications used for sedation and anesthesia during your procedure. These include:  Dizziness or light-headedness, confusion or memory loss, delayed reaction times, loss of coordination, nausea and vomiting. Because of your increased risk for injury, we ask that you observe the following precautions: For the next 24 hours,  DO NOT operate an automobile, bicycle, motorcycle, , power tools or large equipment of any kind. Do not drink alcohol, sign any legal documents or make any legal decisions for 24 hours. Do not bend your head over lower than your heart. DO sit on the side of bed/couch awhile before getting up. Plan on bedrest or quiet relaxation today. You may resume normal activities in 24 hours. DIET:    Your first meal today should be light, avoiding spicy and fatty foods. If you tolerate this first meal,  then you may advance to your regular diet unless otherwise advised by your physician. NORMAL SYMPTOMS:  -Sore throat if youve had an EGD  -Gaseous discomfort    NOTIFY YOUR PHYSICIAN IF THESE SYMPTOMS OCCUR:  1. Fever (greater than 100)  5. Increased abdominal bloating  2. Severe pain    6. Excessive bleeding  3. Nausea and vomiting  7. Chest pain                                                                    4. Chills    8. Shortness of breath      ADDITIONAL INSTRUCTIONS:    Biopsy results:  WILL CALL YOU IN 1 WEEK WITH BIOPSY RESULTS. Educational Information:    Follow up appointment:                               Please review these discharge instructions this evening or tomorrow for   information you may have forgotten. We want to thank you for choosing the [facility] as your health care provider.  We always strive to provide you with excellent care while you are here. You may receive a survey in the mail regarding your care. We would appreciate you taking a few minutes of your time to complete this survey.  Again, thank you for choosing the [facility]

## 2022-11-07 NOTE — PROGRESS NOTES
Patient to room # 21 SDS post op   Date: 11/07/22 Room / Location: [facility] Hemanth Seymour 03 / [facility]   Anesthesia Start:  Anesthesia Stop:    Procedures:        EGD BIOPSY       COLONOSCOPY POLYPECTOMY SNARE/COLD BIOPSY Diagnosis:        History of colon polyps       Abdominal pain, unspecified abdominal location       Gastroesophageal reflux disease, unspecified whether esophagitis present       (History of colon polyps [Z86.010])       (Abdominal pain, unspecified abdominal location [R10.9])       (Gastroesophageal reflux disease, unspecified whether esophagitis present [K21.9])   Surgeons: Martina Marcelino MD Responsible Provider:    Anesthesia Type: Not recorded ASA Status: Not recorded   Report received from ENDO RN. Patient awake- no complaints of pain or nausea. Passing gas. Called [facility] with update and discussed discharge instructions.

## 2022-11-21 ENCOUNTER — OFFICE VISIT (OUTPATIENT)
Dept: PAIN MANAGEMENT | Age: 67
End: 2022-11-21
Payer: MEDICARE

## 2022-11-21 ENCOUNTER — TELEPHONE (OUTPATIENT)
Dept: PAIN MANAGEMENT | Age: 67
End: 2022-11-21

## 2022-11-21 VITALS
OXYGEN SATURATION: 97 % | HEART RATE: 69 BPM | DIASTOLIC BLOOD PRESSURE: 69 MMHG | WEIGHT: 139.4 LBS | SYSTOLIC BLOOD PRESSURE: 136 MMHG | BODY MASS INDEX: 22.5 KG/M2

## 2022-11-21 DIAGNOSIS — M47.817 LUMBOSACRAL SPONDYLOSIS WITHOUT MYELOPATHY: ICD-10-CM

## 2022-11-21 DIAGNOSIS — Z79.891 ENCOUNTER FOR LONG-TERM OPIATE ANALGESIC USE: ICD-10-CM

## 2022-11-21 DIAGNOSIS — G89.4 CHRONIC PAIN SYNDROME: ICD-10-CM

## 2022-11-21 DIAGNOSIS — Z51.81 ENCOUNTER FOR THERAPEUTIC DRUG MONITORING: ICD-10-CM

## 2022-11-21 DIAGNOSIS — M96.1 POSTLAMINECTOMY SYNDROME, CERVICAL REGION: ICD-10-CM

## 2022-11-21 DIAGNOSIS — M47.816 SPONDYLOSIS OF LUMBAR REGION WITHOUT MYELOPATHY OR RADICULOPATHY: ICD-10-CM

## 2022-11-21 PROCEDURE — 99214 OFFICE O/P EST MOD 30 MIN: CPT | Performed by: NURSE PRACTITIONER

## 2022-11-21 PROCEDURE — 1123F ACP DISCUSS/DSCN MKR DOCD: CPT | Performed by: NURSE PRACTITIONER

## 2022-11-21 RX ORDER — CELECOXIB 200 MG/1
200 CAPSULE ORAL 2 TIMES DAILY
Qty: 60 CAPSULE | Refills: 0 | Status: SHIPPED | OUTPATIENT
Start: 2022-11-21 | End: 2022-12-21

## 2022-11-21 RX ORDER — HYDROCODONE BITARTRATE AND ACETAMINOPHEN 5; 325 MG/1; MG/1
1 TABLET ORAL 2 TIMES DAILY
Qty: 56 TABLET | Refills: 0 | Status: SHIPPED | OUTPATIENT
Start: 2022-11-21 | End: 2022-12-19

## 2022-11-21 RX ORDER — BUPRENORPHINE HYDROCHLORIDE 150 UG/1
150 FILM, SOLUBLE BUCCAL 2 TIMES DAILY
Qty: 56 EACH | Refills: 0 | Status: SHIPPED | OUTPATIENT
Start: 2022-11-21 | End: 2022-12-19

## 2022-11-21 NOTE — PROGRESS NOTES
Deyanira Speaker  1955  [id number]      HISTORY OF PRESENT ILLNESS: Ms. Shar Lal is a 78 y.o. female returns for a follow up visit for pain management  She has a diagnosis of   1. Spondylosis of lumbar region without myelopathy or radiculopathy    2. Chronic pain syndrome    3. Lumbosacral spondylosis without myelopathy    4. Postlaminectomy syndrome, cervical region    5. Encounter for long-term opiate analgesic use    6. Encounter for therapeutic drug monitoring    . As per Information Obtained from the PADT (Patient Assessment and Documentation Tool)    She complains of pain in the  neck and low back. She rates the pain 6/10 and describes it as sharp, burning, shooting. Current treatment regimen has helped relieve about 30% of the pain. She denies any side effects from the current pain regimen. Patient reports that since the last follow up visit the physical functioning is unchanged, family/social relationships are unchanged, mood is unchanged sleep patterns are unchanged, and that the overall functioning is unchanged. Patient denies misusing/abusing her narcotic pain medications or using any illegal drugs. Patient reports pain has been worsening while she is working, she has been taking 2 tabs at a time for pain states that 1 tab does not help her pain. She denies side effects from current regimen and continues bowel softeners on a daily basis. She is unsure if Celebrex is helping at all, discussed with patient if it is not helping she does not have to take, she is unsure if she will continue. Upon obtaining medical history from Ms. Shar Lal    ALLERGIES: Patients list of allergies were reviewed     MEDICATIONS: Ms. Shar Lal list of medications were reviewed. Her current medications are   Outpatient Medications Prior to Visit   Medication Sig Dispense Refill    rosuvastatin (CRESTOR) 5 MG tablet       HYDROcodone-acetaminophen (NORCO) 5-325 MG per tablet Take 1 tablet by mouth every 8 hours as needed for Pain for up to 28 days. 84 tablet 0    celecoxib (CELEBREX) 200 MG capsule Take 1 capsule by mouth 2 times daily 60 capsule 0    traZODone (DESYREL) 50 MG tablet TAKE 1 TABLET BY MOUTH AT BEDTIME      naloxone (NARCAN) 4 MG/0.1ML LIQD nasal spray 1 spray by Nasal route as needed for Opioid Reversal 1 each 0    famciclovir (FAMVIR) 250 MG tablet Take 1 tablet by mouth daily       omeprazole (PRILOSEC) 20 MG delayed release capsule Take 20 mg by mouth Daily       docusate sodium (COLACE, DULCOLAX) 100 MG CAPS Take 100 mg by mouth 2 times daily 60 capsule 1    venlafaxine (EFFEXOR) 100 MG tablet Take 100 mg by mouth 2 times daily       multivitamin (THERAGRAN) per tablet Take 1 tablet by mouth daily. No facility-administered medications prior to visit. SOCIAL/FAMILY/PAST MEDICAL HISTORY: Ms. Mynor Washington, family and past medical history was reviewed. REVIEW OF SYSTEMS:    Respiratory: Negative for apnea, chest tightness and shortness of breath or change in baseline breathing. Gastrointestinal: Negative for nausea, vomiting, abdominal pain, diarrhea, constipation, blood in stool and abdominal distention. PHYSICAL EXAM:   Nursing note and vitals reviewed. /69   Pulse 69   Wt 139 lb 6.4 oz (63.2 kg)   LMP 07/12/2007   SpO2 97%   BMI 22.50 kg/m²   Constitutional: She appears well-developed and well-nourished. No acute distress. Skin: Skin is warm and dry, good turgor. No rash noted. She is not diaphoretic. Cardiovascular: Normal rate, regular rhythm, normal heart sounds, and does not have murmur. Pulmonary/Chest: Effort normal. No respiratory distress. She does not have wheezes in the lung fields. She has no rales. Neurological/Psychiatric:She is alert and oriented to person, place, and time. Coordination is  normal.  Her mood isAppropriate and affect is Neutral/Euthymic(normal) .      Prescription pain medication monitoring:                  MEDD current = 15              ORT Score = 1              Other Risk factors - history of THC use, continue montoring              Date of Last Medication Agreement: 09/26/2022              Date Naloxone prescribed: 09/26/2022              UDT:                          Date of last UDT: 09/26/2022                          Adverse report: No              OARRS:                          Checked today: Yes                          Adverse report: No    IMPRESSION:   1. Spondylosis of lumbar region without myelopathy or radiculopathy    2. Chronic pain syndrome    3. Lumbosacral spondylosis without myelopathy    4. Postlaminectomy syndrome, cervical region    5. Encounter for long-term opiate analgesic use    6. Encounter for therapeutic drug monitoring        PLAN:  Informed verbal consent was obtained:  -Jaskaran did not get MRI 2 months ago related to patient not surgical candidate. Lumbar MRI nearly 8years old. Continued low back pain with intermittent radicular pain. New MRI for assessment  -Patient admits to taking 2 Norco at times, states she took her last tab yesterday. She complains of worsening pain that is throughout the day and difficulty working nearly 40 hours a week at [address] [place] [address] to twice daily (from [employer])  -Add Belbuca 150 mcg films twice per day for long-acting around-the-clock relief  -continue and refill celebrex  -F/U 28 days    Analgesic Plan:              Continue present regimen: yes              Adjust dose of present analgesic:yes              Switch analgesics:yes              Add/Adjust concomitant therapy:no      Current Outpatient Medications   Medication Sig Dispense Refill    rosuvastatin (CRESTOR) 5 MG tablet       HYDROcodone-acetaminophen (NORCO) 5-325 MG per tablet Take 1 tablet by mouth every 8 hours as needed for Pain for up to 28 days.  84 tablet 0    celecoxib (CELEBREX) 200 MG capsule Take 1 capsule by mouth 2 times daily 60 capsule 0    traZODone (DESYREL) 50 MG tablet TAKE 1 TABLET BY MOUTH AT BEDTIME      naloxone (NARCAN) 4 MG/0.1ML LIQD nasal spray 1 spray by Nasal route as needed for Opioid Reversal 1 each 0    famciclovir (FAMVIR) 250 MG tablet Take 1 tablet by mouth daily       omeprazole (PRILOSEC) 20 MG delayed release capsule Take 20 mg by mouth Daily       docusate sodium (COLACE, DULCOLAX) 100 MG CAPS Take 100 mg by mouth 2 times daily 60 capsule 1    venlafaxine (EFFEXOR) 100 MG tablet Take 100 mg by mouth 2 times daily       multivitamin (THERAGRAN) per tablet Take 1 tablet by mouth daily. No current facility-administered medications for this visit. I will continue her current medication regimen  which is part of the above treatment schedule. It has been helping with Ms. Gold's chronic  medical problems which for this visit include:   Diagnoses of Spondylosis of lumbar region without myelopathy or radiculopathy, Chronic pain syndrome, Lumbosacral spondylosis without myelopathy, Postlaminectomy syndrome, cervical region, Encounter for long-term opiate analgesic use, and Encounter for therapeutic drug monitoring were pertinent to this visit. Risks and benefits of the medications and other alternative treatments  including no treatment were discussed with the patient. The common side effects of these medications were also explained to the patient. Informed verbal consent was obtained. Goals of current treatment regimen include improvement in pain, restoration of functioning- with focus on improvement in physical performance, general activity, work or disability,emotional distress, health care utilization and  decreased medication consumption. Will continue to monitor progress towards achieving/maintaining therapeutic goals with special emphasis on  1. Improvement in perceived interfernce  of pain with ADL's. Ability to do home exercises independently. Ability to do household chores indoor and/or outdoor work and social and leisure activities. Improve psychosocial and physical functioning. - she is not showing any significant progress/or showing regression  towards this goal and reassessment and adjustment of goals/treatment have been made. She was advised against drinking alcohol with the narcotic pain medicines, advised against driving or handling machinery while adjusting the dose of medicines or if having cognitive  issues related to the current medications. Risk of overdose and death, if medicines not taken as prescribed, were also discussed. If the patient develops new symptoms or if the symptoms worsen, the patient should call the office. While transcribing every attempt was made to maintain the accuracy of the note in terms of it's contents,there may have been some errors made inadvertently. Thank you for allowing me to participate in the care of this patient.     AMARI Ba    Cc: Carmen Sanchez MD

## 2022-11-28 ENCOUNTER — TELEPHONE (OUTPATIENT)
Dept: PAIN MANAGEMENT | Age: 67
End: 2022-11-28

## 2022-11-28 NOTE — TELEPHONE ENCOUNTER
Pharmacy called about patient's Vasiliy Mace    They said in order for them to be able to fill it, KK's \"XDEA\" needs to be on there ? ?      They cancelled the current BELBUCA and need a new one sent     Please advise     [facility] [address] PEGGY AJY [facility], [address] [place] [address] [phone] [place] [phone]   [facility] [address] [place] [address]   Phone:  [phone]  Fax:  [phone]

## 2022-11-29 ENCOUNTER — HOSPITAL ENCOUNTER (OUTPATIENT)
Dept: MRI IMAGING | Age: 67
Discharge: HOME | End: 2022-11-29
Payer: MEDICARE

## 2022-11-29 DIAGNOSIS — M47.816 SPONDYLOSIS OF LUMBAR REGION WITHOUT MYELOPATHY OR RADICULOPATHY: ICD-10-CM

## 2022-11-29 PROCEDURE — 72148 MRI LUMBAR SPINE W/O DYE: CPT

## 2022-11-30 NOTE — TELEPHONE ENCOUNTER
This is not written for substance abuse or addiction. Jeffrey Olivares is for chronic pain and does NOT require a special CLAUDIA number.  Let me know if I Need to speak to the pharmacist

## 2022-12-01 NOTE — TELEPHONE ENCOUNTER
I spoke to [facility] ([phone]), he confirmed that they need an XDEA number waver to be able to prescribe Belbuca. Per Claudia none of our providers have an XDEA number. She will find out how to get this resolved.

## 2022-12-06 ENCOUNTER — TELEPHONE (OUTPATIENT)
Dept: PAIN MANAGEMENT | Age: 67
End: 2022-12-06

## 2022-12-06 NOTE — TELEPHONE ENCOUNTER
Patient called stating that her Erna Obrien is not working, and the only thing it is doing is making her tired.      Requested a callback

## 2022-12-19 ENCOUNTER — OFFICE VISIT (OUTPATIENT)
Dept: PAIN MANAGEMENT | Age: 67
End: 2022-12-19
Payer: MEDICARE

## 2022-12-19 VITALS
SYSTOLIC BLOOD PRESSURE: 135 MMHG | WEIGHT: 136.8 LBS | HEART RATE: 61 BPM | BODY MASS INDEX: 22.08 KG/M2 | OXYGEN SATURATION: 98 % | DIASTOLIC BLOOD PRESSURE: 68 MMHG

## 2022-12-19 DIAGNOSIS — M47.817 LUMBOSACRAL SPONDYLOSIS WITHOUT MYELOPATHY: ICD-10-CM

## 2022-12-19 DIAGNOSIS — Z51.81 ENCOUNTER FOR THERAPEUTIC DRUG MONITORING: ICD-10-CM

## 2022-12-19 DIAGNOSIS — Z79.891 ENCOUNTER FOR LONG-TERM OPIATE ANALGESIC USE: ICD-10-CM

## 2022-12-19 DIAGNOSIS — M96.1 POSTLAMINECTOMY SYNDROME, CERVICAL REGION: ICD-10-CM

## 2022-12-19 DIAGNOSIS — M47.816 SPONDYLOSIS OF LUMBAR REGION WITHOUT MYELOPATHY OR RADICULOPATHY: Primary | ICD-10-CM

## 2022-12-19 DIAGNOSIS — M54.12 CERVICAL RADICULOPATHY: ICD-10-CM

## 2022-12-19 DIAGNOSIS — G89.4 CHRONIC PAIN SYNDROME: ICD-10-CM

## 2022-12-19 PROCEDURE — 1123F ACP DISCUSS/DSCN MKR DOCD: CPT | Performed by: NURSE PRACTITIONER

## 2022-12-19 PROCEDURE — 99214 OFFICE O/P EST MOD 30 MIN: CPT | Performed by: NURSE PRACTITIONER

## 2022-12-19 RX ORDER — METHOCARBAMOL 500 MG/1
500 TABLET, FILM COATED ORAL 3 TIMES DAILY
Qty: 90 TABLET | Refills: 0 | Status: SHIPPED | OUTPATIENT
Start: 2022-12-19 | End: 2023-01-18

## 2022-12-19 RX ORDER — HYDROCODONE BITARTRATE AND ACETAMINOPHEN 5; 325 MG/1; MG/1
1 TABLET ORAL EVERY 8 HOURS PRN
Qty: 84 TABLET | Refills: 0 | Status: SHIPPED | OUTPATIENT
Start: 2022-12-19 | End: 2023-01-16

## 2022-12-19 NOTE — PROGRESS NOTES
Missael Douglas  1955  [id number]      HISTORY OF PRESENT ILLNESS: Ms. Belle Patel is a 78 y.o. female returns for a follow up visit for pain management  She has a diagnosis of   1. Spondylosis of lumbar region without myelopathy or radiculopathy    2. Encounter for therapeutic drug monitoring    3. Chronic pain syndrome    4. Lumbosacral spondylosis without myelopathy    5. Postlaminectomy syndrome, cervical region    6. Encounter for long-term opiate analgesic use    7. Cervical radiculopathy    . As per Information Obtained from the PADT (Patient Assessment and Documentation Tool)    She complains of pain in the  neck and low back. She rates the pain 8/10 and describes it as sharp, shooting. Current treatment regimen has helped relieve about 0% of the pain. She reports headaches from the belbuca. Patient reports that since the last follow up visit the physical functioning is worse, family/social relationships are unchanged, mood is worse sleep patterns are worse, and that the overall functioning is worse. Patient denies misusing/abusing her narcotic pain medications or using any illegal drugs. Patient complains of worsening neck and right upper extremity radicular pain. She reports that the pain is radiating from her neck into her right shoulder and at times into her right hand. History of cervical surgery nearly 30 years ago, patient cannot remember specific date or what levels are fused. Upon obtaining medical history from Ms. Belle Patel    ALLERGIES: Patients list of allergies were reviewed     MEDICATIONS: Ms. Belle Patel list of medications were reviewed. Her current medications are   Outpatient Medications Prior to Visit   Medication Sig Dispense Refill    rosuvastatin (CRESTOR) 5 MG tablet       traZODone (DESYREL) 50 MG tablet TAKE 1 TABLET BY MOUTH AT BEDTIME      naloxone (NARCAN) 4 MG/0.1ML LIQD nasal spray 1 spray by Nasal route as needed for Opioid Reversal 1 each 0    famciclovir (FAMVIR) 250 MG tablet Take 1 tablet by mouth daily       omeprazole (PRILOSEC) 20 MG delayed release capsule Take 20 mg by mouth Daily       docusate sodium (COLACE, DULCOLAX) 100 MG CAPS Take 100 mg by mouth 2 times daily 60 capsule 1    venlafaxine (EFFEXOR) 100 MG tablet Take 100 mg by mouth 2 times daily       multivitamin (THERAGRAN) per tablet Take 1 tablet by mouth daily. HYDROcodone-acetaminophen (NORCO) 5-325 MG per tablet Take 1 tablet by mouth in the morning and at bedtime for 28 days. 56 tablet 0    Buprenorphine HCl (BELBUCA) 150 MCG FILM Place 150 mcg inside cheek 2 times daily for 28 days. 56 each 0    celecoxib (CELEBREX) 200 MG capsule Take 1 capsule by mouth 2 times daily 60 capsule 0     No facility-administered medications prior to visit. SOCIAL/FAMILY/PAST MEDICAL HISTORY: Ms. Justyna Varghese, family and past medical history was reviewed. REVIEW OF SYSTEMS:    Respiratory: Negative for apnea, chest tightness and shortness of breath or change in baseline breathing. Gastrointestinal: Negative for nausea, vomiting, abdominal pain, diarrhea, constipation, blood in stool and abdominal distention. PHYSICAL EXAM:   Nursing note and vitals reviewed. /68   Pulse 61   Wt 136 lb 12.8 oz (62.1 kg)   LMP 07/12/2007   SpO2 98%   BMI 22.08 kg/m²   Constitutional: She appears well-developed and well-nourished. No acute distress. Skin: Skin is warm and dry, good turgor. No rash noted. She is not diaphoretic. Cardiovascular: Normal rate, regular rhythm, normal heart sounds, and does not have murmur. Pulmonary/Chest: Effort normal. No respiratory distress. She does not have wheezes in the lung fields. She has no rales. Neurological/Psychiatric:She is alert and oriented to person, place, and time. Coordination is  normal.  Her mood isAppropriate and affect is Neutral/Euthymic(normal) .      Prescription pain medication monitoring:                  MEDD current = 10 ORT Score = 1              Other Risk factors - stable depressed mood              Date of Last Medication Agreement:09/26/2022              Date Naloxone prescribed:09/26/2022              UDT:                          Date of last UDT: 09/26/2022                          Adverse report: No              OARRS:                          Checked today: Yes                          Adverse report: No    IMPRESSION:   1. Spondylosis of lumbar region without myelopathy or radiculopathy    2. Encounter for therapeutic drug monitoring    3. Chronic pain syndrome    4. Lumbosacral spondylosis without myelopathy    5. Postlaminectomy syndrome, cervical region    6. Encounter for long-term opiate analgesic use    7. Cervical radiculopathy        PLAN:  Informed verbal consent was obtained:  -Lumbar MRI reviewed with patient today. With severe stenosis at L4-5, trefoil canal stenosis. Also with spinal canal stenosis at L3-4. With an L2 disc that is abutting right L2 nerve root that patient is symptomatic of today. -TENS therapy, patient has been told in the past she is not a good surgical candidate  -DC Belbuca related to side effect  -Refill Norco, okay with 3 times daily dosing as needed for pain  -Add methocarbamol 3 times daily as needed for spasm, patient has tolerated this muscle relaxant in the past without sedation  -Cervical MRI for worsening radicular pain, history of cervical surgery nearly 30 years ago  -random UDS today for opiate monitoring  -Follow-up in 28 days for reassessment    Analgesic Plan:              Continue present regimen:no              Adjust dose of present analgesic:yes              Switch analgesics:no              Add/Adjust concomitant therapy:yes      Current Outpatient Medications   Medication Sig Dispense Refill    HYDROcodone-acetaminophen (NORCO) 5-325 MG per tablet Take 1 tablet by mouth every 8 hours as needed for Pain for up to 28 days.  84 tablet 0    methocarbamol (ROBAXIN) 500 MG tablet Take 1 tablet by mouth 3 times daily 90 tablet 0    rosuvastatin (CRESTOR) 5 MG tablet       traZODone (DESYREL) 50 MG tablet TAKE 1 TABLET BY MOUTH AT BEDTIME      naloxone (NARCAN) 4 MG/0.1ML LIQD nasal spray 1 spray by Nasal route as needed for Opioid Reversal 1 each 0    famciclovir (FAMVIR) 250 MG tablet Take 1 tablet by mouth daily       omeprazole (PRILOSEC) 20 MG delayed release capsule Take 20 mg by mouth Daily       docusate sodium (COLACE, DULCOLAX) 100 MG CAPS Take 100 mg by mouth 2 times daily 60 capsule 1    venlafaxine (EFFEXOR) 100 MG tablet Take 100 mg by mouth 2 times daily       multivitamin (THERAGRAN) per tablet Take 1 tablet by mouth daily. No current facility-administered medications for this visit. I will continue her current medication regimen  which is part of the above treatment schedule. It has been helping with Ms. Gold's chronic  medical problems which for this visit include: The primary encounter diagnosis was Spondylosis of lumbar region without myelopathy or radiculopathy. Diagnoses of Encounter for therapeutic drug monitoring, Chronic pain syndrome, Lumbosacral spondylosis without myelopathy, Postlaminectomy syndrome, cervical region, Encounter for long-term opiate analgesic use, and Cervical radiculopathy were also pertinent to this visit. Risks and benefits of the medications and other alternative treatments  including no treatment were discussed with the patient. The common side effects of these medications were also explained to the patient. Informed verbal consent was obtained. Goals of current treatment regimen include improvement in pain, restoration of functioning- with focus on improvement in physical performance, general activity, work or disability,emotional distress, health care utilization and  decreased medication consumption.  Will continue to monitor progress towards achieving/maintaining therapeutic goals with special emphasis on  1. Improvement in perceived interfernce  of pain with ADL's. Ability to do home exercises independently. Ability to do household chores indoor and/or outdoor work and social and leisure activities. Improve psychosocial and physical functioning. - she is not showing any significant progress/or showing regression  towards this goal and reassessment and adjustment of goals/treatment have been made. She was advised against drinking alcohol with the narcotic pain medicines, advised against driving or handling machinery while adjusting the dose of medicines or if having cognitive  issues related to the current medications. Risk of overdose and death, if medicines not taken as prescribed, were also discussed. If the patient develops new symptoms or if the symptoms worsen, the patient should call the office. While transcribing every attempt was made to maintain the accuracy of the note in terms of it's contents,there may have been some errors made inadvertently. Thank you for allowing me to participate in the care of this patient.     Tavares Monte NP-C    Cc: Felice Almendarez MD

## 2022-12-27 ENCOUNTER — HOSPITAL ENCOUNTER (OUTPATIENT)
Dept: MRI IMAGING | Age: 67
Discharge: HOME | End: 2022-12-27
Payer: MEDICARE

## 2022-12-27 ENCOUNTER — TELEPHONE (OUTPATIENT)
Dept: PAIN MANAGEMENT | Age: 67
End: 2022-12-27

## 2022-12-27 DIAGNOSIS — M54.12 CERVICAL RADICULOPATHY: Primary | ICD-10-CM

## 2022-12-27 DIAGNOSIS — M54.12 CERVICAL RADICULOPATHY: ICD-10-CM

## 2022-12-27 PROCEDURE — 72141 MRI NECK SPINE W/O DYE: CPT

## 2022-12-28 NOTE — TELEPHONE ENCOUNTER
I put in referral for Dr. Ron Drew for cervical epidural. They usually call the patient within a couple of weeks.

## 2023-01-05 NOTE — TELEPHONE ENCOUNTER
Patient called and stated that the doctor she was referred to for her epidural is too far away for her and is wondering if she can be referred somewhere else.  Please advise

## 2023-01-09 NOTE — TELEPHONE ENCOUNTER
Patient called back and stated that she would like to be referred to . Patient is requesting a callback.

## 2023-01-16 ENCOUNTER — OFFICE VISIT (OUTPATIENT)
Dept: PAIN MANAGEMENT | Age: 68
End: 2023-01-16
Payer: COMMERCIAL

## 2023-01-16 VITALS
OXYGEN SATURATION: 97 % | DIASTOLIC BLOOD PRESSURE: 78 MMHG | HEART RATE: 63 BPM | BODY MASS INDEX: 21.34 KG/M2 | SYSTOLIC BLOOD PRESSURE: 128 MMHG | WEIGHT: 132.2 LBS

## 2023-01-16 DIAGNOSIS — M47.817 LUMBOSACRAL SPONDYLOSIS WITHOUT MYELOPATHY: ICD-10-CM

## 2023-01-16 DIAGNOSIS — Z51.81 ENCOUNTER FOR THERAPEUTIC DRUG MONITORING: ICD-10-CM

## 2023-01-16 DIAGNOSIS — G89.4 CHRONIC PAIN SYNDROME: ICD-10-CM

## 2023-01-16 DIAGNOSIS — M96.1 POSTLAMINECTOMY SYNDROME, CERVICAL REGION: ICD-10-CM

## 2023-01-16 DIAGNOSIS — Z79.891 ENCOUNTER FOR LONG-TERM OPIATE ANALGESIC USE: ICD-10-CM

## 2023-01-16 DIAGNOSIS — M47.816 SPONDYLOSIS OF LUMBAR REGION WITHOUT MYELOPATHY OR RADICULOPATHY: ICD-10-CM

## 2023-01-16 DIAGNOSIS — M54.12 CERVICAL RADICULOPATHY: ICD-10-CM

## 2023-01-16 PROCEDURE — 1123F ACP DISCUSS/DSCN MKR DOCD: CPT | Performed by: NURSE PRACTITIONER

## 2023-01-16 PROCEDURE — 99214 OFFICE O/P EST MOD 30 MIN: CPT | Performed by: NURSE PRACTITIONER

## 2023-01-16 RX ORDER — METHOCARBAMOL 500 MG/1
500 TABLET, FILM COATED ORAL 3 TIMES DAILY
Qty: 90 TABLET | Refills: 0 | Status: SHIPPED | OUTPATIENT
Start: 2023-01-16 | End: 2023-02-15

## 2023-01-16 RX ORDER — HYDROCODONE BITARTRATE AND ACETAMINOPHEN 5; 325 MG/1; MG/1
1 TABLET ORAL EVERY 8 HOURS PRN
Qty: 84 TABLET | Refills: 0 | Status: SHIPPED | OUTPATIENT
Start: 2023-01-16 | End: 2023-02-13

## 2023-01-16 RX ORDER — METHYLPREDNISOLONE 4 MG/1
TABLET ORAL
Qty: 21 TABLET | Refills: 0 | Status: SHIPPED | OUTPATIENT
Start: 2023-01-16 | End: 2023-01-22

## 2023-01-16 NOTE — PROGRESS NOTES
Edger Nails  1955  [id number]      HISTORY OF PRESENT ILLNESS: Ms. Mayra Lu is a 78 y.o. female returns for a follow up visit for pain management  She has a diagnosis of   1. Cervical radiculopathy    2. Spondylosis of lumbar region without myelopathy or radiculopathy    3. Encounter for therapeutic drug monitoring    4. Chronic pain syndrome    5. Lumbosacral spondylosis without myelopathy    6. Postlaminectomy syndrome, cervical region    7. Encounter for long-term opiate analgesic use    . As per Information Obtained from the PADT (Patient Assessment and Documentation Tool)    She complains of pain in the  She rates the pain right lower back7/10 and describes it as sharp, aching. Current treatment regimen has helped relieve about 40% of the pain. She denies any side effects from the current pain regimen. Patient reports that since the last follow up visit the physical functioning is better, family/social relationships are better, mood is better sleep patterns are better, and that the overall functioning is better. Patient denies misusing/abusing her narcotic pain medications or using any illegal drugs. She complains of a worsening right lower back pain today. Also with a recent occipital headache that lasted for several days and was very painful. She states this has improved some today. Upon obtaining medical history from Ms. Mayra Lu    ALLERGIES: Patients list of allergies were reviewed     MEDICATIONS: Ms. Mayra uL list of medications were reviewed. Her current medications are   Outpatient Medications Prior to Visit   Medication Sig Dispense Refill    HYDROcodone-acetaminophen (NORCO) 5-325 MG per tablet Take 1 tablet by mouth every 8 hours as needed for Pain for up to 28 days.  84 tablet 0    methocarbamol (ROBAXIN) 500 MG tablet Take 1 tablet by mouth 3 times daily 90 tablet 0    rosuvastatin (CRESTOR) 5 MG tablet       traZODone (DESYREL) 50 MG tablet TAKE 1 TABLET BY MOUTH AT BEDTIME      naloxone (NARCAN) 4 MG/0.1ML LIQD nasal spray 1 spray by Nasal route as needed for Opioid Reversal 1 each 0    famciclovir (FAMVIR) 250 MG tablet Take 1 tablet by mouth daily       omeprazole (PRILOSEC) 20 MG delayed release capsule Take 20 mg by mouth Daily       docusate sodium (COLACE, DULCOLAX) 100 MG CAPS Take 100 mg by mouth 2 times daily 60 capsule 1    venlafaxine (EFFEXOR) 100 MG tablet Take 100 mg by mouth 2 times daily       multivitamin (THERAGRAN) per tablet Take 1 tablet by mouth daily. No facility-administered medications prior to visit. SOCIAL/FAMILY/PAST MEDICAL HISTORY: Ms. Tere Freeman, family and past medical history was reviewed. REVIEW OF SYSTEMS:    Respiratory: Negative for apnea, chest tightness and shortness of breath or change in baseline breathing. Gastrointestinal: Negative for nausea, vomiting, abdominal pain, diarrhea, constipation, blood in stool and abdominal distention. PHYSICAL EXAM:   Nursing note and vitals reviewed. LMP 07/12/2007   Constitutional: She appears well-developed and well-nourished. No acute distress. Skin: Skin is warm and dry, good turgor. No rash noted. She is not diaphoretic. Cardiovascular: Normal rate, regular rhythm, normal heart sounds, and does not have murmur. Pulmonary/Chest: Effort normal. No respiratory distress. She does not have wheezes in the lung fields. She has no rales. Neurological/Psychiatric:She is alert and oriented to person, place, and time. Coordination is  normal.  Her mood isAppropriate and affect is Neutral/Euthymic(normal) . MSK: Tender right L5 facet joint, tender to palpate right PSIS.   Cervical spine with tender facets    Prescription pain medication monitoring:                  MEDD current = 15              ORT Score = 1              Other Risk factors - stable mood              Date of Last Medication Agreement: 09/26/2022              Date Naloxone prescribed: 09/26/2022 UDT:                          Date of last UDT: 09/26/2022                          Adverse report: No              OARRS:                          Checked today: Yes                          Adverse report: No    IMPRESSION:   1. Cervical radiculopathy    2. Spondylosis of lumbar region without myelopathy or radiculopathy    3. Encounter for therapeutic drug monitoring    4. Chronic pain syndrome    5. Lumbosacral spondylosis without myelopathy    6. Postlaminectomy syndrome, cervical region    7. Encounter for long-term opiate analgesic use        PLAN:  Informed verbal consent was obtained:  -cont and refill Norco.  She reports the pain medicine does not help her pain very much, pain is worse when medicine wears off.  -She did not get her TENS unit or a phone call will investigate  -Refill Robaxin this is helping some, she does note it makes her slightly fatigued and will plan to take a half a tab. History of failed tizanidine related to sedation and failed baclofen related to minimal efficacy  -Medrol Dosepak for increased flare of pain  -December urine drug screen reviewed and consistent  -Patient has appointment for [facility] evaluation on Monday  -Follow-up in 4 weeks for reassessment    Analgesic Plan:              Continue present regimen: yes              Adjust dose of present analgesic:no              Switch analgesics:no              Add/Adjust concomitant therapy:no      Current Outpatient Medications   Medication Sig Dispense Refill    HYDROcodone-acetaminophen (NORCO) 5-325 MG per tablet Take 1 tablet by mouth every 8 hours as needed for Pain for up to 28 days.  84 tablet 0    methocarbamol (ROBAXIN) 500 MG tablet Take 1 tablet by mouth 3 times daily 90 tablet 0    rosuvastatin (CRESTOR) 5 MG tablet       traZODone (DESYREL) 50 MG tablet TAKE 1 TABLET BY MOUTH AT BEDTIME      naloxone (NARCAN) 4 MG/0.1ML LIQD nasal spray 1 spray by Nasal route as needed for Opioid Reversal 1 each 0    famciclovir (FAMVIR) 250 MG tablet Take 1 tablet by mouth daily       omeprazole (PRILOSEC) 20 MG delayed release capsule Take 20 mg by mouth Daily       docusate sodium (COLACE, DULCOLAX) 100 MG CAPS Take 100 mg by mouth 2 times daily 60 capsule 1    venlafaxine (EFFEXOR) 100 MG tablet Take 100 mg by mouth 2 times daily       multivitamin (THERAGRAN) per tablet Take 1 tablet by mouth daily. No current facility-administered medications for this visit. I will continue her current medication regimen  which is part of the above treatment schedule. It has been helping with Ms. Gold's chronic  medical problems which for this visit include:   Diagnoses of Cervical radiculopathy, Spondylosis of lumbar region without myelopathy or radiculopathy, Encounter for therapeutic drug monitoring, Chronic pain syndrome, Lumbosacral spondylosis without myelopathy, Postlaminectomy syndrome, cervical region, and Encounter for long-term opiate analgesic use were pertinent to this visit. Risks and benefits of the medications and other alternative treatments  including no treatment were discussed with the patient. The common side effects of these medications were also explained to the patient. Informed verbal consent was obtained. Goals of current treatment regimen include improvement in pain, restoration of functioning- with focus on improvement in physical performance, general activity, work or disability,emotional distress, health care utilization and  decreased medication consumption. Will continue to monitor progress towards achieving/maintaining therapeutic goals with special emphasis on  1. Improvement in perceived interfernce  of pain with ADL's. Ability to do home exercises independently. Ability to do household chores indoor and/or outdoor work and social and leisure activities. Improve psychosocial and physical functioning. - she is not showing any significant progress/or showing regression  towards this goal and reassessment and adjustment of goals/treatment have been made. She was advised against drinking alcohol with the narcotic pain medicines, advised against driving or handling machinery while adjusting the dose of medicines or if having cognitive  issues related to the current medications. Risk of overdose and death, if medicines not taken as prescribed, were also discussed. If the patient develops new symptoms or if the symptoms worsen, the patient should call the office. While transcribing every attempt was made to maintain the accuracy of the note in terms of it's contents,there may have been some errors made inadvertently. Thank you for allowing me to participate in the care of this patient.     Chris Youssef NP-C    Cc: Alicia Gamboa MD

## 2023-02-13 ENCOUNTER — OFFICE VISIT (OUTPATIENT)
Dept: PAIN MANAGEMENT | Age: 68
End: 2023-02-13
Payer: COMMERCIAL

## 2023-02-13 VITALS
WEIGHT: 128.8 LBS | OXYGEN SATURATION: 97 % | SYSTOLIC BLOOD PRESSURE: 119 MMHG | HEART RATE: 64 BPM | DIASTOLIC BLOOD PRESSURE: 78 MMHG | BODY MASS INDEX: 20.79 KG/M2

## 2023-02-13 DIAGNOSIS — M47.816 SPONDYLOSIS OF LUMBAR REGION WITHOUT MYELOPATHY OR RADICULOPATHY: ICD-10-CM

## 2023-02-13 DIAGNOSIS — G89.4 CHRONIC PAIN SYNDROME: ICD-10-CM

## 2023-02-13 DIAGNOSIS — M54.12 CERVICAL RADICULOPATHY: ICD-10-CM

## 2023-02-13 DIAGNOSIS — M96.1 POSTLAMINECTOMY SYNDROME, CERVICAL REGION: ICD-10-CM

## 2023-02-13 DIAGNOSIS — Z51.81 ENCOUNTER FOR THERAPEUTIC DRUG MONITORING: ICD-10-CM

## 2023-02-13 DIAGNOSIS — M47.817 LUMBOSACRAL SPONDYLOSIS WITHOUT MYELOPATHY: ICD-10-CM

## 2023-02-13 DIAGNOSIS — Z79.891 ENCOUNTER FOR LONG-TERM OPIATE ANALGESIC USE: ICD-10-CM

## 2023-02-13 PROCEDURE — 1123F ACP DISCUSS/DSCN MKR DOCD: CPT | Performed by: NURSE PRACTITIONER

## 2023-02-13 PROCEDURE — 99214 OFFICE O/P EST MOD 30 MIN: CPT | Performed by: NURSE PRACTITIONER

## 2023-02-13 RX ORDER — HYDROCODONE BITARTRATE AND ACETAMINOPHEN 5; 325 MG/1; MG/1
1 TABLET ORAL EVERY 8 HOURS PRN
Qty: 84 TABLET | Refills: 0 | Status: SHIPPED | OUTPATIENT
Start: 2023-02-13 | End: 2023-03-13

## 2023-02-13 RX ORDER — METHOCARBAMOL 500 MG/1
500 TABLET, FILM COATED ORAL 3 TIMES DAILY
Qty: 90 TABLET | Refills: 0 | Status: SHIPPED | OUTPATIENT
Start: 2023-02-13 | End: 2023-03-15

## 2023-02-13 NOTE — LETTER
[place] [address] [address]  Phone: [phone]  Fax: [phone]    NATHEN Benoit CNP        February 16, 2023         [facility] INJECTION ORDER    Date:  2/16/23      Patient: Sandra Mcginnis     YOB: 1955    Patient Home Phone: [phone] (home)    Diagnosis: M54.12, M96.11    Levels: C6-C7    Side: Please check  []LT     []RT     []SAMREEN     [x]Midline    Location of Injection: Please check  [x]Cervical IL Anna   []Lumbar Transforaminal ANNA  []Cervical TF Anna              []Lumbar Interlaminar ANNA  []Cervical Facet Injection  [] Lumbar Facet Injection    []Cervical Medial Branch Block []Lumbar Medial Branch Block  []Other:                                              []Lumbar Interlaminar ANNA                                                 [] SI Injection       Service Provider or Service Location: Please check  [][facility]/Dr. Nataliya Canchola    [x][facility]  []Dr. Dereck Oneill    []Dr. Yimi Shahid   []Other:     Ordering Physician: AMARI Benoit  Signature: Electronically signed by AMARI Benoit    Comments or Special Instructions: Allergies:    Allergies   Allergen Reactions    Latex Rash    Zocor [Simvastatin] Other (See Comments)     STATINS - LEG CRAMPS         First Insurance:  Payor: [facility] / Plan: ANTHEM MEDICARE SUPP / Product Type: *No Product type* /                                ______________________________________________________________________  To be filled in by Scheduling:  Scheduled Date:    Scheduled Time:  Procedure Code:  Pre Cert Information:      Sincerely,        NATHEN Benoit CNP

## 2023-02-13 NOTE — PROGRESS NOTES
Vikas Breen  1955  [id number]      HISTORY OF PRESENT ILLNESS: Ms. Bradley Helm is a 78 y.o. female returns for a follow up visit for pain management  She has a diagnosis of   1. Cervical radiculopathy    2. Spondylosis of lumbar region without myelopathy or radiculopathy    3. Encounter for therapeutic drug monitoring    4. Chronic pain syndrome    5. Lumbosacral spondylosis without myelopathy    6. Postlaminectomy syndrome, cervical region    7. Encounter for long-term opiate analgesic use    . As per Information Obtained from the PADT (Patient Assessment and Documentation Tool)    She complains of pain in the lower back. She rates the pain 6/10 and describes it as sharp, aching. Current treatment regimen has helped relieve about 30% of the pain. She denies any side effects from the current pain regimen. Patient reports that since the last follow up visit the physical functioning is better, family/social relationships are unchanged, mood is unchanged sleep patterns are unchanged, and that the overall functioning is unchanged. Patient denies misusing/abusing her narcotic pain medications or using any illegal drugs. Upon obtaining medical history from Ms. Bradley Helm    ALLERGIES: Patients list of allergies were reviewed     MEDICATIONS: Ms. Bradley Helm list of medications were reviewed. Her current medications are   Outpatient Medications Prior to Visit   Medication Sig Dispense Refill    HYDROcodone-acetaminophen (NORCO) 5-325 MG per tablet Take 1 tablet by mouth every 8 hours as needed for Pain for up to 28 days.  84 tablet 0    methocarbamol (ROBAXIN) 500 MG tablet Take 1 tablet by mouth 3 times daily 90 tablet 0    rosuvastatin (CRESTOR) 5 MG tablet       traZODone (DESYREL) 50 MG tablet TAKE 1 TABLET BY MOUTH AT BEDTIME      naloxone (NARCAN) 4 MG/0.1ML LIQD nasal spray 1 spray by Nasal route as needed for Opioid Reversal 1 each 0    famciclovir (FAMVIR) 250 MG tablet Take 1 tablet by mouth daily omeprazole (PRILOSEC) 20 MG delayed release capsule Take 20 mg by mouth Daily       docusate sodium (COLACE, DULCOLAX) 100 MG CAPS Take 100 mg by mouth 2 times daily 60 capsule 1    venlafaxine (EFFEXOR) 100 MG tablet Take 100 mg by mouth 2 times daily       multivitamin (THERAGRAN) per tablet Take 1 tablet by mouth daily. No facility-administered medications prior to visit. SOCIAL/FAMILY/PAST MEDICAL HISTORY: Ms. Eran Cole, family and past medical history was reviewed. REVIEW OF SYSTEMS:    Respiratory: Negative for apnea, chest tightness and shortness of breath or change in baseline breathing. Gastrointestinal: Negative for nausea, vomiting, abdominal pain, diarrhea, constipation, blood in stool and abdominal distention. PHYSICAL EXAM:   Nursing note and vitals reviewed. LMP 07/12/2007   Constitutional: She appears well-developed and well-nourished. No acute distress. Skin: Skin is warm and dry, good turgor. No rash noted. She is not diaphoretic. Cardiovascular: Normal rate, regular rhythm, normal heart sounds, and does not have murmur. Pulmonary/Chest: Effort normal. No respiratory distress. She does not have wheezes in the lung fields. She has no rales. Neurological/Psychiatric:She is alert and oriented to person, place, and time. Coordination is  normal.  Her mood isAppropriate and affect is Neutral/Euthymic(normal)   MSK: tender to palpate cervical facets bilaterally into occiput. Jono UE weakness, jono tricep 4/5.  Limited cervical rotation, radicular pain with cervical flexion     Prescription pain medication monitoring:                  MEDD current = 15              ORT Score = 1              Other Risk factors - (mood) stable              Date of Last Medication Agreement: 09/26/2022              Date Naloxone prescribed: 09/26/2022              UDT:                          Date of last UDT: 12/19/2022                          Adverse report: No OARRS:                          Checked today: Yes                          Adverse report: No    IMPRESSION:   1. Cervical radiculopathy    2. Spondylosis of lumbar region without myelopathy or radiculopathy    3. Encounter for therapeutic drug monitoring    4. Chronic pain syndrome    5. Lumbosacral spondylosis without myelopathy    6. Postlaminectomy syndrome, cervical region    7. Encounter for long-term opiate analgesic use        PLAN:  Informed verbal consent was obtained:  -medrol dose pack was helpful last month-flare improved today  -refer to [facility] for MARGO C6/C7  -note given today for restrictions at her job to avoid her from operating the  as this can flare her cervical radicular pain from her cervical canal stenosis  -refill Norco 7.5mg TID PRN pain  -refill robaxin, she is taking 1/2 tabs as needed w/o SE or lethargy  -f/u 4 weeks for reassessment     Analgesic Plan:              Continue present regimen:yes              Adjust dose of present analgesic:no              Switch analgesics:no              Add/Adjust concomitant therapy:no      Current Outpatient Medications   Medication Sig Dispense Refill    HYDROcodone-acetaminophen (NORCO) 5-325 MG per tablet Take 1 tablet by mouth every 8 hours as needed for Pain for up to 28 days.  84 tablet 0    methocarbamol (ROBAXIN) 500 MG tablet Take 1 tablet by mouth 3 times daily 90 tablet 0    rosuvastatin (CRESTOR) 5 MG tablet       traZODone (DESYREL) 50 MG tablet TAKE 1 TABLET BY MOUTH AT BEDTIME      naloxone (NARCAN) 4 MG/0.1ML LIQD nasal spray 1 spray by Nasal route as needed for Opioid Reversal 1 each 0    famciclovir (FAMVIR) 250 MG tablet Take 1 tablet by mouth daily       omeprazole (PRILOSEC) 20 MG delayed release capsule Take 20 mg by mouth Daily       docusate sodium (COLACE, DULCOLAX) 100 MG CAPS Take 100 mg by mouth 2 times daily 60 capsule 1    venlafaxine (EFFEXOR) 100 MG tablet Take 100 mg by mouth 2 times daily multivitamin (THERAGRAN) per tablet Take 1 tablet by mouth daily. No current facility-administered medications for this visit. I will continue her current medication regimen  which is part of the above treatment schedule. It has been helping with Ms. Gold's chronic  medical problems which for this visit include:   Diagnoses of Cervical radiculopathy, Spondylosis of lumbar region without myelopathy or radiculopathy, Encounter for therapeutic drug monitoring, Chronic pain syndrome, Lumbosacral spondylosis without myelopathy, Postlaminectomy syndrome, cervical region, and Encounter for long-term opiate analgesic use were pertinent to this visit. Risks and benefits of the medications and other alternative treatments  including no treatment were discussed with the patient. The common side effects of these medications were also explained to the patient. Informed verbal consent was obtained. Goals of current treatment regimen include improvement in pain, restoration of functioning- with focus on improvement in physical performance, general activity, work or disability,emotional distress, health care utilization and  decreased medication consumption. Will continue to monitor progress towards achieving/maintaining therapeutic goals with special emphasis on  1. Improvement in perceived interfernce  of pain with ADL's. Ability to do home exercises independently. Ability to do household chores indoor and/or outdoor work and social and leisure activities. Improve psychosocial and physical functioning. - she is showing progression towards this treatment goal with the current regimen. She was advised against drinking alcohol with the narcotic pain medicines, advised against driving or handling machinery while adjusting the dose of medicines or if having cognitive  issues related to the current medications. Risk of overdose and death, if medicines not taken as prescribed, were also discussed.  If the patient develops new symptoms or if the symptoms worsen, the patient should call the office. While transcribing every attempt was made to maintain the accuracy of the note in terms of it's contents,there may have been some errors made inadvertently. Thank you for allowing me to participate in the care of this patient.     AMARI De La Vega    Cc: Valerie Joyner MD

## 2023-02-13 NOTE — LETTER
[facility]  [address]  [address]  [facility] [address]  Phone: [phone]  Fax: [phone]     To whom it may concern,     Kiarra Gold is restricted in her ability to operate a  related to her cervical canal stenosis. The upper extremity and neck motion may flare this condition.  She is able to continue current job duties as tolerated. Please call with questions.               MIKI AdornoC

## 2023-02-22 NOTE — DISCHARGE INSTRUCTIONS
[facility] [employer]  Cardiovascular Special Procedures  Cervical Epidural Steroid Injection  Patient Discharge Instructions      When [address] not [address] the day of the procedure. You may experience arm weakness during the first 24 hours following the procedure. However, you do not need to stay in bed when you get home. Even if you feel better right away, avoid activities that may strain your neck. Keep in mind that some patients may feel increased pain for the first 24 hours. You should start feeling some pain relief 3-7 days following the injection. This is because the steroid will start working within three days of the injection with maximal effect by one week. At that time, we will evaluate your pain level to determine the need for another steroid injection. Remove bandaid(s) within 24 hours. When to Call Your Doctor    Call right away if you notice any of the following symptoms:    Severe pain or headache;  Fever or chills; Redness or swelling around the injection site. You may contact [address] [place], [place] [address]. for any questions or problems that may occur at [phone] during the hours of 9am-4pm Monday-Friday, or the hospital  after hours at [phone], to have the interventional radiologist on call paged. [facility] [employer]  Cardiovascular Special Procedures  General Discharge Instructions    ____ You may be drowsy or lightheaded after receiving sedation. DO NOT operate a vehicle (automobile, bicycle, motorcycle, machinery, or power tools), make any important decisions or sign any important/legal documents, or drink alcoholic beverages for the next 24 hours  _x___ We strongly suggest that a responsible adult be with you for the next 24 hours for your protection and safety  ____ If the intravenous catheter site is painful, apply warm wet compresses on the site until the soreness is relieved and elevate the arm above the heart.   Call your physician if no improvement in 2 to 3 days    DIETARY INSTRUCTIONS:    ____ Drink extra fluids over the next 24 hours (If not contraindicated by illness or by                   physician order)  ____ Start with clear liquids and progress to normal diet as you feel like eating. If you experience nausea or repeated episodes of vomiting, which persist beyond 12-24 hours, notify your doctor        _x___ Resume your previous diet    ACTIVITY INSTRUCTIONS:    ____ See other instructions  ____ No special instructions  ____ Rest for 24 hours    __x__ Up as tolerated  __x__ Increase activity as tolerated    Wound/Dressing Instructions:  ____ See other instructions  __x__ May shower, tomorrow  __x__ Remove bandage within 24 hours    MEDICATION INSTRUCTIONS:    __x__ See Medication Reconciliation Sheet                    The Discharge Instructions have been explained to me. I understand and can verbalize these instructions.

## 2023-02-28 ENCOUNTER — HOSPITAL ENCOUNTER (OUTPATIENT)
Dept: INTERVENTIONAL RADIOLOGY/VASCULAR | Age: 68
Discharge: HOME | End: 2023-02-28
Payer: MEDICARE

## 2023-02-28 ENCOUNTER — TELEPHONE (OUTPATIENT)
Dept: PAIN MANAGEMENT | Age: 68
End: 2023-02-28

## 2023-02-28 DIAGNOSIS — M54.12 BRACHIAL NEURITIS: ICD-10-CM

## 2023-02-28 PROCEDURE — 62321 NJX INTERLAMINAR CRV/THRC: CPT

## 2023-02-28 PROCEDURE — 2500000003 HC RX 250 WO HCPCS

## 2023-02-28 PROCEDURE — 6360000002 HC RX W HCPCS

## 2023-02-28 NOTE — TELEPHONE ENCOUNTER
Patient came in to the [place] [address] office yesterday and dropped off a [facility] form to be completed by Noble Seay. Per Noble Seay she can go over the form with her at her 3/13/23 visit. I called patient back, lvm.

## 2023-02-28 NOTE — TELEPHONE ENCOUNTER
Patient called back, she said all it needs to say is that she can not use the . It has to be on this specific form, they would not accept the letter.

## 2023-03-13 ENCOUNTER — OFFICE VISIT (OUTPATIENT)
Dept: PAIN MANAGEMENT | Age: 68
End: 2023-03-13
Payer: MEDICARE

## 2023-03-13 VITALS
SYSTOLIC BLOOD PRESSURE: 130 MMHG | WEIGHT: 127 LBS | HEART RATE: 76 BPM | BODY MASS INDEX: 20.5 KG/M2 | DIASTOLIC BLOOD PRESSURE: 85 MMHG | OXYGEN SATURATION: 99 %

## 2023-03-13 DIAGNOSIS — M47.816 SPONDYLOSIS OF LUMBAR REGION WITHOUT MYELOPATHY OR RADICULOPATHY: ICD-10-CM

## 2023-03-13 DIAGNOSIS — Z79.891 ENCOUNTER FOR LONG-TERM OPIATE ANALGESIC USE: ICD-10-CM

## 2023-03-13 DIAGNOSIS — M96.1 POSTLAMINECTOMY SYNDROME, CERVICAL REGION: ICD-10-CM

## 2023-03-13 DIAGNOSIS — G89.4 CHRONIC PAIN SYNDROME: ICD-10-CM

## 2023-03-13 DIAGNOSIS — M47.817 LUMBOSACRAL SPONDYLOSIS WITHOUT MYELOPATHY: ICD-10-CM

## 2023-03-13 DIAGNOSIS — Z51.81 ENCOUNTER FOR THERAPEUTIC DRUG MONITORING: ICD-10-CM

## 2023-03-13 DIAGNOSIS — M54.12 CERVICAL RADICULOPATHY: ICD-10-CM

## 2023-03-13 PROCEDURE — 1123F ACP DISCUSS/DSCN MKR DOCD: CPT | Performed by: NURSE PRACTITIONER

## 2023-03-13 PROCEDURE — 99214 OFFICE O/P EST MOD 30 MIN: CPT | Performed by: NURSE PRACTITIONER

## 2023-03-13 RX ORDER — HYDROCODONE BITARTRATE AND ACETAMINOPHEN 5; 325 MG/1; MG/1
1 TABLET ORAL EVERY 8 HOURS PRN
Qty: 84 TABLET | Refills: 0 | Status: SHIPPED | OUTPATIENT
Start: 2023-03-13 | End: 2023-04-10

## 2023-03-13 RX ORDER — METHOCARBAMOL 500 MG/1
500 TABLET, FILM COATED ORAL 3 TIMES DAILY
Qty: 90 TABLET | Refills: 0 | Status: SHIPPED | OUTPATIENT
Start: 2023-03-13 | End: 2023-04-12

## 2023-03-13 NOTE — LETTER
March 13, 2023       Roberto Manning YOB: 1955   [address] [place] [address] Date of Visit:  3/13/2023       To Whom It May Concern: It is my medical opinion that Rafaela Menjivar has no weight restrictions with working. If you have any questions or concerns, please don't hesitate to call.     Sincerely,        Monica Grimes, NATHEN - CNP

## 2023-03-13 NOTE — PROGRESS NOTES
Jesika Bejarano  1955  [id number]      HISTORY OF PRESENT ILLNESS: Ms. Phuc Velazquez is a 78 y.o. female returns for a follow up visit for pain management  She has a diagnosis of   1. Cervical radiculopathy    2. Spondylosis of lumbar region without myelopathy or radiculopathy    3. Encounter for therapeutic drug monitoring    4. Chronic pain syndrome    5. Lumbosacral spondylosis without myelopathy    6. Postlaminectomy syndrome, cervical region    7. Encounter for long-term opiate analgesic use    . As per Information Obtained from the PADT (Patient Assessment and Documentation Tool)    She complains of pain in the lower back, She rates the pain 5/10 and describes it as sharp. Current treatment regimen has helped relieve about 50% of the pain. She denies any side effects from the current pain regimen. Patient reports that since the last follow up visit the physical functioning is better, family/social relationships are better, mood is better sleep patterns are unchanged, and that the overall functioning is better. Patient denies misusing/abusing her narcotic pain medications or using any illegal drugs. Upon obtaining medical history from Ms. Phuc Velazquez    ALLERGIES: Patients list of allergies were reviewed     MEDICATIONS: Ms. Phuc Velazquez list of medications were reviewed. Her current medications are   Outpatient Medications Prior to Visit   Medication Sig Dispense Refill    HYDROcodone-acetaminophen (NORCO) 5-325 MG per tablet Take 1 tablet by mouth every 8 hours as needed for Pain for up to 28 days.  84 tablet 0    methocarbamol (ROBAXIN) 500 MG tablet Take 1 tablet by mouth 3 times daily 90 tablet 0    rosuvastatin (CRESTOR) 5 MG tablet       traZODone (DESYREL) 50 MG tablet TAKE 1 TABLET BY MOUTH AT BEDTIME      naloxone (NARCAN) 4 MG/0.1ML LIQD nasal spray 1 spray by Nasal route as needed for Opioid Reversal 1 each 0    famciclovir (FAMVIR) 250 MG tablet Take 1 tablet by mouth daily       omeprazole (PRILOSEC) 20 MG delayed release capsule Take 20 mg by mouth Daily       docusate sodium (COLACE, DULCOLAX) 100 MG CAPS Take 100 mg by mouth 2 times daily 60 capsule 1    venlafaxine (EFFEXOR) 100 MG tablet Take 100 mg by mouth 2 times daily       multivitamin (THERAGRAN) per tablet Take 1 tablet by mouth daily. No facility-administered medications prior to visit. SOCIAL/FAMILY/PAST MEDICAL HISTORY: Ms. Fiore Camden, family and past medical history was reviewed. REVIEW OF SYSTEMS:    Respiratory: Negative for apnea, chest tightness and shortness of breath or change in baseline breathing. Gastrointestinal: Negative for nausea, vomiting, abdominal pain, diarrhea, constipation, blood in stool and abdominal distention. PHYSICAL EXAM:   Nursing note and vitals reviewed. /85   Pulse 76   Wt 127 lb (57.6 kg)   LMP 07/12/2007   SpO2 99%   BMI 20.50 kg/m²   Constitutional: She appears well-developed and well-nourished. No acute distress. Skin: Skin is warm and dry, good turgor. No rash noted. She is not diaphoretic. Cardiovascular: Normal rate, regular rhythm, normal heart sounds, and does not have murmur. Pulmonary/Chest: Effort normal. No respiratory distress. She does not have wheezes in the lung fields. She has no rales. Neurological/Psychiatric:She is alert and oriented to person, place, and time. Coordination is  normal.  Her mood isAppropriate and affect is Neutral/Euthymic(normal) .      Prescription pain medication monitoring:                  MEDD current = 15              ORT Score = 1              Other Risk factors - (mood) stable              Date of Last Medication Agreement: 01/16/2023              Date Naloxone prescribed: 09/26/2022              UDT:                          Date of last UDT: 12/19/2022                          Adverse report: No               OARRS:                          Checked today: Yes                          Adverse report: No    IMPRESSION:   1. Cervical radiculopathy    2. Spondylosis of lumbar region without myelopathy or radiculopathy    3. Encounter for therapeutic drug monitoring    4. Chronic pain syndrome    5. Lumbosacral spondylosis without myelopathy    6. Postlaminectomy syndrome, cervical region    7. Encounter for long-term opiate analgesic use        PLAN:  Informed verbal consent was obtained:  -refill Norco 7.5mg TID PRN pain  -refill robaxin, she is taking 1/2 tabs as needed w/o SE or lethargy  -recent MARGO with up to 60% relief  -review of lumbar MRI. Pt w/o radicular pain. No pain on the left. 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 Pt interested in injection for low back pain. Will refer for lumbar medical branch block L3-S1 on the right.   -new note today for work regarding no weight lifting restrictions  -f/u 4 weeks for reassessment     Analgesic Plan:              Continue present regimen: yes              Adjust dose of present analgesic:no              Switch analgesics:no              Add/Adjust concomitant therapy:no      Current Outpatient Medications   Medication Sig Dispense Refill    HYDROcodone-acetaminophen (NORCO) 5-325 MG per tablet Take 1 tablet by mouth every 8 hours as needed for Pain for up to 28 days.  84 tablet 0    methocarbamol (ROBAXIN) 500 MG tablet Take 1 tablet by mouth 3 times daily 90 tablet 0    rosuvastatin (CRESTOR) 5 MG tablet       traZODone (DESYREL) 50 MG tablet TAKE 1 TABLET BY MOUTH AT BEDTIME      naloxone (NARCAN) 4 MG/0.1ML LIQD nasal spray 1 spray by Nasal route as needed for Opioid Reversal 1 each 0    famciclovir (FAMVIR) 250 MG tablet Take 1 tablet by mouth daily       omeprazole (PRILOSEC) 20 MG delayed release capsule Take 20 mg by mouth Daily       docusate sodium (COLACE, DULCOLAX) 100 MG CAPS Take 100 mg by mouth 2 times daily 60 capsule 1    venlafaxine (EFFEXOR) 100 MG tablet Take 100 mg by mouth 2 times daily       multivitamin (THERAGRAN) per tablet Take 1 tablet by mouth daily. No current facility-administered medications for this visit. I will continue her current medication regimen  which is part of the above treatment schedule. It has been helping with Ms. Gold's chronic  medical problems which for this visit include:   Diagnoses of Cervical radiculopathy, Spondylosis of lumbar region without myelopathy or radiculopathy, Encounter for therapeutic drug monitoring, Chronic pain syndrome, Lumbosacral spondylosis without myelopathy, Postlaminectomy syndrome, cervical region, and Encounter for long-term opiate analgesic use were pertinent to this visit. Risks and benefits of the medications and other alternative treatments  including no treatment were discussed with the patient. The common side effects of these medications were also explained to the patient. Informed verbal consent was obtained. Goals of current treatment regimen include improvement in pain, restoration of functioning- with focus on improvement in physical performance, general activity, work or disability,emotional distress, health care utilization and  decreased medication consumption. Will continue to monitor progress towards achieving/maintaining therapeutic goals with special emphasis on  1. Improvement in perceived interfernce  of pain with ADL's. Ability to do home exercises independently. Ability to do household chores indoor and/or outdoor work and social and leisure activities. Improve psychosocial and physical functioning. - she is showing progression towards this treatment goal with the current regimen. She was advised against drinking alcohol with the narcotic pain medicines, advised against driving or handling machinery while adjusting the dose of medicines or if having cognitive  issues related to the current medications. Risk of overdose and death, if medicines not taken as prescribed, were also discussed.  If the patient develops new symptoms or if the symptoms worsen, the patient should call the office. While transcribing every attempt was made to maintain the accuracy of the note in terms of it's contents,there may have been some errors made inadvertently. Thank you for allowing me to participate in the care of this patient.     MIKI BassettC    Cc: Marilin Galarza MD

## 2023-03-27 ENCOUNTER — HOSPITAL ENCOUNTER (OUTPATIENT)
Dept: INTERVENTIONAL RADIOLOGY/VASCULAR | Age: 68
Discharge: HOME | End: 2023-03-27
Payer: MEDICARE

## 2023-03-27 DIAGNOSIS — M47.817 LUMBOSACRAL SPONDYLOSIS WITHOUT MYELOPATHY: ICD-10-CM

## 2023-03-27 PROCEDURE — 2709999900 HC NON-CHARGEABLE SUPPLY

## 2023-03-27 PROCEDURE — 64483 NJX AA&/STRD TFRM EPI L/S 1: CPT

## 2023-03-27 PROCEDURE — 2500000003 HC RX 250 WO HCPCS

## 2023-03-27 PROCEDURE — 64484 NJX AA&/STRD TFRM EPI L/S EA: CPT

## 2023-03-27 NOTE — DISCHARGE INSTRUCTIONS
Lumbar Epidural Steroid Injection  Patient Discharge Instructions  When [address] [facility] should not drive the day of the procedure. You may experience leg weakness during the first 24 hours following the procedure. To prevent yourself from falling, it is important to have someone help you walk. However, you do not need to stay in bed when you get home. In fact, it is best to walk around if you feel up to it, but you will need assistance during the first 24 hours following the epidural steroid injection. Even if you feel better right away, avoid activities that may strain your back. Keep in mind that most patients feel increased pain for the first 24 hours. You should start feeling some pain relief 2-3 days following the injection. This is because the steroid will start working within three days of the injection with maximal effect by one week. At that time, we will evaluate your pain level to determine the need for another steroid injection. Remove bandaid(s) within 24 hours. When to Call Your Doctor    Call right away if you notice any of the following symptoms:    Severe pain or headache;  Fever or chills; Redness or swelling around the injection site. Loss of bladder or bowel control. You may contact [address] [place], [place] [address] [facility]. for any questions or problems that may occur at [phone] during the hours of 9am-5pm Monday-Friday, or the hospital  after hours at [phone], to have the interventional radiologist on call paged. [facility]  Cardiovascular Special Procedures  General Discharge Instructions  DIETARY INSTRUCTIONS:    ____ Drink extra fluids over the next 24 hours (If not contraindicated by illness or by                   physician order)  ____ Start with clear liquids and progress to normal diet as you feel like eating.   If you experience nausea or repeated episodes of vomiting, which persist beyond 12-24 hours, notify your doctor        __x__

## 2023-05-01 ENCOUNTER — HOSPITAL ENCOUNTER (OUTPATIENT)
Dept: INTERVENTIONAL RADIOLOGY/VASCULAR | Age: 68
Discharge: HOME | End: 2023-05-01
Payer: MEDICARE

## 2023-05-01 DIAGNOSIS — M47.817 LUMBOSACRAL SPONDYLOSIS WITHOUT MYELOPATHY: ICD-10-CM

## 2023-05-01 PROCEDURE — 64495 INJ PARAVERT F JNT L/S 3 LEV: CPT

## 2023-05-01 PROCEDURE — 64493 INJ PARAVERT F JNT L/S 1 LEV: CPT

## 2023-05-01 PROCEDURE — 2500000003 HC RX 250 WO HCPCS

## 2023-05-01 PROCEDURE — 64494 INJ PARAVERT F JNT L/S 2 LEV: CPT

## 2023-05-01 PROCEDURE — 2709999900 HC NON-CHARGEABLE SUPPLY

## 2023-05-01 NOTE — DISCHARGE INSTRUCTIONS
Medial Branch Block Injection  Patient Discharge Instructions      When [address] [facility] should not drive the day of the procedure. You may experience leg weakness during the first 24 hours following the procedure. To prevent yourself from falling, it is important to have someone help you walk. However, you do not need to stay in bed when you get home. Even if you feel better right away, avoid activities that may strain your back. Remove bandaid(s) within 24 hours. When to Call Your Doctor    Call right away if you notice any of the following symptoms:    Severe pain or headache;  Fever or chills; Redness or swelling around the injection site. Loss of bladder or bowel control. You may contact [address] [place] [address] [employer]. for any questions or problems that may occur at [phone] during the hours of 9am-4pm Monday-Friday, or the hospital  after hours at [phone], to have the interventional radiologist on call paged. [facility]  Cardiovascular Special Procedures  General Discharge Instructions      ____ You may be drowsy or lightheaded after receiving sedation. DO NOT operate a vehicle (automobile, bicycle, motorcycle, machinery, or power tools), make any important decisions or sign any important/legal documents, or drink alcoholic beverages for the next 24 hours  _x___ We strongly suggest that a responsible adult be with you for the next 24 hours for your protection and safety  ____ If the intravenous catheter site is painful, apply warm wet compresses on the site until the soreness is relieved and elevate the arm above the heart. Call your physician if no improvement in 2 to 3 days    DIETARY INSTRUCTIONS:    ____ Drink extra fluids over the next 24 hours (If not contraindicated by illness or by                   physician order)  ____ Start with clear liquids and progress to normal diet as you feel like eating.   If you experience nausea or repeated

## 2023-05-08 ENCOUNTER — OFFICE VISIT (OUTPATIENT)
Dept: PAIN MANAGEMENT | Age: 68
End: 2023-05-08
Payer: MEDICARE

## 2023-05-08 VITALS
HEART RATE: 79 BPM | WEIGHT: 129.6 LBS | BODY MASS INDEX: 20.92 KG/M2 | OXYGEN SATURATION: 95 % | SYSTOLIC BLOOD PRESSURE: 133 MMHG | DIASTOLIC BLOOD PRESSURE: 86 MMHG

## 2023-05-08 DIAGNOSIS — Z79.891 ENCOUNTER FOR LONG-TERM OPIATE ANALGESIC USE: ICD-10-CM

## 2023-05-08 DIAGNOSIS — G89.4 CHRONIC PAIN SYNDROME: ICD-10-CM

## 2023-05-08 DIAGNOSIS — M47.816 SPONDYLOSIS OF LUMBAR REGION WITHOUT MYELOPATHY OR RADICULOPATHY: ICD-10-CM

## 2023-05-08 DIAGNOSIS — M54.12 CERVICAL RADICULOPATHY: ICD-10-CM

## 2023-05-08 DIAGNOSIS — Z51.81 ENCOUNTER FOR THERAPEUTIC DRUG MONITORING: ICD-10-CM

## 2023-05-08 DIAGNOSIS — M47.817 LUMBOSACRAL SPONDYLOSIS WITHOUT MYELOPATHY: ICD-10-CM

## 2023-05-08 DIAGNOSIS — M96.1 POSTLAMINECTOMY SYNDROME, CERVICAL REGION: ICD-10-CM

## 2023-05-08 PROCEDURE — 1123F ACP DISCUSS/DSCN MKR DOCD: CPT | Performed by: NURSE PRACTITIONER

## 2023-05-08 PROCEDURE — 99214 OFFICE O/P EST MOD 30 MIN: CPT | Performed by: NURSE PRACTITIONER

## 2023-05-08 RX ORDER — HYDROCODONE BITARTRATE AND ACETAMINOPHEN 5; 325 MG/1; MG/1
1 TABLET ORAL EVERY 8 HOURS PRN
Qty: 84 TABLET | Refills: 0 | Status: SHIPPED | OUTPATIENT
Start: 2023-05-08 | End: 2023-06-05

## 2023-05-08 RX ORDER — METHOCARBAMOL 500 MG/1
500 TABLET, FILM COATED ORAL 3 TIMES DAILY
Qty: 90 TABLET | Refills: 0 | Status: SHIPPED | OUTPATIENT
Start: 2023-05-08 | End: 2023-06-07

## 2023-05-08 RX ORDER — PANTOPRAZOLE SODIUM 40 MG/1
40 TABLET, DELAYED RELEASE ORAL DAILY
COMMUNITY
Start: 2023-04-16

## 2023-05-08 NOTE — PROGRESS NOTES
Monica McNeil  1955  [id number]      HISTORY OF PRESENT ILLNESS: Ms. Meaghan Cantrell is a 78 y.o. female returns for a follow up visit for pain management  She has a diagnosis of   1. Cervical radiculopathy    2. Spondylosis of lumbar region without myelopathy or radiculopathy    3. Encounter for therapeutic drug monitoring    4. Chronic pain syndrome    5. Lumbosacral spondylosis without myelopathy    6. Postlaminectomy syndrome, cervical region    7. Encounter for long-term opiate analgesic use    . As per Information Obtained from the PADT (Patient Assessment and Documentation Tool)    She complains of pain in the lower back. She rates the pain 8/10 and describes it as aching. Current treatment regimen has helped relieve about 70% of the pain. She denies any side effects from the current pain regimen. Patient reports that since the last follow up visit the physical functioning is unchanged, family/social relationships are unchanged, mood is unchanged sleep patterns are unchanged, and that the overall functioning is unchanged. Patient denies misusing/abusing her narcotic pain medications or using any illegal drugs. Upon obtaining medical history from Ms. Meaghan Cantrell    ALLERGIES: Patients list of allergies were reviewed     MEDICATIONS: Ms. Meaghan Cantrell list of medications were reviewed. Her current medications are   Outpatient Medications Prior to Visit   Medication Sig Dispense Refill    pantoprazole (PROTONIX) 40 MG tablet Take 1 tablet by mouth daily      HYDROcodone-acetaminophen (NORCO) 5-325 MG per tablet Take 1 tablet by mouth every 8 hours as needed for Pain for up to 28 days.  84 tablet 0    methocarbamol (ROBAXIN) 500 MG tablet Take 1 tablet by mouth 3 times daily 90 tablet 0    rosuvastatin (CRESTOR) 5 MG tablet       traZODone (DESYREL) 50 MG tablet TAKE 1 TABLET BY MOUTH AT BEDTIME      naloxone (NARCAN) 4 MG/0.1ML LIQD nasal spray 1 spray by Nasal route as needed for Opioid Reversal 1 each 0

## 2023-06-05 ENCOUNTER — OFFICE VISIT (OUTPATIENT)
Dept: PAIN MANAGEMENT | Age: 68
End: 2023-06-05
Payer: MEDICARE

## 2023-06-05 VITALS
OXYGEN SATURATION: 98 % | HEART RATE: 66 BPM | DIASTOLIC BLOOD PRESSURE: 69 MMHG | SYSTOLIC BLOOD PRESSURE: 129 MMHG | WEIGHT: 133.2 LBS | BODY MASS INDEX: 21.5 KG/M2

## 2023-06-05 DIAGNOSIS — M47.816 SPONDYLOSIS OF LUMBAR REGION WITHOUT MYELOPATHY OR RADICULOPATHY: ICD-10-CM

## 2023-06-05 DIAGNOSIS — Z51.81 ENCOUNTER FOR THERAPEUTIC DRUG MONITORING: ICD-10-CM

## 2023-06-05 DIAGNOSIS — G89.4 CHRONIC PAIN SYNDROME: ICD-10-CM

## 2023-06-05 DIAGNOSIS — Z79.891 ENCOUNTER FOR LONG-TERM OPIATE ANALGESIC USE: ICD-10-CM

## 2023-06-05 DIAGNOSIS — M54.12 CERVICAL RADICULOPATHY: ICD-10-CM

## 2023-06-05 DIAGNOSIS — M96.1 POSTLAMINECTOMY SYNDROME, CERVICAL REGION: ICD-10-CM

## 2023-06-05 DIAGNOSIS — M47.817 LUMBOSACRAL SPONDYLOSIS WITHOUT MYELOPATHY: ICD-10-CM

## 2023-06-05 PROCEDURE — G8427 DOCREV CUR MEDS BY ELIG CLIN: HCPCS | Performed by: NURSE PRACTITIONER

## 2023-06-05 PROCEDURE — 1036F TOBACCO NON-USER: CPT | Performed by: NURSE PRACTITIONER

## 2023-06-05 PROCEDURE — 3017F COLORECTAL CA SCREEN DOC REV: CPT | Performed by: NURSE PRACTITIONER

## 2023-06-05 PROCEDURE — G8400 PT W/DXA NO RESULTS DOC: HCPCS | Performed by: NURSE PRACTITIONER

## 2023-06-05 PROCEDURE — 1090F PRES/ABSN URINE INCON ASSESS: CPT | Performed by: NURSE PRACTITIONER

## 2023-06-05 PROCEDURE — G8420 CALC BMI NORM PARAMETERS: HCPCS | Performed by: NURSE PRACTITIONER

## 2023-06-05 PROCEDURE — 1123F ACP DISCUSS/DSCN MKR DOCD: CPT | Performed by: NURSE PRACTITIONER

## 2023-06-05 PROCEDURE — 99213 OFFICE O/P EST LOW 20 MIN: CPT | Performed by: NURSE PRACTITIONER

## 2023-06-05 RX ORDER — HYDROCODONE BITARTRATE AND ACETAMINOPHEN 5; 325 MG/1; MG/1
1 TABLET ORAL EVERY 8 HOURS PRN
Qty: 84 TABLET | Refills: 0 | Status: SHIPPED | OUTPATIENT
Start: 2023-06-05 | End: 2023-07-03

## 2023-06-05 RX ORDER — METHOCARBAMOL 500 MG/1
500 TABLET, FILM COATED ORAL 3 TIMES DAILY
Qty: 90 TABLET | Refills: 0 | Status: SHIPPED | OUTPATIENT
Start: 2023-06-05 | End: 2023-07-05

## 2023-06-05 RX ORDER — PSEUDOEPHEDRINE HCL 30 MG
100 TABLET ORAL 2 TIMES DAILY
Qty: 60 CAPSULE | Refills: 1 | Status: SHIPPED | OUTPATIENT
Start: 2023-06-05

## 2023-06-05 NOTE — PROGRESS NOTES
famciclovir (FAMVIR) 250 MG tablet Take 1 tablet by mouth daily      omeprazole (PRILOSEC) 20 MG delayed release capsule Take 1 capsule by mouth Daily      docusate sodium (COLACE, DULCOLAX) 100 MG CAPS Take 100 mg by mouth 2 times daily 60 capsule 1    venlafaxine (EFFEXOR) 100 MG tablet Take 1 tablet by mouth 2 times daily      multivitamin (THERAGRAN) per tablet Take 1 tablet by mouth daily       No facility-administered medications prior to visit. SOCIAL/FAMILY/PAST MEDICAL HISTORY: Ms. Nolan Lee, family and past medical history was reviewed. REVIEW OF SYSTEMS:    Respiratory: Negative for apnea, chest tightness and shortness of breath or change in baseline breathing. Gastrointestinal: Negative for nausea, vomiting, abdominal pain, diarrhea, constipation, blood in stool and abdominal distention. PHYSICAL EXAM:   Nursing note and vitals reviewed. /69   Pulse 66   Wt 133 lb 3.2 oz (60.4 kg)   LMP 07/12/2007   SpO2 98%   BMI 21.50 kg/m²   Constitutional: She appears well-developed and well-nourished. No acute distress. Skin: Skin is warm and dry, good turgor. No rash noted. She is not diaphoretic. Cardiovascular: Normal rate, regular rhythm, normal heart sounds, and does not have murmur. Pulmonary/Chest: Effort normal. No respiratory distress. She does not have wheezes in the lung fields. She has no rales. Neurological/Psychiatric:She is alert and oriented to person, place, and time. Coordination is  normal.  Her mood isAppropriate and affect is Neutral/Euthymic(normal) .      Prescription pain medication monitoring:                  MEDD current = 15              ORT Score = 1              Other Risk factors - (mood) stable              Date of Last Medication Agreement: 01/16/2023              Date Naloxone prescribed:09/26/2022              UDT:                          Date of last UDT: 04/10/2023                          Adverse report: No              OARRS:

## 2023-06-26 ENCOUNTER — OFFICE VISIT (OUTPATIENT)
Dept: PAIN MANAGEMENT | Age: 68
End: 2023-06-26
Payer: MEDICARE

## 2023-06-26 VITALS
HEART RATE: 70 BPM | SYSTOLIC BLOOD PRESSURE: 131 MMHG | WEIGHT: 131.4 LBS | DIASTOLIC BLOOD PRESSURE: 81 MMHG | OXYGEN SATURATION: 96 % | BODY MASS INDEX: 21.21 KG/M2

## 2023-06-26 DIAGNOSIS — M47.817 LUMBOSACRAL SPONDYLOSIS WITHOUT MYELOPATHY: ICD-10-CM

## 2023-06-26 DIAGNOSIS — Z51.81 ENCOUNTER FOR THERAPEUTIC DRUG MONITORING: ICD-10-CM

## 2023-06-26 DIAGNOSIS — M47.816 SPONDYLOSIS OF LUMBAR REGION WITHOUT MYELOPATHY OR RADICULOPATHY: ICD-10-CM

## 2023-06-26 DIAGNOSIS — Z79.891 ENCOUNTER FOR LONG-TERM OPIATE ANALGESIC USE: ICD-10-CM

## 2023-06-26 DIAGNOSIS — M54.12 CERVICAL RADICULOPATHY: ICD-10-CM

## 2023-06-26 DIAGNOSIS — M96.1 POSTLAMINECTOMY SYNDROME, CERVICAL REGION: ICD-10-CM

## 2023-06-26 DIAGNOSIS — G89.4 CHRONIC PAIN SYNDROME: ICD-10-CM

## 2023-06-26 PROCEDURE — 1123F ACP DISCUSS/DSCN MKR DOCD: CPT | Performed by: NURSE PRACTITIONER

## 2023-06-26 PROCEDURE — 99213 OFFICE O/P EST LOW 20 MIN: CPT | Performed by: NURSE PRACTITIONER

## 2023-06-26 RX ORDER — HYDROCODONE BITARTRATE AND ACETAMINOPHEN 5; 325 MG/1; MG/1
1 TABLET ORAL EVERY 8 HOURS PRN
Qty: 84 TABLET | Refills: 0 | Status: SHIPPED | OUTPATIENT
Start: 2023-07-02 | End: 2023-07-30

## 2023-06-26 RX ORDER — TIZANIDINE 4 MG/1
4 TABLET ORAL 2 TIMES DAILY PRN
Qty: 60 TABLET | Refills: 0 | Status: SHIPPED | OUTPATIENT
Start: 2023-06-26 | End: 2023-07-26

## 2023-07-03 ENCOUNTER — HOSPITAL ENCOUNTER (OUTPATIENT)
Dept: INTERVENTIONAL RADIOLOGY/VASCULAR | Age: 68
Discharge: HOME | End: 2023-07-03
Payer: MEDICARE

## 2023-07-03 DIAGNOSIS — M54.12 CERVICAL RADICULITIS: ICD-10-CM

## 2023-07-03 PROCEDURE — 62321 NJX INTERLAMINAR CRV/THRC: CPT

## 2023-07-03 PROCEDURE — 6360000004 HC RX CONTRAST MEDICATION: Performed by: RADIOLOGY

## 2023-07-03 PROCEDURE — 6360000002 HC RX W HCPCS

## 2023-07-03 RX ADMIN — IOHEXOL 10 ML: 180 INJECTION INTRAVENOUS at 09:33

## 2023-07-24 ENCOUNTER — OFFICE VISIT (OUTPATIENT)
Dept: PAIN MANAGEMENT | Age: 68
End: 2023-07-24
Payer: MEDICARE

## 2023-07-24 VITALS
WEIGHT: 135.4 LBS | HEART RATE: 65 BPM | SYSTOLIC BLOOD PRESSURE: 132 MMHG | BODY MASS INDEX: 21.85 KG/M2 | DIASTOLIC BLOOD PRESSURE: 82 MMHG | OXYGEN SATURATION: 95 %

## 2023-07-24 DIAGNOSIS — M54.12 CERVICAL RADICULOPATHY: ICD-10-CM

## 2023-07-24 DIAGNOSIS — Z51.81 ENCOUNTER FOR THERAPEUTIC DRUG MONITORING: ICD-10-CM

## 2023-07-24 DIAGNOSIS — G89.4 CHRONIC PAIN SYNDROME: ICD-10-CM

## 2023-07-24 DIAGNOSIS — Z79.891 ENCOUNTER FOR LONG-TERM OPIATE ANALGESIC USE: ICD-10-CM

## 2023-07-24 DIAGNOSIS — M47.816 SPONDYLOSIS OF LUMBAR REGION WITHOUT MYELOPATHY OR RADICULOPATHY: ICD-10-CM

## 2023-07-24 DIAGNOSIS — M96.1 POSTLAMINECTOMY SYNDROME, CERVICAL REGION: ICD-10-CM

## 2023-07-24 DIAGNOSIS — M47.817 LUMBOSACRAL SPONDYLOSIS WITHOUT MYELOPATHY: ICD-10-CM

## 2023-07-24 PROCEDURE — 1123F ACP DISCUSS/DSCN MKR DOCD: CPT | Performed by: NURSE PRACTITIONER

## 2023-07-24 PROCEDURE — 99213 OFFICE O/P EST LOW 20 MIN: CPT | Performed by: NURSE PRACTITIONER

## 2023-07-24 RX ORDER — HYDROCODONE BITARTRATE AND ACETAMINOPHEN 5; 325 MG/1; MG/1
1 TABLET ORAL EVERY 8 HOURS PRN
Qty: 84 TABLET | Refills: 0 | Status: SHIPPED | OUTPATIENT
Start: 2023-08-02 | End: 2023-08-30

## 2023-07-24 RX ORDER — TIZANIDINE 4 MG/1
4 TABLET ORAL 2 TIMES DAILY PRN
Qty: 60 TABLET | Refills: 0 | Status: SHIPPED | OUTPATIENT
Start: 2023-07-24 | End: 2023-08-23

## 2023-07-24 NOTE — PROGRESS NOTES
UDT:                          Date of last UDT:                           Adverse report: No;               OARRS:                          Checked today: Yes                          Adverse report: No    IMPRESSION:   1. Cervical radiculopathy    2. Spondylosis of lumbar region without myelopathy or radiculopathy    3. Encounter for therapeutic drug monitoring    4. Chronic pain syndrome    5. Lumbosacral spondylosis without myelopathy    6. Postlaminectomy syndrome, cervical region    7. Encounter for long-term opiate analgesic use        PLAN:  Informed verbal consent was obtained:  -MARGO on July 3rd has improved radicular pain and paresthesia of the RUE over 60%. She reports continued right sided pain on the neck over her cervical facets. Patient is interested in cervical MBB on the right side with consideration of cervical RFA. Plan for [facility] to [facility] for right sided CMBB C4-C7 for severe facet disease contributing to cervical canal stenosis  -tizanidine helping nighttime leg pains, she would like to continue  -refill Norco 7.5mg TID PRN pain  -f/u 4 weeks for reassessment     Analgesic Plan:              Continue present regimen: yes              Adjust dose of present analgesic:no              Switch analgesics:no              Add/Adjust concomitant therapy:no      Current Outpatient Medications   Medication Sig Dispense Refill    HYDROcodone-acetaminophen (NORCO) 5-325 MG per tablet Take 1 tablet by mouth every 8 hours as needed for Pain for up to 28 days.  84 tablet 0    tiZANidine (ZANAFLEX) 4 MG tablet Take 1 tablet by mouth 2 times daily as needed (leg spasms) 60 tablet 0    docusate (COLACE, DULCOLAX) 100 MG CAPS Take 100 mg by mouth 2 times daily 60 capsule 1    pantoprazole (PROTONIX) 40 MG tablet Take 1 tablet by mouth daily      rosuvastatin (CRESTOR) 5 MG tablet       traZODone (DESYREL) 50 MG tablet TAKE 1 TABLET BY MOUTH AT BEDTIME      naloxone (NARCAN) 4 MG/0.1ML

## 2023-08-01 ENCOUNTER — TELEPHONE (OUTPATIENT)
Dept: PAIN MANAGEMENT | Age: 68
End: 2023-08-01

## 2023-08-01 NOTE — TELEPHONE ENCOUNTER
[facility] called and said they don't do cervical median branch blocks.  An order will have to be sent to another facility         Please advise

## 2023-08-28 ENCOUNTER — OFFICE VISIT (OUTPATIENT)
Dept: PAIN MANAGEMENT | Age: 68
End: 2023-08-28
Payer: MEDICARE

## 2023-08-28 VITALS
HEART RATE: 55 BPM | OXYGEN SATURATION: 97 % | SYSTOLIC BLOOD PRESSURE: 134 MMHG | DIASTOLIC BLOOD PRESSURE: 67 MMHG | WEIGHT: 132 LBS | BODY MASS INDEX: 21.31 KG/M2

## 2023-08-28 DIAGNOSIS — M47.816 SPONDYLOSIS OF LUMBAR REGION WITHOUT MYELOPATHY OR RADICULOPATHY: ICD-10-CM

## 2023-08-28 DIAGNOSIS — Z79.891 ENCOUNTER FOR LONG-TERM OPIATE ANALGESIC USE: ICD-10-CM

## 2023-08-28 DIAGNOSIS — G89.4 CHRONIC PAIN SYNDROME: ICD-10-CM

## 2023-08-28 DIAGNOSIS — M96.1 POSTLAMINECTOMY SYNDROME, CERVICAL REGION: ICD-10-CM

## 2023-08-28 DIAGNOSIS — M54.12 CERVICAL RADICULOPATHY: ICD-10-CM

## 2023-08-28 DIAGNOSIS — M47.817 LUMBOSACRAL SPONDYLOSIS WITHOUT MYELOPATHY: ICD-10-CM

## 2023-08-28 DIAGNOSIS — Z51.81 ENCOUNTER FOR THERAPEUTIC DRUG MONITORING: ICD-10-CM

## 2023-08-28 PROCEDURE — 1123F ACP DISCUSS/DSCN MKR DOCD: CPT | Performed by: NURSE PRACTITIONER

## 2023-08-28 PROCEDURE — 99213 OFFICE O/P EST LOW 20 MIN: CPT | Performed by: NURSE PRACTITIONER

## 2023-08-28 RX ORDER — HYDROCODONE BITARTRATE AND ACETAMINOPHEN 7.5; 325 MG/1; MG/1
1 TABLET ORAL EVERY 8 HOURS PRN
Qty: 84 TABLET | Refills: 0 | Status: SHIPPED | OUTPATIENT
Start: 2023-08-28 | End: 2023-09-25

## 2023-08-28 RX ORDER — PSEUDOEPHEDRINE HCL 30 MG
100 TABLET ORAL 2 TIMES DAILY
Qty: 60 CAPSULE | Refills: 1 | Status: CANCELLED | OUTPATIENT
Start: 2023-08-28

## 2023-08-28 RX ORDER — TIZANIDINE 4 MG/1
4 TABLET ORAL 2 TIMES DAILY PRN
Qty: 60 TABLET | Refills: 0 | Status: SHIPPED | OUTPATIENT
Start: 2023-08-28 | End: 2023-09-27

## 2023-08-28 RX ORDER — HYDROCODONE BITARTRATE AND ACETAMINOPHEN 5; 325 MG/1; MG/1
1 TABLET ORAL EVERY 8 HOURS PRN
Qty: 84 TABLET | Refills: 0 | Status: CANCELLED | OUTPATIENT
Start: 2023-08-30 | End: 2023-09-27

## 2023-08-28 RX ORDER — DICYCLOMINE HCL 20 MG
20 TABLET ORAL 3 TIMES DAILY
COMMUNITY
Start: 2023-08-24

## 2023-08-28 NOTE — PROGRESS NOTES
Magdalene Vijaybob  1955  [id number]      HISTORY OF PRESENT ILLNESS: Ms. Giselle Celestin is a 78 y.o. female returns for a follow up visit for pain management  She has a diagnosis of   1. Cervical radiculopathy    2. Spondylosis of lumbar region without myelopathy or radiculopathy    3. Encounter for therapeutic drug monitoring    4. Chronic pain syndrome    5. Lumbosacral spondylosis without myelopathy    6. Postlaminectomy syndrome, cervical region    7. Encounter for long-term opiate analgesic use    . As per Information Obtained from the PADT (Patient Assessment and Documentation Tool)    She complains of pain in the lower back. She rates the pain 6/10 and describes it as sharp. Current treatment regimen has helped relieve about 50% of the pain. She denies any side effects from the current pain regimen. Patient reports that since the last follow up visit the physical functioning is unchanged, family/social relationships are unchanged, mood is unchanged sleep patterns are unchanged, and that the overall functioning is unchanged. Patient denies misusing/abusing her narcotic pain medications or using any illegal drugs. Upon obtaining medical history from Ms. Giselle Celestin    ALLERGIES: Patients list of allergies were reviewed     MEDICATIONS: Ms. Giselle Celestin list of medications were reviewed. Her current medications are   Outpatient Medications Prior to Visit   Medication Sig Dispense Refill    dicyclomine (BENTYL) 20 MG tablet Take 1 tablet by mouth 3 times daily      HYDROcodone-acetaminophen (NORCO) 5-325 MG per tablet Take 1 tablet by mouth every 8 hours as needed for Pain for up to 28 days.  84 tablet 0    docusate (COLACE, DULCOLAX) 100 MG CAPS Take 100 mg by mouth 2 times daily 60 capsule 1    pantoprazole (PROTONIX) 40 MG tablet Take 1 tablet by mouth daily      rosuvastatin (CRESTOR) 5 MG tablet       traZODone (DESYREL) 50 MG tablet TAKE 1 TABLET BY MOUTH AT BEDTIME      naloxone (NARCAN) 4 MG/0.1ML LIQD

## 2023-09-05 ENCOUNTER — TELEPHONE (OUTPATIENT)
Dept: PAIN MANAGEMENT | Age: 68
End: 2023-09-05

## 2023-09-05 NOTE — TELEPHONE ENCOUNTER
Patient called and states that Ana Golden had order for her to have JUANJO,but she never received a call to schedule.      Last order for a JUANJO injection was 07/28/2023, please advise

## 2023-09-06 NOTE — TELEPHONE ENCOUNTER
Per Emperatriz Anna @ [facility]: Thank you  for referring, Christy Sam, 1955, for a cervical medial branch block. Unfortunately, we don't perform cervical medial branch blocks or ablations.

## 2023-09-12 RX ORDER — TIZANIDINE 4 MG/1
TABLET ORAL
Qty: 60 TABLET | Refills: 0 | OUTPATIENT
Start: 2023-09-12

## 2023-09-25 ENCOUNTER — OFFICE VISIT (OUTPATIENT)
Dept: PAIN MANAGEMENT | Age: 68
End: 2023-09-25

## 2023-09-25 VITALS
OXYGEN SATURATION: 93 % | WEIGHT: 128.6 LBS | SYSTOLIC BLOOD PRESSURE: 131 MMHG | BODY MASS INDEX: 20.76 KG/M2 | DIASTOLIC BLOOD PRESSURE: 73 MMHG | HEART RATE: 61 BPM

## 2023-09-25 DIAGNOSIS — M54.12 CERVICAL RADICULOPATHY: ICD-10-CM

## 2023-09-25 DIAGNOSIS — M96.1 POSTLAMINECTOMY SYNDROME, CERVICAL REGION: ICD-10-CM

## 2023-09-25 DIAGNOSIS — Z79.891 ENCOUNTER FOR LONG-TERM OPIATE ANALGESIC USE: ICD-10-CM

## 2023-09-25 DIAGNOSIS — G89.4 CHRONIC PAIN SYNDROME: Primary | ICD-10-CM

## 2023-09-25 DIAGNOSIS — Z51.81 ENCOUNTER FOR THERAPEUTIC DRUG MONITORING: ICD-10-CM

## 2023-09-25 DIAGNOSIS — M47.817 LUMBOSACRAL SPONDYLOSIS WITHOUT MYELOPATHY: ICD-10-CM

## 2023-09-25 DIAGNOSIS — M47.816 SPONDYLOSIS OF LUMBAR REGION WITHOUT MYELOPATHY OR RADICULOPATHY: ICD-10-CM

## 2023-09-25 RX ORDER — METHOCARBAMOL 500 MG/1
500 TABLET, FILM COATED ORAL 3 TIMES DAILY
Qty: 90 TABLET | Refills: 0 | Status: SHIPPED | OUTPATIENT
Start: 2023-09-25 | End: 2023-10-25

## 2023-09-25 RX ORDER — HYDROCODONE BITARTRATE AND ACETAMINOPHEN 7.5; 325 MG/1; MG/1
1 TABLET ORAL EVERY 6 HOURS PRN
Qty: 112 TABLET | Refills: 0 | Status: SHIPPED | OUTPATIENT
Start: 2023-09-25 | End: 2023-10-23

## 2023-09-25 NOTE — PROGRESS NOTES
Date of Last Medication Agreement: 01/16/2023              Date Naloxone prescribed:09/26/2022              UDT:                          Date of last UDT: 04/10/2023                          Adverse report: No              OARRS:                          Checked today: Yes                          Adverse report: No    IMPRESSION:   1. Chronic pain syndrome    2. Cervical radiculopathy    3. Spondylosis of lumbar region without myelopathy or radiculopathy    4. Encounter for therapeutic drug monitoring    5. Lumbosacral spondylosis without myelopathy    6. Postlaminectomy syndrome, cervical region    7. Encounter for long-term opiate analgesic use        PLAN:  Informed verbal consent was obtained:  -Patient reports worsening pain with working. She continues to work at [employer] With severe lumbar central canal stenosis that affects her ability to walk. She admits that some days she takes an extra tab of Norco she is interested in repeat injections. She had 2 successful lumbar medial branch blocks on the right L3-S1. Patient does complain of worsening low back pain and would like to proceed with a lumbar for radiofrequency. Plan for right-sided lumbar RFA L3-S1  -refill Norco 7.5mg QID PRN pain  -refill robaxin  -She was unable to have cervical medial branch block as this was not a procedure offered [facility].   Patient does complain of worsening low back pain and would like to proceed with a lumbar for radiofrequency  -f/u 4 weeks for reassessment     Analgesic Plan:              Continue present regimen: yes              Adjust dose of present analgesic:no              Switch analgesics:no              Add/Adjust concomitant therapy:no      Current Outpatient Medications   Medication Sig Dispense Refill    dicyclomine (BENTYL) 20 MG tablet Take 1 tablet by mouth 3 times daily      HYDROcodone-acetaminophen (NORCO) 7.5-325 MG per tablet Take 1 tablet by mouth every 8 hours as needed for Pain

## 2023-10-09 ENCOUNTER — HOSPITAL ENCOUNTER (OUTPATIENT)
Dept: INTERVENTIONAL RADIOLOGY/VASCULAR | Age: 68
Discharge: HOME | End: 2023-10-09
Payer: MEDICARE

## 2023-10-09 DIAGNOSIS — M43.16 SPONDYLOLISTHESIS OF LUMBAR REGION: ICD-10-CM

## 2023-10-09 PROCEDURE — 2709999900 HC NON-CHARGEABLE SUPPLY

## 2023-10-09 PROCEDURE — 2500000003 HC RX 250 WO HCPCS

## 2023-10-09 PROCEDURE — 6360000002 HC RX W HCPCS

## 2023-10-09 PROCEDURE — 64636 DESTROY L/S FACET JNT ADDL: CPT

## 2023-10-09 PROCEDURE — 64635 DESTROY LUMB/SAC FACET JNT: CPT

## 2023-10-09 NOTE — DISCHARGE INSTRUCTIONS
Lumbar Radiofrequency Ablation  Patient Discharge Instructions  When [address] [facility] should not drive the day of the procedure. You may experience leg weakness during the first 24 hours following the procedure. To prevent yourself from falling, it is important to have someone help you walk. However, you do not need to stay in bed when you get home. In fact, it is best to walk around if you feel up to it, but you will need assistance during the first 24 hours following the epidural steroid injection. Even if you feel better right away, avoid activities that may strain your back. Keep in mind that most patients feel increased pain for the first 24 hours. You should start feeling some pain relief 2-3 days following the injection. This is because the steroid will start working within three days of the injection with maximal effect by one week. At that time, we will evaluate your pain level to determine the need for another steroid injection. Remove bandaid(s) within 24 hours. When to Call Your Doctor    Call right away if you notice any of the following symptoms:    Severe pain or headache;  Fever or chills; Redness or swelling around the injection site. Loss of bladder or bowel control. You may contact [address] [place] [address] for any questions or problems that may occur at [phone] during the hours of 9am-5pm Monday-Friday, or the hospital  after hours at [phone], to have the interventional radiologist on call paged. [facility]  Cardiovascular Special Procedures  General Discharge Instructions  DIETARY INSTRUCTIONS:    ____ Drink extra fluids over the next 24 hours (If not contraindicated by illness or by                   physician order)  ____ Start with clear liquids and progress to normal diet as you feel like eating.   If you experience nausea or repeated episodes of vomiting, which persist beyond 12-24 hours, notify your doctor        __x__

## 2023-10-30 ENCOUNTER — OFFICE VISIT (OUTPATIENT)
Dept: PAIN MANAGEMENT | Age: 68
End: 2023-10-30
Payer: MEDICARE

## 2023-10-30 VITALS
BODY MASS INDEX: 21.34 KG/M2 | DIASTOLIC BLOOD PRESSURE: 77 MMHG | OXYGEN SATURATION: 97 % | SYSTOLIC BLOOD PRESSURE: 130 MMHG | WEIGHT: 132.2 LBS | HEART RATE: 70 BPM

## 2023-10-30 DIAGNOSIS — Z51.81 ENCOUNTER FOR THERAPEUTIC DRUG MONITORING: ICD-10-CM

## 2023-10-30 DIAGNOSIS — G89.4 CHRONIC PAIN SYNDROME: ICD-10-CM

## 2023-10-30 DIAGNOSIS — M54.12 CERVICAL RADICULOPATHY: ICD-10-CM

## 2023-10-30 DIAGNOSIS — M47.817 LUMBOSACRAL SPONDYLOSIS WITHOUT MYELOPATHY: ICD-10-CM

## 2023-10-30 DIAGNOSIS — M47.816 SPONDYLOSIS OF LUMBAR REGION WITHOUT MYELOPATHY OR RADICULOPATHY: ICD-10-CM

## 2023-10-30 DIAGNOSIS — Z79.891 ENCOUNTER FOR LONG-TERM OPIATE ANALGESIC USE: ICD-10-CM

## 2023-10-30 DIAGNOSIS — M96.1 POSTLAMINECTOMY SYNDROME, CERVICAL REGION: ICD-10-CM

## 2023-10-30 PROCEDURE — 1123F ACP DISCUSS/DSCN MKR DOCD: CPT | Performed by: NURSE PRACTITIONER

## 2023-10-30 PROCEDURE — 99213 OFFICE O/P EST LOW 20 MIN: CPT | Performed by: NURSE PRACTITIONER

## 2023-10-30 RX ORDER — HYDROCODONE BITARTRATE AND ACETAMINOPHEN 7.5; 325 MG/1; MG/1
1 TABLET ORAL EVERY 6 HOURS PRN
Qty: 112 TABLET | Refills: 0 | Status: SHIPPED | OUTPATIENT
Start: 2023-10-30 | End: 2023-11-27

## 2023-10-30 RX ORDER — TIZANIDINE 4 MG/1
4 TABLET ORAL 2 TIMES DAILY PRN
Qty: 60 TABLET | Refills: 0 | Status: SHIPPED | OUTPATIENT
Start: 2023-10-30 | End: 2023-11-29

## 2023-10-30 RX ORDER — GLYCOPYRROLATE 2 MG/1
2 TABLET ORAL 2 TIMES DAILY
COMMUNITY
Start: 2023-10-26

## 2023-10-30 NOTE — PROGRESS NOTES
Jacquelin Sandovalt  1955  [id number]      HISTORY OF PRESENT ILLNESS: Ms. Neda Bustillo is a 78 y.o. female returns for a follow up visit for pain management  She has a diagnosis of   1. Cervical radiculopathy    2. Spondylosis of lumbar region without myelopathy or radiculopathy    3. Encounter for therapeutic drug monitoring    4. Chronic pain syndrome    5. Lumbosacral spondylosis without myelopathy    6. Postlaminectomy syndrome, cervical region    7. Encounter for long-term opiate analgesic use    . New Medications since Last Office visit have been reviewed with patient. As per Information Obtained from the PADT (Patient Assessment and Documentation Tool)    She complains of pain in the neck, lower back. She rates the pain 4/10 and describes it as sharp, aching. Current treatment regimen has helped relieve about 40% of the pain since beginning treatment plan. She denies any side effects from the current pain regimen. Patient reports that since implementation of their treatment plan; their physical functioning is unchanged, family/social relationships are unchanged, mood is unchanged sleep patterns are unchanged, and that the overall functioning is unchanged. Patient denies/admits that any of the above have changed since last office visit. Patient denies misusing/abusing her narcotic pain medications or using any illegal drugs. Upon obtaining medical history from Ms. Neda Bustillo    ALLERGIES: Patients list of allergies were reviewed     MEDICATIONS: Ms. Neda Bustillo list of medications were reviewed. Her current medications are   Outpatient Medications Prior to Visit   Medication Sig Dispense Refill    glycopyrrolate (ROBINUL) 2 MG tablet Take 1 tablet by mouth 2 times daily      dicyclomine (BENTYL) 20 MG tablet Take 1 tablet by mouth 3 times daily      pantoprazole (PROTONIX) 40 MG tablet Take 1 tablet by mouth daily      rosuvastatin (CRESTOR) 5 MG tablet       traZODone (DESYREL) 50 MG tablet TAKE 1

## 2023-10-31 RX ORDER — METHOCARBAMOL 500 MG/1
500 TABLET, FILM COATED ORAL 3 TIMES DAILY
Qty: 90 TABLET | Refills: 0 | OUTPATIENT
Start: 2023-10-31

## 2023-11-02 ENCOUNTER — TELEPHONE (OUTPATIENT)
Dept: PAIN MANAGEMENT | Age: 68
End: 2023-11-02

## 2023-11-02 NOTE — TELEPHONE ENCOUNTER
Patient called asking about her referral to [facility] in [place] for her JUANJO. PLEASE give patient a call back.       Please advice

## 2023-11-20 NOTE — DISCHARGE INSTRUCTIONS
[facility] [employer]  Cardiovascular Special Procedures  Cervical Epidural Steroid Injection  Patient Discharge Instructions      When [address] [facility] should not drive the day of the procedure. You may experience arm weakness during the first 24 hours following the procedure. However, you do not need to stay in bed when you get home. Even if you feel better right away, avoid activities that may strain your neck. Keep in mind that some patients may feel increased pain for the first 24 hours. You should start feeling some pain relief 3-7 days following the injection. This is because the steroid will start working within three days of the injection with maximal effect by one week. At that time, we will evaluate your pain level to determine the need for another steroid injection. Remove bandaid(s) within 24 hours. When to Call Your Doctor    Call right away if you notice any of the following symptoms:    Severe pain or headache;  Fever or chills; Redness or swelling around the injection site. You may contact [address] [place] [address] for any questions or problems that may occur at [phone] during the hours of 9am-4pm Monday-Friday, or the hospital  after hours at [phone], to have the interventional radiologist on call paged. [facility]  Cardiovascular Special Procedures  General Discharge Instructions    ____ You may be drowsy or lightheaded after receiving sedation. DO NOT operate a vehicle (automobile, bicycle, motorcycle, machinery, or power tools), make any important decisions or sign any important/legal documents, or drink alcoholic beverages for the next 24 hours  _x___ We strongly suggest that a responsible adult be with you for the next 24 hours for your protection and safety  ____ If the intravenous catheter site is painful, apply warm wet compresses on the site until the soreness is relieved and elevate the arm above the heart.   Call your

## 2023-11-21 ENCOUNTER — HOSPITAL ENCOUNTER (OUTPATIENT)
Dept: INTERVENTIONAL RADIOLOGY/VASCULAR | Age: 68
Discharge: HOME | End: 2023-11-21
Payer: MEDICARE

## 2023-11-21 DIAGNOSIS — M54.12 CERVICAL RADICULOPATHY: ICD-10-CM

## 2023-11-21 PROCEDURE — 6360000004 HC RX CONTRAST MEDICATION: Performed by: RADIOLOGY

## 2023-11-21 PROCEDURE — 6360000002 HC RX W HCPCS

## 2023-11-21 PROCEDURE — 2709999900 HC NON-CHARGEABLE SUPPLY

## 2023-11-21 PROCEDURE — 62321 NJX INTERLAMINAR CRV/THRC: CPT

## 2023-11-21 PROCEDURE — 2500000003 HC RX 250 WO HCPCS

## 2023-11-21 RX ADMIN — IOHEXOL 10 ML: 180 INJECTION INTRAVENOUS at 10:03

## 2023-11-22 NOTE — TELEPHONE ENCOUNTER
Pt called about script that pharmacy sent over is still not available.     Methocarbamol 500 mg    Please advise

## 2023-11-27 ENCOUNTER — OFFICE VISIT (OUTPATIENT)
Dept: PAIN MANAGEMENT | Age: 68
End: 2023-11-27
Payer: MEDICARE

## 2023-11-27 VITALS
SYSTOLIC BLOOD PRESSURE: 120 MMHG | HEART RATE: 67 BPM | WEIGHT: 125.8 LBS | BODY MASS INDEX: 20.3 KG/M2 | OXYGEN SATURATION: 96 % | DIASTOLIC BLOOD PRESSURE: 70 MMHG

## 2023-11-27 DIAGNOSIS — Z51.81 ENCOUNTER FOR THERAPEUTIC DRUG MONITORING: ICD-10-CM

## 2023-11-27 DIAGNOSIS — G89.4 CHRONIC PAIN SYNDROME: ICD-10-CM

## 2023-11-27 DIAGNOSIS — K58.0 IRRITABLE BOWEL SYNDROME WITH DIARRHEA: Primary | ICD-10-CM

## 2023-11-27 DIAGNOSIS — M54.12 CERVICAL RADICULOPATHY: ICD-10-CM

## 2023-11-27 DIAGNOSIS — M47.816 SPONDYLOSIS OF LUMBAR REGION WITHOUT MYELOPATHY OR RADICULOPATHY: ICD-10-CM

## 2023-11-27 DIAGNOSIS — Z79.891 ENCOUNTER FOR LONG-TERM OPIATE ANALGESIC USE: ICD-10-CM

## 2023-11-27 DIAGNOSIS — M96.1 POSTLAMINECTOMY SYNDROME, CERVICAL REGION: ICD-10-CM

## 2023-11-27 DIAGNOSIS — M47.817 LUMBOSACRAL SPONDYLOSIS WITHOUT MYELOPATHY: ICD-10-CM

## 2023-11-27 PROCEDURE — 99214 OFFICE O/P EST MOD 30 MIN: CPT | Performed by: NURSE PRACTITIONER

## 2023-11-27 PROCEDURE — 1123F ACP DISCUSS/DSCN MKR DOCD: CPT | Performed by: NURSE PRACTITIONER

## 2023-11-27 RX ORDER — HYDROCODONE BITARTRATE AND ACETAMINOPHEN 7.5; 325 MG/1; MG/1
1 TABLET ORAL EVERY 6 HOURS PRN
Qty: 112 TABLET | Refills: 0 | Status: SHIPPED | OUTPATIENT
Start: 2023-11-27 | End: 2023-12-25

## 2023-11-27 RX ORDER — ROPINIROLE 0.25 MG/1
TABLET, FILM COATED ORAL
Qty: 60 TABLET | Refills: 0 | Status: SHIPPED | OUTPATIENT
Start: 2023-11-27

## 2023-11-27 RX ORDER — TIZANIDINE 4 MG/1
4 TABLET ORAL 2 TIMES DAILY PRN
Qty: 60 TABLET | Refills: 0 | Status: SHIPPED | OUTPATIENT
Start: 2023-11-27 | End: 2023-12-27

## 2023-11-27 NOTE — PROGRESS NOTES
Adverse report: No              OARRS:                          Checked today: Yes                          Adverse report: No    IMPRESSION:   1. Cervical radiculopathy    2. Spondylosis of lumbar region without myelopathy or radiculopathy    3. Encounter for therapeutic drug monitoring    4. Chronic pain syndrome    5. Lumbosacral spondylosis without myelopathy    6. Postlaminectomy syndrome, cervical region    7. Encounter for long-term opiate analgesic use        PLAN:  Informed verbal consent was obtained:  -Oct UDS reviewed today and cons  -pt admits she is taking 2 tabs at a time to get relief. D/w patient she  may need opiate holiday. No opiate increase. She was instructed to take 1/2 tabs QID to decrease tolerance. She verbalized understanding  -recent MARGO with [facility] radi[facility] that did decrease her burning and severe neck pain  -worsening leg spasms lateral knee bilaterally. This is at night and severe when sleeping, tizanidine helps mildly  -she continues to suffer from constant diarrhea and history of IBS, she is unhappy with her current GI doc r/t no return of her calls and SE from glycopyrrolate, this medication is causing severe dry mouth, refer to Dr. Mehran Lowry for second opinion   -add ropinerole to help with nighttime RLS and spasms  -Refill Norco 7.5mg QID PRN pain   -refill tizanidine PRN   -f/u 4 weeks for reassessment     Analgesic Plan:              Continue present regimen: yes              Adjust dose of present analgesic:no              Switch analgesics: no              Add/Adjust concomitant therapy:no      Current Outpatient Medications   Medication Sig Dispense Refill    glycopyrrolate (ROBINUL) 2 MG tablet Take 1 tablet by mouth 2 times daily      HYDROcodone-acetaminophen (NORCO) 7.5-325 MG per tablet Take 1 tablet by mouth every 6 hours as needed for Pain for up to 28 days.  Max Daily Amount: 4 tablets 112 tablet 0    tiZANidine (ZANAFLEX) 4 MG tablet Take 1 tablet by mouth 2

## 2023-12-01 ENCOUNTER — TELEPHONE (OUTPATIENT)
Dept: PAIN MANAGEMENT | Age: 68
End: 2023-12-01

## 2023-12-04 RX ORDER — METHOCARBAMOL 500 MG/1
500 TABLET, FILM COATED ORAL 3 TIMES DAILY
Qty: 90 TABLET | Refills: 0 | Status: SHIPPED | OUTPATIENT
Start: 2023-12-04 | End: 2023-12-21

## 2023-12-12 ENCOUNTER — HOSPITAL ENCOUNTER (OUTPATIENT)
Age: 68
Setting detail: OUTPATIENT SURGERY
Discharge: HOME | End: 2023-12-12
Attending: INTERNAL MEDICINE | Admitting: INTERNAL MEDICINE
Payer: MEDICARE

## 2023-12-12 ENCOUNTER — ANESTHESIA (OUTPATIENT)
Dept: ENDOSCOPY | Age: 68
End: 2023-12-12
Payer: MEDICARE

## 2023-12-12 ENCOUNTER — ANESTHESIA EVENT (OUTPATIENT)
Dept: ENDOSCOPY | Age: 68
End: 2023-12-12
Payer: MEDICARE

## 2023-12-12 VITALS
OXYGEN SATURATION: 94 % | HEIGHT: 66 IN | BODY MASS INDEX: 19.61 KG/M2 | HEART RATE: 80 BPM | WEIGHT: 122 LBS | TEMPERATURE: 98.2 F | SYSTOLIC BLOOD PRESSURE: 137 MMHG | DIASTOLIC BLOOD PRESSURE: 90 MMHG | RESPIRATION RATE: 16 BRPM

## 2023-12-12 DIAGNOSIS — R13.10 DYSPHAGIA, UNSPECIFIED TYPE: ICD-10-CM

## 2023-12-12 PROCEDURE — 3700000000 HC ANESTHESIA ATTENDED CARE: Performed by: INTERNAL MEDICINE

## 2023-12-12 PROCEDURE — 3700000001 HC ADD 15 MINUTES (ANESTHESIA): Performed by: INTERNAL MEDICINE

## 2023-12-12 PROCEDURE — 2709999900 HC NON-CHARGEABLE SUPPLY: Performed by: INTERNAL MEDICINE

## 2023-12-12 PROCEDURE — 7100000010 HC PHASE II RECOVERY - FIRST 15 MIN: Performed by: INTERNAL MEDICINE

## 2023-12-12 PROCEDURE — 7100000011 HC PHASE II RECOVERY - ADDTL 15 MIN: Performed by: INTERNAL MEDICINE

## 2023-12-12 PROCEDURE — 2500000003 HC RX 250 WO HCPCS

## 2023-12-12 PROCEDURE — 2580000003 HC RX 258: Performed by: ANESTHESIOLOGY

## 2023-12-12 PROCEDURE — 3609012400 HC EGD TRANSORAL BIOPSY SINGLE/MULTIPLE: Performed by: INTERNAL MEDICINE

## 2023-12-12 PROCEDURE — 6360000002 HC RX W HCPCS

## 2023-12-12 RX ORDER — SODIUM CHLORIDE, SODIUM LACTATE, POTASSIUM CHLORIDE, CALCIUM CHLORIDE 600; 310; 30; 20 MG/100ML; MG/100ML; MG/100ML; MG/100ML
INJECTION, SOLUTION INTRAVENOUS CONTINUOUS
Status: DISCONTINUED | OUTPATIENT
Start: 2023-12-12 | End: 2023-12-12 | Stop reason: HOSPADM

## 2023-12-12 RX ORDER — DESVENLAFAXINE 100 MG/1
100 TABLET, EXTENDED RELEASE ORAL DAILY
COMMUNITY
Start: 2023-11-27

## 2023-12-12 RX ORDER — BUPROPION HYDROCHLORIDE 300 MG/1
300 TABLET ORAL DAILY
COMMUNITY
Start: 2023-04-17

## 2023-12-12 RX ORDER — LIDOCAINE HYDROCHLORIDE 20 MG/ML
INJECTION, SOLUTION EPIDURAL; INFILTRATION; INTRACAUDAL; PERINEURAL PRN
Status: DISCONTINUED | OUTPATIENT
Start: 2023-12-12 | End: 2023-12-12 | Stop reason: SDUPTHER

## 2023-12-12 RX ORDER — PROPOFOL 10 MG/ML
INJECTION, EMULSION INTRAVENOUS PRN
Status: DISCONTINUED | OUTPATIENT
Start: 2023-12-12 | End: 2023-12-12 | Stop reason: SDUPTHER

## 2023-12-12 RX ADMIN — LIDOCAINE HYDROCHLORIDE 50 MG: 20 INJECTION, SOLUTION EPIDURAL; INFILTRATION; INTRACAUDAL; PERINEURAL at 11:49

## 2023-12-12 RX ADMIN — PROPOFOL 50 MG: 10 INJECTION, EMULSION INTRAVENOUS at 11:51

## 2023-12-12 RX ADMIN — SODIUM CHLORIDE, POTASSIUM CHLORIDE, SODIUM LACTATE AND CALCIUM CHLORIDE: 600; 310; 30; 20 INJECTION, SOLUTION INTRAVENOUS at 09:45

## 2023-12-12 RX ADMIN — PROPOFOL 50 MG: 10 INJECTION, EMULSION INTRAVENOUS at 11:49

## 2023-12-12 RX ADMIN — PROPOFOL 150 MCG/KG/MIN: 10 INJECTION, EMULSION INTRAVENOUS at 11:50

## 2023-12-12 ASSESSMENT — PAIN DESCRIPTION - LOCATION
LOCATION: CHEST
LOCATION: CHEST

## 2023-12-12 ASSESSMENT — PAIN DESCRIPTION - ORIENTATION: ORIENTATION: RIGHT;LEFT

## 2023-12-12 ASSESSMENT — PAIN DESCRIPTION - DESCRIPTORS
DESCRIPTORS: SORE;DISCOMFORT
DESCRIPTORS: CRAMPING
DESCRIPTORS: DISCOMFORT;SORE

## 2023-12-12 ASSESSMENT — PAIN DESCRIPTION - PAIN TYPE: TYPE: CHRONIC PAIN

## 2023-12-12 ASSESSMENT — PAIN DESCRIPTION - ONSET: ONSET: ON-GOING

## 2023-12-12 ASSESSMENT — PAIN - FUNCTIONAL ASSESSMENT: PAIN_FUNCTIONAL_ASSESSMENT: 0-10

## 2023-12-12 ASSESSMENT — PAIN DESCRIPTION - FREQUENCY: FREQUENCY: CONTINUOUS

## 2023-12-12 ASSESSMENT — PAIN SCALES - GENERAL
PAINLEVEL_OUTOF10: 6
PAINLEVEL_OUTOF10: 6

## 2023-12-12 NOTE — ANESTHESIA PRE PROCEDURE
Department of Anesthesiology  Preprocedure Note       Name:  Bobby Wallis   Age:  80 y.o.  :  1955                                          MRN:  [id number]         Date:  2023      Surgeon: Lisa Alvarenga):  Kristina Young MD    Procedure: Procedure(s):  ESOPHAGOGASTRODUODENOSCOPY    Medications prior to admission:   Prior to Admission medications    Medication Sig Start Date End Date Taking? Authorizing Provider   methocarbamol (ROBAXIN) 500 MG tablet Take 1 tablet by mouth 3 times daily 12/4/23 1/3/24  [facility], APRN - CNP   HYDROcodone-acetaminophen (NORCO) 7.5-325 MG per tablet Take 1 tablet by mouth every 6 hours as needed for Pain for up to 28 days.  Max Daily Amount: 4 tablets 23  [facility], APRN - CNP   tiZANidine (ZANAFLEX) 4 MG tablet Take 1 tablet by mouth 2 times daily as needed (leg spasms) 23  [facility], APRN - CNP   rOPINIRole (REQUIP) 0.25 MG tablet Take 1-2 tabs as needed as bedtime for restless legs 23   [facility], APRN - CNP   glycopyrrolate (ROBINUL) 2 MG tablet Take 1 tablet by mouth 2 times daily 10/26/23   Aylin Uribe MD   pantoprazole (PROTONIX) 40 MG tablet Take 1 tablet by mouth daily 23   Aylin Uribe MD   rosuvastatin (CRESTOR) 5 MG tablet  10/28/22   Aylin Uribe MD   traZODone (DESYREL) 50 MG tablet TAKE 1 TABLET BY MOUTH AT BEDTIME 22   Aylin Uribe MD   famciclovir (FAMVIR) 250 MG tablet Take 1 tablet by mouth daily 18   Aylin Uribe MD   venlafaxine (EFFEXOR) 100 MG tablet Take 1 tablet by mouth 2 times daily    Aylin Uribe MD   multivitamin (Sayra Reil) per tablet Take 1 tablet by mouth daily    Aylin Uribe MD       Current medications:    Current Facility-Administered Medications   Medication Dose Route Frequency Provider Last Rate Last Admin    lactated ringers IV soln infusion   IntraVENous Continuous Wilfrido Anchors Britton Smith MD           Allergies:

## 2023-12-12 NOTE — ANESTHESIA POSTPROCEDURE EVALUATION
Department of Anesthesiology  Postprocedure Note    Patient: Erica Stewart  MRN: [id number]  YOB: 1955  Date of evaluation: 12/12/2023      Procedure Summary     Date: 12/12/23 Room / Location: [address] [place] [address] 03 / The [id number] [place] [address]    Anesthesia Start: 1133 Anesthesia Stop: 1159    Procedure: EGD BIOPSY Diagnosis:       Dysphagia, unspecified type      (Dysphagia, unspecified type [R13.10])    Surgeons: Jairo Breen MD Responsible Provider: Gayathri Beltran MD    Anesthesia Type: MAC ASA Status: 2          Anesthesia Type: No value filed.     Anum Phase I: Anum Score: 10    Anum Phase II: Anum Score: 10      Anesthesia Post Evaluation    Patient location during evaluation: PACU  Patient participation: complete - patient participated  Level of consciousness: awake  Pain score: 0  Airway patency: patent  Nausea & Vomiting: no nausea  Complications: no  Cardiovascular status: hemodynamically stable  Respiratory status: acceptable  Hydration status: stable  Pain management: satisfactory to patient

## 2023-12-12 NOTE — PROGRESS NOTES
Ambulatory Surgery/Procedure Discharge Note    Vitals:    12/12/23 1210   BP: (!) 137/90   Pulse: 80   Resp: 16   Temp:    SpO2: 94%   BP meets anastasia discharge criteria     In: 400 [I.V.:400]  Out: -     Restroom use offered before discharge. Yes    Pain assessment:  level of pain (1-10, 10 severe)  Pain Level: 6, pt states discomfort tolerable for discharge   Pt to Endoscopy recovery post EGD. Pt c/o chest discomfort, pt states due to cough, pt states pain tolerable for discharge. Discharge instructions given to pt and pt's sister and both state understanding of these instructions. Pt states that she is ready to go. \"         Patient discharged to home/self care.  Patient discharged via wheel chair by transporter to waiting family/S.O.       12/12/2023 12:55 PM

## 2023-12-13 NOTE — OP NOTE
[facility], [address] [place] [address]                                OPERATIVE REPORT    PATIENT NAME: Cleo Smalls                   :        1955  MED REC NO:   [id number]                          ROOM:  ACCOUNT NO:   [de-identified]                           ADMIT DATE: 2023  PROVIDER:     Shravan Craft MD    DATE OF PROCEDURE:  2023    SURGEON:  Shravan Craft MD    INDICATION FOR PROCEDURE:  A 71-year-old woman who presented with  dysphagia and heartburn. DESCRIPTION OF THE PROCEDURE:  With the patient in the left lateral  position and after sedation with IV Diprivan, the Olympus video  endoscope was introduced into the esophagus and advanced towards the  gastroesophageal junction where small hiatus hernia and short-segment  English's esophagus were seen. Biopsies were obtained. There was no  sign of stricture or neoplasm. Stomach was carefully inspected. It was  normal.  Biopsies were obtained for Helicobacter pylori from the antrum. The duodenum was normal.  Scope was then removed without complication. IMPRESSION:  1. Small hiatus hernia. 2.  Short-segment English's esophagus. ESTIMATED BLOOD LOSS:  None.         Shravan Craft MD    D: 2023 12:26:28       T: 2023 13:01:55     [email or web address]  Job#: [id number]     Doc#: [id number]    CC:

## 2023-12-28 RX ORDER — METHOCARBAMOL 500 MG/1
500 TABLET, FILM COATED ORAL 3 TIMES DAILY
Qty: 90 TABLET | Refills: 0 | OUTPATIENT
Start: 2023-12-28

## 2024-01-18 ENCOUNTER — TELEPHONE (OUTPATIENT)
Dept: PAIN MANAGEMENT | Age: 69
End: 2024-01-18

## 2024-01-18 NOTE — TELEPHONE ENCOUNTER
Reason for refill request: patient is sick and can't come to her appt. But is asking for a refill of her pain medication.      Name of medication: hydrocodone      Pharmacy name:   [facility] [address] [place] [address] - P [phone] - F [phone]         Phone number: [phone]      Last refill: 12/23/2023      Next appointment: 01/22/2024    Patient confirmed the above pharmacy has their medication in stock      Not sure

## 2024-01-18 NOTE — TELEPHONE ENCOUNTER
Her appointment is on Monday. She knows she will be sick on Monday? Per OARRS she didn't fill the December prescription for her pain medication. Is she aware of this?

## 2024-01-22 ENCOUNTER — OFFICE VISIT (OUTPATIENT)
Dept: PAIN MANAGEMENT | Age: 69
End: 2024-01-22
Payer: COMMERCIAL

## 2024-01-22 VITALS
OXYGEN SATURATION: 96 % | DIASTOLIC BLOOD PRESSURE: 79 MMHG | BODY MASS INDEX: 21.14 KG/M2 | SYSTOLIC BLOOD PRESSURE: 138 MMHG | WEIGHT: 131 LBS | HEART RATE: 70 BPM

## 2024-01-22 DIAGNOSIS — Z79.891 ENCOUNTER FOR LONG-TERM OPIATE ANALGESIC USE: ICD-10-CM

## 2024-01-22 DIAGNOSIS — Z51.81 ENCOUNTER FOR THERAPEUTIC DRUG MONITORING: ICD-10-CM

## 2024-01-22 DIAGNOSIS — M47.817 LUMBOSACRAL SPONDYLOSIS WITHOUT MYELOPATHY: ICD-10-CM

## 2024-01-22 DIAGNOSIS — M47.816 SPONDYLOSIS OF LUMBAR REGION WITHOUT MYELOPATHY OR RADICULOPATHY: ICD-10-CM

## 2024-01-22 DIAGNOSIS — K58.0 IRRITABLE BOWEL SYNDROME WITH DIARRHEA: ICD-10-CM

## 2024-01-22 DIAGNOSIS — G89.4 CHRONIC PAIN SYNDROME: ICD-10-CM

## 2024-01-22 DIAGNOSIS — M96.1 POSTLAMINECTOMY SYNDROME, CERVICAL REGION: ICD-10-CM

## 2024-01-22 DIAGNOSIS — M54.12 CERVICAL RADICULOPATHY: ICD-10-CM

## 2024-01-22 PROCEDURE — 1123F ACP DISCUSS/DSCN MKR DOCD: CPT | Performed by: NURSE PRACTITIONER

## 2024-01-22 PROCEDURE — 99213 OFFICE O/P EST LOW 20 MIN: CPT | Performed by: NURSE PRACTITIONER

## 2024-01-22 RX ORDER — IBUPROFEN 600 MG/1
600 TABLET ORAL 2 TIMES DAILY PRN
Qty: 60 TABLET | Refills: 0 | Status: SHIPPED | OUTPATIENT
Start: 2024-01-22 | End: 2024-02-21

## 2024-01-22 RX ORDER — HYDROCODONE BITARTRATE AND ACETAMINOPHEN 7.5; 325 MG/1; MG/1
1 TABLET ORAL EVERY 6 HOURS PRN
Qty: 112 TABLET | Refills: 0 | Status: SHIPPED | OUTPATIENT
Start: 2024-01-22 | End: 2024-02-19

## 2024-01-22 RX ORDER — ROPINIROLE 0.25 MG/1
TABLET, FILM COATED ORAL
Qty: 60 TABLET | Refills: 1 | Status: SHIPPED | OUTPATIENT
Start: 2024-01-22

## 2024-01-22 RX ORDER — METHOCARBAMOL 500 MG/1
500 TABLET, FILM COATED ORAL 3 TIMES DAILY
Qty: 90 TABLET | Refills: 1 | Status: SHIPPED | OUTPATIENT
Start: 2024-01-22 | End: 2024-03-22

## 2024-01-22 NOTE — PROGRESS NOTES
(EFFEXOR) 100 MG tablet Take 1 tablet by mouth 2 times daily      multivitamin (THERAGRAN) per tablet Take 1 tablet by mouth daily       No current facility-administered medications for this visit.     I will continue her current medication regimen  which is part of the above treatment schedule. It has been helping with Ms. Gold's chronic  medical problems which for this visit include:   Diagnoses of Cervical radiculopathy, Spondylosis of lumbar region without myelopathy or radiculopathy, Encounter for therapeutic drug monitoring, Chronic pain syndrome, Lumbosacral spondylosis without myelopathy, Postlaminectomy syndrome, cervical region, Encounter for long-term opiate analgesic use, and Irritable bowel syndrome with diarrhea were pertinent to this visit.   Risks and benefits of the medications and other alternative treatments  including no treatment were discussed with the patient.The common side effects of these medications were also explained to the patient.  Informed verbal consent was obtained.   Goals of current treatment regimen include improvement in pain, restoration of functioning- with focus on improvement in physical performance, general activity, work or disability,emotional distress, health care utilization and  decreased medication consumption. Will continue to monitor progress towards achieving/maintaining therapeutic goals with special emphasis on  1. Improvement in perceived interfernce  of pain with ADL's. Ability to do home exercises independently. Ability to do household chores indoor and/or outdoor work and social and leisure activities.Improve psychosocial and physical functioning. she is showing progression towards this treatment goal with the current regimen.   She was advised against drinking alcohol with the narcotic pain medicines, advised against driving or handling machinery while adjusting the dose of medicines or if having cognitive  issues related to the current medications.Risk

## 2024-02-19 ENCOUNTER — OFFICE VISIT (OUTPATIENT)
Dept: PAIN MANAGEMENT | Age: 69
End: 2024-02-19
Payer: COMMERCIAL

## 2024-02-19 VITALS
OXYGEN SATURATION: 94 % | WEIGHT: 130.2 LBS | SYSTOLIC BLOOD PRESSURE: 137 MMHG | DIASTOLIC BLOOD PRESSURE: 78 MMHG | HEART RATE: 72 BPM | BODY MASS INDEX: 21.01 KG/M2

## 2024-02-19 DIAGNOSIS — K58.0 IRRITABLE BOWEL SYNDROME WITH DIARRHEA: ICD-10-CM

## 2024-02-19 DIAGNOSIS — M47.817 LUMBOSACRAL SPONDYLOSIS WITHOUT MYELOPATHY: ICD-10-CM

## 2024-02-19 DIAGNOSIS — M54.12 CERVICAL RADICULOPATHY: ICD-10-CM

## 2024-02-19 DIAGNOSIS — G89.4 CHRONIC PAIN SYNDROME: ICD-10-CM

## 2024-02-19 DIAGNOSIS — M47.816 SPONDYLOSIS OF LUMBAR REGION WITHOUT MYELOPATHY OR RADICULOPATHY: ICD-10-CM

## 2024-02-19 DIAGNOSIS — M96.1 POSTLAMINECTOMY SYNDROME, CERVICAL REGION: Primary | ICD-10-CM

## 2024-02-19 DIAGNOSIS — Z51.81 ENCOUNTER FOR THERAPEUTIC DRUG MONITORING: ICD-10-CM

## 2024-02-19 DIAGNOSIS — Z79.891 ENCOUNTER FOR LONG-TERM OPIATE ANALGESIC USE: ICD-10-CM

## 2024-02-19 PROCEDURE — 99214 OFFICE O/P EST MOD 30 MIN: CPT | Performed by: NURSE PRACTITIONER

## 2024-02-19 PROCEDURE — 1123F ACP DISCUSS/DSCN MKR DOCD: CPT | Performed by: NURSE PRACTITIONER

## 2024-02-19 RX ORDER — METHOCARBAMOL 500 MG/1
500 TABLET, FILM COATED ORAL 3 TIMES DAILY
Qty: 90 TABLET | Refills: 1 | Status: CANCELLED | OUTPATIENT
Start: 2024-02-19 | End: 2024-04-19

## 2024-02-19 RX ORDER — HYDROCODONE BITARTRATE AND ACETAMINOPHEN 7.5; 325 MG/1; MG/1
1 TABLET ORAL EVERY 6 HOURS PRN
Qty: 112 TABLET | Refills: 0 | Status: SHIPPED | OUTPATIENT
Start: 2024-02-19 | End: 2024-03-18

## 2024-02-19 RX ORDER — IBUPROFEN 600 MG/1
600 TABLET ORAL 2 TIMES DAILY PRN
Qty: 60 TABLET | Refills: 0 | Status: SHIPPED | OUTPATIENT
Start: 2024-02-19 | End: 2024-03-20

## 2024-02-19 RX ORDER — ROPINIROLE 0.25 MG/1
TABLET, FILM COATED ORAL
Qty: 60 TABLET | Refills: 1 | Status: CANCELLED | OUTPATIENT
Start: 2024-02-19

## 2024-02-19 NOTE — PROGRESS NOTES
Kiarra Gold  1955  [id number]      HISTORY OF PRESENT ILLNESS: Ms. Gold is a 68 y.o. female returns for a follow up visit for pain management  She has a diagnosis of   1. Postlaminectomy syndrome, cervical region    2. Cervical radiculopathy    3. Spondylosis of lumbar region without myelopathy or radiculopathy    4. Encounter for therapeutic drug monitoring    5. Chronic pain syndrome    6. Lumbosacral spondylosis without myelopathy    7. Encounter for long-term opiate analgesic use    8. Irritable bowel syndrome with diarrhea    .      New Medications since Last Office visit have been reviewed with patient.     As per Information Obtained from the PADT (Patient Assessment and Documentation Tool)    She complains of pain in the neck, lower back. She rates the pain 5/10 and describes it as aching. Current treatment regimen has helped relieve about 40% of the pain since beginning treatment plan.  She denies any side effects from the current pain regimen. Patient reports that since implementation of their treatment plan; their physical functioning is unchanged, family/social relationships are unchanged, mood is unchanged sleep patterns are unchanged, and that the overall functioning is unchanged.  Patient denies/admits that any of the above have changed since last office visit. Patient denies misusing/abusing her narcotic pain medications or using any illegal drugs.      Upon obtaining medical history from Ms. Gold    ALLERGIES: Patients list of allergies were reviewed     MEDICATIONS: Ms. Gold list of medications were reviewed.Her current medications are   Outpatient Medications Prior to Visit   Medication Sig Dispense Refill    methocarbamol (ROBAXIN) 500 MG tablet Take 1 tablet by mouth 3 times daily 90 tablet 1    rOPINIRole (REQUIP) 0.25 MG tablet Take 1-2 tabs as needed as bedtime for restless legs 60 tablet 1    ibuprofen (ADVIL;MOTRIN) 600 MG tablet Take 1 tablet by mouth 2 times daily

## 2024-03-04 ENCOUNTER — HOSPITAL ENCOUNTER (OUTPATIENT)
Dept: INTERVENTIONAL RADIOLOGY/VASCULAR | Age: 69
Discharge: HOME | End: 2024-03-04
Payer: MEDICARE

## 2024-03-04 DIAGNOSIS — M54.16 LUMBAR RADICULOPATHY: ICD-10-CM

## 2024-03-04 PROCEDURE — 6360000002 HC RX W HCPCS

## 2024-03-04 PROCEDURE — 2500000003 HC RX 250 WO HCPCS

## 2024-03-04 PROCEDURE — 62321 NJX INTERLAMINAR CRV/THRC: CPT

## 2024-03-04 PROCEDURE — 6360000004 HC RX CONTRAST MEDICATION: Performed by: RADIOLOGY

## 2024-03-04 PROCEDURE — 2709999900 HC NON-CHARGEABLE SUPPLY

## 2024-03-04 RX ADMIN — IOHEXOL 10 ML: 180 INJECTION INTRAVENOUS at 11:41

## 2024-03-04 NOTE — PATIENT INSTRUCTIONS
Cervical Epidural Steroid Injection  Patient Discharge Instructions    When You Get Home    You should not drive the day of the procedure.  You may experience leg weakness during the first 24 hours following the procedure.  To prevent yourself from falling, it is important to have someone help you walk.  However, you do not need to stay in bed when you get home.  In fact, it is best to walk around if you feel up to it, but you will need assistance during the first 24 hours following the epidural steroid injection.   Even if you feel better right away, avoid activities that may strain your back.      Keep in mind that most patients feel increased pain for the first 24 hours.  You should start feeling some pain relief 2-3 days following the injection.  This is because the steroid will start working within three days of the injection with maximal effect by one week.  At that time, we will evaluate your pain level to determine the need for another steroid injection.    Remove bandaid(s) within 24 hours.       When to Call Your Doctor    Call right away if you notice any of the following symptoms:    Severe pain or headache;  Fever or chills;  Redness or swelling around the injection site.  Loss of bladder or bowel control.          You may contact [facility] [employer]. for any questions or problems that may occur at [phone] during the hours of 9am-5pm Monday-Friday, or the hospital  after hours at [phone], to have the interventional radiologist on call paged.      [facility]  Cardiovascular Special Procedures  General Discharge Instructions  DIETARY INSTRUCTIONS:    ____ Drink extra fluids over the next 24 hours (If not contraindicated by illness or by                   physician order)  ____ Start with clear liquids and progress to normal diet as you feel like eating.  If you experience nausea or repeated episodes of vomiting, which persist beyond 12-24 hours, notify your doctor

## 2024-03-18 ENCOUNTER — OFFICE VISIT (OUTPATIENT)
Dept: PAIN MANAGEMENT | Age: 69
End: 2024-03-18
Payer: MEDICARE

## 2024-03-18 VITALS
WEIGHT: 132 LBS | DIASTOLIC BLOOD PRESSURE: 71 MMHG | SYSTOLIC BLOOD PRESSURE: 129 MMHG | HEART RATE: 71 BPM | BODY MASS INDEX: 21.31 KG/M2 | OXYGEN SATURATION: 93 %

## 2024-03-18 DIAGNOSIS — G89.4 CHRONIC PAIN SYNDROME: ICD-10-CM

## 2024-03-18 DIAGNOSIS — Z79.891 ENCOUNTER FOR LONG-TERM OPIATE ANALGESIC USE: ICD-10-CM

## 2024-03-18 DIAGNOSIS — M96.1 POSTLAMINECTOMY SYNDROME, CERVICAL REGION: ICD-10-CM

## 2024-03-18 DIAGNOSIS — K58.0 IRRITABLE BOWEL SYNDROME WITH DIARRHEA: ICD-10-CM

## 2024-03-18 DIAGNOSIS — M54.12 CERVICAL RADICULOPATHY: ICD-10-CM

## 2024-03-18 DIAGNOSIS — M47.817 LUMBOSACRAL SPONDYLOSIS WITHOUT MYELOPATHY: ICD-10-CM

## 2024-03-18 DIAGNOSIS — Z51.81 ENCOUNTER FOR THERAPEUTIC DRUG MONITORING: ICD-10-CM

## 2024-03-18 DIAGNOSIS — M47.816 SPONDYLOSIS OF LUMBAR REGION WITHOUT MYELOPATHY OR RADICULOPATHY: ICD-10-CM

## 2024-03-18 PROCEDURE — 1123F ACP DISCUSS/DSCN MKR DOCD: CPT | Performed by: NURSE PRACTITIONER

## 2024-03-18 PROCEDURE — 99213 OFFICE O/P EST LOW 20 MIN: CPT | Performed by: NURSE PRACTITIONER

## 2024-03-18 RX ORDER — IBUPROFEN 600 MG/1
600 TABLET ORAL 2 TIMES DAILY PRN
Qty: 60 TABLET | Refills: 0 | Status: SHIPPED | OUTPATIENT
Start: 2024-03-18 | End: 2024-04-17

## 2024-03-18 RX ORDER — MAGNESIUM OXIDE 400 MG/1
400 TABLET ORAL 2 TIMES DAILY
Qty: 60 TABLET | Refills: 0 | Status: SHIPPED | OUTPATIENT
Start: 2024-03-18 | End: 2024-04-17

## 2024-03-18 RX ORDER — HYDROCODONE BITARTRATE AND ACETAMINOPHEN 7.5; 325 MG/1; MG/1
1 TABLET ORAL EVERY 6 HOURS PRN
Qty: 112 TABLET | Refills: 0 | Status: SHIPPED | OUTPATIENT
Start: 2024-03-18 | End: 2024-04-15

## 2024-03-18 RX ORDER — CYCLOBENZAPRINE HCL 10 MG
10 TABLET ORAL 2 TIMES DAILY PRN
Qty: 60 TABLET | Refills: 0 | Status: SHIPPED | OUTPATIENT
Start: 2024-03-18 | End: 2024-04-17

## 2024-03-18 NOTE — PROGRESS NOTES
needed for Pain 60 tablet 0    methocarbamol (ROBAXIN) 500 MG tablet Take 1 tablet by mouth 3 times daily 90 tablet 1    rOPINIRole (REQUIP) 0.25 MG tablet Take 1-2 tabs as needed as bedtime for restless legs 60 tablet 1    buPROPion (WELLBUTRIN XL) 300 MG extended release tablet Take 1 tablet by mouth daily      desvenlafaxine succinate (PRISTIQ) 100 MG TB24 extended release tablet Take 1 tablet by mouth daily      glycopyrrolate (ROBINUL) 2 MG tablet Take 1 tablet by mouth 2 times daily      pantoprazole (PROTONIX) 40 MG tablet Take 1 tablet by mouth daily      rosuvastatin (CRESTOR) 5 MG tablet       traZODone (DESYREL) 50 MG tablet TAKE 1 TABLET BY MOUTH AT BEDTIME      famciclovir (FAMVIR) 250 MG tablet Take 1 tablet by mouth daily      venlafaxine (EFFEXOR) 100 MG tablet Take 1 tablet by mouth 2 times daily      multivitamin (THERAGRAN) per tablet Take 1 tablet by mouth daily       No current facility-administered medications for this visit.     I will continue her current medication regimen  which is part of the above treatment schedule. It has been helping with Ms. Gold's chronic  medical problems which for this visit include:   Diagnoses of Cervical radiculopathy, Spondylosis of lumbar region without myelopathy or radiculopathy, Encounter for therapeutic drug monitoring, Chronic pain syndrome, Lumbosacral spondylosis without myelopathy, Postlaminectomy syndrome, cervical region, Encounter for long-term opiate analgesic use, and Irritable bowel syndrome with diarrhea were pertinent to this visit.   Risks and benefits of the medications and other alternative treatments  including no treatment were discussed with the patient.The common side effects of these medications were also explained to the patient.  Informed verbal consent was obtained.   Goals of current treatment regimen include improvement in pain, restoration of functioning- with focus on improvement in physical performance, general activity,

## 2024-04-15 ENCOUNTER — OFFICE VISIT (OUTPATIENT)
Dept: PAIN MANAGEMENT | Age: 69
End: 2024-04-15
Payer: MEDICARE

## 2024-04-15 VITALS
OXYGEN SATURATION: 90 % | DIASTOLIC BLOOD PRESSURE: 64 MMHG | BODY MASS INDEX: 21.47 KG/M2 | SYSTOLIC BLOOD PRESSURE: 118 MMHG | WEIGHT: 133 LBS | HEART RATE: 70 BPM

## 2024-04-15 DIAGNOSIS — M54.12 CERVICAL RADICULOPATHY: ICD-10-CM

## 2024-04-15 DIAGNOSIS — Z79.891 ENCOUNTER FOR LONG-TERM OPIATE ANALGESIC USE: ICD-10-CM

## 2024-04-15 DIAGNOSIS — K58.0 IRRITABLE BOWEL SYNDROME WITH DIARRHEA: ICD-10-CM

## 2024-04-15 DIAGNOSIS — Z51.81 ENCOUNTER FOR THERAPEUTIC DRUG MONITORING: ICD-10-CM

## 2024-04-15 DIAGNOSIS — G89.4 CHRONIC PAIN SYNDROME: ICD-10-CM

## 2024-04-15 DIAGNOSIS — M47.817 LUMBOSACRAL SPONDYLOSIS WITHOUT MYELOPATHY: ICD-10-CM

## 2024-04-15 DIAGNOSIS — M47.816 SPONDYLOSIS OF LUMBAR REGION WITHOUT MYELOPATHY OR RADICULOPATHY: Primary | ICD-10-CM

## 2024-04-15 DIAGNOSIS — M96.1 POSTLAMINECTOMY SYNDROME, CERVICAL REGION: ICD-10-CM

## 2024-04-15 PROCEDURE — 1123F ACP DISCUSS/DSCN MKR DOCD: CPT | Performed by: NURSE PRACTITIONER

## 2024-04-15 PROCEDURE — 99214 OFFICE O/P EST MOD 30 MIN: CPT | Performed by: NURSE PRACTITIONER

## 2024-04-15 RX ORDER — MAGNESIUM OXIDE 400 MG/1
400 TABLET ORAL 2 TIMES DAILY
Qty: 60 TABLET | Refills: 0 | Status: SHIPPED | OUTPATIENT
Start: 2024-04-15 | End: 2024-05-15

## 2024-04-15 RX ORDER — ROPINIROLE 0.25 MG/1
TABLET, FILM COATED ORAL
Qty: 60 TABLET | Refills: 1 | Status: SHIPPED | OUTPATIENT
Start: 2024-04-15

## 2024-04-15 RX ORDER — HYDROCODONE BITARTRATE AND ACETAMINOPHEN 7.5; 325 MG/1; MG/1
1 TABLET ORAL EVERY 6 HOURS PRN
Qty: 112 TABLET | Refills: 0 | Status: SHIPPED | OUTPATIENT
Start: 2024-04-15 | End: 2024-05-13

## 2024-04-15 RX ORDER — IBUPROFEN 600 MG/1
600 TABLET ORAL 2 TIMES DAILY PRN
Qty: 60 TABLET | Refills: 0 | Status: SHIPPED | OUTPATIENT
Start: 2024-04-15 | End: 2024-05-15

## 2024-04-15 RX ORDER — CYCLOBENZAPRINE HCL 10 MG
10 TABLET ORAL 2 TIMES DAILY PRN
Qty: 60 TABLET | Refills: 0 | Status: SHIPPED | OUTPATIENT
Start: 2024-04-15 | End: 2024-05-15

## 2024-04-15 NOTE — PROGRESS NOTES
Kiarra Gold  1955  [id number]      HISTORY OF PRESENT ILLNESS: Ms. Gold is a 68 y.o. female returns for a follow up visit for pain management  She has a diagnosis of   1. Spondylosis of lumbar region without myelopathy or radiculopathy    2. Chronic pain syndrome    3. Cervical radiculopathy    4. Encounter for long-term opiate analgesic use    5. Lumbosacral spondylosis without myelopathy    6. Irritable bowel syndrome with diarrhea    7. Postlaminectomy syndrome, cervical region    .      New Medications since Last Office visit have been reviewed with patient.     As per Information Obtained from the PADT (Patient Assessment and Documentation Tool)    She complains of pain in the right side She rates the pain 5/10 and describes it as stabbing. Current treatment regimen has helped relieve about 50% of the pain since beginning treatment plan.  She denies any side effects from the current pain regimen. Patient reports that since implementation of their treatment plan; their physical functioning is unchanged, family/social relationships are unchanged, mood is unchanged sleep patterns are unchanged, and that the overall functioning is better.  Patient denies/admits that any of the above have changed since last office visit. Patient denies misusing/abusing her narcotic pain medications or using any illegal drugs.      Upon obtaining medical history from Ms. Gold    ALLERGIES: Patients list of allergies were reviewed     MEDICATIONS: Ms. Gold list of medications were reviewed.Her current medications are   Outpatient Medications Prior to Visit   Medication Sig Dispense Refill    magnesium oxide (MAG-OX) 400 MG tablet Take 1 tablet by mouth 2 times daily 60 tablet 0    ibuprofen (ADVIL;MOTRIN) 600 MG tablet Take 1 tablet by mouth 2 times daily as needed for Pain 60 tablet 0    HYDROcodone-acetaminophen (NORCO) 7.5-325 MG per tablet Take 1 tablet by mouth every 6 hours as needed for Pain for up to 28

## 2024-05-13 ENCOUNTER — OFFICE VISIT (OUTPATIENT)
Dept: PAIN MANAGEMENT | Age: 69
End: 2024-05-13
Payer: MEDICARE

## 2024-05-13 VITALS
HEART RATE: 73 BPM | WEIGHT: 136 LBS | SYSTOLIC BLOOD PRESSURE: 128 MMHG | BODY MASS INDEX: 21.95 KG/M2 | OXYGEN SATURATION: 93 % | DIASTOLIC BLOOD PRESSURE: 78 MMHG

## 2024-05-13 DIAGNOSIS — G89.4 CHRONIC PAIN SYNDROME: ICD-10-CM

## 2024-05-13 DIAGNOSIS — K58.0 IRRITABLE BOWEL SYNDROME WITH DIARRHEA: ICD-10-CM

## 2024-05-13 DIAGNOSIS — Z51.81 ENCOUNTER FOR THERAPEUTIC DRUG MONITORING: ICD-10-CM

## 2024-05-13 DIAGNOSIS — M47.816 SPONDYLOSIS OF LUMBAR REGION WITHOUT MYELOPATHY OR RADICULOPATHY: ICD-10-CM

## 2024-05-13 DIAGNOSIS — Z79.891 ENCOUNTER FOR LONG-TERM OPIATE ANALGESIC USE: ICD-10-CM

## 2024-05-13 DIAGNOSIS — M96.1 POSTLAMINECTOMY SYNDROME, CERVICAL REGION: ICD-10-CM

## 2024-05-13 DIAGNOSIS — M47.817 LUMBOSACRAL SPONDYLOSIS WITHOUT MYELOPATHY: ICD-10-CM

## 2024-05-13 DIAGNOSIS — M54.12 CERVICAL RADICULOPATHY: ICD-10-CM

## 2024-05-13 PROCEDURE — 99214 OFFICE O/P EST MOD 30 MIN: CPT | Performed by: NURSE PRACTITIONER

## 2024-05-13 PROCEDURE — 1123F ACP DISCUSS/DSCN MKR DOCD: CPT | Performed by: NURSE PRACTITIONER

## 2024-05-13 RX ORDER — BETAMETHASONE DIPROPIONATE 0.5 MG/G
CREAM TOPICAL
COMMUNITY
Start: 2024-05-07

## 2024-05-13 RX ORDER — HYDROCODONE BITARTRATE AND ACETAMINOPHEN 7.5; 325 MG/1; MG/1
1 TABLET ORAL EVERY 6 HOURS PRN
Qty: 120 TABLET | Refills: 0 | Status: SHIPPED | OUTPATIENT
Start: 2024-05-13 | End: 2024-06-12

## 2024-05-13 RX ORDER — MAGNESIUM OXIDE 400 MG/1
400 TABLET ORAL 2 TIMES DAILY
Qty: 60 TABLET | Refills: 0 | Status: SHIPPED | OUTPATIENT
Start: 2024-05-13 | End: 2024-06-12

## 2024-05-13 RX ORDER — CYCLOBENZAPRINE HCL 10 MG
10 TABLET ORAL 2 TIMES DAILY PRN
Qty: 60 TABLET | Refills: 0 | Status: SHIPPED | OUTPATIENT
Start: 2024-05-13 | End: 2024-06-12

## 2024-05-13 RX ORDER — SAW/PYGEUM/BETA/HERB/D3/B6/ZN 30 MG-25MG
1 CAPSULE ORAL NIGHTLY
Qty: 30 TABLET | Refills: 0 | Status: SHIPPED | OUTPATIENT
Start: 2024-05-13 | End: 2024-06-12

## 2024-05-13 RX ORDER — IBUPROFEN 600 MG/1
600 TABLET ORAL 2 TIMES DAILY PRN
Qty: 60 TABLET | Refills: 0 | Status: SHIPPED | OUTPATIENT
Start: 2024-05-13 | End: 2024-06-12

## 2024-05-13 NOTE — PROGRESS NOTES
Kiarra Gold  1955  [id number]      HISTORY OF PRESENT ILLNESS: Ms. Gold is a 68 y.o. female returns for a follow up visit for pain management  She has a diagnosis of   1. Chronic pain syndrome    2. Postlaminectomy syndrome, cervical region    3. Cervical radiculopathy    4. Encounter for therapeutic drug monitoring    5. Encounter for long-term opiate analgesic use    6. Spondylosis of lumbar region without myelopathy or radiculopathy    7. Lumbosacral spondylosis without myelopathy    8. Irritable bowel syndrome with diarrhea    .      New Medications since Last Office visit have been reviewed with patient.     As per Information Obtained from the PADT (Patient Assessment and Documentation Tool)    She complains of pain in the lower back. She rates the pain 5/10 and describes it as burning. Current treatment regimen has helped relieve about 50% of the pain since beginning treatment plan.  She denies any side effects from the current pain regimen. Patient reports that since implementation of their treatment plan; their physical functioning is unchanged, family/social relationships are unchanged, mood is unchanged sleep patterns are unchanged, and that the overall functioning is unchanged.  Patient denies/admits that any of the above have changed since last office visit. Patient denies misusing/abusing her narcotic pain medications or using any illegal drugs.      Upon obtaining medical history from Ms. Gold    ALLERGIES: Patients list of allergies were reviewed     MEDICATIONS: Ms. Gold list of medications were reviewed.Her current medications are   Outpatient Medications Prior to Visit   Medication Sig Dispense Refill    augmented betamethasone dipropionate (DIPROLENE-AF) 0.05 % cream APPLY CREAM TOPICALLY TWICE DAILY      magnesium oxide (MAG-OX) 400 MG tablet Take 1 tablet by mouth 2 times daily 60 tablet 0    ibuprofen (ADVIL;MOTRIN) 600 MG tablet Take 1 tablet by mouth 2 times daily as

## 2024-05-28 ENCOUNTER — TELEPHONE (OUTPATIENT)
Dept: PAIN MANAGEMENT | Age: 69
End: 2024-05-28

## 2024-05-28 NOTE — TELEPHONE ENCOUNTER
Pt states DARNELL ordered an ablation and she would like to know if she could change that to an epidural. Pt states the ablation did not work for her last time

## 2024-05-29 NOTE — TELEPHONE ENCOUNTER
Yes we can the order to an epidural for her. I reviewed her lumbar MRI. Plan for transforaminal epidural steroid injection bilateral L4/L5. (Her severe trefoil stenosis excludes her from getting midline JUANJO)

## 2024-06-11 ENCOUNTER — OFFICE VISIT (OUTPATIENT)
Dept: PAIN MANAGEMENT | Age: 69
End: 2024-06-11
Payer: MEDICARE

## 2024-06-11 VITALS
HEART RATE: 69 BPM | BODY MASS INDEX: 21.63 KG/M2 | DIASTOLIC BLOOD PRESSURE: 81 MMHG | SYSTOLIC BLOOD PRESSURE: 129 MMHG | OXYGEN SATURATION: 98 % | WEIGHT: 134 LBS

## 2024-06-11 DIAGNOSIS — K58.0 IRRITABLE BOWEL SYNDROME WITH DIARRHEA: ICD-10-CM

## 2024-06-11 DIAGNOSIS — M47.816 SPONDYLOSIS OF LUMBAR REGION WITHOUT MYELOPATHY OR RADICULOPATHY: ICD-10-CM

## 2024-06-11 DIAGNOSIS — G89.4 CHRONIC PAIN SYNDROME: ICD-10-CM

## 2024-06-11 DIAGNOSIS — Z79.891 ENCOUNTER FOR LONG-TERM OPIATE ANALGESIC USE: ICD-10-CM

## 2024-06-11 DIAGNOSIS — M54.12 CERVICAL RADICULOPATHY: ICD-10-CM

## 2024-06-11 DIAGNOSIS — M47.817 LUMBOSACRAL SPONDYLOSIS WITHOUT MYELOPATHY: ICD-10-CM

## 2024-06-11 DIAGNOSIS — Z51.81 ENCOUNTER FOR THERAPEUTIC DRUG MONITORING: ICD-10-CM

## 2024-06-11 DIAGNOSIS — M96.1 POSTLAMINECTOMY SYNDROME, CERVICAL REGION: Primary | ICD-10-CM

## 2024-06-11 PROCEDURE — 1123F ACP DISCUSS/DSCN MKR DOCD: CPT | Performed by: NURSE PRACTITIONER

## 2024-06-11 PROCEDURE — 99213 OFFICE O/P EST LOW 20 MIN: CPT | Performed by: NURSE PRACTITIONER

## 2024-06-11 RX ORDER — MAGNESIUM OXIDE TAB 400 MG (241.3 MG ELEMENTAL MG) 400 (241.3 MG) MG
400 TAB ORAL 2 TIMES DAILY
Qty: 60 TABLET | Refills: 0 | OUTPATIENT
Start: 2024-06-11

## 2024-06-11 RX ORDER — ROPINIROLE 0.25 MG/1
TABLET, FILM COATED ORAL
Qty: 60 TABLET | Refills: 1 | Status: SHIPPED | OUTPATIENT
Start: 2024-06-11

## 2024-06-11 RX ORDER — SAW/PYGEUM/BETA/HERB/D3/B6/ZN 30 MG-25MG
1 CAPSULE ORAL NIGHTLY
Qty: 30 TABLET | Refills: 0 | Status: SHIPPED | OUTPATIENT
Start: 2024-06-11 | End: 2024-07-11

## 2024-06-11 RX ORDER — MAGNESIUM OXIDE 400 MG/1
400 TABLET ORAL 2 TIMES DAILY
Qty: 60 TABLET | Refills: 2 | Status: SHIPPED | OUTPATIENT
Start: 2024-06-11 | End: 2024-09-09

## 2024-06-11 RX ORDER — HYDROCODONE BITARTRATE AND ACETAMINOPHEN 7.5; 325 MG/1; MG/1
1 TABLET ORAL EVERY 6 HOURS PRN
Qty: 120 TABLET | Refills: 0 | Status: SHIPPED | OUTPATIENT
Start: 2024-06-11 | End: 2024-07-11

## 2024-06-11 RX ORDER — CYCLOBENZAPRINE HCL 10 MG
10 TABLET ORAL 2 TIMES DAILY PRN
Qty: 60 TABLET | Refills: 0 | Status: SHIPPED | OUTPATIENT
Start: 2024-06-11 | End: 2024-07-11

## 2024-06-11 NOTE — PROGRESS NOTES
needed for Pain for up to 30 days. Max Daily Amount: 4 tablets 120 tablet 0    ibuprofen (ADVIL;MOTRIN) 600 MG tablet Take 1 tablet by mouth 2 times daily as needed for Pain 60 tablet 0    magnesium oxide (MAG-OX) 400 MG tablet Take 1 tablet by mouth 2 times daily 60 tablet 0    cyclobenzaprine (FLEXERIL) 10 MG tablet Take 1 tablet by mouth 2 times daily as needed for Muscle spasms 60 tablet 0    rOPINIRole (REQUIP) 0.25 MG tablet Take 1-2 tabs as needed as bedtime for restless legs 60 tablet 1    buPROPion (WELLBUTRIN XL) 300 MG extended release tablet Take 1 tablet by mouth daily      desvenlafaxine succinate (PRISTIQ) 100 MG TB24 extended release tablet Take 1 tablet by mouth daily      glycopyrrolate (ROBINUL) 2 MG tablet Take 1 tablet by mouth 2 times daily      pantoprazole (PROTONIX) 40 MG tablet Take 1 tablet by mouth daily      rosuvastatin (CRESTOR) 5 MG tablet       traZODone (DESYREL) 50 MG tablet TAKE 1 TABLET BY MOUTH AT BEDTIME      famciclovir (FAMVIR) 250 MG tablet Take 1 tablet by mouth daily      venlafaxine (EFFEXOR) 100 MG tablet Take 1 tablet by mouth 2 times daily      multivitamin (THERAGRAN) per tablet Take 1 tablet by mouth daily       No facility-administered medications prior to visit.       SOCIAL/FAMILY/PAST MEDICAL HISTORY: Ms. Gold social, family and past medical history was reviewed.     REVIEW OF SYSTEMS:    Respiratory: Negative for apnea, chest tightness and shortness of breath or change in baseline breathing.    Gastrointestinal: Negative for nausea, vomiting, abdominal pain, diarrhea, constipation, blood in stool and abdominal distention.       PHYSICAL EXAM:   Nursing note and vitals reviewed. /81   Pulse 69   Wt 60.8 kg (134 lb)   LMP 07/12/2007   SpO2 98%   BMI 21.63 kg/m²   Constitutional: She appears well-developed and well-nourished. No acute distress.   Skin: Skin is warm and dry, good turgor. No rash noted. She is not diaphoretic.  Cardiovascular: Normal

## 2024-07-08 ENCOUNTER — HOSPITAL ENCOUNTER (OUTPATIENT)
Dept: INTERVENTIONAL RADIOLOGY/VASCULAR | Age: 69
Discharge: HOME | End: 2024-07-10
Payer: MEDICARE

## 2024-07-08 DIAGNOSIS — M54.16 LUMBAR RADICULOPATHY: ICD-10-CM

## 2024-07-08 PROCEDURE — 2709999900 HC NON-CHARGEABLE SUPPLY

## 2024-07-08 PROCEDURE — 6360000004 HC RX CONTRAST MEDICATION: Performed by: STUDENT IN AN ORGANIZED HEALTH CARE EDUCATION/TRAINING PROGRAM

## 2024-07-08 PROCEDURE — 2500000003 HC RX 250 WO HCPCS: Performed by: STUDENT IN AN ORGANIZED HEALTH CARE EDUCATION/TRAINING PROGRAM

## 2024-07-08 PROCEDURE — 62321 NJX INTERLAMINAR CRV/THRC: CPT

## 2024-07-08 PROCEDURE — 6360000002 HC RX W HCPCS: Performed by: STUDENT IN AN ORGANIZED HEALTH CARE EDUCATION/TRAINING PROGRAM

## 2024-07-08 RX ORDER — BETAMETHASONE SODIUM PHOSPHATE AND BETAMETHASONE ACETATE 3; 3 MG/ML; MG/ML
INJECTION, SUSPENSION INTRA-ARTICULAR; INTRALESIONAL; INTRAMUSCULAR; SOFT TISSUE PRN
Status: COMPLETED | OUTPATIENT
Start: 2024-07-08 | End: 2024-07-08

## 2024-07-08 RX ORDER — IOPAMIDOL 408 MG/ML
INJECTION, SOLUTION INTRATHECAL PRN
Status: COMPLETED | OUTPATIENT
Start: 2024-07-08 | End: 2024-07-08

## 2024-07-08 RX ORDER — LIDOCAINE HYDROCHLORIDE 10 MG/ML
INJECTION, SOLUTION EPIDURAL; INFILTRATION; INTRACAUDAL; PERINEURAL PRN
Status: COMPLETED | OUTPATIENT
Start: 2024-07-08 | End: 2024-07-08

## 2024-07-08 RX ADMIN — BETAMETHASONE SODIUM PHOSPHATE AND BETAMETHASONE ACETATE 12 MG: 3; 3 INJECTION, SUSPENSION INTRA-ARTICULAR; INTRALESIONAL; INTRAMUSCULAR at 09:48

## 2024-07-08 RX ADMIN — IOPAMIDOL 3 ML: 408 INJECTION, SOLUTION INTRATHECAL at 09:48

## 2024-07-08 RX ADMIN — LIDOCAINE HYDROCHLORIDE 5 ML: 10 INJECTION, SOLUTION EPIDURAL; INFILTRATION; INTRACAUDAL; PERINEURAL at 09:47

## 2024-07-08 NOTE — PROCEDURES
Interventional Radiology Post Procedure    Date: 7/8/2024    Physician: Francisco Javier Marquez MD    Pre-op Diagnosis: cervicalgia    Post-op Diagnosis: same    Variation from Planned Procedure: None       Findings: successful MARGO at C6-7. Pain 9 -> 8.    Patient condition: Stable    Estimated Blood Loss: 1 cc    Specimens:  none      Signed,  Pablo Marquez MD  10:07 AM  7/8/2024

## 2024-07-08 NOTE — DISCHARGE INSTRUCTIONS
Cervical Epidural Steroid Injection  Patient Discharge Instructions      When You Get Home    You should not drive the day of the procedure. It is best to walk around if you feel up to it, but you will need assistance during the first 24 hours following the epidural steroid injection.   Even if you feel better right away, avoid activities that may strain your neck/back.      Keep in mind that some patients may feel increased pain for the first 24 hours.  You should start feeling some pain relief 2-3 days following the injection.  This is because the steroid will start working within three days of the injection with maximal effect by one week.  At that time, we will evaluate your pain level to determine the need for another steroid injection.    Remove bandaid(s) within 24 hours.       When to Call Your Doctor    Call right away if you notice any of the following symptoms:    Severe pain or headache;  Fever or chills;  Redness or swelling around the injection site.  Loss of bladder or bowel control.          You may contact [facility] for any questions or problems that may occur at [phone] during the hours of 9am-4pm Monday-Friday, or the hospital  after hours at [phone], to have the interventional radiologist on call paged.      [facility]  Cardiovascular Special Procedures  General Discharge Instructions    ____ You may be drowsy or lightheaded after receiving sedation. DO NOT operate a vehicle (automobile, bicycle, motorcycle, machinery, or power tools), make any important decisions or sign any important/legal documents, or drink alcoholic beverages for the next 24 hours  __X__ We strongly suggest that a responsible adult be with you for the next 24 hours for your protection and safety  ____ If the intravenous catheter site is painful, apply warm wet compresses on the site until the soreness is relieved and elevate the arm above the heart.  Call your physician if no

## 2024-07-08 NOTE — H&P
Patient:  Kiarra Gold   :   1955      Relevant patient history reviewed and discussed.    The procedure including risks and benefits was discussed at length with the patient (or designated family member) and all questions were answered.  Informed consent to proceed with the procedure was given.    Condition : stable    [facility] nurses notes reviewed and agreed.  Medications reviewed.  Allergies:   Allergies   Allergen Reactions    Latex Rash    Zocor [Simvastatin] Other (See Comments)     STATINS - LEG CRAMPS

## 2024-07-09 ENCOUNTER — TELEPHONE (OUTPATIENT)
Dept: PAIN MANAGEMENT | Age: 69
End: 2024-07-09

## 2024-07-09 ENCOUNTER — OFFICE VISIT (OUTPATIENT)
Dept: PAIN MANAGEMENT | Age: 69
End: 2024-07-09
Payer: MEDICARE

## 2024-07-09 VITALS
OXYGEN SATURATION: 97 % | SYSTOLIC BLOOD PRESSURE: 121 MMHG | BODY MASS INDEX: 21.73 KG/M2 | HEART RATE: 79 BPM | DIASTOLIC BLOOD PRESSURE: 73 MMHG | WEIGHT: 134.6 LBS

## 2024-07-09 DIAGNOSIS — M47.816 SPONDYLOSIS OF LUMBAR REGION WITHOUT MYELOPATHY OR RADICULOPATHY: Primary | ICD-10-CM

## 2024-07-09 DIAGNOSIS — G47.01 INSOMNIA DUE TO MEDICAL CONDITION: ICD-10-CM

## 2024-07-09 DIAGNOSIS — M47.817 LUMBOSACRAL SPONDYLOSIS WITHOUT MYELOPATHY: ICD-10-CM

## 2024-07-09 DIAGNOSIS — M96.1 POSTLAMINECTOMY SYNDROME, CERVICAL REGION: ICD-10-CM

## 2024-07-09 DIAGNOSIS — M54.12 CERVICAL RADICULOPATHY: ICD-10-CM

## 2024-07-09 DIAGNOSIS — G89.4 CHRONIC PAIN SYNDROME: ICD-10-CM

## 2024-07-09 DIAGNOSIS — G25.81 RESTLESS LEG SYNDROME: ICD-10-CM

## 2024-07-09 DIAGNOSIS — K58.0 IRRITABLE BOWEL SYNDROME WITH DIARRHEA: ICD-10-CM

## 2024-07-09 DIAGNOSIS — Z79.891 ENCOUNTER FOR LONG-TERM OPIATE ANALGESIC USE: ICD-10-CM

## 2024-07-09 DIAGNOSIS — Z51.81 ENCOUNTER FOR THERAPEUTIC DRUG MONITORING: ICD-10-CM

## 2024-07-09 PROCEDURE — 99214 OFFICE O/P EST MOD 30 MIN: CPT | Performed by: NURSE PRACTITIONER

## 2024-07-09 PROCEDURE — 1123F ACP DISCUSS/DSCN MKR DOCD: CPT | Performed by: NURSE PRACTITIONER

## 2024-07-09 RX ORDER — ROPINIROLE 0.25 MG/1
TABLET, FILM COATED ORAL
Qty: 60 TABLET | Refills: 1 | Status: SHIPPED | OUTPATIENT
Start: 2024-07-09

## 2024-07-09 RX ORDER — HYDROCODONE BITARTRATE AND ACETAMINOPHEN 7.5; 325 MG/1; MG/1
1 TABLET ORAL EVERY 6 HOURS PRN
Qty: 120 TABLET | Refills: 0 | Status: SHIPPED | OUTPATIENT
Start: 2024-07-09 | End: 2024-08-08

## 2024-07-09 RX ORDER — CYCLOBENZAPRINE HCL 10 MG
10 TABLET ORAL 2 TIMES DAILY PRN
Qty: 60 TABLET | Refills: 0 | Status: SHIPPED | OUTPATIENT
Start: 2024-07-09 | End: 2024-08-08

## 2024-07-09 RX ORDER — LEMBOREXANT 10 MG/1
1 TABLET, FILM COATED ORAL NIGHTLY
Qty: 30 TABLET | Refills: 0 | Status: SHIPPED | OUTPATIENT
Start: 2024-07-09 | End: 2024-08-08

## 2024-07-09 NOTE — TELEPHONE ENCOUNTER
Submitted PA for [facility] Giraldo: [id number] STATUS: \"The patient currently has access to the requested medication and a Prior Authorization is not needed for the patient/medication.\"

## 2024-07-09 NOTE — TELEPHONE ENCOUNTER
Submitted PA for Cyclobenzaprine Via [facility] Key: [id number] STATUS: PENDING.    Follow up done daily; if no decision with in three days we will refax.  If another three days goes by with no decision will call the insurance for status.

## 2024-07-09 NOTE — TELEPHONE ENCOUNTER
This medication is APPROVED 1/1/2024-10/7/2024.    [facility] has been notified.   Thank you!

## 2024-07-09 NOTE — PROGRESS NOTES
current medications.Risk of overdose and death, if medicines not taken as prescribed, were also discussed. If the patient develops new symptoms or if the symptoms worsen, the patient should call the office.    While transcribing every attempt was made to maintain the accuracy of the note in terms of it's contents,there may have been some errors made inadvertently.  Thank you for allowing me to participate in the care of this patient.    Teresita Colvin, NP-C    Cc: Prateek Hernandez MD

## 2024-08-05 ENCOUNTER — TELEPHONE (OUTPATIENT)
Dept: PAIN MANAGEMENT | Age: 69
End: 2024-08-05

## 2024-08-05 DIAGNOSIS — G89.4 CHRONIC PAIN SYNDROME: ICD-10-CM

## 2024-08-05 DIAGNOSIS — M47.817 LUMBOSACRAL SPONDYLOSIS WITHOUT MYELOPATHY: ICD-10-CM

## 2024-08-05 DIAGNOSIS — M47.816 SPONDYLOSIS OF LUMBAR REGION WITHOUT MYELOPATHY OR RADICULOPATHY: ICD-10-CM

## 2024-08-05 DIAGNOSIS — M96.1 POSTLAMINECTOMY SYNDROME, CERVICAL REGION: ICD-10-CM

## 2024-08-05 DIAGNOSIS — Z51.81 ENCOUNTER FOR THERAPEUTIC DRUG MONITORING: ICD-10-CM

## 2024-08-05 DIAGNOSIS — M54.12 CERVICAL RADICULOPATHY: ICD-10-CM

## 2024-08-05 DIAGNOSIS — Z79.891 ENCOUNTER FOR LONG-TERM OPIATE ANALGESIC USE: ICD-10-CM

## 2024-08-05 RX ORDER — HYDROCODONE BITARTRATE AND ACETAMINOPHEN 7.5; 325 MG/1; MG/1
1 TABLET ORAL EVERY 6 HOURS PRN
Qty: 24 TABLET | Refills: 0 | Status: SHIPPED | OUTPATIENT
Start: 2024-08-05 | End: 2024-08-12

## 2024-08-05 RX ORDER — IBUPROFEN 600 MG/1
600 TABLET ORAL 2 TIMES DAILY PRN
Qty: 60 TABLET | Refills: 0 | Status: SHIPPED | OUTPATIENT
Start: 2024-08-05 | End: 2024-09-04

## 2024-08-13 ENCOUNTER — OFFICE VISIT (OUTPATIENT)
Dept: PAIN MANAGEMENT | Age: 69
End: 2024-08-13
Payer: MEDICARE

## 2024-08-13 VITALS
WEIGHT: 133 LBS | OXYGEN SATURATION: 97 % | HEART RATE: 74 BPM | SYSTOLIC BLOOD PRESSURE: 134 MMHG | BODY MASS INDEX: 21.47 KG/M2 | DIASTOLIC BLOOD PRESSURE: 83 MMHG

## 2024-08-13 DIAGNOSIS — Z51.81 ENCOUNTER FOR THERAPEUTIC DRUG MONITORING: ICD-10-CM

## 2024-08-13 DIAGNOSIS — K58.0 IRRITABLE BOWEL SYNDROME WITH DIARRHEA: ICD-10-CM

## 2024-08-13 DIAGNOSIS — G25.81 RESTLESS LEG SYNDROME: ICD-10-CM

## 2024-08-13 DIAGNOSIS — M47.816 SPONDYLOSIS OF LUMBAR REGION WITHOUT MYELOPATHY OR RADICULOPATHY: Primary | ICD-10-CM

## 2024-08-13 DIAGNOSIS — M96.1 POSTLAMINECTOMY SYNDROME, CERVICAL REGION: ICD-10-CM

## 2024-08-13 DIAGNOSIS — M47.817 LUMBOSACRAL SPONDYLOSIS WITHOUT MYELOPATHY: ICD-10-CM

## 2024-08-13 DIAGNOSIS — G47.01 INSOMNIA DUE TO MEDICAL CONDITION: ICD-10-CM

## 2024-08-13 DIAGNOSIS — M54.12 CERVICAL RADICULOPATHY: ICD-10-CM

## 2024-08-13 DIAGNOSIS — Z79.891 ENCOUNTER FOR LONG-TERM OPIATE ANALGESIC USE: ICD-10-CM

## 2024-08-13 DIAGNOSIS — G89.4 CHRONIC PAIN SYNDROME: ICD-10-CM

## 2024-08-13 PROCEDURE — 99214 OFFICE O/P EST MOD 30 MIN: CPT | Performed by: NURSE PRACTITIONER

## 2024-08-13 PROCEDURE — 1123F ACP DISCUSS/DSCN MKR DOCD: CPT | Performed by: NURSE PRACTITIONER

## 2024-08-13 RX ORDER — METHOCARBAMOL 500 MG/1
500 TABLET, FILM COATED ORAL 2 TIMES DAILY
Qty: 60 TABLET | Refills: 0 | Status: SHIPPED | OUTPATIENT
Start: 2024-08-13 | End: 2024-09-12

## 2024-08-13 RX ORDER — HYDROCODONE BITARTRATE AND ACETAMINOPHEN 7.5; 325 MG/1; MG/1
1 TABLET ORAL EVERY 6 HOURS PRN
Qty: 112 TABLET | Refills: 0 | Status: SHIPPED | OUTPATIENT
Start: 2024-08-13 | End: 2024-09-10

## 2024-08-13 RX ORDER — LEMBOREXANT 10 MG/1
1 TABLET, FILM COATED ORAL NIGHTLY
Qty: 30 TABLET | Refills: 0 | Status: SHIPPED | OUTPATIENT
Start: 2024-08-13 | End: 2024-09-12

## 2024-08-13 RX ORDER — CYCLOBENZAPRINE HCL 10 MG
10 TABLET ORAL NIGHTLY PRN
Qty: 30 TABLET | Refills: 0 | Status: SHIPPED | OUTPATIENT
Start: 2024-08-13 | End: 2024-09-12

## 2024-08-13 RX ORDER — ROPINIROLE 0.25 MG/1
TABLET, FILM COATED ORAL
Qty: 60 TABLET | Refills: 1 | Status: SHIPPED | OUTPATIENT
Start: 2024-08-13

## 2024-08-13 NOTE — PROGRESS NOTES
Kiarra Gold  1955  [id number]      HISTORY OF PRESENT ILLNESS: Ms. Gold is a 68 y.o. female returns for a follow up visit for pain management  She has a diagnosis of   1. Spondylosis of lumbar region without myelopathy or radiculopathy    2. Chronic pain syndrome    3. Encounter for long-term opiate analgesic use    4. Lumbosacral spondylosis without myelopathy    5. Postlaminectomy syndrome, cervical region    6. Cervical radiculopathy    7. Encounter for therapeutic drug monitoring    8. Insomnia due to medical condition    9. Irritable bowel syndrome with diarrhea    10. Restless leg syndrome    .      New Medications since Last Office visit have been reviewed with patient.     As per Information Obtained from the PADT (Patient Assessment and Documentation Tool)    She complains of pain in the neck, lower back. She rates the pain 5/10 and describes it as aching. Current treatment regimen has helped relieve about 50% of the pain since beginning treatment plan.  She denies any side effects from the current pain regimen. Patient reports that since implementation of their treatment plan; their physical functioning is unchanged, family/social relationships are unchanged, mood is unchanged sleep patterns are unchanged, and that the overall functioning is unchanged.  Patient denies/admits that any of the above have changed since last office visit. Patient denies misusing/abusing her narcotic pain medications or using any illegal drugs.      Upon obtaining medical history from Ms. Gold    ALLERGIES: Patients list of allergies were reviewed     MEDICATIONS: Ms. Gold list of medications were reviewed.Her current medications are   Outpatient Medications Prior to Visit   Medication Sig Dispense Refill    ibuprofen (ADVIL;MOTRIN) 600 MG tablet Take 1 tablet by mouth 2 times daily as needed for Pain 60 tablet 0    rOPINIRole (REQUIP) 0.25 MG tablet Take 1-2 tabs as needed as bedtime for restless legs 60

## 2024-09-08 DIAGNOSIS — M96.1 POSTLAMINECTOMY SYNDROME, CERVICAL REGION: ICD-10-CM

## 2024-09-08 DIAGNOSIS — M47.816 SPONDYLOSIS OF LUMBAR REGION WITHOUT MYELOPATHY OR RADICULOPATHY: ICD-10-CM

## 2024-09-08 DIAGNOSIS — G89.4 CHRONIC PAIN SYNDROME: ICD-10-CM

## 2024-09-09 ENCOUNTER — HOSPITAL ENCOUNTER (OUTPATIENT)
Dept: INTERVENTIONAL RADIOLOGY/VASCULAR | Age: 69
Discharge: HOME | End: 2024-09-11
Payer: MEDICARE

## 2024-09-09 DIAGNOSIS — M47.816 LUMBAR SPONDYLOSIS: ICD-10-CM

## 2024-09-09 PROCEDURE — 62323 NJX INTERLAMINAR LMBR/SAC: CPT

## 2024-09-09 PROCEDURE — 6360000002 HC RX W HCPCS: Performed by: RADIOLOGY

## 2024-09-09 PROCEDURE — 6360000004 HC RX CONTRAST MEDICATION: Performed by: RADIOLOGY

## 2024-09-09 PROCEDURE — 2500000003 HC RX 250 WO HCPCS: Performed by: RADIOLOGY

## 2024-09-09 RX ORDER — TRIAMCINOLONE ACETONIDE 40 MG/ML
INJECTION, SUSPENSION INTRA-ARTICULAR; INTRAMUSCULAR PRN
Status: COMPLETED | OUTPATIENT
Start: 2024-09-09 | End: 2024-09-09

## 2024-09-09 RX ORDER — BUPIVACAINE HYDROCHLORIDE 2.5 MG/ML
INJECTION, SOLUTION EPIDURAL; INFILTRATION; INTRACAUDAL PRN
Status: COMPLETED | OUTPATIENT
Start: 2024-09-09 | End: 2024-09-09

## 2024-09-09 RX ORDER — LIDOCAINE HYDROCHLORIDE 10 MG/ML
INJECTION, SOLUTION EPIDURAL; INFILTRATION; INTRACAUDAL; PERINEURAL PRN
Status: COMPLETED | OUTPATIENT
Start: 2024-09-09 | End: 2024-09-09

## 2024-09-09 RX ORDER — IOPAMIDOL 408 MG/ML
INJECTION, SOLUTION INTRATHECAL PRN
Status: COMPLETED | OUTPATIENT
Start: 2024-09-09 | End: 2024-09-09

## 2024-09-09 RX ADMIN — LIDOCAINE HYDROCHLORIDE 2 ML: 10 INJECTION, SOLUTION EPIDURAL; INFILTRATION; INTRACAUDAL; PERINEURAL at 09:49

## 2024-09-09 RX ADMIN — IOPAMIDOL 1 ML: 408 INJECTION, SOLUTION INTRATHECAL at 09:50

## 2024-09-09 RX ADMIN — TRIAMCINOLONE ACETONIDE 80 MG: 40 INJECTION, SUSPENSION INTRA-ARTICULAR; INTRAMUSCULAR at 09:52

## 2024-09-09 RX ADMIN — BUPIVACAINE HYDROCHLORIDE 1 ML: 2.5 INJECTION, SOLUTION EPIDURAL; INFILTRATION; INTRACAUDAL at 09:54

## 2024-09-10 ENCOUNTER — OFFICE VISIT (OUTPATIENT)
Dept: PAIN MANAGEMENT | Age: 69
End: 2024-09-10
Payer: MEDICARE

## 2024-09-10 VITALS
BODY MASS INDEX: 21.14 KG/M2 | OXYGEN SATURATION: 99 % | DIASTOLIC BLOOD PRESSURE: 84 MMHG | HEART RATE: 80 BPM | SYSTOLIC BLOOD PRESSURE: 138 MMHG | WEIGHT: 131 LBS

## 2024-09-10 DIAGNOSIS — G47.01 INSOMNIA DUE TO MEDICAL CONDITION: ICD-10-CM

## 2024-09-10 DIAGNOSIS — M47.816 SPONDYLOSIS OF LUMBAR REGION WITHOUT MYELOPATHY OR RADICULOPATHY: ICD-10-CM

## 2024-09-10 DIAGNOSIS — Z79.891 ENCOUNTER FOR LONG-TERM OPIATE ANALGESIC USE: ICD-10-CM

## 2024-09-10 DIAGNOSIS — Z51.81 ENCOUNTER FOR THERAPEUTIC DRUG MONITORING: ICD-10-CM

## 2024-09-10 DIAGNOSIS — M96.1 POSTLAMINECTOMY SYNDROME, CERVICAL REGION: Primary | ICD-10-CM

## 2024-09-10 DIAGNOSIS — K58.0 IRRITABLE BOWEL SYNDROME WITH DIARRHEA: ICD-10-CM

## 2024-09-10 DIAGNOSIS — G25.81 RESTLESS LEG SYNDROME: ICD-10-CM

## 2024-09-10 DIAGNOSIS — G89.4 CHRONIC PAIN SYNDROME: ICD-10-CM

## 2024-09-10 DIAGNOSIS — M47.817 LUMBOSACRAL SPONDYLOSIS WITHOUT MYELOPATHY: ICD-10-CM

## 2024-09-10 DIAGNOSIS — M54.12 CERVICAL RADICULOPATHY: ICD-10-CM

## 2024-09-10 PROCEDURE — 1123F ACP DISCUSS/DSCN MKR DOCD: CPT | Performed by: NURSE PRACTITIONER

## 2024-09-10 PROCEDURE — 99214 OFFICE O/P EST MOD 30 MIN: CPT | Performed by: NURSE PRACTITIONER

## 2024-09-10 RX ORDER — IBUPROFEN 600 MG/1
600 TABLET, FILM COATED ORAL 2 TIMES DAILY PRN
Qty: 60 TABLET | Refills: 2 | Status: SHIPPED | OUTPATIENT
Start: 2024-09-10 | End: 2024-12-09

## 2024-09-10 RX ORDER — HYDROCODONE BITARTRATE AND ACETAMINOPHEN 7.5; 325 MG/1; MG/1
1 TABLET ORAL EVERY 6 HOURS PRN
Qty: 112 TABLET | Refills: 0 | Status: SHIPPED | OUTPATIENT
Start: 2024-09-10 | End: 2024-10-08

## 2024-09-10 RX ORDER — ZALEPLON 10 MG/1
10 CAPSULE ORAL NIGHTLY
Qty: 30 CAPSULE | Refills: 0 | Status: SHIPPED | OUTPATIENT
Start: 2024-09-10 | End: 2024-10-10

## 2024-09-10 RX ORDER — METHOCARBAMOL 500 MG/1
500 TABLET, FILM COATED ORAL 2 TIMES DAILY
Qty: 60 TABLET | Refills: 2 | Status: SHIPPED | OUTPATIENT
Start: 2024-09-10 | End: 2024-12-09

## 2024-09-10 RX ORDER — CYCLOBENZAPRINE HCL 10 MG
10 TABLET ORAL NIGHTLY PRN
Qty: 30 TABLET | Refills: 2 | Status: SHIPPED | OUTPATIENT
Start: 2024-09-10 | End: 2024-12-09

## 2024-10-03 RX ORDER — MAGNESIUM OXIDE TAB 400 MG (241.3 MG ELEMENTAL MG) 400 (241.3 MG) MG
400 TAB ORAL 2 TIMES DAILY
Qty: 60 TABLET | Refills: 0 | OUTPATIENT
Start: 2024-10-03

## 2024-10-08 ENCOUNTER — TELEPHONE (OUTPATIENT)
Dept: PAIN MANAGEMENT | Age: 69
End: 2024-10-08

## 2024-10-08 ENCOUNTER — OFFICE VISIT (OUTPATIENT)
Dept: PAIN MANAGEMENT | Age: 69
End: 2024-10-08
Payer: MEDICARE

## 2024-10-08 VITALS
DIASTOLIC BLOOD PRESSURE: 81 MMHG | HEART RATE: 78 BPM | OXYGEN SATURATION: 96 % | BODY MASS INDEX: 21.63 KG/M2 | SYSTOLIC BLOOD PRESSURE: 124 MMHG | WEIGHT: 134 LBS

## 2024-10-08 DIAGNOSIS — K58.0 IRRITABLE BOWEL SYNDROME WITH DIARRHEA: ICD-10-CM

## 2024-10-08 DIAGNOSIS — Z79.891 ENCOUNTER FOR LONG-TERM OPIATE ANALGESIC USE: ICD-10-CM

## 2024-10-08 DIAGNOSIS — Z51.81 ENCOUNTER FOR THERAPEUTIC DRUG MONITORING: ICD-10-CM

## 2024-10-08 DIAGNOSIS — G62.9 NEUROPATHY: ICD-10-CM

## 2024-10-08 DIAGNOSIS — M47.816 SPONDYLOSIS OF LUMBAR REGION WITHOUT MYELOPATHY OR RADICULOPATHY: Primary | ICD-10-CM

## 2024-10-08 DIAGNOSIS — G25.81 RESTLESS LEG SYNDROME: ICD-10-CM

## 2024-10-08 DIAGNOSIS — G47.01 INSOMNIA DUE TO MEDICAL CONDITION: ICD-10-CM

## 2024-10-08 DIAGNOSIS — M96.1 POSTLAMINECTOMY SYNDROME, CERVICAL REGION: ICD-10-CM

## 2024-10-08 DIAGNOSIS — M54.12 CERVICAL RADICULOPATHY: ICD-10-CM

## 2024-10-08 DIAGNOSIS — M47.817 LUMBOSACRAL SPONDYLOSIS WITHOUT MYELOPATHY: ICD-10-CM

## 2024-10-08 DIAGNOSIS — G89.4 CHRONIC PAIN SYNDROME: ICD-10-CM

## 2024-10-08 PROCEDURE — 1123F ACP DISCUSS/DSCN MKR DOCD: CPT | Performed by: NURSE PRACTITIONER

## 2024-10-08 PROCEDURE — 99214 OFFICE O/P EST MOD 30 MIN: CPT | Performed by: NURSE PRACTITIONER

## 2024-10-08 RX ORDER — HYDROCODONE BITARTRATE AND ACETAMINOPHEN 7.5; 325 MG/1; MG/1
1 TABLET ORAL EVERY 6 HOURS PRN
Qty: 112 TABLET | Refills: 0 | Status: SHIPPED | OUTPATIENT
Start: 2024-10-08 | End: 2024-11-05

## 2024-10-08 RX ORDER — PREGABALIN 75 MG/1
75 CAPSULE ORAL 2 TIMES DAILY
Qty: 60 CAPSULE | Refills: 0 | Status: SHIPPED | OUTPATIENT
Start: 2024-10-08 | End: 2024-11-07

## 2024-10-08 NOTE — TELEPHONE ENCOUNTER
This medication is APPROVED.  Authorization Expiration Date: 12/31/2024.    [facility] has been notified.   Thank you!

## 2024-10-08 NOTE — PROGRESS NOTES
Kiarra Gold  1955  [id number]      HISTORY OF PRESENT ILLNESS: Ms. Gold is a 68 y.o. female returns for a follow up visit for pain management  She has a diagnosis of   1. Spondylosis of lumbar region without myelopathy or radiculopathy    2. Chronic pain syndrome    3. Postlaminectomy syndrome, cervical region    4. Encounter for long-term opiate analgesic use    5. Lumbosacral spondylosis without myelopathy    6. Cervical radiculopathy    7. Encounter for therapeutic drug monitoring    8. Insomnia due to medical condition    9. Irritable bowel syndrome with diarrhea    10. Restless leg syndrome    .      New Medications since Last Office visit have been reviewed with patient.     As per Information Obtained from the PADT (Patient Assessment and Documentation Tool)    She complains of pain in the lower back. She rates the pain 6/10 and describes it as aching. Current treatment regimen has helped relieve about 50% of the pain since beginning treatment plan.  She denies any side effects from the current pain regimen. Patient reports that since implementation of their treatment plan; their physical functioning is unchanged, family/social relationships are unchanged, mood is unchanged sleep patterns are unchanged, and that the overall functioning is better.  Patient denies/admits that any of the above have changed since last office visit. Patient denies misusing/abusing her narcotic pain medications or using any illegal drugs.      Upon obtaining medical history from Ms. Gold    ALLERGIES: Patients list of allergies were reviewed     MEDICATIONS: Ms. Gold list of medications were reviewed.Her current medications are   Outpatient Medications Prior to Visit   Medication Sig Dispense Refill    methocarbamol (ROBAXIN) 500 MG tablet Take 1 tablet by mouth 2 times daily Using in daytime. Does not make drowsy 60 tablet 2    cyclobenzaprine (FLEXERIL) 10 MG tablet Take 1 tablet by mouth nightly as needed

## 2024-10-08 NOTE — TELEPHONE ENCOUNTER
Submitted PA for Pregabalin Via [facility] Key: [id number] STATUS: PENDING.    Follow up done daily; if no decision with in three days we will refax.  If another three days goes by with no decision will call the insurance for status.

## 2024-10-24 ENCOUNTER — TELEPHONE (OUTPATIENT)
Dept: PAIN MANAGEMENT | Age: 69
End: 2024-10-24

## 2024-10-24 NOTE — TELEPHONE ENCOUNTER
Pt calling stating DARNELL was supposed to order a neck injection but she is stating that [facility] has not received the order. Please confirm order sent and call pt back to let them know it is completed.

## 2024-11-04 ENCOUNTER — HOSPITAL ENCOUNTER (OUTPATIENT)
Dept: INTERVENTIONAL RADIOLOGY/VASCULAR | Age: 69
Discharge: HOME | End: 2024-11-06
Payer: MEDICARE

## 2024-11-04 DIAGNOSIS — M54.12 RADICULOPATHY OF CERVICAL SPINE: ICD-10-CM

## 2024-11-04 PROCEDURE — 62321 NJX INTERLAMINAR CRV/THRC: CPT

## 2024-11-04 PROCEDURE — 2500000003 HC RX 250 WO HCPCS: Performed by: RADIOLOGY

## 2024-11-04 PROCEDURE — 2709999900 HC NON-CHARGEABLE SUPPLY

## 2024-11-04 PROCEDURE — 6360000002 HC RX W HCPCS: Performed by: RADIOLOGY

## 2024-11-04 PROCEDURE — 6360000004 HC RX CONTRAST MEDICATION: Performed by: RADIOLOGY

## 2024-11-04 RX ORDER — IOPAMIDOL 408 MG/ML
INJECTION, SOLUTION INTRATHECAL PRN
Status: COMPLETED | OUTPATIENT
Start: 2024-11-04 | End: 2024-11-04

## 2024-11-04 RX ORDER — TRIAMCINOLONE ACETONIDE 40 MG/ML
INJECTION, SUSPENSION INTRA-ARTICULAR; INTRAMUSCULAR PRN
Status: COMPLETED | OUTPATIENT
Start: 2024-11-04 | End: 2024-11-04

## 2024-11-04 RX ORDER — LIDOCAINE HYDROCHLORIDE 10 MG/ML
INJECTION, SOLUTION INFILTRATION; PERINEURAL PRN
Status: COMPLETED | OUTPATIENT
Start: 2024-11-04 | End: 2024-11-04

## 2024-11-04 RX ADMIN — IOPAMIDOL 1 ML: 408 INJECTION, SOLUTION INTRATHECAL at 10:20

## 2024-11-04 RX ADMIN — LIDOCAINE HYDROCHLORIDE 5 ML: 10 INJECTION, SOLUTION INFILTRATION; PERINEURAL at 10:20

## 2024-11-04 RX ADMIN — TRIAMCINOLONE ACETONIDE 80 MG: 40 INJECTION, SUSPENSION INTRA-ARTICULAR; INTRAMUSCULAR at 10:20

## 2024-11-04 NOTE — DISCHARGE INSTRUCTIONS
[facility]  Cardiovascular Special Procedures  Cervical Epidural Steroid Injection  Patient Discharge Instructions      When You Get Home    You should not drive the day of the procedure.  You may experience arm weakness during the first 24 hours following the procedure.  However, you do not need to stay in bed when you get home.  Even if you feel better right away, avoid activities that may strain your neck.    Keep in mind that most patients feel increased pain for the first 24 hours.  You should start feeling some pain relief 3-7 days following the injection.  This is because the steroid will start working within three days of the injection with maximal effect by one week.  At that time, we will evaluate your pain level to determine the need for another steroid injection.    Remove bandaid(s) within 24 hours.      When to Call Your Doctor    Call right away if you notice any of the following symptoms:    Severe pain or headache;  Fever or chills;  Redness or swelling around the injection site.        You may contact [facility] for any questions or problems that may occur at [phone] during the hours of 9am-5pm Monday-Friday, or the hospital  after hours at [phone], to have the interventional radiologist on call paged.        [facility]  Cardiovascular Special Procedures  General Discharge Instructions    PROCEDURE: _____cervical epidural steroid injection_______________________________________________    ____ You may be drowsy or lightheaded after receiving sedation. DO NOT operate a vehicle (automobile, bicycle, motorcycle, machinery, or power tools), make any important decisions or sign any important/legal documents, or drink alcoholic beverages for the next 24 hours  _x___ We strongly suggest that a responsible adult be with you for the next 24 hours for your protection and safety  ____ If the intravenous catheter site is painful, apply warm wet

## 2024-11-05 ENCOUNTER — OFFICE VISIT (OUTPATIENT)
Dept: PAIN MANAGEMENT | Age: 69
End: 2024-11-05

## 2024-11-05 VITALS
OXYGEN SATURATION: 95 % | WEIGHT: 134 LBS | DIASTOLIC BLOOD PRESSURE: 76 MMHG | BODY MASS INDEX: 21.63 KG/M2 | HEART RATE: 79 BPM | SYSTOLIC BLOOD PRESSURE: 120 MMHG

## 2024-11-05 DIAGNOSIS — G25.81 RESTLESS LEG SYNDROME: ICD-10-CM

## 2024-11-05 DIAGNOSIS — Z79.891 ENCOUNTER FOR LONG-TERM OPIATE ANALGESIC USE: ICD-10-CM

## 2024-11-05 DIAGNOSIS — M47.816 SPONDYLOSIS OF LUMBAR REGION WITHOUT MYELOPATHY OR RADICULOPATHY: Primary | ICD-10-CM

## 2024-11-05 DIAGNOSIS — M54.12 CERVICAL RADICULOPATHY: ICD-10-CM

## 2024-11-05 DIAGNOSIS — Z51.81 ENCOUNTER FOR THERAPEUTIC DRUG MONITORING: ICD-10-CM

## 2024-11-05 DIAGNOSIS — M47.817 LUMBOSACRAL SPONDYLOSIS WITHOUT MYELOPATHY: ICD-10-CM

## 2024-11-05 DIAGNOSIS — G89.4 CHRONIC PAIN SYNDROME: ICD-10-CM

## 2024-11-05 DIAGNOSIS — K58.0 IRRITABLE BOWEL SYNDROME WITH DIARRHEA: ICD-10-CM

## 2024-11-05 DIAGNOSIS — M96.1 POSTLAMINECTOMY SYNDROME, CERVICAL REGION: ICD-10-CM

## 2024-11-05 DIAGNOSIS — G47.01 INSOMNIA DUE TO MEDICAL CONDITION: ICD-10-CM

## 2024-11-05 DIAGNOSIS — G62.9 NEUROPATHY: ICD-10-CM

## 2024-11-05 RX ORDER — HYDROCODONE BITARTRATE AND ACETAMINOPHEN 7.5; 325 MG/1; MG/1
1 TABLET ORAL EVERY 6 HOURS PRN
Qty: 112 TABLET | Refills: 0 | Status: CANCELLED | OUTPATIENT
Start: 2024-11-05 | End: 2024-12-03

## 2024-11-05 RX ORDER — PREGABALIN 75 MG/1
75 CAPSULE ORAL 2 TIMES DAILY
Qty: 60 CAPSULE | Refills: 0 | Status: CANCELLED | OUTPATIENT
Start: 2024-11-05 | End: 2024-12-05

## 2024-11-05 RX ORDER — PREGABALIN 50 MG/1
50 CAPSULE ORAL 2 TIMES DAILY
Qty: 60 CAPSULE | Refills: 0 | Status: SHIPPED | OUTPATIENT
Start: 2024-11-05 | End: 2024-12-05

## 2024-11-05 RX ORDER — OXYCODONE AND ACETAMINOPHEN 5; 325 MG/1; MG/1
1 TABLET ORAL EVERY 6 HOURS PRN
Qty: 112 TABLET | Refills: 0 | Status: SHIPPED | OUTPATIENT
Start: 2024-11-05 | End: 2024-12-03

## 2024-11-05 RX ORDER — METHOCARBAMOL 500 MG/1
500 TABLET, FILM COATED ORAL 2 TIMES DAILY
Qty: 60 TABLET | Refills: 0 | Status: SHIPPED | OUTPATIENT
Start: 2024-11-05 | End: 2024-12-05

## 2024-11-05 NOTE — PROGRESS NOTES
75 mg capsule some mild hangover effect.  Discussed with patient decrease the Lyrica to prevent the hangover effect and help more with pain.  -Still using magnesium and Robaxin for spasms, needs Robaxin refilled  -No longer using cyclobenzaprine  -f/u 4 weeks for reassessment      Analgesic Plan:              Continue present regimen: yes              Adjust dose of present analgesic:no              Switch analgesics:no              Add/Adjust concomitant therapy:no      Current Outpatient Medications   Medication Sig Dispense Refill    HYDROcodone-acetaminophen (NORCO) 7.5-325 MG per tablet Take 1 tablet by mouth every 6 hours as needed for Pain for up to 28 days. Max Daily Amount: 4 tablets 112 tablet 0    pregabalin (LYRICA) 75 MG capsule Take 1 capsule by mouth 2 times daily for 30 days. Max Daily Amount: 150 mg 60 capsule 0    methocarbamol (ROBAXIN) 500 MG tablet Take 1 tablet by mouth 2 times daily Using in daytime. Does not make drowsy 60 tablet 2    cyclobenzaprine (FLEXERIL) 10 MG tablet Take 1 tablet by mouth nightly as needed for Muscle spasms 30 tablet 2    ibuprofen (ADVIL;MOTRIN) 600 MG tablet Take 1 tablet by mouth 2 times daily as needed for Pain 60 tablet 2    rOPINIRole (REQUIP) 0.25 MG tablet Take 1-2 tabs as needed as bedtime for restless legs 60 tablet 1    augmented betamethasone dipropionate (DIPROLENE-AF) 0.05 % cream APPLY CREAM TOPICALLY TWICE DAILY      buPROPion (WELLBUTRIN XL) 300 MG extended release tablet Take 1 tablet by mouth daily      desvenlafaxine succinate (PRISTIQ) 100 MG TB24 extended release tablet Take 1 tablet by mouth daily      glycopyrrolate (ROBINUL) 2 MG tablet Take 1 tablet by mouth 2 times daily      pantoprazole (PROTONIX) 40 MG tablet Take 1 tablet by mouth daily      rosuvastatin (CRESTOR) 5 MG tablet       traZODone (DESYREL) 50 MG tablet TAKE 1 TABLET BY MOUTH AT BEDTIME      famciclovir (FAMVIR) 250 MG tablet Take 1 tablet by mouth daily      venlafaxine

## 2024-12-03 ENCOUNTER — OFFICE VISIT (OUTPATIENT)
Dept: PAIN MANAGEMENT | Age: 69
End: 2024-12-03
Payer: MEDICARE

## 2024-12-03 VITALS
WEIGHT: 141 LBS | OXYGEN SATURATION: 94 % | BODY MASS INDEX: 22.76 KG/M2 | SYSTOLIC BLOOD PRESSURE: 128 MMHG | DIASTOLIC BLOOD PRESSURE: 79 MMHG | HEART RATE: 74 BPM

## 2024-12-03 DIAGNOSIS — M96.1 POSTLAMINECTOMY SYNDROME, CERVICAL REGION: ICD-10-CM

## 2024-12-03 DIAGNOSIS — M54.12 CERVICAL RADICULOPATHY: ICD-10-CM

## 2024-12-03 DIAGNOSIS — G89.4 CHRONIC PAIN SYNDROME: ICD-10-CM

## 2024-12-03 DIAGNOSIS — Z51.81 ENCOUNTER FOR THERAPEUTIC DRUG MONITORING: ICD-10-CM

## 2024-12-03 DIAGNOSIS — G62.9 NEUROPATHY: ICD-10-CM

## 2024-12-03 DIAGNOSIS — G25.81 RESTLESS LEG SYNDROME: ICD-10-CM

## 2024-12-03 DIAGNOSIS — M47.816 SPONDYLOSIS OF LUMBAR REGION WITHOUT MYELOPATHY OR RADICULOPATHY: Primary | ICD-10-CM

## 2024-12-03 DIAGNOSIS — M47.817 LUMBOSACRAL SPONDYLOSIS WITHOUT MYELOPATHY: ICD-10-CM

## 2024-12-03 DIAGNOSIS — K58.0 IRRITABLE BOWEL SYNDROME WITH DIARRHEA: ICD-10-CM

## 2024-12-03 DIAGNOSIS — G47.01 INSOMNIA DUE TO MEDICAL CONDITION: ICD-10-CM

## 2024-12-03 DIAGNOSIS — Z79.891 ENCOUNTER FOR LONG-TERM OPIATE ANALGESIC USE: ICD-10-CM

## 2024-12-03 PROCEDURE — 1123F ACP DISCUSS/DSCN MKR DOCD: CPT | Performed by: NURSE PRACTITIONER

## 2024-12-03 PROCEDURE — 1159F MED LIST DOCD IN RCRD: CPT | Performed by: NURSE PRACTITIONER

## 2024-12-03 PROCEDURE — 1160F RVW MEDS BY RX/DR IN RCRD: CPT | Performed by: NURSE PRACTITIONER

## 2024-12-03 PROCEDURE — 99214 OFFICE O/P EST MOD 30 MIN: CPT | Performed by: NURSE PRACTITIONER

## 2024-12-03 RX ORDER — OXYCODONE AND ACETAMINOPHEN 5; 325 MG/1; MG/1
1 TABLET ORAL EVERY 6 HOURS PRN
Qty: 112 TABLET | Refills: 0 | Status: SHIPPED | OUTPATIENT
Start: 2024-12-03 | End: 2024-12-31

## 2024-12-03 RX ORDER — METHOCARBAMOL 500 MG/1
500 TABLET, FILM COATED ORAL 3 TIMES DAILY
Qty: 90 TABLET | Refills: 0 | Status: SHIPPED | OUTPATIENT
Start: 2024-12-03 | End: 2025-01-02

## 2024-12-03 NOTE — PROGRESS NOTES
Kiarra Gold  1955  [id number]      HISTORY OF PRESENT ILLNESS: Ms. Gold is a 69 y.o. female returns for a follow up visit for pain management  She has a diagnosis of   1. Spondylosis of lumbar region without myelopathy or radiculopathy    2. Chronic pain syndrome    3. Postlaminectomy syndrome, cervical region    4. Encounter for long-term opiate analgesic use    5. Lumbosacral spondylosis without myelopathy    6. Cervical radiculopathy    7. Encounter for therapeutic drug monitoring    8. Insomnia due to medical condition    9. Irritable bowel syndrome with diarrhea    10. Restless leg syndrome    11. Neuropathy    .      New Medications since Last Office visit have been reviewed with patient.     As per Information Obtained from the PADT (Patient Assessment and Documentation Tool)    She complains of pain in the lower back. She rates the pain 5/10 and describes it as aching. Current treatment regimen has helped relieve about 50% of the pain since beginning treatment plan.  She denies any side effects from the current pain regimen. Patient reports that since implementation of their treatment plan; their physical functioning is unchanged, family/social relationships are unchanged, mood is unchanged sleep patterns are unchanged, and that the overall functioning is better.  Patient denies/admits that any of the above have changed since last office visit. Patient denies misusing/abusing her narcotic pain medications or using any illegal drugs.      Upon obtaining medical history from Ms. Gold    ALLERGIES: Patients list of allergies were reviewed     MEDICATIONS: Ms. Gold list of medications were reviewed.Her current medications are   Outpatient Medications Prior to Visit   Medication Sig Dispense Refill    ibuprofen (ADVIL;MOTRIN) 600 MG tablet Take 1 tablet by mouth 2 times daily as needed for Pain 60 tablet 2    rOPINIRole (REQUIP) 0.25 MG tablet Take 1-2 tabs as needed as bedtime for

## 2024-12-17 DIAGNOSIS — Z79.891 ENCOUNTER FOR LONG-TERM OPIATE ANALGESIC USE: ICD-10-CM

## 2024-12-17 DIAGNOSIS — G25.81 RESTLESS LEG SYNDROME: ICD-10-CM

## 2024-12-17 DIAGNOSIS — Z51.81 ENCOUNTER FOR THERAPEUTIC DRUG MONITORING: ICD-10-CM

## 2024-12-17 DIAGNOSIS — G89.4 CHRONIC PAIN SYNDROME: ICD-10-CM

## 2024-12-17 DIAGNOSIS — K58.0 IRRITABLE BOWEL SYNDROME WITH DIARRHEA: ICD-10-CM

## 2024-12-17 DIAGNOSIS — M54.12 CERVICAL RADICULOPATHY: ICD-10-CM

## 2024-12-17 DIAGNOSIS — M47.816 SPONDYLOSIS OF LUMBAR REGION WITHOUT MYELOPATHY OR RADICULOPATHY: ICD-10-CM

## 2024-12-17 DIAGNOSIS — M96.1 POSTLAMINECTOMY SYNDROME, CERVICAL REGION: ICD-10-CM

## 2024-12-17 DIAGNOSIS — G47.01 INSOMNIA DUE TO MEDICAL CONDITION: ICD-10-CM

## 2024-12-17 DIAGNOSIS — M47.817 LUMBOSACRAL SPONDYLOSIS WITHOUT MYELOPATHY: ICD-10-CM

## 2024-12-18 RX ORDER — METHOCARBAMOL 500 MG/1
TABLET, FILM COATED ORAL
Qty: 60 TABLET | Refills: 0 | OUTPATIENT
Start: 2024-12-18

## 2024-12-25 DIAGNOSIS — G25.81 RESTLESS LEG SYNDROME: ICD-10-CM

## 2024-12-26 RX ORDER — ROPINIROLE 0.25 MG/1
TABLET, FILM COATED ORAL
Qty: 60 TABLET | Refills: 0 | OUTPATIENT
Start: 2024-12-26

## 2024-12-27 ENCOUNTER — TELEPHONE (OUTPATIENT)
Dept: PAIN MANAGEMENT | Age: 69
End: 2024-12-27

## 2024-12-27 DIAGNOSIS — M47.817 LUMBOSACRAL SPONDYLOSIS WITHOUT MYELOPATHY: ICD-10-CM

## 2024-12-27 DIAGNOSIS — G25.81 RESTLESS LEG SYNDROME: ICD-10-CM

## 2024-12-27 DIAGNOSIS — G89.4 CHRONIC PAIN SYNDROME: ICD-10-CM

## 2024-12-27 DIAGNOSIS — G62.9 NEUROPATHY: ICD-10-CM

## 2024-12-27 DIAGNOSIS — G47.01 INSOMNIA DUE TO MEDICAL CONDITION: ICD-10-CM

## 2024-12-27 DIAGNOSIS — M54.12 CERVICAL RADICULOPATHY: ICD-10-CM

## 2024-12-27 DIAGNOSIS — M47.816 SPONDYLOSIS OF LUMBAR REGION WITHOUT MYELOPATHY OR RADICULOPATHY: ICD-10-CM

## 2024-12-27 DIAGNOSIS — M96.1 POSTLAMINECTOMY SYNDROME, CERVICAL REGION: ICD-10-CM

## 2024-12-27 DIAGNOSIS — K58.0 IRRITABLE BOWEL SYNDROME WITH DIARRHEA: ICD-10-CM

## 2024-12-27 DIAGNOSIS — Z79.891 ENCOUNTER FOR LONG-TERM OPIATE ANALGESIC USE: ICD-10-CM

## 2024-12-27 RX ORDER — OXYCODONE AND ACETAMINOPHEN 5; 325 MG/1; MG/1
1 TABLET ORAL EVERY 6 HOURS PRN
Qty: 56 TABLET | Refills: 0 | Status: SHIPPED | OUTPATIENT
Start: 2024-12-30 | End: 2025-01-13

## 2025-01-01 DIAGNOSIS — G25.81 RESTLESS LEG SYNDROME: ICD-10-CM

## 2025-01-02 RX ORDER — ROPINIROLE 0.25 MG/1
TABLET, FILM COATED ORAL
Qty: 60 TABLET | Refills: 0 | OUTPATIENT
Start: 2025-01-02

## 2025-01-14 ENCOUNTER — OFFICE VISIT (OUTPATIENT)
Dept: PAIN MANAGEMENT | Age: 70
End: 2025-01-14
Payer: MEDICARE

## 2025-01-14 VITALS
HEART RATE: 77 BPM | DIASTOLIC BLOOD PRESSURE: 80 MMHG | BODY MASS INDEX: 22.6 KG/M2 | SYSTOLIC BLOOD PRESSURE: 125 MMHG | WEIGHT: 140 LBS | OXYGEN SATURATION: 96 %

## 2025-01-14 DIAGNOSIS — G47.01 INSOMNIA DUE TO MEDICAL CONDITION: ICD-10-CM

## 2025-01-14 DIAGNOSIS — Z51.81 ENCOUNTER FOR THERAPEUTIC DRUG MONITORING: ICD-10-CM

## 2025-01-14 DIAGNOSIS — T40.2X5A OPIOID-INDUCED CONSTIPATION: ICD-10-CM

## 2025-01-14 DIAGNOSIS — K59.03 OPIOID-INDUCED CONSTIPATION: ICD-10-CM

## 2025-01-14 DIAGNOSIS — G89.4 CHRONIC PAIN SYNDROME: ICD-10-CM

## 2025-01-14 DIAGNOSIS — M96.1 POSTLAMINECTOMY SYNDROME, CERVICAL REGION: ICD-10-CM

## 2025-01-14 DIAGNOSIS — M47.817 LUMBOSACRAL SPONDYLOSIS WITHOUT MYELOPATHY: ICD-10-CM

## 2025-01-14 DIAGNOSIS — G25.81 RESTLESS LEG SYNDROME: ICD-10-CM

## 2025-01-14 DIAGNOSIS — Z79.891 ENCOUNTER FOR LONG-TERM OPIATE ANALGESIC USE: ICD-10-CM

## 2025-01-14 DIAGNOSIS — M47.816 SPONDYLOSIS OF LUMBAR REGION WITHOUT MYELOPATHY OR RADICULOPATHY: Primary | ICD-10-CM

## 2025-01-14 DIAGNOSIS — M54.12 CERVICAL RADICULOPATHY: ICD-10-CM

## 2025-01-14 DIAGNOSIS — K58.0 IRRITABLE BOWEL SYNDROME WITH DIARRHEA: ICD-10-CM

## 2025-01-14 DIAGNOSIS — G62.9 NEUROPATHY: ICD-10-CM

## 2025-01-14 PROCEDURE — 1159F MED LIST DOCD IN RCRD: CPT | Performed by: NURSE PRACTITIONER

## 2025-01-14 PROCEDURE — 1123F ACP DISCUSS/DSCN MKR DOCD: CPT | Performed by: NURSE PRACTITIONER

## 2025-01-14 PROCEDURE — 99214 OFFICE O/P EST MOD 30 MIN: CPT | Performed by: NURSE PRACTITIONER

## 2025-01-14 PROCEDURE — 1160F RVW MEDS BY RX/DR IN RCRD: CPT | Performed by: NURSE PRACTITIONER

## 2025-01-14 RX ORDER — BISACODYL 5 MG/1
5-10 TABLET, DELAYED RELEASE ORAL DAILY PRN
Qty: 60 TABLET | Refills: 0 | Status: SHIPPED | OUTPATIENT
Start: 2025-01-14 | End: 2025-02-13

## 2025-01-14 RX ORDER — OXYCODONE AND ACETAMINOPHEN 5; 325 MG/1; MG/1
1 TABLET ORAL EVERY 6 HOURS PRN
Qty: 112 TABLET | Refills: 0 | Status: SHIPPED | OUTPATIENT
Start: 2025-01-14 | End: 2025-02-11

## 2025-01-14 RX ORDER — ROPINIROLE 0.5 MG/1
0.5 TABLET, FILM COATED ORAL 3 TIMES DAILY
Qty: 90 TABLET | Refills: 3 | Status: SHIPPED | OUTPATIENT
Start: 2025-01-14

## 2025-01-14 RX ORDER — ALBUTEROL SULFATE 90 UG/1
2 INHALANT RESPIRATORY (INHALATION)
COMMUNITY
Start: 2024-12-09

## 2025-01-14 RX ORDER — METHOCARBAMOL 500 MG/1
500 TABLET, FILM COATED ORAL 3 TIMES DAILY
Qty: 90 TABLET | Refills: 2 | Status: SHIPPED | OUTPATIENT
Start: 2025-01-14 | End: 2025-04-14

## 2025-01-14 RX ORDER — IBUPROFEN 600 MG/1
600 TABLET, FILM COATED ORAL 2 TIMES DAILY PRN
Qty: 60 TABLET | Refills: 2 | Status: SHIPPED | OUTPATIENT
Start: 2025-01-14 | End: 2025-04-14

## 2025-01-14 NOTE — PROGRESS NOTES
Kiarra Gold  1955  [id number]      HISTORY OF PRESENT ILLNESS: Ms. Gold is a 69 y.o. female returns for a follow up visit for pain management  She has a diagnosis of   1. Spondylosis of lumbar region without myelopathy or radiculopathy    2. Restless leg syndrome    3. Chronic pain syndrome    4. Postlaminectomy syndrome, cervical region    5. Encounter for long-term opiate analgesic use    6. Lumbosacral spondylosis without myelopathy    7. Cervical radiculopathy    8. Insomnia due to medical condition    9. Irritable bowel syndrome with diarrhea    10. Neuropathy    11. Encounter for therapeutic drug monitoring    12. Opioid-induced constipation    .      New Medications since Last Office visit have been reviewed with patient.     As per Information Obtained from the PADT (Patient Assessment and Documentation Tool)    She complains of pain in the neck, lower back. She rates the pain 5/10 and describes it as aching. Current treatment regimen has helped relieve about 50% of the pain since beginning treatment plan.  She denies any side effects from the current pain regimen. Patient reports that since implementation of their treatment plan; their physical functioning is unchanged, family/social relationships are unchanged, mood is unchanged sleep patterns are unchanged, and that the overall functioning is better.  Patient denies/admits that any of the above have changed since last office visit. Patient denies misusing/abusing her narcotic pain medications or using any illegal drugs.      Upon obtaining medical history from Ms. Gold    ALLERGIES: Patients list of allergies were reviewed     MEDICATIONS: Ms. Gold list of medications were reviewed.Her current medications are   Outpatient Medications Prior to Visit   Medication Sig Dispense Refill    albuterol sulfate HFA (PROVENTIL;VENTOLIN;PROAIR) 108 (90 Base) MCG/ACT inhaler Inhale 2 puffs into the lungs every 4-6 hours as needed      augmented

## 2025-01-27 ENCOUNTER — TELEPHONE (OUTPATIENT)
Dept: PAIN MANAGEMENT | Age: 70
End: 2025-01-27

## 2025-01-31 NOTE — TELEPHONE ENCOUNTER
I called patient, explained that we have not received the forms yet.  She said she put in a request today for them to refax it to us.

## 2025-02-11 ENCOUNTER — OFFICE VISIT (OUTPATIENT)
Dept: PAIN MANAGEMENT | Age: 70
End: 2025-02-11
Payer: MEDICARE

## 2025-02-11 VITALS
BODY MASS INDEX: 22.44 KG/M2 | OXYGEN SATURATION: 95 % | SYSTOLIC BLOOD PRESSURE: 137 MMHG | HEART RATE: 89 BPM | DIASTOLIC BLOOD PRESSURE: 73 MMHG | WEIGHT: 139 LBS

## 2025-02-11 DIAGNOSIS — M47.816 SPONDYLOSIS OF LUMBAR REGION WITHOUT MYELOPATHY OR RADICULOPATHY: ICD-10-CM

## 2025-02-11 DIAGNOSIS — Z51.81 ENCOUNTER FOR THERAPEUTIC DRUG MONITORING: ICD-10-CM

## 2025-02-11 DIAGNOSIS — K58.0 IRRITABLE BOWEL SYNDROME WITH DIARRHEA: ICD-10-CM

## 2025-02-11 DIAGNOSIS — M54.12 CERVICAL RADICULOPATHY: ICD-10-CM

## 2025-02-11 DIAGNOSIS — G62.9 NEUROPATHY: ICD-10-CM

## 2025-02-11 DIAGNOSIS — Z79.891 ENCOUNTER FOR LONG-TERM OPIATE ANALGESIC USE: ICD-10-CM

## 2025-02-11 DIAGNOSIS — M47.817 LUMBOSACRAL SPONDYLOSIS WITHOUT MYELOPATHY: ICD-10-CM

## 2025-02-11 DIAGNOSIS — G25.81 RESTLESS LEG SYNDROME: ICD-10-CM

## 2025-02-11 DIAGNOSIS — G47.01 INSOMNIA DUE TO MEDICAL CONDITION: ICD-10-CM

## 2025-02-11 DIAGNOSIS — M96.1 POSTLAMINECTOMY SYNDROME, CERVICAL REGION: Primary | ICD-10-CM

## 2025-02-11 DIAGNOSIS — T40.2X5A OPIOID-INDUCED CONSTIPATION: ICD-10-CM

## 2025-02-11 DIAGNOSIS — K59.03 OPIOID-INDUCED CONSTIPATION: ICD-10-CM

## 2025-02-11 DIAGNOSIS — G89.4 CHRONIC PAIN SYNDROME: ICD-10-CM

## 2025-02-11 PROCEDURE — 99214 OFFICE O/P EST MOD 30 MIN: CPT | Performed by: NURSE PRACTITIONER

## 2025-02-11 PROCEDURE — 1159F MED LIST DOCD IN RCRD: CPT | Performed by: NURSE PRACTITIONER

## 2025-02-11 PROCEDURE — 1123F ACP DISCUSS/DSCN MKR DOCD: CPT | Performed by: NURSE PRACTITIONER

## 2025-02-11 PROCEDURE — 1160F RVW MEDS BY RX/DR IN RCRD: CPT | Performed by: NURSE PRACTITIONER

## 2025-02-11 RX ORDER — OXYCODONE AND ACETAMINOPHEN 5; 325 MG/1; MG/1
1 TABLET ORAL EVERY 6 HOURS PRN
Qty: 112 TABLET | Refills: 0 | Status: SHIPPED | OUTPATIENT
Start: 2025-02-11 | End: 2025-03-11

## 2025-02-11 NOTE — PROGRESS NOTES
Kiarra Gold  1955  [id number]      HISTORY OF PRESENT ILLNESS: Ms. Gold is a 69 y.o. female returns for a follow up visit for pain management  She has a diagnosis of   1. Postlaminectomy syndrome, cervical region    2. Restless leg syndrome    3. Chronic pain syndrome    4. Spondylosis of lumbar region without myelopathy or radiculopathy    5. Encounter for long-term opiate analgesic use    6. Lumbosacral spondylosis without myelopathy    7. Cervical radiculopathy    8. Insomnia due to medical condition    9. Irritable bowel syndrome with diarrhea    10. Neuropathy    11. Encounter for therapeutic drug monitoring    12. Opioid-induced constipation    .      New Medications since Last Office visit have been reviewed with patient.     As per Information Obtained from the PADT (Patient Assessment and Documentation Tool)    She complains of pain in the lower back. She rates the pain 5/10 and describes it as aching. Current treatment regimen has helped relieve about 50% of the pain since beginning treatment plan.  She denies any side effects from the current pain regimen. Patient reports that since implementation of their treatment plan; their physical functioning is unchanged, family/social relationships are unchanged, mood is unchanged sleep patterns are unchanged, and that the overall functioning is better.  Patient denies/admits that any of the above have changed since last office visit. Patient denies misusing/abusing her narcotic pain medications or using any illegal drugs.      Upon obtaining medical history from Ms. Gold    ALLERGIES: Patients list of allergies were reviewed     MEDICATIONS: Ms. Gold list of medications were reviewed.Her current medications are   Outpatient Medications Prior to Visit   Medication Sig Dispense Refill    albuterol sulfate HFA (PROVENTIL;VENTOLIN;PROAIR) 108 (90 Base) MCG/ACT inhaler Inhale 2 puffs into the lungs every 4-6 hours as needed

## 2025-02-18 ENCOUNTER — TELEPHONE (OUTPATIENT)
Dept: PAIN MANAGEMENT | Age: 70
End: 2025-02-18

## 2025-02-18 NOTE — TELEPHONE ENCOUNTER
Pt called and said [facility] was supposed to send order for pt to get neck injection but facility did not receive it. Pt is needing it to be resent.

## 2025-03-04 ENCOUNTER — HOSPITAL ENCOUNTER (OUTPATIENT)
Dept: INTERVENTIONAL RADIOLOGY/VASCULAR | Age: 70
Discharge: HOME | End: 2025-03-06
Payer: MEDICARE

## 2025-03-04 DIAGNOSIS — M54.12 CERVICAL RADICULOPATHY: ICD-10-CM

## 2025-03-04 PROCEDURE — 2500000003 HC RX 250 WO HCPCS: Performed by: RADIOLOGY

## 2025-03-04 PROCEDURE — 6360000004 HC RX CONTRAST MEDICATION: Performed by: RADIOLOGY

## 2025-03-04 PROCEDURE — 62321 NJX INTERLAMINAR CRV/THRC: CPT

## 2025-03-04 PROCEDURE — 2709999900 HC NON-CHARGEABLE SUPPLY

## 2025-03-04 PROCEDURE — 6360000002 HC RX W HCPCS: Performed by: RADIOLOGY

## 2025-03-04 RX ORDER — TRIAMCINOLONE ACETONIDE 40 MG/ML
INJECTION, SUSPENSION INTRA-ARTICULAR; INTRAMUSCULAR PRN
Status: COMPLETED | OUTPATIENT
Start: 2025-03-04 | End: 2025-03-04

## 2025-03-04 RX ORDER — LIDOCAINE HYDROCHLORIDE 10 MG/ML
INJECTION, SOLUTION EPIDURAL; INFILTRATION; INTRACAUDAL; PERINEURAL PRN
Status: COMPLETED | OUTPATIENT
Start: 2025-03-04 | End: 2025-03-04

## 2025-03-04 RX ORDER — IOPAMIDOL 408 MG/ML
INJECTION, SOLUTION INTRATHECAL PRN
Status: COMPLETED | OUTPATIENT
Start: 2025-03-04 | End: 2025-03-04

## 2025-03-04 RX ADMIN — WATER 2 ML: 1 INJECTION INTRAMUSCULAR; INTRAVENOUS; SUBCUTANEOUS at 09:11

## 2025-03-04 RX ADMIN — LIDOCAINE HYDROCHLORIDE 5 ML: 10 INJECTION, SOLUTION EPIDURAL; INFILTRATION; INTRACAUDAL; PERINEURAL at 09:10

## 2025-03-04 RX ADMIN — TRIAMCINOLONE ACETONIDE 80 MG: 40 INJECTION, SUSPENSION INTRA-ARTICULAR; INTRAMUSCULAR at 09:10

## 2025-03-04 RX ADMIN — IOPAMIDOL 1 ML: 408 INJECTION, SOLUTION INTRATHECAL at 09:11

## 2025-03-11 ENCOUNTER — OFFICE VISIT (OUTPATIENT)
Dept: PAIN MANAGEMENT | Age: 70
End: 2025-03-11
Payer: MEDICARE

## 2025-03-11 ENCOUNTER — TELEPHONE (OUTPATIENT)
Dept: PAIN MANAGEMENT | Age: 70
End: 2025-03-11

## 2025-03-11 VITALS
WEIGHT: 134 LBS | HEART RATE: 89 BPM | DIASTOLIC BLOOD PRESSURE: 86 MMHG | OXYGEN SATURATION: 92 % | SYSTOLIC BLOOD PRESSURE: 140 MMHG | BODY MASS INDEX: 21.63 KG/M2

## 2025-03-11 DIAGNOSIS — K58.0 IRRITABLE BOWEL SYNDROME WITH DIARRHEA: ICD-10-CM

## 2025-03-11 DIAGNOSIS — M96.1 POSTLAMINECTOMY SYNDROME, CERVICAL REGION: Primary | ICD-10-CM

## 2025-03-11 DIAGNOSIS — G89.4 CHRONIC PAIN SYNDROME: ICD-10-CM

## 2025-03-11 DIAGNOSIS — T40.2X5A OPIOID-INDUCED CONSTIPATION: ICD-10-CM

## 2025-03-11 DIAGNOSIS — G25.81 RESTLESS LEG SYNDROME: ICD-10-CM

## 2025-03-11 DIAGNOSIS — G47.01 INSOMNIA DUE TO MEDICAL CONDITION: ICD-10-CM

## 2025-03-11 DIAGNOSIS — Z51.81 ENCOUNTER FOR THERAPEUTIC DRUG MONITORING: ICD-10-CM

## 2025-03-11 DIAGNOSIS — M54.12 CERVICAL RADICULOPATHY: ICD-10-CM

## 2025-03-11 DIAGNOSIS — M47.817 LUMBOSACRAL SPONDYLOSIS WITHOUT MYELOPATHY: ICD-10-CM

## 2025-03-11 DIAGNOSIS — K59.03 OPIOID-INDUCED CONSTIPATION: ICD-10-CM

## 2025-03-11 DIAGNOSIS — G62.9 NEUROPATHY: ICD-10-CM

## 2025-03-11 DIAGNOSIS — M47.816 SPONDYLOSIS OF LUMBAR REGION WITHOUT MYELOPATHY OR RADICULOPATHY: ICD-10-CM

## 2025-03-11 DIAGNOSIS — Z79.891 ENCOUNTER FOR LONG-TERM OPIATE ANALGESIC USE: ICD-10-CM

## 2025-03-11 PROCEDURE — 1160F RVW MEDS BY RX/DR IN RCRD: CPT | Performed by: NURSE PRACTITIONER

## 2025-03-11 PROCEDURE — 1159F MED LIST DOCD IN RCRD: CPT | Performed by: NURSE PRACTITIONER

## 2025-03-11 PROCEDURE — 1123F ACP DISCUSS/DSCN MKR DOCD: CPT | Performed by: NURSE PRACTITIONER

## 2025-03-11 PROCEDURE — 99214 OFFICE O/P EST MOD 30 MIN: CPT | Performed by: NURSE PRACTITIONER

## 2025-03-11 RX ORDER — ROPINIROLE 0.5 MG/1
0.5 TABLET, FILM COATED ORAL 3 TIMES DAILY
Qty: 90 TABLET | Refills: 3 | Status: SHIPPED | OUTPATIENT
Start: 2025-03-11

## 2025-03-11 RX ORDER — OXYCODONE AND ACETAMINOPHEN 5; 325 MG/1; MG/1
1 TABLET ORAL EVERY 6 HOURS PRN
Qty: 112 TABLET | Refills: 0 | Status: SHIPPED | OUTPATIENT
Start: 2025-03-11 | End: 2025-04-08

## 2025-03-11 RX ORDER — METHOCARBAMOL 750 MG/1
750 TABLET, FILM COATED ORAL 3 TIMES DAILY
Qty: 90 TABLET | Refills: 1 | Status: SHIPPED | OUTPATIENT
Start: 2025-03-11 | End: 2025-05-10

## 2025-03-11 RX ORDER — IBUPROFEN 600 MG/1
600 TABLET, FILM COATED ORAL 2 TIMES DAILY PRN
Qty: 60 TABLET | Refills: 2 | Status: SHIPPED | OUTPATIENT
Start: 2025-03-11 | End: 2025-06-09

## 2025-03-11 RX ORDER — SUVOREXANT 10 MG/1
1 TABLET, FILM COATED ORAL NIGHTLY
Qty: 30 TABLET | Refills: 1 | Status: SHIPPED | OUTPATIENT
Start: 2025-03-11 | End: 2025-05-10

## 2025-03-11 NOTE — PROGRESS NOTES
primary encounter diagnosis was Postlaminectomy syndrome, cervical region. Diagnoses of Restless leg syndrome, Chronic pain syndrome, Spondylosis of lumbar region without myelopathy or radiculopathy, Encounter for long-term opiate analgesic use, Lumbosacral spondylosis without myelopathy, Cervical radiculopathy, Insomnia due to medical condition, Irritable bowel syndrome with diarrhea, Neuropathy, Encounter for therapeutic drug monitoring, and Opioid-induced constipation were also pertinent to this visit.   Risks and benefits of the medications and other alternative treatments  including no treatment were discussed with the patient.The common side effects of these medications were also explained to the patient.  Informed verbal consent was obtained.   Goals of current treatment regimen include:   *Improvement in pain by using the least amount of medication therapy to be satisfactorily functional.    *To restore functioning with focus on improvement in physical performance, general activity, ADLs, work or disability, emotional distress   *Patient was educated on and understands the limitations of opiates regarding their inability to remove pain long term.   Will continue to monitor progress towards achieving/maintaining therapeutic goals with special emphasis on  1. Improvement in perceived interfernce of pain with ADL's. Ability to do home exercises independently. Ability to do household chores indoor and/or outdoor work and social and leisure activities.Improve psychosocial and physical functioning. she is showing progression towards this treatment goal with the current regimen.     She was advised against drinking alcohol with the narcotic pain medicines, advised against driving or handling machinery while adjusting the dose of medicines or if having cognitive  issues related to the current medications.Risk of overdose and death, if medicines not taken as prescribed, were also discussed. If the patient develops

## 2025-03-11 NOTE — TELEPHONE ENCOUNTER
Submitted PA for [facility] Key: [id number] STATUS: APPROVED 1/1/25-3/11/26.    Authorization Expiration Date: 3/10/2026.    Pharmacy has been notified. Thank you!

## 2025-03-13 NOTE — TELEPHONE ENCOUNTER
Pt states that even though PA was approve, she still can not afford to pay over $300 for meds.  Request something different medication.     Request call back.

## 2025-04-01 DIAGNOSIS — M47.817 LUMBOSACRAL SPONDYLOSIS WITHOUT MYELOPATHY: ICD-10-CM

## 2025-04-01 DIAGNOSIS — M96.1 POSTLAMINECTOMY SYNDROME, CERVICAL REGION: ICD-10-CM

## 2025-04-01 DIAGNOSIS — G89.4 CHRONIC PAIN SYNDROME: ICD-10-CM

## 2025-04-02 ENCOUNTER — TELEPHONE (OUTPATIENT)
Dept: PAIN MANAGEMENT | Age: 70
End: 2025-04-02

## 2025-04-02 DIAGNOSIS — G62.9 NEUROPATHY: ICD-10-CM

## 2025-04-02 DIAGNOSIS — M47.817 LUMBOSACRAL SPONDYLOSIS WITHOUT MYELOPATHY: ICD-10-CM

## 2025-04-02 DIAGNOSIS — G25.81 RESTLESS LEG SYNDROME: ICD-10-CM

## 2025-04-02 DIAGNOSIS — Z79.891 ENCOUNTER FOR LONG-TERM OPIATE ANALGESIC USE: ICD-10-CM

## 2025-04-02 DIAGNOSIS — M47.816 SPONDYLOSIS OF LUMBAR REGION WITHOUT MYELOPATHY OR RADICULOPATHY: ICD-10-CM

## 2025-04-02 DIAGNOSIS — G47.01 INSOMNIA DUE TO MEDICAL CONDITION: ICD-10-CM

## 2025-04-02 DIAGNOSIS — K58.0 IRRITABLE BOWEL SYNDROME WITH DIARRHEA: ICD-10-CM

## 2025-04-02 DIAGNOSIS — M96.1 POSTLAMINECTOMY SYNDROME, CERVICAL REGION: ICD-10-CM

## 2025-04-02 DIAGNOSIS — G89.4 CHRONIC PAIN SYNDROME: ICD-10-CM

## 2025-04-02 DIAGNOSIS — M54.12 CERVICAL RADICULOPATHY: ICD-10-CM

## 2025-04-02 RX ORDER — CYCLOBENZAPRINE HCL 10 MG
TABLET ORAL
Qty: 30 TABLET | Refills: 0 | OUTPATIENT
Start: 2025-04-02

## 2025-04-02 RX ORDER — OXYCODONE AND ACETAMINOPHEN 5; 325 MG/1; MG/1
1 TABLET ORAL EVERY 6 HOURS PRN
Qty: 28 TABLET | Refills: 0 | Status: SHIPPED | OUTPATIENT
Start: 2025-04-08 | End: 2025-04-15

## 2025-04-15 ENCOUNTER — OFFICE VISIT (OUTPATIENT)
Dept: PAIN MANAGEMENT | Age: 70
End: 2025-04-15
Payer: MEDICARE

## 2025-04-15 VITALS
HEART RATE: 77 BPM | BODY MASS INDEX: 22.11 KG/M2 | SYSTOLIC BLOOD PRESSURE: 138 MMHG | DIASTOLIC BLOOD PRESSURE: 91 MMHG | WEIGHT: 137 LBS

## 2025-04-15 DIAGNOSIS — Z51.81 ENCOUNTER FOR THERAPEUTIC DRUG MONITORING: ICD-10-CM

## 2025-04-15 DIAGNOSIS — G89.4 CHRONIC PAIN SYNDROME: ICD-10-CM

## 2025-04-15 DIAGNOSIS — M54.12 CERVICAL RADICULOPATHY: ICD-10-CM

## 2025-04-15 DIAGNOSIS — G47.01 INSOMNIA DUE TO MEDICAL CONDITION: ICD-10-CM

## 2025-04-15 DIAGNOSIS — K59.03 OPIOID-INDUCED CONSTIPATION: ICD-10-CM

## 2025-04-15 DIAGNOSIS — T40.2X5A OPIOID-INDUCED CONSTIPATION: ICD-10-CM

## 2025-04-15 DIAGNOSIS — M47.817 LUMBOSACRAL SPONDYLOSIS WITHOUT MYELOPATHY: ICD-10-CM

## 2025-04-15 DIAGNOSIS — G62.9 NEUROPATHY: ICD-10-CM

## 2025-04-15 DIAGNOSIS — G25.81 RESTLESS LEG SYNDROME: ICD-10-CM

## 2025-04-15 DIAGNOSIS — M47.816 SPONDYLOSIS OF LUMBAR REGION WITHOUT MYELOPATHY OR RADICULOPATHY: ICD-10-CM

## 2025-04-15 DIAGNOSIS — M96.1 POSTLAMINECTOMY SYNDROME, CERVICAL REGION: Primary | ICD-10-CM

## 2025-04-15 DIAGNOSIS — K58.0 IRRITABLE BOWEL SYNDROME WITH DIARRHEA: ICD-10-CM

## 2025-04-15 DIAGNOSIS — Z79.891 ENCOUNTER FOR LONG-TERM OPIATE ANALGESIC USE: ICD-10-CM

## 2025-04-15 PROCEDURE — 1123F ACP DISCUSS/DSCN MKR DOCD: CPT | Performed by: NURSE PRACTITIONER

## 2025-04-15 PROCEDURE — 1160F RVW MEDS BY RX/DR IN RCRD: CPT | Performed by: NURSE PRACTITIONER

## 2025-04-15 PROCEDURE — 1159F MED LIST DOCD IN RCRD: CPT | Performed by: NURSE PRACTITIONER

## 2025-04-15 PROCEDURE — 99214 OFFICE O/P EST MOD 30 MIN: CPT | Performed by: NURSE PRACTITIONER

## 2025-04-15 RX ORDER — OXYCODONE AND ACETAMINOPHEN 5; 325 MG/1; MG/1
1 TABLET ORAL EVERY 6 HOURS PRN
Qty: 112 TABLET | Refills: 0 | Status: SHIPPED | OUTPATIENT
Start: 2025-04-15 | End: 2025-05-13

## 2025-04-15 NOTE — PROGRESS NOTES
Kiarra Gold  1955  [id number]      HISTORY OF PRESENT ILLNESS: Ms. Gold is a 69 y.o. female returns for a follow up visit for pain management  She has a diagnosis of   1. Postlaminectomy syndrome, cervical region    2. Restless leg syndrome    3. Chronic pain syndrome    4. Spondylosis of lumbar region without myelopathy or radiculopathy    5. Encounter for long-term opiate analgesic use    6. Lumbosacral spondylosis without myelopathy    7. Cervical radiculopathy    8. Insomnia due to medical condition    9. Irritable bowel syndrome with diarrhea    10. Neuropathy    11. Encounter for therapeutic drug monitoring    12. Opioid-induced constipation    .      New Medications since Last Office visit have been reviewed with patient.     As per Information Obtained from the PADT (Patient Assessment and Documentation Tool)    She complains of pain in the lower back. She rates the pain 5/10 and describes it as aching. Current treatment regimen has helped relieve about 60% of the pain since beginning treatment plan.  She denies any side effects from the current pain regimen. Patient reports that since implementation of their treatment plan; their physical functioning is better, family/social relationships are better, mood is better sleep patterns are better, and that the overall functioning is better.  Patient denies/admits that any of the above have changed since last office visit. Patient denies misusing/abusing her narcotic pain medications or using any illegal drugs.      Upon obtaining medical history from Ms. Gold    ALLERGIES: Patients list of allergies were reviewed     MEDICATIONS: Ms. Gold list of medications were reviewed.Her current medications are   Outpatient Medications Prior to Visit   Medication Sig Dispense Refill    oxyCODONE-acetaminophen (PERCOCET) 5-325 MG per tablet Take 1 tablet by mouth every 6 hours as needed for Pain for up to 7 days. Max Daily Amount: 4 tablets 28 tablet 0

## 2025-05-13 ENCOUNTER — OFFICE VISIT (OUTPATIENT)
Dept: PAIN MANAGEMENT | Age: 70
End: 2025-05-13
Payer: MEDICARE

## 2025-05-13 VITALS
DIASTOLIC BLOOD PRESSURE: 85 MMHG | WEIGHT: 136 LBS | HEART RATE: 76 BPM | BODY MASS INDEX: 21.95 KG/M2 | OXYGEN SATURATION: 96 % | SYSTOLIC BLOOD PRESSURE: 132 MMHG

## 2025-05-13 DIAGNOSIS — K58.0 IRRITABLE BOWEL SYNDROME WITH DIARRHEA: ICD-10-CM

## 2025-05-13 DIAGNOSIS — K59.03 OPIOID-INDUCED CONSTIPATION: ICD-10-CM

## 2025-05-13 DIAGNOSIS — M54.12 CERVICAL RADICULOPATHY: ICD-10-CM

## 2025-05-13 DIAGNOSIS — G25.81 RESTLESS LEG SYNDROME: ICD-10-CM

## 2025-05-13 DIAGNOSIS — M47.816 SPONDYLOSIS OF LUMBAR REGION WITHOUT MYELOPATHY OR RADICULOPATHY: Primary | ICD-10-CM

## 2025-05-13 DIAGNOSIS — T40.2X5A OPIOID-INDUCED CONSTIPATION: ICD-10-CM

## 2025-05-13 DIAGNOSIS — M47.817 LUMBOSACRAL SPONDYLOSIS WITHOUT MYELOPATHY: ICD-10-CM

## 2025-05-13 DIAGNOSIS — Z79.891 ENCOUNTER FOR LONG-TERM OPIATE ANALGESIC USE: ICD-10-CM

## 2025-05-13 DIAGNOSIS — G47.01 INSOMNIA DUE TO MEDICAL CONDITION: ICD-10-CM

## 2025-05-13 DIAGNOSIS — M96.1 POSTLAMINECTOMY SYNDROME, CERVICAL REGION: ICD-10-CM

## 2025-05-13 DIAGNOSIS — G62.9 NEUROPATHY: ICD-10-CM

## 2025-05-13 DIAGNOSIS — G89.4 CHRONIC PAIN SYNDROME: ICD-10-CM

## 2025-05-13 DIAGNOSIS — Z51.81 ENCOUNTER FOR THERAPEUTIC DRUG MONITORING: ICD-10-CM

## 2025-05-13 PROCEDURE — 1159F MED LIST DOCD IN RCRD: CPT | Performed by: NURSE PRACTITIONER

## 2025-05-13 PROCEDURE — 99214 OFFICE O/P EST MOD 30 MIN: CPT | Performed by: NURSE PRACTITIONER

## 2025-05-13 PROCEDURE — 1160F RVW MEDS BY RX/DR IN RCRD: CPT | Performed by: NURSE PRACTITIONER

## 2025-05-13 PROCEDURE — 1123F ACP DISCUSS/DSCN MKR DOCD: CPT | Performed by: NURSE PRACTITIONER

## 2025-05-13 RX ORDER — IBUPROFEN 600 MG/1
600 TABLET, FILM COATED ORAL 2 TIMES DAILY PRN
Qty: 60 TABLET | Refills: 2 | Status: CANCELLED | OUTPATIENT
Start: 2025-05-13 | End: 2025-08-11

## 2025-05-13 RX ORDER — MIRTAZAPINE 15 MG/1
15 TABLET, FILM COATED ORAL NIGHTLY
COMMUNITY
Start: 2025-05-05

## 2025-05-13 RX ORDER — ROPINIROLE 0.5 MG/1
0.5 TABLET, FILM COATED ORAL 3 TIMES DAILY
Qty: 90 TABLET | Refills: 3 | Status: CANCELLED | OUTPATIENT
Start: 2025-05-13

## 2025-05-13 RX ORDER — OXYCODONE AND ACETAMINOPHEN 5; 325 MG/1; MG/1
1 TABLET ORAL EVERY 6 HOURS PRN
Qty: 112 TABLET | Refills: 0 | Status: SHIPPED | OUTPATIENT
Start: 2025-05-13 | End: 2025-06-10

## 2025-05-13 RX ORDER — METHOCARBAMOL 750 MG/1
750 TABLET, FILM COATED ORAL 4 TIMES DAILY
Qty: 120 TABLET | Refills: 1 | Status: SHIPPED | OUTPATIENT
Start: 2025-05-13 | End: 2025-07-12

## 2025-05-13 NOTE — PROGRESS NOTES
Kiarra Gold  1955  [id number]      HISTORY OF PRESENT ILLNESS: Ms. Gold is a 69 y.o. female returns for a follow up visit for pain management  She has a diagnosis of   1. Spondylosis of lumbar region without myelopathy or radiculopathy    2. Restless leg syndrome    3. Chronic pain syndrome    4. Postlaminectomy syndrome, cervical region    5. Encounter for long-term opiate analgesic use    6. Lumbosacral spondylosis without myelopathy    7. Cervical radiculopathy    8. Insomnia due to medical condition    9. Irritable bowel syndrome with diarrhea    10. Neuropathy    11. Encounter for therapeutic drug monitoring    12. Opioid-induced constipation    .      New Medications since Last Office visit have been reviewed with patient.     As per Information Obtained from the PADT (Patient Assessment and Documentation Tool)    She complains of pain in the lower back. She rates the pain 5/10 and describes it as aching. Current treatment regimen has helped relieve about 50% of the pain since beginning treatment plan.  She denies any side effects from the current pain regimen. Patient reports that since implementation of their treatment plan; their physical functioning is unchanged, family/social relationships are unchanged, mood is unchanged sleep patterns are unchanged, and that the overall functioning is better.  Patient denies/admits that any of the above have changed since last office visit. Patient denies misusing/abusing her narcotic pain medications or using any illegal drugs.      Upon obtaining medical history from Ms. Gold    ALLERGIES: Patients list of allergies were reviewed     MEDICATIONS: Ms. Gold list of medications were reviewed.Her current medications are   Outpatient Medications Prior to Visit   Medication Sig Dispense Refill    mirtazapine (REMERON) 15 MG tablet Take 1 tablet by mouth nightly      oxyCODONE-acetaminophen (PERCOCET) 5-325 MG per tablet Take 1 tablet by mouth every 6

## 2025-06-02 ENCOUNTER — HOSPITAL ENCOUNTER (OUTPATIENT)
Dept: MRI IMAGING | Age: 70
Discharge: HOME | End: 2025-06-02
Payer: MEDICARE

## 2025-06-02 DIAGNOSIS — M96.1 POSTLAMINECTOMY SYNDROME, CERVICAL REGION: ICD-10-CM

## 2025-06-02 DIAGNOSIS — M54.12 CERVICAL RADICULOPATHY: ICD-10-CM

## 2025-06-02 PROCEDURE — 72141 MRI NECK SPINE W/O DYE: CPT

## 2025-06-10 ENCOUNTER — OFFICE VISIT (OUTPATIENT)
Dept: PAIN MANAGEMENT | Age: 70
End: 2025-06-10

## 2025-06-10 VITALS
WEIGHT: 140 LBS | OXYGEN SATURATION: 96 % | DIASTOLIC BLOOD PRESSURE: 77 MMHG | HEART RATE: 70 BPM | SYSTOLIC BLOOD PRESSURE: 133 MMHG | BODY MASS INDEX: 22.6 KG/M2

## 2025-06-10 DIAGNOSIS — M47.816 SPONDYLOSIS OF LUMBAR REGION WITHOUT MYELOPATHY OR RADICULOPATHY: ICD-10-CM

## 2025-06-10 DIAGNOSIS — K58.0 IRRITABLE BOWEL SYNDROME WITH DIARRHEA: ICD-10-CM

## 2025-06-10 DIAGNOSIS — G62.9 NEUROPATHY: ICD-10-CM

## 2025-06-10 DIAGNOSIS — G89.4 CHRONIC PAIN SYNDROME: ICD-10-CM

## 2025-06-10 DIAGNOSIS — Z79.891 ENCOUNTER FOR LONG-TERM OPIATE ANALGESIC USE: ICD-10-CM

## 2025-06-10 DIAGNOSIS — K59.03 OPIOID-INDUCED CONSTIPATION: ICD-10-CM

## 2025-06-10 DIAGNOSIS — M47.817 LUMBOSACRAL SPONDYLOSIS WITHOUT MYELOPATHY: ICD-10-CM

## 2025-06-10 DIAGNOSIS — T40.2X5A OPIOID-INDUCED CONSTIPATION: ICD-10-CM

## 2025-06-10 DIAGNOSIS — M54.12 CERVICAL RADICULOPATHY: ICD-10-CM

## 2025-06-10 DIAGNOSIS — G47.01 INSOMNIA DUE TO MEDICAL CONDITION: ICD-10-CM

## 2025-06-10 DIAGNOSIS — M96.1 POSTLAMINECTOMY SYNDROME, CERVICAL REGION: Primary | ICD-10-CM

## 2025-06-10 DIAGNOSIS — M48.061 SPINAL STENOSIS OF LUMBAR REGION WITHOUT NEUROGENIC CLAUDICATION: ICD-10-CM

## 2025-06-10 DIAGNOSIS — Z51.81 ENCOUNTER FOR THERAPEUTIC DRUG MONITORING: ICD-10-CM

## 2025-06-10 DIAGNOSIS — G25.81 RESTLESS LEG SYNDROME: ICD-10-CM

## 2025-06-10 RX ORDER — IBUPROFEN 600 MG/1
600 TABLET, FILM COATED ORAL 2 TIMES DAILY PRN
Qty: 60 TABLET | Refills: 2 | Status: SHIPPED | OUTPATIENT
Start: 2025-06-10 | End: 2025-09-08

## 2025-06-10 RX ORDER — OXYCODONE AND ACETAMINOPHEN 5; 325 MG/1; MG/1
1 TABLET ORAL EVERY 6 HOURS PRN
Qty: 112 TABLET | Refills: 0 | Status: SHIPPED | OUTPATIENT
Start: 2025-06-10 | End: 2025-07-08

## 2025-06-10 RX ORDER — ROPINIROLE 0.5 MG/1
0.5 TABLET, FILM COATED ORAL 3 TIMES DAILY
Qty: 90 TABLET | Refills: 3 | Status: SHIPPED | OUTPATIENT
Start: 2025-06-10

## 2025-06-10 NOTE — PROGRESS NOTES
Multilevel cervical spondylosis and facet arthropathy, not significantly changed.     Moderate spinal canal stenosis at C5-C6 where there is mild ventral cord compression without underlying cord signal abnormality. Mild spinal canal stenosis at C6-C7.     At least moderate to severe right C2 foraminal stenosis. Moderate to severe bilateral C6 and right C7 foraminal stenosis.     Marrow edema centered about the right atlantoaxial joint is likely reactive or stress related in the setting of moderate hypertrophic degenerative change.     Electronically signed by Yannick Longo MD    -plan for [facility] C6/C7 professional radiology    -worsening low back pain h/o trefoil canal stenosis, lumbar MRI from 2022, plan for new image to reassess and continue interventions    - Using Robaxin 3 times daily to 4 times daily as needed for spasm, refill on file  -cont refill percocet 5mg tabs TID-QID PRN pain.   -Requip helping restless leg syndrome, refill today  -Refill ibuprofen for as needed use for inflammatory pain  -f/u 4 weeks for reassessment     Current Outpatient Medications   Medication Sig Dispense Refill    mirtazapine (REMERON) 15 MG tablet Take 1 tablet by mouth nightly      oxyCODONE-acetaminophen (PERCOCET) 5-325 MG per tablet Take 1 tablet by mouth every 6 hours as needed for Pain for up to 28 days. Max Daily Amount: 4 tablets 112 tablet 0    methocarbamol (ROBAXIN) 750 MG tablet Take 1 tablet by mouth 4 times daily 120 tablet 1    ibuprofen (ADVIL;MOTRIN) 600 MG tablet Take 1 tablet by mouth 2 times daily as needed for Pain 60 tablet 2    rOPINIRole (REQUIP) 0.5 MG tablet Take 1 tablet by mouth 3 times daily 90 tablet 3    Handicap Placard MISC by Does not apply route GOOD FOR 5 YEARS 1 each 0    albuterol sulfate HFA (PROVENTIL;VENTOLIN;PROAIR) 108 (90 Base) MCG/ACT inhaler Inhale 2 puffs into the lungs every 4-6 hours as needed      augmented betamethasone dipropionate (DIPROLENE-AF) 0.05 % cream APPLY CREAM

## 2025-06-15 DIAGNOSIS — M47.817 LUMBOSACRAL SPONDYLOSIS WITHOUT MYELOPATHY: ICD-10-CM

## 2025-06-15 DIAGNOSIS — M47.816 SPONDYLOSIS OF LUMBAR REGION WITHOUT MYELOPATHY OR RADICULOPATHY: ICD-10-CM

## 2025-06-15 DIAGNOSIS — G47.01 INSOMNIA DUE TO MEDICAL CONDITION: ICD-10-CM

## 2025-06-15 DIAGNOSIS — M54.12 CERVICAL RADICULOPATHY: ICD-10-CM

## 2025-06-15 DIAGNOSIS — M96.1 POSTLAMINECTOMY SYNDROME, CERVICAL REGION: ICD-10-CM

## 2025-06-15 DIAGNOSIS — G25.81 RESTLESS LEG SYNDROME: ICD-10-CM

## 2025-06-15 DIAGNOSIS — Z51.81 ENCOUNTER FOR THERAPEUTIC DRUG MONITORING: ICD-10-CM

## 2025-06-15 DIAGNOSIS — K58.0 IRRITABLE BOWEL SYNDROME WITH DIARRHEA: ICD-10-CM

## 2025-06-15 DIAGNOSIS — G89.4 CHRONIC PAIN SYNDROME: ICD-10-CM

## 2025-06-15 DIAGNOSIS — Z79.891 ENCOUNTER FOR LONG-TERM OPIATE ANALGESIC USE: ICD-10-CM

## 2025-06-16 ENCOUNTER — HOSPITAL ENCOUNTER (OUTPATIENT)
Dept: MRI IMAGING | Age: 70
Discharge: HOME | End: 2025-06-16
Payer: MEDICARE

## 2025-06-16 DIAGNOSIS — M48.061 SPINAL STENOSIS OF LUMBAR REGION WITHOUT NEUROGENIC CLAUDICATION: ICD-10-CM

## 2025-06-16 PROCEDURE — 72148 MRI LUMBAR SPINE W/O DYE: CPT

## 2025-06-23 ENCOUNTER — TELEPHONE (OUTPATIENT)
Dept: PAIN MANAGEMENT | Age: 70
End: 2025-06-23

## 2025-07-01 ENCOUNTER — HOSPITAL ENCOUNTER (OUTPATIENT)
Dept: INTERVENTIONAL RADIOLOGY/VASCULAR | Age: 70
Discharge: HOME | End: 2025-07-03
Payer: MEDICARE

## 2025-07-01 DIAGNOSIS — M54.12 CERVICAL RADICULOPATHY: ICD-10-CM

## 2025-07-01 PROCEDURE — 6360000004 HC RX CONTRAST MEDICATION: Performed by: RADIOLOGY

## 2025-07-01 PROCEDURE — 62321 NJX INTERLAMINAR CRV/THRC: CPT

## 2025-07-01 PROCEDURE — 6360000002 HC RX W HCPCS: Performed by: RADIOLOGY

## 2025-07-01 PROCEDURE — 2709999900 HC NON-CHARGEABLE SUPPLY

## 2025-07-01 RX ORDER — TRIAMCINOLONE ACETONIDE 40 MG/ML
INJECTION, SUSPENSION INTRA-ARTICULAR; INTRAMUSCULAR PRN
Status: COMPLETED | OUTPATIENT
Start: 2025-07-01 | End: 2025-07-01

## 2025-07-01 RX ORDER — LIDOCAINE HYDROCHLORIDE 10 MG/ML
INJECTION, SOLUTION INFILTRATION; PERINEURAL PRN
Status: COMPLETED | OUTPATIENT
Start: 2025-07-01 | End: 2025-07-01

## 2025-07-01 RX ADMIN — TRIAMCINOLONE ACETONIDE 80 MG: 40 INJECTION, SUSPENSION INTRA-ARTICULAR; INTRAMUSCULAR at 10:01

## 2025-07-01 RX ADMIN — IOHEXOL 1 ML: 180 INJECTION INTRAVENOUS at 10:01

## 2025-07-01 RX ADMIN — LIDOCAINE HYDROCHLORIDE 5 ML: 10 INJECTION, SOLUTION INFILTRATION; PERINEURAL at 09:58

## 2025-07-08 ENCOUNTER — OFFICE VISIT (OUTPATIENT)
Dept: PAIN MANAGEMENT | Age: 70
End: 2025-07-08
Payer: MEDICARE

## 2025-07-08 VITALS
WEIGHT: 138 LBS | HEART RATE: 92 BPM | DIASTOLIC BLOOD PRESSURE: 91 MMHG | OXYGEN SATURATION: 96 % | SYSTOLIC BLOOD PRESSURE: 133 MMHG | BODY MASS INDEX: 22.27 KG/M2

## 2025-07-08 DIAGNOSIS — Z79.891 ENCOUNTER FOR LONG-TERM OPIATE ANALGESIC USE: ICD-10-CM

## 2025-07-08 DIAGNOSIS — M47.817 LUMBOSACRAL SPONDYLOSIS WITHOUT MYELOPATHY: ICD-10-CM

## 2025-07-08 DIAGNOSIS — G89.4 CHRONIC PAIN SYNDROME: ICD-10-CM

## 2025-07-08 DIAGNOSIS — G47.01 INSOMNIA DUE TO MEDICAL CONDITION: ICD-10-CM

## 2025-07-08 DIAGNOSIS — G25.81 RESTLESS LEG SYNDROME: ICD-10-CM

## 2025-07-08 DIAGNOSIS — M48.061 SPINAL STENOSIS OF LUMBAR REGION WITHOUT NEUROGENIC CLAUDICATION: ICD-10-CM

## 2025-07-08 DIAGNOSIS — T40.2X5A OPIOID-INDUCED CONSTIPATION: ICD-10-CM

## 2025-07-08 DIAGNOSIS — Z51.81 ENCOUNTER FOR THERAPEUTIC DRUG MONITORING: ICD-10-CM

## 2025-07-08 DIAGNOSIS — M96.1 POSTLAMINECTOMY SYNDROME, CERVICAL REGION: ICD-10-CM

## 2025-07-08 DIAGNOSIS — K58.0 IRRITABLE BOWEL SYNDROME WITH DIARRHEA: ICD-10-CM

## 2025-07-08 DIAGNOSIS — M47.816 SPONDYLOSIS OF LUMBAR REGION WITHOUT MYELOPATHY OR RADICULOPATHY: Primary | ICD-10-CM

## 2025-07-08 DIAGNOSIS — M54.12 CERVICAL RADICULOPATHY: ICD-10-CM

## 2025-07-08 DIAGNOSIS — G62.9 NEUROPATHY: ICD-10-CM

## 2025-07-08 DIAGNOSIS — K59.03 OPIOID-INDUCED CONSTIPATION: ICD-10-CM

## 2025-07-08 PROCEDURE — 99214 OFFICE O/P EST MOD 30 MIN: CPT | Performed by: NURSE PRACTITIONER

## 2025-07-08 PROCEDURE — 1160F RVW MEDS BY RX/DR IN RCRD: CPT | Performed by: NURSE PRACTITIONER

## 2025-07-08 PROCEDURE — 1123F ACP DISCUSS/DSCN MKR DOCD: CPT | Performed by: NURSE PRACTITIONER

## 2025-07-08 PROCEDURE — 1159F MED LIST DOCD IN RCRD: CPT | Performed by: NURSE PRACTITIONER

## 2025-07-08 RX ORDER — METHOCARBAMOL 750 MG/1
750 TABLET, FILM COATED ORAL 3 TIMES DAILY
Qty: 90 TABLET | Refills: 0 | OUTPATIENT
Start: 2025-07-08

## 2025-07-08 RX ORDER — OXYCODONE AND ACETAMINOPHEN 5; 325 MG/1; MG/1
1 TABLET ORAL EVERY 6 HOURS PRN
Qty: 112 TABLET | Refills: 0 | Status: SHIPPED | OUTPATIENT
Start: 2025-07-08 | End: 2025-08-05

## 2025-07-08 RX ORDER — METHOCARBAMOL 750 MG/1
750 TABLET, FILM COATED ORAL 4 TIMES DAILY
Qty: 120 TABLET | Refills: 1 | Status: SHIPPED | OUTPATIENT
Start: 2025-07-08 | End: 2025-09-06

## 2025-07-08 NOTE — PROGRESS NOTES
driving or handling machinery while adjusting the dose of medicines or if having cognitive  issues related to the current medications.Risk of overdose and death, if medicines not taken as prescribed, were also discussed. If the patient develops new symptoms or if the symptoms worsen, the patient should call the office.    While transcribing every attempt was made to maintain the accuracy of the note in terms of it's contents,there may have been some errors made inadvertently.  Thank you for allowing me to participate in the care of this patient.    Teresita Colvin, NP-C    Cc: , Prateek FOSTER MD

## 2025-08-05 ENCOUNTER — OFFICE VISIT (OUTPATIENT)
Dept: PAIN MANAGEMENT | Age: 70
End: 2025-08-05
Payer: MEDICARE

## 2025-08-05 VITALS
HEART RATE: 71 BPM | SYSTOLIC BLOOD PRESSURE: 138 MMHG | BODY MASS INDEX: 22.92 KG/M2 | OXYGEN SATURATION: 95 % | DIASTOLIC BLOOD PRESSURE: 87 MMHG | WEIGHT: 142 LBS

## 2025-08-05 DIAGNOSIS — G89.4 CHRONIC PAIN SYNDROME: ICD-10-CM

## 2025-08-05 DIAGNOSIS — M47.816 SPONDYLOSIS OF LUMBAR REGION WITHOUT MYELOPATHY OR RADICULOPATHY: Primary | ICD-10-CM

## 2025-08-05 DIAGNOSIS — G25.81 RESTLESS LEG SYNDROME: ICD-10-CM

## 2025-08-05 DIAGNOSIS — K58.0 IRRITABLE BOWEL SYNDROME WITH DIARRHEA: ICD-10-CM

## 2025-08-05 DIAGNOSIS — M54.12 CERVICAL RADICULOPATHY: ICD-10-CM

## 2025-08-05 DIAGNOSIS — G47.01 INSOMNIA DUE TO MEDICAL CONDITION: ICD-10-CM

## 2025-08-05 DIAGNOSIS — T40.2X5A OPIOID-INDUCED CONSTIPATION: ICD-10-CM

## 2025-08-05 DIAGNOSIS — M47.817 LUMBOSACRAL SPONDYLOSIS WITHOUT MYELOPATHY: ICD-10-CM

## 2025-08-05 DIAGNOSIS — M96.1 POSTLAMINECTOMY SYNDROME, CERVICAL REGION: ICD-10-CM

## 2025-08-05 DIAGNOSIS — G62.9 NEUROPATHY: ICD-10-CM

## 2025-08-05 DIAGNOSIS — K59.03 OPIOID-INDUCED CONSTIPATION: ICD-10-CM

## 2025-08-05 DIAGNOSIS — M48.061 SPINAL STENOSIS OF LUMBAR REGION WITHOUT NEUROGENIC CLAUDICATION: ICD-10-CM

## 2025-08-05 DIAGNOSIS — Z79.891 ENCOUNTER FOR LONG-TERM OPIATE ANALGESIC USE: ICD-10-CM

## 2025-08-05 DIAGNOSIS — Z51.81 ENCOUNTER FOR THERAPEUTIC DRUG MONITORING: ICD-10-CM

## 2025-08-05 PROCEDURE — 1159F MED LIST DOCD IN RCRD: CPT | Performed by: NURSE PRACTITIONER

## 2025-08-05 PROCEDURE — 1123F ACP DISCUSS/DSCN MKR DOCD: CPT | Performed by: NURSE PRACTITIONER

## 2025-08-05 PROCEDURE — 99214 OFFICE O/P EST MOD 30 MIN: CPT | Performed by: NURSE PRACTITIONER

## 2025-08-05 PROCEDURE — 1160F RVW MEDS BY RX/DR IN RCRD: CPT | Performed by: NURSE PRACTITIONER

## 2025-08-05 RX ORDER — METHOCARBAMOL 750 MG/1
750 TABLET, FILM COATED ORAL 4 TIMES DAILY
Qty: 120 TABLET | Refills: 1 | Status: SHIPPED | OUTPATIENT
Start: 2025-08-05 | End: 2025-10-04

## 2025-08-05 RX ORDER — IBUPROFEN 600 MG/1
600 TABLET, FILM COATED ORAL 2 TIMES DAILY PRN
Qty: 60 TABLET | Refills: 2 | Status: SHIPPED | OUTPATIENT
Start: 2025-08-05 | End: 2025-11-03

## 2025-08-05 RX ORDER — ROPINIROLE 0.5 MG/1
0.5 TABLET, FILM COATED ORAL 3 TIMES DAILY
Qty: 90 TABLET | Refills: 3 | Status: SHIPPED | OUTPATIENT
Start: 2025-08-05

## 2025-08-05 RX ORDER — OXYCODONE AND ACETAMINOPHEN 5; 325 MG/1; MG/1
1 TABLET ORAL EVERY 6 HOURS PRN
Qty: 112 TABLET | Refills: 0 | Status: SHIPPED | OUTPATIENT
Start: 2025-08-05 | End: 2025-09-02

## 2025-09-02 ENCOUNTER — OFFICE VISIT (OUTPATIENT)
Dept: PAIN MANAGEMENT | Age: 70
End: 2025-09-02
Payer: MEDICARE

## 2025-09-02 VITALS
DIASTOLIC BLOOD PRESSURE: 87 MMHG | WEIGHT: 139 LBS | BODY MASS INDEX: 22.44 KG/M2 | OXYGEN SATURATION: 95 % | HEART RATE: 79 BPM | SYSTOLIC BLOOD PRESSURE: 159 MMHG

## 2025-09-02 DIAGNOSIS — Z51.81 ENCOUNTER FOR THERAPEUTIC DRUG MONITORING: ICD-10-CM

## 2025-09-02 DIAGNOSIS — M47.817 LUMBOSACRAL SPONDYLOSIS WITHOUT MYELOPATHY: ICD-10-CM

## 2025-09-02 DIAGNOSIS — M47.816 SPONDYLOSIS OF LUMBAR REGION WITHOUT MYELOPATHY OR RADICULOPATHY: ICD-10-CM

## 2025-09-02 DIAGNOSIS — M96.1 POSTLAMINECTOMY SYNDROME, CERVICAL REGION: Primary | ICD-10-CM

## 2025-09-02 DIAGNOSIS — M54.12 CERVICAL RADICULOPATHY: ICD-10-CM

## 2025-09-02 DIAGNOSIS — G47.01 INSOMNIA DUE TO MEDICAL CONDITION: ICD-10-CM

## 2025-09-02 DIAGNOSIS — G89.4 CHRONIC PAIN SYNDROME: ICD-10-CM

## 2025-09-02 DIAGNOSIS — G25.81 RESTLESS LEG SYNDROME: ICD-10-CM

## 2025-09-02 DIAGNOSIS — Z79.891 ENCOUNTER FOR LONG-TERM OPIATE ANALGESIC USE: ICD-10-CM

## 2025-09-02 DIAGNOSIS — G62.9 NEUROPATHY: ICD-10-CM

## 2025-09-02 DIAGNOSIS — K58.0 IRRITABLE BOWEL SYNDROME WITH DIARRHEA: ICD-10-CM

## 2025-09-02 DIAGNOSIS — M48.061 SPINAL STENOSIS OF LUMBAR REGION WITHOUT NEUROGENIC CLAUDICATION: ICD-10-CM

## 2025-09-02 DIAGNOSIS — T40.2X5A OPIOID-INDUCED CONSTIPATION: ICD-10-CM

## 2025-09-02 DIAGNOSIS — K59.03 OPIOID-INDUCED CONSTIPATION: ICD-10-CM

## 2025-09-02 PROCEDURE — 1159F MED LIST DOCD IN RCRD: CPT | Performed by: NURSE PRACTITIONER

## 2025-09-02 PROCEDURE — 1123F ACP DISCUSS/DSCN MKR DOCD: CPT | Performed by: NURSE PRACTITIONER

## 2025-09-02 PROCEDURE — 1160F RVW MEDS BY RX/DR IN RCRD: CPT | Performed by: NURSE PRACTITIONER

## 2025-09-02 PROCEDURE — 99214 OFFICE O/P EST MOD 30 MIN: CPT | Performed by: NURSE PRACTITIONER

## 2025-09-02 RX ORDER — OXYCODONE AND ACETAMINOPHEN 5; 325 MG/1; MG/1
1 TABLET ORAL EVERY 6 HOURS PRN
Qty: 112 TABLET | Refills: 0 | Status: SHIPPED | OUTPATIENT
Start: 2025-09-02 | End: 2025-09-30

## (undated) DEVICE — SUTURE VCRL SZ 0 L27IN ABSRB UD L26MM CT-2 1/2 CIR J270H

## (undated) DEVICE — SHEET, T, LAPAROTOMY, STERILE: Brand: MEDLINE

## (undated) DEVICE — SWAB CULT DBL W/O CHAR RAYON TIP AMIES GEL CLMN FOR COLL

## (undated) DEVICE — COVER LT HNDL BLU PLAS

## (undated) DEVICE — SHEARS ENDOSCP L36CM DIA5MM ULTRASONIC CRV TIP HARM

## (undated) DEVICE — SUTURE MCRYL SZ 3-0 L27IN ABSRB UD L19MM PS-2 3/8 CIR PRIM Y427H

## (undated) DEVICE — ELECTROSURGICAL PENCIL ROCKER SWITCH NON COATED BLADE ELECTRODE 10 FT (3 M) CORD HOLSTER: Brand: MEGADYNE

## (undated) DEVICE — GLOVE SURG SZ 7 L12IN FNGR THK75MIL WHT LTX POLYMER BEAD

## (undated) DEVICE — [HIGH FLOW INSUFFLATOR,  DO NOT USE IF PACKAGE IS DAMAGED,  KEEP DRY,  KEEP AWAY FROM SUNLIGHT,  PROTECT FROM HEAT AND RADIOACTIVE SOURCES.]: Brand: PNEUMOSURE

## (undated) DEVICE — DRAPE,LAP,CHOLE,W/TROUGHS,STERILE: Brand: MEDLINE

## (undated) DEVICE — RELOAD STPL L75MM OPN H3.8MM CLS 1.5MM WIRE DIA0.2MM REG

## (undated) DEVICE — STAPLER SKIN H3.9MM WIRE DIA0.58MM CRWN 6.9MM 35 STPL ROT

## (undated) DEVICE — GOWN,SIRUS,POLYRNF,BRTHSLV,XL,30/CS: Brand: MEDLINE

## (undated) DEVICE — SNARES COLD OVAL 10MM THIN

## (undated) DEVICE — SPONGE,LAP,18"X18",DLX,XR,ST,5/PK,40/PK: Brand: MEDLINE

## (undated) DEVICE — SURE SET-DOUBLE BASIN-LF: Brand: MEDLINE INDUSTRIES, INC.

## (undated) DEVICE — INTENDED USE FOR SURGICAL MARKING ON INTACT SKIN, ALSO PROVIDES A PERMANENT METHOD OF IDENTIFYING OBJECTS IN THE OPERATING ROOM: Brand: WRITESITE® PLUS MINI PREP RESISTANT MARKER

## (undated) DEVICE — APPLICATOR PREP 26ML 0.7% IOD POVACRYLEX 74% ISO ALC ST

## (undated) DEVICE — SYRINGE MED 10ML TRNSLUC BRL PLUNG BLK MRK POLYPR CTRL

## (undated) DEVICE — TOWEL,OR,DSP,ST,BLUE,DLX,8/PK,10PK/CS: Brand: MEDLINE

## (undated) DEVICE — KIT,ANTI FOG,W/SPONGE & FLUID,SOFT PACK: Brand: MEDLINE

## (undated) DEVICE — STAPLER INT L75MM CUT LN L73MM STPL LN L77MM BLU B FRM 8

## (undated) DEVICE — TROCAR: Brand: KII SHIELDED BLADED ACCESS SYSTEM

## (undated) DEVICE — SUTURE PDS II SZ 1 L27IN ABSRB VLT CT-1 L36MM 1/2 CIR Z341H

## (undated) DEVICE — STRL PENROSE DRAIN 18" X 1/2": Brand: CARDINAL HEALTH

## (undated) DEVICE — SKIN AFFIX SURG ADHESIVE 72/CS 0.55ML: Brand: MEDLINE

## (undated) DEVICE — 1010 S-DRAPE TOWEL DRAPE 10/BX: Brand: STERI-DRAPE™

## (undated) DEVICE — SUTURE PERMAHAND SZ 3-0 L18IN NONABSORBABLE BLK L26MM SH C013D

## (undated) DEVICE — PUMP SUC IRR TBNG L10FT W/ HNDPC ASSEMB STRYKEFLOW 2

## (undated) DEVICE — CORD ES L10FT MPLR LAP

## (undated) DEVICE — PLATE ES AD W 9FT CRD 2

## (undated) DEVICE — DRAIN SURG 15FR L3 16IN DIA47MM SIL RND HUBLESS FULL FLUT

## (undated) DEVICE — SUTURE PERMA-HAND SZ 2-0 L30IN NONABSORBABLE BLK L26MM SH K833H

## (undated) DEVICE — HANDPIECE SUCTION TUBING INTERPULSE 10FT

## (undated) DEVICE — GARMENT,MEDLINE,DVT,INT,CALF,MED, GEN2: Brand: MEDLINE

## (undated) DEVICE — MEDICINE CUPS: Brand: DEROYAL

## (undated) DEVICE — TURNOVER KIT RM INF CTRL TECH

## (undated) DEVICE — GAUZE,PACKING STRIP,IODOFORM,1/2"X5YD,ST: Brand: CURAD

## (undated) DEVICE — SYRINGE IRRIG 60ML SFT PLIABLE BLB EZ TO GRP 1 HND USE W/

## (undated) DEVICE — PAD MATERNITY CURITY ADH STRIP DISP

## (undated) DEVICE — GLOVE ORANGE PI 7 1/2   MSG9075

## (undated) DEVICE — HIGH FLOW TIP

## (undated) DEVICE — SUTURE VCRL SZ 2-0 L18IN ABSRB VLT L26MM SH 1/2 CIR J775D

## (undated) DEVICE — E-Z CLEAN, NON-STICK, PTFE COATED, ELECTROSURGICAL BLADE ELECTRODE, MODIFIED EXTENDED INSULATION, 2.5 INCH (6.35 CM): Brand: MEGADYNE

## (undated) DEVICE — Device

## (undated) DEVICE — STANDARD HYPODERMIC NEEDLE,POLYPROPYLENE HUB: Brand: MONOJECT

## (undated) DEVICE — PACK,UNIVERSAL,NO GOWNS: Brand: MEDLINE

## (undated) DEVICE — PAD,ABDOMINAL,5"X9",ST,LF,25/BX: Brand: MEDLINE INDUSTRIES, INC.

## (undated) DEVICE — TROCAR: Brand: KII FIOS FIRST ENTRY

## (undated) DEVICE — PRE OP PACK: Brand: MEDLINE INDUSTRIES, INC.

## (undated) DEVICE — INTENDED FOR TISSUE SEPARATION, AND OTHER PROCEDURES THAT REQUIRE A SHARP SURGICAL BLADE TO PUNCTURE OR CUT.: Brand: BARD-PARKER ® CARBON RIB-BACK BLADES

## (undated) DEVICE — PENCIL ES ULT VAC W TELSCP NOSE EZ CLN BLDE 10FT

## (undated) DEVICE — SOLUTION INJ LR VISIV 1000ML BG

## (undated) DEVICE — SUTURE PDS II SZ 0 L60IN ABSRB VLT L48MM CTX 1/2 CIR Z990G

## (undated) DEVICE — PACK LAP IV REINF TBL CVR ADH UTIL UNDERBUTTOCK DRP W CUF

## (undated) DEVICE — SOLUTION IV 1000ML 0.9% SOD CHL

## (undated) DEVICE — SUTURE ETHLN SZ 3-0 L18IN NONABSORBABLE BLK L24MM FS-1 3/8 663G

## (undated) DEVICE — GOWN,SIRUS,POLYRNF,BRTHSLV,LG,30/CS: Brand: MEDLINE

## (undated) DEVICE — VAC VERAFLO DRESSING SYSTEM MEDIUM: Brand: V.A.C. VERAFLO™

## (undated) DEVICE — TUBING, SUCTION, 1/4" X 12', STRAIGHT: Brand: MEDLINE

## (undated) DEVICE — TRAP POLYP ETRAP

## (undated) DEVICE — DRESSING,GAUZE,XEROFORM,CURAD,5"X9",ST: Brand: CURAD

## (undated) DEVICE — APPLIER CLP M L L11.4IN DIA10MM ENDOSCP ROT MULT FOR LIG

## (undated) DEVICE — SUTURE PDS II SZ 2-0 L27IN ABSRB VLT L26MM CT-2 1/2 CIR Z333H

## (undated) DEVICE — Z INACTIVE NO SUPPLIER SOLUTIONIRRIG 3000ML 0.9% SOD CHL FLX CONT [79720808] [HOSPIRA WORLDWIDE INC]

## (undated) DEVICE — FORCEPS BX L240CM JAW DIA2.4MM ORNG L CAP W/ NDL DISP RAD

## (undated) DEVICE — DRESSING W3XL8IN PETRO CELOS ACETT FN MESH GZ ADPTC

## (undated) DEVICE — SURGICAL SET UP - SURE SET: Brand: MEDLINE INDUSTRIES, INC.

## (undated) DEVICE — PAD,NON-ADHERENT,3X8,STERILE,LF,1/PK: Brand: MEDLINE

## (undated) DEVICE — SPONGE GZ W4XL8IN COT WVN 12 PLY

## (undated) DEVICE — SUTURE VCRL SZ 3-0 L27IN ABSRB UD L26MM SH 1/2 CIR J416H

## (undated) DEVICE — APPLIER LIG CLP M L11IN TI STR RNG HNDL FOR 20 CLP DISP

## (undated) DEVICE — SUTURE MCRYL SZ 4-0 L27IN ABSRB UD L19MM PS-2 1/2 CIR PRIM Y426H

## (undated) DEVICE — 3M™ TEGADERM™ TRANSPARENT FILM DRESSING FRAME STYLE, 1626W, 4 IN X 4-3/4 IN (10 CM X 12 CM), 50/CT 4CT/CASE: Brand: 3M™ TEGADERM™

## (undated) DEVICE — STAPLER SKIN L440MM 32MM LNG 12 FIRING B FRM PWR + GRIPPING

## (undated) DEVICE — CHLORAPREP 26ML ORANGE

## (undated) DEVICE — COLOSTOMY/ILEOSTOMY KIT, FLEXWEAR: Brand: NEW IMAGE

## (undated) DEVICE — TRAY PREP DRY W/ PREM GLV 2 APPL 6 SPNG 2 UNDPD 1 OVERWRAP

## (undated) DEVICE — BANDAGE,GAUZE,BULKEE II,4.5"X4.1YD,STRL: Brand: MEDLINE

## (undated) DEVICE — SUTURE VCRL SZ 0 L27IN ABSRB UD L36MM CT-1 1/2 CIR J260H

## (undated) DEVICE — SCISSORS ENDOSCP DIA5MM CRV MPLR CAUT W/ RATCH HNDL

## (undated) DEVICE — GAUZE,SPONGE,4"X4",16PLY,XRAY,STRL,LF: Brand: MEDLINE

## (undated) DEVICE — TRAY CATHETER 16FR F INCLUDE BARDX IC COMPLT CARE DRNGE BG

## (undated) DEVICE — SUTURE CHROMIC GUT SZ 4-0 L27IN ABSRB BRN L17MM RB-1 1/2 U203H

## (undated) DEVICE — SHEET,DRAPE,53X77,STERILE: Brand: MEDLINE

## (undated) DEVICE — SYSTEM SKIN CLSR 22CM DERMBND PRINEO

## (undated) DEVICE — SUTURE VCRL SZ 3-0 L18IN ABSRB UD L26MM SH 1/2 CIR J864D

## (undated) DEVICE — TROCAR ENDOSCP L100MM DIA12MM DIL TIP OBT RADLUC STBL SL

## (undated) DEVICE — CANNULA SAMP CO2 AD GRN 7FT CO2 AND 7FT O2 TBNG UNIV CONN

## (undated) DEVICE — SIPS DUAL 2 MINUTE TIP

## (undated) DEVICE — FORCEPS BX L240CM JAW DIA2.8MM L CAP W/ NDL MIC MESH TOOTH

## (undated) DEVICE — GLOVE ORANGE PI 8 1/2   MSG9085